# Patient Record
Sex: FEMALE | Race: WHITE | HISPANIC OR LATINO | Employment: FULL TIME | URBAN - METROPOLITAN AREA
[De-identification: names, ages, dates, MRNs, and addresses within clinical notes are randomized per-mention and may not be internally consistent; named-entity substitution may affect disease eponyms.]

---

## 2017-01-30 ENCOUNTER — OFFICE VISIT (OUTPATIENT)
Dept: OBSTETRICS AND GYNECOLOGY | Facility: CLINIC | Age: 27
End: 2017-01-30
Payer: COMMERCIAL

## 2017-01-30 VITALS
DIASTOLIC BLOOD PRESSURE: 70 MMHG | WEIGHT: 198.88 LBS | BODY MASS INDEX: 33.13 KG/M2 | SYSTOLIC BLOOD PRESSURE: 120 MMHG | HEIGHT: 65 IN

## 2017-01-30 DIAGNOSIS — Z12.4 SCREENING FOR CERVICAL CANCER: ICD-10-CM

## 2017-01-30 DIAGNOSIS — Z01.419 ROUTINE GYNECOLOGICAL EXAMINATION: Primary | ICD-10-CM

## 2017-01-30 PROCEDURE — 99999 PR PBB SHADOW E&M-EST. PATIENT-LVL II: CPT | Mod: PBBFAC,,, | Performed by: OBSTETRICS & GYNECOLOGY

## 2017-01-30 PROCEDURE — 99395 PREV VISIT EST AGE 18-39: CPT | Mod: S$GLB,,, | Performed by: OBSTETRICS & GYNECOLOGY

## 2017-01-30 PROCEDURE — 88175 CYTOPATH C/V AUTO FLUID REDO: CPT

## 2017-01-30 NOTE — PROGRESS NOTES
"OBSTETRIC HISTORY:   OB History      Para Term  AB TAB SAB Ectopic Multiple Living    0 0 0 0 0 0 0 0 0 0         COMPREHENSIVE GYN HISTORY:  PAP History: Denies abnormal Paps.  Infection History: Reports STDs: Chlamydia. Denies PID.  Benign History: Denies uterine fibroids. Denies ovarian cysts. Denies endometriosis.   Cancer History: Denies cervical cancer. Denies uterine cancer or hyperplasia. Denies ovarian cancer. Denies vulvar cancer or pre-cancer. Denies vaginal cancer or pre-cancer. Denies breast cancer. Denies colon cancer.  Sexual Activity History:   reports that she currently engages in sexual activity and has had male partners. She reports using the following methods of birth control/protection: OCP and Condom.   Menstrual History:  Every 30 days, flows for 4 days. Moderate flow.  Dysmenorrhea History: Reports occ. dysmenorrhea.  Contraception: OCP        HPI:   26 y.o.  Patient's last menstrual period was 2017.   Patient is  here for her annual gynecologic exam.  She has complaints of decreased sex drive and vaginal dryness. They have been trying to get pregnant since 2016 and she is getting positive ovulation kits. She denies bladder, breast and bowel complaints.    ROS:  GENERAL: Denies weight gain or weight loss. Feeling well overall.   SKIN: Denies rash or lesions.   HEAD: Denies headache.   NODES: Denies enlarged lymph nodes.   CHEST: Denies shortness of breath.   ABDOMEN: No abdominal pain, constipation, diarrhea, nausea, vomiting or rectal bleeding.   URINARY: No frequency, dysuria, hematuria, or burning on urination.  REPRODUCTIVE: See HPI.   BREASTS: The patient denies pain, lumps, or nipple discharge.   HEMATOLOGIC: No easy bruisability.   MUSCULOSKELETAL: Denies joint pain or back pain.   NEUROLOGIC: Denies weakness.   PSYCHIATRIC: Denies depression, anxiety or mood swings.    PE:   Visit Vitals    /70    Ht 5' 5" (1.651 m)    Wt 90.2 kg (198 lb 13.7 oz)    " LMP 01/18/2017    BMI 33.09 kg/m2     APPEARANCE: Well nourished, well developed, in no acute distress.  NECK: Neck symmetric without  thyromegaly.  NODES: No inguinal, cervical lymph node enlargement.  CHEST: Lungs clear to auscultation.  HEART: Regular rate and rhythm, no murmurs, rubs or gallops.  ABDOMEN: Soft. No tenderness or masses. No hernias.  BREASTS: Symmetrical, no skin changes or visible lesions. No palpable masses, nipple discharge or adenopathy bilaterally.  PELVIC:   VULVA: No lesions. Normal female genitalia.  URETHRAL MEATUS: Normal size and location, no lesions, no prolapse.  URETHRA: No masses, tenderness, prolapse or scarring.  VAGINA: Moist and well rugated, no discharge, no significant cystocele or rectocele.  CERVIX: No lesions and discharge.  UTERUS: Normal size, regular shape, mobile, non-tender, bladder base nontender.  ADNEXA: No masses or tenderness.    PROCEDURES:  Pap smear    Assessment:  Normal Gynecologic Exam  Decreased sex drive-- natural solutions such as increase almond intake.  Vaginal dryness -- using Proseed  If not pregnant by April 2017 we can send her for HSG and  for semen analysis at [facility]    Plan:  Mammogram and Colonoscopy if indicated by current recommendations.  Return to clinic in one year or for any problems or complaints.    Counseling:  Patient was counseled today on A.C.S. Pap guidelines and recommendations for yearly pelvic exams and monthly self breast exams; to see her PCP for other health maintenance. Regular exercise and healthy diet.

## 2017-01-31 ENCOUNTER — TELEPHONE (OUTPATIENT)
Dept: FAMILY MEDICINE | Facility: CLINIC | Age: 27
End: 2017-01-31

## 2017-01-31 NOTE — TELEPHONE ENCOUNTER
----- Message from Wilberto Rosas sent at 1/30/2017  4:08 PM CST -----  Contact: self/[phone]  The doctor cancelled her appt on 3/15; she needs something close to that because she is going to be out of her synthroid.

## 2017-02-01 DIAGNOSIS — E03.9 ACQUIRED HYPOTHYROIDISM: ICD-10-CM

## 2017-02-01 RX ORDER — LEVOTHYROXINE SODIUM 50 UG/1
50 TABLET ORAL DAILY
Qty: 90 TABLET | Refills: 3 | Status: SHIPPED | OUTPATIENT
Start: 2017-02-01 | End: 2018-03-23 | Stop reason: SDUPTHER

## 2017-02-01 NOTE — TELEPHONE ENCOUNTER
refill of the following medications:   levothyroxine (SYNTHROID) 50 MCG     Preferred pharmacy: [facility]Y  - [address] AT [facility]   Comment:   Will need refills to last until next appointment in April

## 2017-02-05 DIAGNOSIS — E03.9 ACQUIRED HYPOTHYROIDISM: ICD-10-CM

## 2017-02-05 RX ORDER — LEVOTHYROXINE SODIUM 50 UG/1
TABLET ORAL
Qty: 90 TABLET | Refills: 3 | Status: SHIPPED | OUTPATIENT
Start: 2017-02-05 | End: 2017-08-18 | Stop reason: SDUPTHER

## 2017-06-13 ENCOUNTER — PATIENT MESSAGE (OUTPATIENT)
Dept: ADMINISTRATIVE | Facility: HOSPITAL | Age: 27
End: 2017-06-13

## 2017-08-16 ENCOUNTER — PATIENT MESSAGE (OUTPATIENT)
Dept: OBSTETRICS AND GYNECOLOGY | Facility: CLINIC | Age: 27
End: 2017-08-16

## 2017-08-16 DIAGNOSIS — O20.0 THREATENED ABORTION: Primary | ICD-10-CM

## 2017-08-16 NOTE — TELEPHONE ENCOUNTER
Notify needs to check UPT and if negative not uncommon to have variability in cycle length. As long as in the window of 21-35 days it is considered normal. Unfortunately when you cycles are so varied it makes it a tiny bit harder to get pregnant.

## 2017-08-18 ENCOUNTER — OFFICE VISIT (OUTPATIENT)
Dept: INTERNAL MEDICINE | Facility: CLINIC | Age: 27
End: 2017-08-18
Payer: COMMERCIAL

## 2017-08-18 ENCOUNTER — LAB VISIT (OUTPATIENT)
Dept: LAB | Facility: HOSPITAL | Age: 27
End: 2017-08-18
Attending: INTERNAL MEDICINE
Payer: COMMERCIAL

## 2017-08-18 VITALS
WEIGHT: 205.94 LBS | HEIGHT: 65 IN | RESPIRATION RATE: 15 BRPM | SYSTOLIC BLOOD PRESSURE: 117 MMHG | HEART RATE: 80 BPM | BODY MASS INDEX: 34.31 KG/M2 | DIASTOLIC BLOOD PRESSURE: 79 MMHG | TEMPERATURE: 98 F

## 2017-08-18 DIAGNOSIS — Z00.00 ROUTINE MEDICAL EXAM: ICD-10-CM

## 2017-08-18 DIAGNOSIS — Z00.00 ROUTINE MEDICAL EXAM: Primary | ICD-10-CM

## 2017-08-18 LAB
25(OH)D3+25(OH)D2 SERPL-MCNC: 22 NG/ML
ALBUMIN SERPL BCP-MCNC: 4.1 G/DL
ALP SERPL-CCNC: 68 U/L
ALT SERPL W/O P-5'-P-CCNC: 12 U/L
ANION GAP SERPL CALC-SCNC: 10 MMOL/L
AST SERPL-CCNC: 22 U/L
BASOPHILS # BLD AUTO: 0.01 K/UL
BASOPHILS NFR BLD: 0.1 %
BILIRUB SERPL-MCNC: 0.5 MG/DL
BUN SERPL-MCNC: 11 MG/DL
CALCIUM SERPL-MCNC: 9.7 MG/DL
CHLORIDE SERPL-SCNC: 106 MMOL/L
CO2 SERPL-SCNC: 21 MMOL/L
CREAT SERPL-MCNC: 0.8 MG/DL
DIFFERENTIAL METHOD: ABNORMAL
EOSINOPHIL # BLD AUTO: 0 K/UL
EOSINOPHIL NFR BLD: 0.2 %
ERYTHROCYTE [DISTWIDTH] IN BLOOD BY AUTOMATED COUNT: 12.5 %
EST. GFR  (AFRICAN AMERICAN): >60 ML/MIN/1.73 M^2
EST. GFR  (NON AFRICAN AMERICAN): >60 ML/MIN/1.73 M^2
GLUCOSE SERPL-MCNC: 91 MG/DL
HCT VFR BLD AUTO: 42.3 %
HGB BLD-MCNC: 14.3 G/DL
LYMPHOCYTES # BLD AUTO: 3 K/UL
LYMPHOCYTES NFR BLD: 24.4 %
MCH RBC QN AUTO: 31.7 PG
MCHC RBC AUTO-ENTMCNC: 33.8 G/DL
MCV RBC AUTO: 94 FL
MONOCYTES # BLD AUTO: 1 K/UL
MONOCYTES NFR BLD: 7.8 %
NEUTROPHILS # BLD AUTO: 8.3 K/UL
NEUTROPHILS NFR BLD: 67.3 %
PLATELET # BLD AUTO: 239 K/UL
PMV BLD AUTO: 11.1 FL
POTASSIUM SERPL-SCNC: 3.9 MMOL/L
PROT SERPL-MCNC: 7.5 G/DL
RBC # BLD AUTO: 4.51 M/UL
SODIUM SERPL-SCNC: 137 MMOL/L
TSH SERPL DL<=0.005 MIU/L-ACNC: 1.43 UIU/ML
WBC # BLD AUTO: 12.29 K/UL

## 2017-08-18 PROCEDURE — 99999 PR PBB SHADOW E&M-EST. PATIENT-LVL III: CPT | Mod: PBBFAC,,, | Performed by: INTERNAL MEDICINE

## 2017-08-18 PROCEDURE — 82306 VITAMIN D 25 HYDROXY: CPT

## 2017-08-18 PROCEDURE — 99395 PREV VISIT EST AGE 18-39: CPT | Mod: S$GLB,,, | Performed by: INTERNAL MEDICINE

## 2017-08-18 PROCEDURE — 85025 COMPLETE CBC W/AUTO DIFF WBC: CPT

## 2017-08-18 PROCEDURE — 84443 ASSAY THYROID STIM HORMONE: CPT

## 2017-08-18 PROCEDURE — 80053 COMPREHEN METABOLIC PANEL: CPT

## 2017-08-18 PROCEDURE — 36415 COLL VENOUS BLD VENIPUNCTURE: CPT | Mod: PO

## 2017-08-18 RX ORDER — TOBRAMYCIN 3 MG/ML
2 SOLUTION/ DROPS OPHTHALMIC EVERY 4 HOURS
COMMUNITY
End: 2017-09-18 | Stop reason: ALTCHOICE

## 2017-08-18 NOTE — PROGRESS NOTES
The patient is a 27 y.o. old female who presents to the office for a physical.    PAST MEDICAL HISTORY  Past Medical History:   Diagnosis Date    Thyroid disease        SURGICAL HISTORY:  Past Surgical History:   Procedure Laterality Date    breast reduction  06/2012         MEDS:  Medcard reviewed and updated    ALLERGIES: Allergy Card reviewed and updated    SOCIAL HISTORY:   The patient is a nonsmoker, denies alcohol or illicit drug use.    ROS:  GENERAL: No fever, chills or weight loss.  Positive fatigue.  SKIN: No rashes.  HEAD: Occasional headaches.  Denies recent head trauma.  EYES: No photophobia, ocular pain or diplopia.  EARS: Denies ear pain, discharge or vertigo.  NOSE: No epistaxis or postnasal drip.  MOUTH & THROAT: No hoarseness or change in voice.   NODES: Denies swollen glands.  CHEST: Denies shortness of breath, wheezing, cough and sputum production.  CARDIOVASCULAR: Denies chest pain or palpitations.  ABDOMEN: Appetite fine. Denies diarrhea, constipation or blood in stool.  Positive abdominal cramping.  URINARY: No dysuria or hematuria.  MUSCULOSKELETAL: No joint stiffness or swelling. Positive back pain.  NEUROLOGIC: No history of seizures.  ENDOCRINE: Denies polyuria or polydipsia.  PSYCHIATRIC: Denies mood swings, homicidal or suicidal thoughts.  Remote history of depression and anxiety.    SCREENINGS:  Last cholesterol: 2016  Last colonoscopy: none  Last mammogram: none  Last Pap smear: 2017  Last tetanus: unknown  Last Pneumovax: none  Last eye exam: 2017  Last bone density: none  Last menstrual period: July 15    PE:   Vitals:  Vitals:    08/18/17 1549   BP: 117/79   Pulse: 80   Resp: 15   Temp: 98.2 °F (36.8 °C)       APPEARANCE: Well nourished, well developed, in no acute distress.    EYES: Sclerae anicteric. PERRL. EOMI.      EARS: TM's intact. No retraction or perforation.    NOSE: Mucosa pink. Airway clear.  MOUTH & THROAT: No tonsillar enlargement. No pharyngeal erythema or exudate.  No stridor.  NECK: Supple, no thyromegaly.  CHEST: Lungs clear to auscultation with unlabored respirations.  CARDIOVASCULAR: Normal S1, S2. No murmurs. No carotid bruits. No pedal edema.  ABDOMEN: Bowel sounds normal. Not distended. Soft. No tenderness or masses.   MUSCULOSKELETAL:  Normal gait, no cyanosis or clubbing.   SKIN: Normal skin turgor, warm and dry.  NEUROLOGIC: Cranial Nerves: Intact.  PSYCHIATRIC: The patient is oriented to person, place, and time and has a pleasant affect.        ASSESSMENT/PLAN:  Sherlyn was seen today for [facility] care and other.    Diagnoses and all orders for this visit:    Routine medical exam  -     CBC auto differential; Future  -     Comprehensive metabolic panel; Future  -     TSH; Future  -     Vitamin D; Future            Answers for HPI/ROS submitted by the patient on 8/16/2017   activity change: No  unexpected weight change: No  neck pain: No  hearing loss: No  rhinorrhea: No  trouble swallowing: No  eye discharge: No  visual disturbance: No  chest tightness: No  wheezing: No  chest pain: No  palpitations: No  blood in stool: No  constipation: No  vomiting: No  diarrhea: No  polydipsia: No  polyuria: No  difficulty urinating: No  hematuria: No  menstrual problem: No  dysuria: No  joint swelling: No  arthralgias: No  headaches: No  weakness: No  confusion: No  dysphoric mood: No

## 2017-08-21 ENCOUNTER — PATIENT MESSAGE (OUTPATIENT)
Dept: OBSTETRICS AND GYNECOLOGY | Facility: CLINIC | Age: 27
End: 2017-08-21

## 2017-08-22 ENCOUNTER — LAB VISIT (OUTPATIENT)
Dept: LAB | Facility: HOSPITAL | Age: 27
End: 2017-08-22
Attending: OBSTETRICS & GYNECOLOGY
Payer: COMMERCIAL

## 2017-08-22 DIAGNOSIS — O20.0 THREATENED ABORTION: ICD-10-CM

## 2017-08-22 LAB
ABO + RH BLD: NORMAL
BLD GP AB SCN CELLS X3 SERPL QL: NORMAL
HCG INTACT+B SERPL-ACNC: 3243 MIU/ML
PROGEST SERPL-MCNC: 8.8 NG/ML

## 2017-08-22 PROCEDURE — 86901 BLOOD TYPING SEROLOGIC RH(D): CPT

## 2017-08-22 PROCEDURE — 86900 BLOOD TYPING SEROLOGIC ABO: CPT

## 2017-08-22 PROCEDURE — 84702 CHORIONIC GONADOTROPIN TEST: CPT

## 2017-08-22 PROCEDURE — 84144 ASSAY OF PROGESTERONE: CPT

## 2017-08-22 NOTE — TELEPHONE ENCOUNTER
Patient never went for labs for bhcg, progesterone and type and screen with repeat bhcg in 48 hours.Did no one answer message or notify patient? Having cramping and has history of Chlamydia.  Can take 400 Iu of vitamin D in addition to what is in prenatal vitamin.

## 2017-08-22 NOTE — TELEPHONE ENCOUNTER
Spoke with patient notified on amount of Vitamin D and labs scheduled for today and repeat on Thursday.      Do you want to see patient in the office this week or keep appointment next week on 8/31?

## 2017-08-22 NOTE — TELEPHONE ENCOUNTER
Please advise on mychart encounter:    Sukh Smith,  I went in for my regular eye exam today and they initially didn't dilate my eyes because I'm pregnant and did some scans instead. However, one of my scans showed traction behind my right eye and they decided to use the most mild dilation to make sure there wasn't anything to worry about. They blocked my tear duct so it wouldn't get into my system, but I wanted to check with you to see if this was something to be worried about. I have to see the retinal specialist on September 25th, and they said they'll probably want to dilate again. Trying not to worry, but it's easier said than done.    Thanks,  Sherlyn

## 2017-08-22 NOTE — TELEPHONE ENCOUNTER
Please advise on patients melharfariba response:    Okay, thank you.    Last night I woke up with cramps that were more intense than usual, but I did not have any bleeding and they stopped after about 10-15 minutes. I just wanted to check if this is normal? Also, I saw that in my blood work from my PCP that I had insufficient Vitamin D levels. Do I need to take an extra supplement?

## 2017-08-23 ENCOUNTER — PATIENT MESSAGE (OUTPATIENT)
Dept: OBSTETRICS AND GYNECOLOGY | Facility: CLINIC | Age: 27
End: 2017-08-23

## 2017-08-23 ENCOUNTER — TELEPHONE (OUTPATIENT)
Dept: OBSTETRICS AND GYNECOLOGY | Facility: CLINIC | Age: 27
End: 2017-08-23

## 2017-08-23 DIAGNOSIS — O36.80X0 PREGNANCY, LOCATION UNKNOWN: Primary | ICD-10-CM

## 2017-08-23 NOTE — TELEPHONE ENCOUNTER
Patient notified and verbalized understanding.  Will come tomorrow for u/s then down to [facility] for HCG draw

## 2017-08-23 NOTE — TELEPHONE ENCOUNTER
----- Message from Wilberto Rosas sent at 8/23/2017  9:34 AM CDT -----  Contact: [phone]  She is returning the nurse's call.

## 2017-08-23 NOTE — TELEPHONE ENCOUNTER
Notify progesterone is low. Needs ultrasound on Thursday after BHCG done to rule out ectopic since having so much cramping. (put in US notes rule out ectopic)

## 2017-08-24 ENCOUNTER — OFFICE VISIT (OUTPATIENT)
Dept: OBSTETRICS AND GYNECOLOGY | Facility: CLINIC | Age: 27
End: 2017-08-24
Payer: COMMERCIAL

## 2017-08-24 ENCOUNTER — LAB VISIT (OUTPATIENT)
Dept: LAB | Facility: HOSPITAL | Age: 27
End: 2017-08-24
Attending: OBSTETRICS & GYNECOLOGY
Payer: COMMERCIAL

## 2017-08-24 ENCOUNTER — PATIENT MESSAGE (OUTPATIENT)
Dept: OBSTETRICS AND GYNECOLOGY | Facility: CLINIC | Age: 27
End: 2017-08-24

## 2017-08-24 DIAGNOSIS — Z34.91 CURRENTLY PREGNANT IN FIRST TRIMESTER WITH UNKNOWN GESTATIONAL AGE: Primary | ICD-10-CM

## 2017-08-24 DIAGNOSIS — O20.0 THREATENED ABORTION: ICD-10-CM

## 2017-08-24 DIAGNOSIS — O26.899 CRAMPING COMPLICATING PREGNANCY, ANTEPARTUM: Primary | ICD-10-CM

## 2017-08-24 DIAGNOSIS — O36.80X0 PREGNANCY, LOCATION UNKNOWN: ICD-10-CM

## 2017-08-24 DIAGNOSIS — R10.9 CRAMPING COMPLICATING PREGNANCY, ANTEPARTUM: Primary | ICD-10-CM

## 2017-08-24 LAB — HCG INTACT+B SERPL-ACNC: 6094 MIU/ML

## 2017-08-24 PROCEDURE — 76817 TRANSVAGINAL US OBSTETRIC: CPT | Mod: S$GLB,,, | Performed by: OBSTETRICS & GYNECOLOGY

## 2017-08-24 PROCEDURE — 84702 CHORIONIC GONADOTROPIN TEST: CPT

## 2017-08-24 PROCEDURE — 36415 COLL VENOUS BLD VENIPUNCTURE: CPT

## 2017-08-24 RX ORDER — PROGESTERONE 200 MG/1
400 CAPSULE ORAL NIGHTLY
Qty: 60 CAPSULE | Refills: 1 | Status: SHIPPED | OUTPATIENT
Start: 2017-08-24 | End: 2018-01-10 | Stop reason: ALTCHOICE

## 2017-08-24 NOTE — TELEPHONE ENCOUNTER
Notify numbers look good. Put on schedule with ultrasound first and see me after in 3-4 weeks. I also want her to start Prometrium since her level was a little low. Rx sent

## 2017-08-25 ENCOUNTER — PATIENT MESSAGE (OUTPATIENT)
Dept: OBSTETRICS AND GYNECOLOGY | Facility: CLINIC | Age: 27
End: 2017-08-25

## 2017-08-28 NOTE — TELEPHONE ENCOUNTER
Can get OTC lidocaine patches from either [facility] or [facility]. Can use heat or ice to back. Can take tylenol if needed

## 2017-08-28 NOTE — TELEPHONE ENCOUNTER
Patient notified via [employer].      New [employer] message:    Okay, thanks. It's been on and off the past few days, but it's stayed very light and brown.     One last question, I've been having a lot of lower back spasms (usually when bending) over the last few weeks. I know I can't take much medicine, but what can I do to relieve the discomfort?    Thanks for all of your help! Sorry I have so many questions.    Sherlyn       Please advise

## 2017-08-30 ENCOUNTER — PATIENT MESSAGE (OUTPATIENT)
Dept: OBSTETRICS AND GYNECOLOGY | Facility: CLINIC | Age: 27
End: 2017-08-30

## 2017-08-30 DIAGNOSIS — O20.0 THREATENED ABORTION: Primary | ICD-10-CM

## 2017-08-30 NOTE — TELEPHONE ENCOUNTER
Please advise to my chart encounter:        Good morning,     Today is the 3rd day I've had light spotting, but this morning it was pink instead of brown. I know spotting is normal, but I just wanted to follow up and also ask at what point should I be concerned? Thanks again for all of your help.     Sherlyn

## 2017-08-30 NOTE — TELEPHONE ENCOUNTER
We can send her for a a repeat of the [facility].  Orders signed  She needs to not worry about the color of the blood as that has no meaning

## 2017-08-31 ENCOUNTER — LAB VISIT (OUTPATIENT)
Dept: LAB | Facility: HOSPITAL | Age: 27
End: 2017-08-31
Attending: OBSTETRICS & GYNECOLOGY
Payer: COMMERCIAL

## 2017-08-31 DIAGNOSIS — O20.0 THREATENED ABORTION: ICD-10-CM

## 2017-08-31 LAB — HCG INTACT+B SERPL-ACNC: NORMAL MIU/ML

## 2017-08-31 PROCEDURE — 84702 CHORIONIC GONADOTROPIN TEST: CPT

## 2017-08-31 PROCEDURE — 36415 COLL VENOUS BLD VENIPUNCTURE: CPT | Mod: PO

## 2017-09-01 ENCOUNTER — PATIENT MESSAGE (OUTPATIENT)
Dept: OBSTETRICS AND GYNECOLOGY | Facility: HOSPITAL | Age: 27
End: 2017-09-01

## 2017-09-02 ENCOUNTER — LAB VISIT (OUTPATIENT)
Dept: LAB | Facility: HOSPITAL | Age: 27
End: 2017-09-02
Attending: OBSTETRICS & GYNECOLOGY
Payer: COMMERCIAL

## 2017-09-02 DIAGNOSIS — O20.0 THREATENED ABORTION: ICD-10-CM

## 2017-09-02 LAB — HCG INTACT+B SERPL-ACNC: NORMAL MIU/ML

## 2017-09-02 PROCEDURE — 84702 CHORIONIC GONADOTROPIN TEST: CPT

## 2017-09-02 PROCEDURE — 36415 COLL VENOUS BLD VENIPUNCTURE: CPT | Mod: PO

## 2017-09-03 ENCOUNTER — PATIENT MESSAGE (OUTPATIENT)
Dept: OBSTETRICS AND GYNECOLOGY | Facility: CLINIC | Age: 27
End: 2017-09-03

## 2017-09-15 ENCOUNTER — PATIENT MESSAGE (OUTPATIENT)
Dept: OBSTETRICS AND GYNECOLOGY | Facility: CLINIC | Age: 27
End: 2017-09-15

## 2017-09-18 ENCOUNTER — ROUTINE PRENATAL (OUTPATIENT)
Dept: OBSTETRICS AND GYNECOLOGY | Facility: CLINIC | Age: 27
End: 2017-09-18
Payer: COMMERCIAL

## 2017-09-18 ENCOUNTER — LAB VISIT (OUTPATIENT)
Dept: LAB | Facility: HOSPITAL | Age: 27
End: 2017-09-18
Attending: OBSTETRICS & GYNECOLOGY
Payer: COMMERCIAL

## 2017-09-18 ENCOUNTER — OFFICE VISIT (OUTPATIENT)
Dept: OBSTETRICS AND GYNECOLOGY | Facility: CLINIC | Age: 27
End: 2017-09-18
Payer: COMMERCIAL

## 2017-09-18 VITALS — DIASTOLIC BLOOD PRESSURE: 80 MMHG | SYSTOLIC BLOOD PRESSURE: 122 MMHG | BODY MASS INDEX: 33.86 KG/M2 | WEIGHT: 203.5 LBS

## 2017-09-18 DIAGNOSIS — Z34.91 CURRENTLY PREGNANT IN FIRST TRIMESTER WITH UNKNOWN GESTATIONAL AGE: ICD-10-CM

## 2017-09-18 DIAGNOSIS — Z34.01 ENCOUNTER FOR SUPERVISION OF NORMAL FIRST PREGNANCY IN FIRST TRIMESTER: ICD-10-CM

## 2017-09-18 DIAGNOSIS — Z34.01 ENCOUNTER FOR SUPERVISION OF NORMAL FIRST PREGNANCY IN FIRST TRIMESTER: Primary | ICD-10-CM

## 2017-09-18 LAB
ERYTHROCYTE [DISTWIDTH] IN BLOOD BY AUTOMATED COUNT: 13.1 %
HCT VFR BLD AUTO: 44.2 %
HGB BLD-MCNC: 14.7 G/DL
MCH RBC QN AUTO: 31.2 PG
MCHC RBC AUTO-ENTMCNC: 33.3 G/DL
MCV RBC AUTO: 94 FL
PLATELET # BLD AUTO: 231 K/UL
PMV BLD AUTO: 11.3 FL
RBC # BLD AUTO: 4.71 M/UL
TSH SERPL DL<=0.005 MIU/L-ACNC: 0.58 UIU/ML
WBC # BLD AUTO: 11.99 K/UL

## 2017-09-18 PROCEDURE — 76801 OB US < 14 WKS SINGLE FETUS: CPT | Mod: S$GLB,,, | Performed by: OBSTETRICS & GYNECOLOGY

## 2017-09-18 PROCEDURE — 85027 COMPLETE CBC AUTOMATED: CPT

## 2017-09-18 PROCEDURE — 99999 PR PBB SHADOW E&M-EST. PATIENT-LVL II: CPT | Mod: PBBFAC,,, | Performed by: OBSTETRICS & GYNECOLOGY

## 2017-09-18 PROCEDURE — 87591 N.GONORRHOEAE DNA AMP PROB: CPT

## 2017-09-18 PROCEDURE — 0500F INITIAL PRENATAL CARE VISIT: CPT | Mod: S$GLB,,, | Performed by: OBSTETRICS & GYNECOLOGY

## 2017-09-18 PROCEDURE — 84443 ASSAY THYROID STIM HORMONE: CPT

## 2017-09-18 PROCEDURE — 36415 COLL VENOUS BLD VENIPUNCTURE: CPT

## 2017-09-18 PROCEDURE — 86762 RUBELLA ANTIBODY: CPT

## 2017-09-18 PROCEDURE — 87340 HEPATITIS B SURFACE AG IA: CPT

## 2017-09-18 PROCEDURE — 86592 SYPHILIS TEST NON-TREP QUAL: CPT

## 2017-09-18 PROCEDURE — 86703 HIV-1/HIV-2 1 RESULT ANTBDY: CPT

## 2017-09-18 PROCEDURE — 87086 URINE CULTURE/COLONY COUNT: CPT

## 2017-09-18 RX ORDER — CYANOCOBALAMIN (VITAMIN B-12) 500 MCG
400 TABLET ORAL DAILY
COMMUNITY
End: 2018-12-13

## 2017-09-18 NOTE — PROGRESS NOTES
OBSTETRIC HISTORY:   OB History      Para Term  AB Living    1 0 0 0 0 0    SAB TAB Ectopic Multiple Live Births    0 0 0 0           HPI:   27 y.o.  Patient's last menstrual period was 07/15/2017.   Patient is  here for pregnancy. She denies bladder, breast and bowel complaints.    ROS:  GENERAL: Denies weight gain or weight loss. Feeling well overall.   SKIN: Denies rash or lesions.   HEAD: Denies headache.   NODES: Denies enlarged lymph nodes.   CHEST: Denies shortness of breath.   ABDOMEN: No abdominal pain, constipation, diarrhea, nausea, vomiting or rectal bleeding.   URINARY: No frequency, dysuria, hematuria, or burning on urination.  REPRODUCTIVE: See HPI.   BREASTS: The patient denies pain, lumps, or nipple discharge.   HEMATOLOGIC: No easy bruisability.   MUSCULOSKELETAL: Denies joint pain or back pain.   NEUROLOGIC: Denies weakness.   PSYCHIATRIC: Denies depression, anxiety or mood swings.    PE:   /80   Wt 92.3 kg (203 lb 7.8 oz)   LMP 07/15/2017   BMI 33.86 kg/m²   APPEARANCE: Well nourished, well developed, in no acute distress.  ABDOMEN: Soft. No tenderness or masses. No hernias.  PELVIC:   VULVA: No lesions. Normal female genitalia.  URETHRAL MEATUS: Normal size and location, no lesions, no prolapse.  URETHRA: No masses, tenderness, prolapse or scarring.  VAGINA: Moist and well rugated, no discharge, no significant cystocele or rectocele.  CERVIX: No lesions and discharge.  UTERUS: Normal size, regular shape, mobile, non-tender, bladder base nontender.  ADNEXA: No masses or tenderness.    ASSESSMENT:  Pregnancy    PLAN:  RTO 4 weeks  GC/CT done  Patient was counseled today on routine 1st trimester precautions, including vaginal bleeding and abdominal pain. Maternal Quad screen offered - patient does desire screening.  Weight: We discussed proper weight gain based on the [employer]'s recommendations based on her pre-pregnancy weight- see below. Diet: Avoid raw meat ie  sushi, unpasteurized cheese, and heat up deli meat. Eat fish that are high in mercury (zuly mackerel, swordfish, tuna) only 6-12 oz once a week. Environment: Patient also given environmental precautions such as avoiding cat litter, Zika Virus precautions and gardening without gloves. Discussed daily prenatal vitamin with folate/iron options (i.e. stool softener, DHA) and avoidance of smoking. Regular and moderate exercise for 30 min or more per day with the avoidance of activities with a high risk of falling, prolonged supine positions, or abdominal trauma.

## 2017-09-19 ENCOUNTER — PATIENT MESSAGE (OUTPATIENT)
Dept: OBSTETRICS AND GYNECOLOGY | Facility: CLINIC | Age: 27
End: 2017-09-19

## 2017-09-19 LAB
C TRACH DNA SPEC QL NAA+PROBE: NOT DETECTED
HBV SURFACE AG SERPL QL IA: NEGATIVE
HIV 1+2 AB+HIV1 P24 AG SERPL QL IA: NEGATIVE
N GONORRHOEA DNA SPEC QL NAA+PROBE: NOT DETECTED
RPR SER QL: NORMAL
RUBV IGG SER-ACNC: 14.7 IU/ML
RUBV IGG SER-IMP: REACTIVE

## 2017-09-20 ENCOUNTER — PATIENT MESSAGE (OUTPATIENT)
Dept: OBSTETRICS AND GYNECOLOGY | Facility: CLINIC | Age: 27
End: 2017-09-20

## 2017-09-20 LAB
BACTERIA UR CULT: NORMAL
BACTERIA UR CULT: NORMAL

## 2017-09-20 NOTE — TELEPHONE ENCOUNTER
Please advise on mychart encounter:    Dr. Smith,    I forgot to mention on Monday- I've been getting a sharp kind of pulling pain behind my bellybutton- it seems to be to the left of it.  It comes and goes on and off, but it has been more continuous the past day and a half.  It's not super painful, but it is pretty uncomfortable.  Are there any stretches or anything I can do without medication to relieve the discomfort?    Thanks,  Sherlyn

## 2017-09-20 NOTE — TELEPHONE ENCOUNTER
Try some gas x as it is safe and she may want to look up exercises/stretches on line/you tube. It is most likely gas or bowel related secondary to bowel changes with pregnancy.

## 2017-10-04 ENCOUNTER — PATIENT MESSAGE (OUTPATIENT)
Dept: OBSTETRICS AND GYNECOLOGY | Facility: CLINIC | Age: 27
End: 2017-10-04

## 2017-10-05 NOTE — TELEPHONE ENCOUNTER
Needs to check her pulse when the flutter happens. In pregnancy heart rate can be normal up to 120. If it is ever greater than 120 when she is having flutter may need to go to ED debi if close to 200. She maybe having PVC's if worsens needs to see PCP

## 2017-10-09 ENCOUNTER — PATIENT MESSAGE (OUTPATIENT)
Dept: OBSTETRICS AND GYNECOLOGY | Facility: CLINIC | Age: 27
End: 2017-10-09

## 2017-10-18 ENCOUNTER — CLINICAL SUPPORT (OUTPATIENT)
Dept: OBSTETRICS AND GYNECOLOGY | Facility: CLINIC | Age: 27
End: 2017-10-18
Payer: COMMERCIAL

## 2017-10-18 ENCOUNTER — ROUTINE PRENATAL (OUTPATIENT)
Dept: OBSTETRICS AND GYNECOLOGY | Facility: CLINIC | Age: 27
End: 2017-10-18
Payer: COMMERCIAL

## 2017-10-18 VITALS
DIASTOLIC BLOOD PRESSURE: 80 MMHG | SYSTOLIC BLOOD PRESSURE: 100 MMHG | WEIGHT: 201.75 LBS | BODY MASS INDEX: 33.57 KG/M2

## 2017-10-18 DIAGNOSIS — Z23 FLU VACCINE NEED: Primary | ICD-10-CM

## 2017-10-18 DIAGNOSIS — Z34.01 ENCOUNTER FOR SUPERVISION OF NORMAL FIRST PREGNANCY IN FIRST TRIMESTER: Primary | ICD-10-CM

## 2017-10-18 PROCEDURE — 90471 IMMUNIZATION ADMIN: CPT | Mod: S$GLB,,, | Performed by: OBSTETRICS & GYNECOLOGY

## 2017-10-18 PROCEDURE — 99999 PR PBB SHADOW E&M-EST. PATIENT-LVL II: CPT | Mod: PBBFAC,,, | Performed by: OBSTETRICS & GYNECOLOGY

## 2017-10-18 PROCEDURE — 90686 IIV4 VACC NO PRSV 0.5 ML IM: CPT | Mod: S$GLB,,, | Performed by: OBSTETRICS & GYNECOLOGY

## 2017-10-18 PROCEDURE — 0502F SUBSEQUENT PRENATAL CARE: CPT | Mod: S$GLB,,, | Performed by: OBSTETRICS & GYNECOLOGY

## 2017-10-18 NOTE — PROGRESS NOTES
Flu vaccine today  Given info to read MSAFP  Sounds like esophageal spasm  Max heart rate with exercise is 150

## 2017-10-18 NOTE — PROGRESS NOTES
10/18/17 at 1604 administered 0.5 ml Fluzone Quadrivalent to R deltoid.   Pt tolerated well.   Pt advised to wait 10 minutes before leaving the office.   Pt verbalized understanding.   Pt received VIS.  Lot: [id number]  Exp: 6/30/18  Man: [employer] Paste[employer]

## 2017-10-29 ENCOUNTER — PATIENT MESSAGE (OUTPATIENT)
Dept: OBSTETRICS AND GYNECOLOGY | Facility: CLINIC | Age: 27
End: 2017-10-29

## 2017-10-30 NOTE — TELEPHONE ENCOUNTER
Please advise on any further recommendations:    From: Sherlyn Monahan     Sent: 10/29/2017  5:18 PM CDT       To: Chiara Smith MD  Subject: Non-Urgent Medical    Hey Dr. Smith,   We took family pictures today and I sat in an ant pile. I used some baking soda on the bites and took a small dose of Benadryl. Is there anything else I should do? They're in some pretty awkward places.    Thanks,  Sherlyn    From: Med Assistant Osiris  Sent: 10/30/17, 11:17 AM  To: Sherlyn Monahan  Subject: RE: Non-Urgent Medical    Have you tried Hydrocortisone cream for the itching?    To: KADEEM KYLE STAFF      From: Sherlyn Monahan      Created: 10/30/2017 11:17 AM        No but I definitely will! Thanks!

## 2017-11-13 ENCOUNTER — ROUTINE PRENATAL (OUTPATIENT)
Dept: OBSTETRICS AND GYNECOLOGY | Facility: CLINIC | Age: 27
End: 2017-11-13
Payer: COMMERCIAL

## 2017-11-13 VITALS
DIASTOLIC BLOOD PRESSURE: 66 MMHG | SYSTOLIC BLOOD PRESSURE: 120 MMHG | BODY MASS INDEX: 33.68 KG/M2 | WEIGHT: 202.38 LBS

## 2017-11-13 DIAGNOSIS — Z36.3 SCREENING, ANTENATAL, FOR MALFORMATION BY ULTRASOUND: ICD-10-CM

## 2017-11-13 DIAGNOSIS — Z3A.17 17 WEEKS GESTATION OF PREGNANCY: ICD-10-CM

## 2017-11-13 DIAGNOSIS — Z34.02 ENCOUNTER FOR SUPERVISION OF NORMAL FIRST PREGNANCY IN SECOND TRIMESTER: Primary | ICD-10-CM

## 2017-11-13 PROCEDURE — 0502F SUBSEQUENT PRENATAL CARE: CPT | Mod: S$GLB,,, | Performed by: OBSTETRICS & GYNECOLOGY

## 2017-11-13 PROCEDURE — 99999 PR PBB SHADOW E&M-EST. PATIENT-LVL II: CPT | Mod: PBBFAC,,, | Performed by: OBSTETRICS & GYNECOLOGY

## 2017-11-13 NOTE — PROGRESS NOTES
Complaining of slight bleeding, only when wiped on Saturday.  Had intercourse about a week before.      Baby friendly education given for weeks 17-20

## 2017-11-16 ENCOUNTER — PATIENT MESSAGE (OUTPATIENT)
Dept: OBSTETRICS AND GYNECOLOGY | Facility: CLINIC | Age: 27
End: 2017-11-16

## 2017-11-16 NOTE — TELEPHONE ENCOUNTER
Please advise on mychart encounter:    Good morning,    I was just wondering what is the best way to treat allergies. They've been pretty bad the past few days, but I didn't want to start taking anything I wasn't supposed to. I know you said Zyrtec is safe. Is Flonase also okay to use?    Thanks,  Sherlyn

## 2017-12-04 ENCOUNTER — PROCEDURE VISIT (OUTPATIENT)
Dept: OBSTETRICS AND GYNECOLOGY | Facility: CLINIC | Age: 27
End: 2017-12-04
Payer: COMMERCIAL

## 2017-12-04 ENCOUNTER — ROUTINE PRENATAL (OUTPATIENT)
Dept: OBSTETRICS AND GYNECOLOGY | Facility: CLINIC | Age: 27
End: 2017-12-04
Payer: COMMERCIAL

## 2017-12-04 VITALS
WEIGHT: 204.38 LBS | DIASTOLIC BLOOD PRESSURE: 62 MMHG | SYSTOLIC BLOOD PRESSURE: 116 MMHG | BODY MASS INDEX: 34.01 KG/M2

## 2017-12-04 DIAGNOSIS — Z36.3 SCREENING, ANTENATAL, FOR MALFORMATION BY ULTRASOUND: ICD-10-CM

## 2017-12-04 DIAGNOSIS — Z34.02 ENCOUNTER FOR SUPERVISION OF NORMAL FIRST PREGNANCY IN SECOND TRIMESTER: Primary | ICD-10-CM

## 2017-12-04 DIAGNOSIS — Z3A.20 20 WEEKS GESTATION OF PREGNANCY: ICD-10-CM

## 2017-12-04 PROCEDURE — 99999 PR PBB SHADOW E&M-EST. PATIENT-LVL II: CPT | Mod: PBBFAC,,, | Performed by: OBSTETRICS & GYNECOLOGY

## 2017-12-04 PROCEDURE — 76805 OB US >/= 14 WKS SNGL FETUS: CPT | Mod: S$GLB,,, | Performed by: OBSTETRICS & GYNECOLOGY

## 2017-12-04 PROCEDURE — 0502F SUBSEQUENT PRENATAL CARE: CPT | Mod: S$GLB,,, | Performed by: OBSTETRICS & GYNECOLOGY

## 2017-12-06 ENCOUNTER — TELEPHONE (OUTPATIENT)
Dept: OBSTETRICS AND GYNECOLOGY | Facility: CLINIC | Age: 27
End: 2017-12-06

## 2017-12-07 ENCOUNTER — TELEPHONE (OUTPATIENT)
Dept: OBSTETRICS AND GYNECOLOGY | Facility: CLINIC | Age: 27
End: 2017-12-07

## 2017-12-07 NOTE — TELEPHONE ENCOUNTER
Spoke with patient, she fell on her back this morning, no cramping or bleeding, back pain, pain scale 5. She want to know should she be concerned.   She spoke with a triage nurse and was told to wait for your advice.      Please advice

## 2017-12-07 NOTE — TELEPHONE ENCOUNTER
----- Message from Melba Domínguez sent at 12/7/2017  8:08 AM CST -----  Contact: self/[phone]  Patient slipped on her steps and fell on her back this morning.  She needs to know if she should be concerned.  Please call and advise.

## 2017-12-07 NOTE — TELEPHONE ENCOUNTER
If she did not hit her abdomen she does not need to do anything. Can do ice or heat to back prn and take Tylenol

## 2017-12-11 ENCOUNTER — PATIENT MESSAGE (OUTPATIENT)
Dept: OBSTETRICS AND GYNECOLOGY | Facility: CLINIC | Age: 27
End: 2017-12-11

## 2017-12-11 ENCOUNTER — TELEPHONE (OUTPATIENT)
Dept: OBSTETRICS AND GYNECOLOGY | Facility: CLINIC | Age: 27
End: 2017-12-11

## 2017-12-11 NOTE — TELEPHONE ENCOUNTER
Notify most important thing is washing hands and having him cover lesions until they are crusted over so she doesn't have contact with lesions

## 2017-12-11 NOTE — TELEPHONE ENCOUNTER
Patient notified, wants to let you know that she did have contact with him before they even knew it was shingles.    Please advise

## 2017-12-11 NOTE — TELEPHONE ENCOUNTER
Spoke with patient she is a little concern,sent message via patient portal          Good morning,     I just wanted to let you know that Will found out over the weekend that he has shingles. I've been vaccinated for chicken pox, but I didn't know if there was anything else I needed to do, and I just wanted you to be aware.     Thanks,   Sherlyn       Please advise

## 2017-12-28 ENCOUNTER — NURSE TRIAGE (OUTPATIENT)
Dept: ADMINISTRATIVE | Facility: CLINIC | Age: 27
End: 2017-12-28

## 2017-12-29 NOTE — TELEPHONE ENCOUNTER
"  Reason for Disposition   [1] Pregnant 23 or more weeks AND [2] baby moving less today AND [3] willing to perform kick count  (all triage questions negative)    Answer Assessment - Initial Assessment Questions  1. FETAL MOVEMENT: "Has the baby's movement decreased or changed significantly from normal?" (e.g., yes, no; describe)      Mom thinks they have been less than normal today- normal in am - after nap today seemed less than normal but is sure in past hr has been at least 5 times  2. SY: "What date are you expecting to deliver?"         3. PREGNANCY: "How many weeks pregnant are you?"       23/24 wks  4. OTHER SYMPTOMS: "Do you have any other symptoms?" (e.g., abdominal pain, leaking fluid from vagina, vaginal bleeding, etc.)      None reported    Protocols used: ST PREGNANCY - DECREASED FETAL MOVEMENT-A-AH    "

## 2018-01-05 ENCOUNTER — PATIENT MESSAGE (OUTPATIENT)
Dept: OBSTETRICS AND GYNECOLOGY | Facility: CLINIC | Age: 28
End: 2018-01-05

## 2018-01-05 NOTE — TELEPHONE ENCOUNTER
Notify if over the weekend thinks she has the Flu needs to go to Urgent Care otherwise can use Chloraseptic spray and cough drops.

## 2018-01-06 ENCOUNTER — PATIENT MESSAGE (OUTPATIENT)
Dept: OBSTETRICS AND GYNECOLOGY | Facility: CLINIC | Age: 28
End: 2018-01-06

## 2018-01-08 ENCOUNTER — OFFICE VISIT (OUTPATIENT)
Dept: URGENT CARE | Facility: CLINIC | Age: 28
End: 2018-01-08
Payer: COMMERCIAL

## 2018-01-08 VITALS
BODY MASS INDEX: 33.49 KG/M2 | WEIGHT: 201 LBS | DIASTOLIC BLOOD PRESSURE: 80 MMHG | OXYGEN SATURATION: 98 % | RESPIRATION RATE: 16 BRPM | HEIGHT: 65 IN | SYSTOLIC BLOOD PRESSURE: 122 MMHG | HEART RATE: 78 BPM | TEMPERATURE: 98 F

## 2018-01-08 DIAGNOSIS — R05.9 COUGH: ICD-10-CM

## 2018-01-08 DIAGNOSIS — J06.9 UPPER RESPIRATORY TRACT INFECTION, UNSPECIFIED TYPE: Primary | ICD-10-CM

## 2018-01-08 LAB
CTP QC/QA: YES
FLUAV AG NPH QL: NEGATIVE
FLUBV AG NPH QL: NEGATIVE

## 2018-01-08 PROCEDURE — 99214 OFFICE O/P EST MOD 30 MIN: CPT | Mod: S$GLB,,, | Performed by: FAMILY MEDICINE

## 2018-01-08 PROCEDURE — 87804 INFLUENZA ASSAY W/OPTIC: CPT | Mod: QW,S$GLB,, | Performed by: FAMILY MEDICINE

## 2018-01-08 NOTE — PATIENT INSTRUCTIONS
Viral Upper Respiratory Illness (Adult)  You have a viral upper respiratory illness (URI), which is another term for the common cold. This illness is contagious during the first few days. It is spread through the air by coughing and sneezing. It may also be spread by direct contact (touching the sick person and then touching your own eyes, nose, or mouth). Frequent handwashing will decrease risk of spread. Most viral illnesses go away within 7 to 10 days with rest and simple home remedies. Sometimes the illness may last for several weeks. Antibiotics will not kill a virus, and they are generally not prescribed for this condition.    Home care  · If symptoms are severe, rest at home for the first 2 to 3 days. When you resume activity, don't let yourself get too tired.  · Avoid being exposed to cigarette smoke (yours or others).  · You may use acetaminophen or ibuprofen to control pain and fever, unless another medicine was prescribed. (Note: If you have chronic liver or kidney disease, have ever had a stomach ulcer or gastrointestinal bleeding, or are taking blood-thinning medicines, talk with your healthcare provider before using these medicines.) Aspirin should never be given to anyone under 18 years of age who is ill with a viral infection or fever. It may cause severe liver or brain damage.  · Your appetite may be poor, so a light diet is fine. Avoid dehydration by drinking 6 to 8 glasses of fluids per day (water, soft drinks, juices, tea, or soup). Extra fluids will help loosen secretions in the nose and lungs.  · Over-the-counter cold medicines will not shorten the length of time youre sick, but they may be helpful for the following symptoms: cough, sore throat, and nasal and sinus congestion. (Note: Do not use decongestants if you have high blood pressure.)  Follow-up care  Follow up with your healthcare provider, or as advised.  When to seek medical advice  Call your healthcare provider right away if any  of these occur:  · Cough with lots of colored sputum (mucus)  · Severe headache; face, neck, or ear pain  · Difficulty swallowing due to throat pain  · Fever of 100.4°F (38°C)  Call 911, or get immediate medical care  Call emergency services right away if any of these occur:  · Chest pain, shortness of breath, wheezing, or difficulty breathing  · Coughing up blood  · Inability to swallow due to throat pain  Date Last Reviewed: 9/13/2015  © 0499-8265 [employer]. [address] [place] [address]. All rights reserved. This information is not intended as a substitute for professional medical care. Always follow your healthcare professional's instructions.      PREGNANT PATIENTS ARE LIMITED IN USING OVER THE COUNTER MEDICATIONS TO SUDAFED, BENADRYL, ROBITUSSIN DM AND TYLENOL.  FLONASE NASAL SPRAY WOULD ALSO BE A GOOD CHOICE.     DISCUSS ANY OTHER MEDICATIONS WITH YOUR OB BEFORE USING THEM.

## 2018-01-08 NOTE — PROGRESS NOTES
"Subjective:       Patient ID: Sherlyn Monahan is a 27 y.o. female.    Vitals:  height is 5' 5" (1.651 m) and weight is 91.2 kg (201 lb). Her oral temperature is 98.1 °F (36.7 °C). Her blood pressure is 122/80 and her pulse is 78. Her respiration is 16 and oxygen saturation is 98%.     Chief Complaint: Cough    Cough   This is a new problem. Episode onset: 4d. The problem has been gradually worsening. The cough is productive of sputum. Associated symptoms include ear congestion, headaches, nasal congestion and postnasal drip. Pertinent negatives include no chest pain, chills, ear pain, eye redness, fever, myalgias, sore throat, shortness of breath or wheezing. Nothing aggravates the symptoms. She has tried nothing for the symptoms.     Review of Systems   Constitution: Positive for malaise/fatigue. Negative for chills and fever.   HENT: Positive for congestion and postnasal drip. Negative for ear pain, hoarse voice and sore throat.    Eyes: Negative for discharge and redness.   Cardiovascular: Negative for chest pain, dyspnea on exertion and leg swelling.   Respiratory: Positive for cough and sputum production. Negative for shortness of breath and wheezing.    Musculoskeletal: Negative for myalgias.   Gastrointestinal: Positive for diarrhea. Negative for abdominal pain and nausea.   Neurological: Positive for headaches.       Objective:      Physical Exam   Constitutional: She is oriented to person, place, and time. She appears well-developed and well-nourished. She is cooperative.  Non-toxic appearance. She does not appear ill. No distress.   HENT:   Head: Normocephalic and atraumatic.   Right Ear: Hearing, external ear and ear canal normal. A middle ear effusion is present.   Left Ear: Hearing, external ear and ear canal normal. A middle ear effusion is present.   Nose: Nose normal. No mucosal edema, rhinorrhea or nasal deformity. No epistaxis. Right sinus exhibits no maxillary sinus tenderness and no frontal " sinus tenderness. Left sinus exhibits no maxillary sinus tenderness and no frontal sinus tenderness.   Mouth/Throat: Uvula is midline and mucous membranes are normal. No trismus in the jaw. Normal dentition. No uvula swelling. Posterior oropharyngeal edema present. No posterior oropharyngeal erythema.   Eyes: Conjunctivae and lids are normal. No scleral icterus.   Sclera clear bilat   Neck: Trachea normal, full passive range of motion without pain and phonation normal. Neck supple.   Cardiovascular: Normal rate, regular rhythm, normal heart sounds, intact distal pulses and normal pulses.    Pulmonary/Chest: Effort normal and breath sounds normal. No respiratory distress. She has no decreased breath sounds. She has no wheezes. She has no rhonchi. She has no rales.   Abdominal: Soft. Normal appearance and bowel sounds are normal. She exhibits no distension. There is no tenderness.   Gravid abdomen   Musculoskeletal: Normal range of motion. She exhibits no edema or deformity.   Lymphadenopathy:     She has cervical adenopathy.        Right cervical: Superficial cervical adenopathy present.        Left cervical: Superficial cervical adenopathy present.   Neurological: She is alert and oriented to person, place, and time. She exhibits normal muscle tone. Coordination normal.   Skin: Skin is warm, dry and intact. She is not diaphoretic. No pallor.   Psychiatric: She has a normal mood and affect. Her speech is normal and behavior is normal. Judgment and thought content normal. Cognition and memory are normal.   Nursing note and vitals reviewed.      Assessment:       1. Upper respiratory tract infection, unspecified type    2. Cough        Plan:         Upper respiratory tract infection, unspecified type    Cough  -     POCT Influenza A/B      Follow Up Comments   Make sure that you follow up with your primary care doctor in the next 2-5 days if needed .  Return to the Urgent Care if signs or symptoms change and certainly  if you have worsening symptoms go to the nearest emergency department for further evaluation.

## 2018-01-10 ENCOUNTER — PROCEDURE VISIT (OUTPATIENT)
Dept: OBSTETRICS AND GYNECOLOGY | Facility: CLINIC | Age: 28
End: 2018-01-10
Payer: COMMERCIAL

## 2018-01-10 ENCOUNTER — ROUTINE PRENATAL (OUTPATIENT)
Dept: OBSTETRICS AND GYNECOLOGY | Facility: CLINIC | Age: 28
End: 2018-01-10
Payer: COMMERCIAL

## 2018-01-10 VITALS — SYSTOLIC BLOOD PRESSURE: 118 MMHG | WEIGHT: 205 LBS | DIASTOLIC BLOOD PRESSURE: 62 MMHG | BODY MASS INDEX: 34.12 KG/M2

## 2018-01-10 DIAGNOSIS — Z34.02 ENCOUNTER FOR SUPERVISION OF NORMAL FIRST PREGNANCY IN SECOND TRIMESTER: Primary | ICD-10-CM

## 2018-01-10 DIAGNOSIS — Z3A.25 25 WEEKS GESTATION OF PREGNANCY: ICD-10-CM

## 2018-01-10 PROCEDURE — 99999 PR PBB SHADOW E&M-EST. PATIENT-LVL II: CPT | Mod: PBBFAC,,, | Performed by: OBSTETRICS & GYNECOLOGY

## 2018-01-10 PROCEDURE — 76816 OB US FOLLOW-UP PER FETUS: CPT | Mod: S$GLB,,, | Performed by: OBSTETRICS & GYNECOLOGY

## 2018-01-10 PROCEDURE — 0502F SUBSEQUENT PRENATAL CARE: CPT | Mod: S$GLB,,, | Performed by: OBSTETRICS & GYNECOLOGY

## 2018-01-10 NOTE — PROGRESS NOTES
DM/CBC next visit as well as TSH  Round ligament pain can try maternity belt, stretches. Also, has asymmetric pelvic crests.

## 2018-01-13 ENCOUNTER — LAB VISIT (OUTPATIENT)
Dept: LAB | Facility: HOSPITAL | Age: 28
End: 2018-01-13
Attending: OBSTETRICS & GYNECOLOGY
Payer: COMMERCIAL

## 2018-01-13 DIAGNOSIS — Z34.02 ENCOUNTER FOR SUPERVISION OF NORMAL FIRST PREGNANCY IN SECOND TRIMESTER: ICD-10-CM

## 2018-01-13 LAB
BASOPHILS # BLD AUTO: 0 K/UL
BASOPHILS NFR BLD: 0 %
DIFFERENTIAL METHOD: ABNORMAL
EOSINOPHIL # BLD AUTO: 0 K/UL
EOSINOPHIL NFR BLD: 0.1 %
ERYTHROCYTE [DISTWIDTH] IN BLOOD BY AUTOMATED COUNT: 13.1 %
GLUCOSE SERPL-MCNC: 110 MG/DL
HCT VFR BLD AUTO: 37.8 %
HGB BLD-MCNC: 12.5 G/DL
LYMPHOCYTES # BLD AUTO: 2.1 K/UL
LYMPHOCYTES NFR BLD: 23.7 %
MCH RBC QN AUTO: 31.6 PG
MCHC RBC AUTO-ENTMCNC: 33.1 G/DL
MCV RBC AUTO: 96 FL
MONOCYTES # BLD AUTO: 0.5 K/UL
MONOCYTES NFR BLD: 5.7 %
NEUTROPHILS # BLD AUTO: 6.1 K/UL
NEUTROPHILS NFR BLD: 70.3 %
PLATELET # BLD AUTO: 176 K/UL
PMV BLD AUTO: 11 FL
RBC # BLD AUTO: 3.95 M/UL
TSH SERPL DL<=0.005 MIU/L-ACNC: 1.11 UIU/ML
WBC # BLD AUTO: 8.66 K/UL

## 2018-01-13 PROCEDURE — 85025 COMPLETE CBC W/AUTO DIFF WBC: CPT

## 2018-01-13 PROCEDURE — 36415 COLL VENOUS BLD VENIPUNCTURE: CPT

## 2018-01-13 PROCEDURE — 82950 GLUCOSE TEST: CPT

## 2018-01-13 PROCEDURE — 84443 ASSAY THYROID STIM HORMONE: CPT

## 2018-01-14 ENCOUNTER — PATIENT MESSAGE (OUTPATIENT)
Dept: OBSTETRICS AND GYNECOLOGY | Facility: CLINIC | Age: 28
End: 2018-01-14

## 2018-01-18 ENCOUNTER — PATIENT MESSAGE (OUTPATIENT)
Dept: OBSTETRICS AND GYNECOLOGY | Facility: CLINIC | Age: 28
End: 2018-01-18

## 2018-02-01 ENCOUNTER — ROUTINE PRENATAL (OUTPATIENT)
Dept: OBSTETRICS AND GYNECOLOGY | Facility: CLINIC | Age: 28
End: 2018-02-01
Payer: COMMERCIAL

## 2018-02-01 VITALS
SYSTOLIC BLOOD PRESSURE: 110 MMHG | BODY MASS INDEX: 34.49 KG/M2 | WEIGHT: 207.25 LBS | DIASTOLIC BLOOD PRESSURE: 72 MMHG

## 2018-02-01 DIAGNOSIS — Z3A.28 28 WEEKS GESTATION OF PREGNANCY: ICD-10-CM

## 2018-02-01 DIAGNOSIS — Z34.03 ENCOUNTER FOR SUPERVISION OF NORMAL FIRST PREGNANCY IN THIRD TRIMESTER: Primary | ICD-10-CM

## 2018-02-01 PROCEDURE — 0502F SUBSEQUENT PRENATAL CARE: CPT | Mod: S$GLB,,, | Performed by: OBSTETRICS & GYNECOLOGY

## 2018-02-01 PROCEDURE — 99999 PR PBB SHADOW E&M-EST. PATIENT-LVL II: CPT | Mod: PBBFAC,,, | Performed by: OBSTETRICS & GYNECOLOGY

## 2018-02-07 ENCOUNTER — PATIENT MESSAGE (OUTPATIENT)
Dept: OBSTETRICS AND GYNECOLOGY | Facility: CLINIC | Age: 28
End: 2018-02-07

## 2018-02-07 NOTE — TELEPHONE ENCOUNTER
Any color when she coughs up mucous? If not most likely allergies or from viral illness. Cough can be expected for 2-3 weeks after a virus or cold

## 2018-02-19 ENCOUNTER — PROCEDURE VISIT (OUTPATIENT)
Dept: OBSTETRICS AND GYNECOLOGY | Facility: CLINIC | Age: 28
End: 2018-02-19
Payer: COMMERCIAL

## 2018-02-19 ENCOUNTER — ROUTINE PRENATAL (OUTPATIENT)
Dept: OBSTETRICS AND GYNECOLOGY | Facility: CLINIC | Age: 28
End: 2018-02-19
Payer: COMMERCIAL

## 2018-02-19 VITALS
BODY MASS INDEX: 34.74 KG/M2 | DIASTOLIC BLOOD PRESSURE: 66 MMHG | WEIGHT: 208.75 LBS | SYSTOLIC BLOOD PRESSURE: 118 MMHG

## 2018-02-19 DIAGNOSIS — Z34.03 ENCOUNTER FOR SUPERVISION OF NORMAL FIRST PREGNANCY IN THIRD TRIMESTER: Primary | ICD-10-CM

## 2018-02-19 DIAGNOSIS — Z36.89 ENCOUNTER FOR ULTRASOUND TO ASSESS INTERVAL GROWTH OF FETUS: ICD-10-CM

## 2018-02-19 DIAGNOSIS — Z3A.31 31 WEEKS GESTATION OF PREGNANCY: ICD-10-CM

## 2018-02-19 PROCEDURE — 0502F SUBSEQUENT PRENATAL CARE: CPT | Mod: S$GLB,,, | Performed by: OBSTETRICS & GYNECOLOGY

## 2018-02-19 PROCEDURE — 99999 PR PBB SHADOW E&M-EST. PATIENT-LVL II: CPT | Mod: PBBFAC,,, | Performed by: OBSTETRICS & GYNECOLOGY

## 2018-02-19 PROCEDURE — 76816 OB US FOLLOW-UP PER FETUS: CPT | Mod: S$GLB,,, | Performed by: OBSTETRICS & GYNECOLOGY

## 2018-02-19 RX ORDER — CETIRIZINE HYDROCHLORIDE 10 MG/1
10 TABLET ORAL DAILY
Status: ON HOLD | COMMUNITY
End: 2018-04-13 | Stop reason: HOSPADM

## 2018-02-19 RX ORDER — FLUTICASONE PROPIONATE 50 MCG
1 SPRAY, SUSPENSION (ML) NASAL DAILY
COMMUNITY
End: 2018-05-16

## 2018-02-20 ENCOUNTER — PATIENT MESSAGE (OUTPATIENT)
Dept: OBSTETRICS AND GYNECOLOGY | Facility: CLINIC | Age: 28
End: 2018-02-20

## 2018-02-21 RX ORDER — AMOXICILLIN AND CLAVULANATE POTASSIUM 875; 125 MG/1; MG/1
1 TABLET, FILM COATED ORAL 2 TIMES DAILY
Qty: 14 TABLET | Refills: 0 | Status: SHIPPED | OUTPATIENT
Start: 2018-02-21 | End: 2018-02-28

## 2018-02-23 ENCOUNTER — PATIENT MESSAGE (OUTPATIENT)
Dept: INTERNAL MEDICINE | Facility: CLINIC | Age: 28
End: 2018-02-23

## 2018-02-24 ENCOUNTER — PATIENT MESSAGE (OUTPATIENT)
Dept: INTERNAL MEDICINE | Facility: CLINIC | Age: 28
End: 2018-02-24

## 2018-03-02 ENCOUNTER — PATIENT MESSAGE (OUTPATIENT)
Dept: INTERNAL MEDICINE | Facility: CLINIC | Age: 28
End: 2018-03-02

## 2018-03-02 ENCOUNTER — TELEPHONE (OUTPATIENT)
Dept: INTERNAL MEDICINE | Facility: CLINIC | Age: 28
End: 2018-03-02

## 2018-03-02 DIAGNOSIS — B35.1 ONYCHOMYCOSIS: ICD-10-CM

## 2018-03-02 DIAGNOSIS — M79.676 PAIN OF TOE, UNSPECIFIED LATERALITY: Primary | ICD-10-CM

## 2018-03-05 ENCOUNTER — OFFICE VISIT (OUTPATIENT)
Dept: PEDIATRICS | Facility: CLINIC | Age: 28
End: 2018-03-05
Payer: COMMERCIAL

## 2018-03-05 ENCOUNTER — ROUTINE PRENATAL (OUTPATIENT)
Dept: OBSTETRICS AND GYNECOLOGY | Facility: CLINIC | Age: 28
End: 2018-03-05
Payer: COMMERCIAL

## 2018-03-05 VITALS
DIASTOLIC BLOOD PRESSURE: 80 MMHG | SYSTOLIC BLOOD PRESSURE: 122 MMHG | WEIGHT: 212.31 LBS | BODY MASS INDEX: 35.33 KG/M2

## 2018-03-05 DIAGNOSIS — Z3A.33 33 WEEKS GESTATION OF PREGNANCY: ICD-10-CM

## 2018-03-05 DIAGNOSIS — Z34.03 ENCOUNTER FOR SUPERVISION OF NORMAL FIRST PREGNANCY IN THIRD TRIMESTER: Primary | ICD-10-CM

## 2018-03-05 DIAGNOSIS — Z76.81 PRE-BIRTH VISIT FOR EXPECTANT PARENTS: Primary | ICD-10-CM

## 2018-03-05 PROCEDURE — 99499 UNLISTED E&M SERVICE: CPT | Mod: S$GLB,,, | Performed by: PEDIATRICS

## 2018-03-05 PROCEDURE — 99999 PR PBB SHADOW E&M-EST. PATIENT-LVL I: CPT | Mod: PBBFAC,,, | Performed by: PEDIATRICS

## 2018-03-05 PROCEDURE — 99999 PR PBB SHADOW E&M-EST. PATIENT-LVL II: CPT | Mod: PBBFAC,,, | Performed by: OBSTETRICS & GYNECOLOGY

## 2018-03-05 PROCEDURE — 0502F SUBSEQUENT PRENATAL CARE: CPT | Mod: S$GLB,,, | Performed by: OBSTETRICS & GYNECOLOGY

## 2018-03-05 NOTE — PROGRESS NOTES
Baby friendly sheet and video played  
No complaints  
85 y/o M with PMH of Dementia, HLD, HTN, DM, BPH was brought in by daughter for episode of unresponsiveness and delirium , which resolved after glucose intake. Pt was monitored in the hospital. Fingersticks were within normal, with pt eating a regular diet. Delirium resolved. It was determined to be due to hypoglycemia Pt and family educated on importance of glucose testing and to discontinue home metformin, since HgAIC 5.9. Also found to be bradycardic so metoprolol and donepezil was stopped. Pt is now cleared for discharge home with physical therapy. Glucometer with supplies sent to pharmacy.

## 2018-03-13 ENCOUNTER — HOSPITAL ENCOUNTER (OUTPATIENT)
Facility: HOSPITAL | Age: 28
Discharge: HOME | End: 2018-03-13
Attending: OBSTETRICS & GYNECOLOGY | Admitting: OBSTETRICS & GYNECOLOGY
Payer: COMMERCIAL

## 2018-03-13 VITALS
DIASTOLIC BLOOD PRESSURE: 75 MMHG | HEART RATE: 103 BPM | WEIGHT: 212 LBS | SYSTOLIC BLOOD PRESSURE: 132 MMHG | BODY MASS INDEX: 35.32 KG/M2 | HEIGHT: 65 IN

## 2018-03-13 DIAGNOSIS — O26.899 ABDOMINAL PAIN AFFECTING PREGNANCY: ICD-10-CM

## 2018-03-13 DIAGNOSIS — R10.9 ABDOMINAL PAIN AFFECTING PREGNANCY: ICD-10-CM

## 2018-03-13 PROCEDURE — 63600175 PHARM REV CODE 636 W HCPCS: Performed by: OBSTETRICS & GYNECOLOGY

## 2018-03-13 PROCEDURE — 96372 THER/PROPH/DIAG INJ SC/IM: CPT

## 2018-03-13 PROCEDURE — 96360 HYDRATION IV INFUSION INIT: CPT

## 2018-03-13 PROCEDURE — 96361 HYDRATE IV INFUSION ADD-ON: CPT

## 2018-03-13 PROCEDURE — G0378 HOSPITAL OBSERVATION PER HR: HCPCS

## 2018-03-13 PROCEDURE — 25000003 PHARM REV CODE 250: Performed by: OBSTETRICS & GYNECOLOGY

## 2018-03-13 PROCEDURE — 99211 OFF/OP EST MAY X REQ PHY/QHP: CPT

## 2018-03-13 RX ORDER — ACETAMINOPHEN 500 MG
500 TABLET ORAL EVERY 6 HOURS PRN
Status: DISCONTINUED | OUTPATIENT
Start: 2018-03-13 | End: 2018-03-13 | Stop reason: HOSPADM

## 2018-03-13 RX ORDER — SODIUM CHLORIDE, SODIUM LACTATE, POTASSIUM CHLORIDE, CALCIUM CHLORIDE 600; 310; 30; 20 MG/100ML; MG/100ML; MG/100ML; MG/100ML
INJECTION, SOLUTION INTRAVENOUS CONTINUOUS
Status: DISCONTINUED | OUTPATIENT
Start: 2018-03-13 | End: 2018-03-13 | Stop reason: HOSPADM

## 2018-03-13 RX ORDER — SODIUM CHLORIDE, SODIUM LACTATE, POTASSIUM CHLORIDE, CALCIUM CHLORIDE 600; 310; 30; 20 MG/100ML; MG/100ML; MG/100ML; MG/100ML
INJECTION, SOLUTION INTRAVENOUS CONTINUOUS
Status: DISCONTINUED | OUTPATIENT
Start: 2018-03-13 | End: 2018-03-13 | Stop reason: SDUPTHER

## 2018-03-13 RX ORDER — TERBUTALINE SULFATE 1 MG/ML
0.25 INJECTION SUBCUTANEOUS ONCE
Status: COMPLETED | OUTPATIENT
Start: 2018-03-13 | End: 2018-03-13

## 2018-03-13 RX ORDER — ONDANSETRON 8 MG/1
8 TABLET, ORALLY DISINTEGRATING ORAL EVERY 8 HOURS PRN
Status: DISCONTINUED | OUTPATIENT
Start: 2018-03-13 | End: 2018-03-13 | Stop reason: HOSPADM

## 2018-03-13 RX ADMIN — TERBUTALINE SULFATE 0.25 MG: 1 INJECTION, SOLUTION SUBCUTANEOUS at 08:03

## 2018-03-13 RX ADMIN — TERBUTALINE SULFATE 0.25 MG: 1 INJECTION, SOLUTION SUBCUTANEOUS at 06:03

## 2018-03-13 RX ADMIN — ACETAMINOPHEN 500 MG: 500 TABLET ORAL at 08:03

## 2018-03-13 RX ADMIN — SODIUM CHLORIDE, POTASSIUM CHLORIDE, SODIUM LACTATE AND CALCIUM CHLORIDE: 600; 310; 30; 20 INJECTION, SOLUTION INTRAVENOUS at 06:03

## 2018-03-13 RX ADMIN — SODIUM CHLORIDE, POTASSIUM CHLORIDE, SODIUM LACTATE AND CALCIUM CHLORIDE: 600; 310; 30; 20 INJECTION, SOLUTION INTRAVENOUS at 08:03

## 2018-03-13 NOTE — PROGRESS NOTES
0515 Pt arrived to labor and delivery with c/o contractions, pt admitted to tr3, pt gowned and to bed, attached to external monitors, pt denies any vaginal bleeding or leakage of fluid, pt denies any complications with the pregnancy.     0600 DR. Miller called and notified of pts arrival with c/o contractions, pt is amparo 3-6minutes, pt is 1cm dilated, received order for brethine and IV fluids.

## 2018-03-13 NOTE — PLAN OF CARE
1000 spoke to Dr. Triplett; recheck pt and if still 1 cm ok to d/c home and f/u w/ Dr Smith as scheduled.    1020 SVE per RN, no change from prev exam. Pt. D/c home w. PTL precautions; d/c instructions given via teachback method. Pt & sig other demonstrated understanding.    1050 pt ambulatory off unit

## 2018-03-13 NOTE — PLAN OF CARE
0820 spoke to Dr. Triplett on call for Luis; ok to repeat terb dose and maint IVFs lR at 125ml/hr. Will continue to monitor.

## 2018-03-13 NOTE — DISCHARGE INSTRUCTIONS
Home Undelivered Discharge Instructions    After Discharge Orders:    Future Appointments  Date Time Provider Department Center   3/19/2018 3:00 PM ULTRASOUND, [facility]   3/19/2018 3:30 PM Chiara Smith MD [facility]   3/26/2018 3:15 PM Chiara Smith MD [facility]   4/2/2018 3:30 PM Chiara Smith MD [facility]   4/9/2018 9:30 AM Chiara Smith MD [facility]   4/16/2018 3:00 PM Chiara Smith MD [facility]           Current Discharge Medication List      CONTINUE these medications which have NOT CHANGED    Details   cetirizine (ZYRTEC) 10 MG tablet Take 10 mg by mouth once daily.      cholecalciferol, vitamin D3, (VITAMIN D3) 400 unit Tab Take 400 Units by mouth once daily.      fluticasone (FLONASE) 50 mcg/actuation nasal spray 1 spray by Each Nare route once daily.      levothyroxine (SYNTHROID) 50 MCG tablet Take 1 tablet (50 mcg total) by mouth once daily.  Qty: 90 tablet, Refills: 3    Associated Diagnoses: Acquired hypothyroidism      prenatal cmb#95-iron-FA-dha 28 mg iron-800 mcg-200 mg Cmpk Take by mouth.      ranitidine (ZANTAC) 75 MG tablet Take 75 mg by mouth once daily.                      · Diet:  normal diet as tolerated    · Rest: normal activity as tolerated    Other instructions: Do kick counts once a day on your baby. Choose the time of day your baby is most active. Get in a comfortable lying or sitting position and time how long it takes to feel 10 kicks, twists, turns, swishes, or rolls. Call your physician or midwife if there have not been 10 kicks in 1.5 hours    Call physician or midwife, return to Labor and Delivery, call 911, or go to the nearest Emergency Room if: increased leakage or fluid, contractions more than  5 per  30 minutes, decreased fetal movement, persistent low back pain or cramping, bleeding from vaginal area, difficulty urinating, pain with urination, difficulty breathing, new calf pain,  persistent headache or vision change

## 2018-03-14 ENCOUNTER — TELEPHONE (OUTPATIENT)
Dept: OBSTETRICS AND GYNECOLOGY | Facility: CLINIC | Age: 28
End: 2018-03-14

## 2018-03-14 ENCOUNTER — PATIENT MESSAGE (OUTPATIENT)
Dept: OBSTETRICS AND GYNECOLOGY | Facility: CLINIC | Age: 28
End: 2018-03-14

## 2018-03-14 NOTE — TELEPHONE ENCOUNTER
Notified to keep appointment on Monday with ultrasound first.  Continue to monitor contractions at home, if starts amparo again then she will need to return to L&D.  Advised per  to stay home from work until see comes in for her appointment on Monday.  Patient verbalized understanding

## 2018-03-14 NOTE — TELEPHONE ENCOUNTER
----- Message from Susan Winkler sent at 3/14/2018  9:31 AM CDT -----  No. [phone]   OB Patient is 34 weeks pregnant.   She went to L & D on 3/13/18 because of contractions.    She is calling to follow up.   Please call.

## 2018-03-15 ENCOUNTER — PATIENT MESSAGE (OUTPATIENT)
Dept: PEDIATRICS | Facility: CLINIC | Age: 28
End: 2018-03-15

## 2018-03-18 ENCOUNTER — NURSE TRIAGE (OUTPATIENT)
Dept: ADMINISTRATIVE | Facility: CLINIC | Age: 28
End: 2018-03-18

## 2018-03-18 NOTE — TELEPHONE ENCOUNTER
"  Reason for Disposition   MODERATE-SEVERE abdominal pain    Answer Assessment - Initial Assessment Questions  1. ONSET: "When did the symptoms begin?"         Today-one hour ago  2. CONTRACTIONS: "Describe the contractions that you are having." (e.g., duration, frequency, regularity, severity)     5-10 mins apart, moderate pain in back and front   SY: "What date are you expecting to deliver?"        4. PARITY: "Have you had a baby before?" If yes, "How long did the labor last?"     first  5. FETAL MOVEMENT: "Has the baby's movement decreased or changed significantly from normal?"   normal  6. OTHER SYMPTOMS: "Do you have any other symptoms?" (e.g., leaking fluid from vagina, fever, hand/facial swelling)   no    Protocols used: ST PREGNANCY - LABOR - [id number]    "

## 2018-03-19 ENCOUNTER — CLINICAL SUPPORT (OUTPATIENT)
Dept: OBSTETRICS AND GYNECOLOGY | Facility: CLINIC | Age: 28
End: 2018-03-19
Payer: COMMERCIAL

## 2018-03-19 ENCOUNTER — ROUTINE PRENATAL (OUTPATIENT)
Dept: OBSTETRICS AND GYNECOLOGY | Facility: CLINIC | Age: 28
End: 2018-03-19
Payer: COMMERCIAL

## 2018-03-19 ENCOUNTER — PROCEDURE VISIT (OUTPATIENT)
Dept: OBSTETRICS AND GYNECOLOGY | Facility: CLINIC | Age: 28
End: 2018-03-19
Payer: COMMERCIAL

## 2018-03-19 VITALS
WEIGHT: 208.56 LBS | SYSTOLIC BLOOD PRESSURE: 100 MMHG | BODY MASS INDEX: 34.71 KG/M2 | DIASTOLIC BLOOD PRESSURE: 64 MMHG

## 2018-03-19 DIAGNOSIS — Z3A.35 35 WEEKS GESTATION OF PREGNANCY: ICD-10-CM

## 2018-03-19 DIAGNOSIS — Z36.85 ANTENATAL SCREENING FOR STREPTOCOCCUS B: ICD-10-CM

## 2018-03-19 DIAGNOSIS — Z23 NEED FOR DIPHTHERIA-TETANUS-PERTUSSIS (TDAP) VACCINE, ADULT/ADOLESCENT: Primary | ICD-10-CM

## 2018-03-19 DIAGNOSIS — Z36.89 ENCOUNTER FOR ULTRASOUND TO ASSESS INTERVAL GROWTH OF FETUS: ICD-10-CM

## 2018-03-19 DIAGNOSIS — Z34.03 ENCOUNTER FOR SUPERVISION OF NORMAL FIRST PREGNANCY IN THIRD TRIMESTER: Primary | ICD-10-CM

## 2018-03-19 PROCEDURE — 99999 PR PBB SHADOW E&M-EST. PATIENT-LVL II: CPT | Mod: PBBFAC,,,

## 2018-03-19 PROCEDURE — 90715 TDAP VACCINE 7 YRS/> IM: CPT | Mod: S$GLB,,, | Performed by: OBSTETRICS & GYNECOLOGY

## 2018-03-19 PROCEDURE — 0502F SUBSEQUENT PRENATAL CARE: CPT | Mod: S$GLB,,, | Performed by: OBSTETRICS & GYNECOLOGY

## 2018-03-19 PROCEDURE — 99999 PR PBB SHADOW E&M-EST. PATIENT-LVL II: CPT | Mod: PBBFAC,,, | Performed by: OBSTETRICS & GYNECOLOGY

## 2018-03-19 PROCEDURE — 87081 CULTURE SCREEN ONLY: CPT

## 2018-03-19 PROCEDURE — 90471 IMMUNIZATION ADMIN: CPT | Mod: S$GLB,,, | Performed by: OBSTETRICS & GYNECOLOGY

## 2018-03-19 PROCEDURE — 76816 OB US FOLLOW-UP PER FETUS: CPT | Mod: S$GLB,,, | Performed by: OBSTETRICS & GYNECOLOGY

## 2018-03-19 NOTE — PROGRESS NOTES
Administered TDAP 0.5 ml   IM inj in R Deltoid  Patient Tolerated well.  Patient instructed to wait 15 minutes in the waiting room after injection and to monitor for reactions.  Patient verbalized understanding.   Lot: [id number]  Exp: [id number]

## 2018-03-19 NOTE — PROGRESS NOTES
Might even just be external os open. Can retrun to work 3/21/18.  GBBS done  Labor precautions given.  TDap Today  Can use KT tape for hips or lidocaine patches

## 2018-03-22 LAB — BACTERIA SPEC AEROBE CULT: NORMAL

## 2018-03-23 DIAGNOSIS — E03.9 ACQUIRED HYPOTHYROIDISM: ICD-10-CM

## 2018-03-23 RX ORDER — LEVOTHYROXINE SODIUM 50 UG/1
TABLET ORAL
Qty: 90 TABLET | Refills: 1 | Status: SHIPPED | OUTPATIENT
Start: 2018-03-23 | End: 2018-10-16 | Stop reason: SDUPTHER

## 2018-03-26 ENCOUNTER — ROUTINE PRENATAL (OUTPATIENT)
Dept: OBSTETRICS AND GYNECOLOGY | Facility: CLINIC | Age: 28
End: 2018-03-26
Payer: COMMERCIAL

## 2018-03-26 VITALS
SYSTOLIC BLOOD PRESSURE: 110 MMHG | DIASTOLIC BLOOD PRESSURE: 76 MMHG | BODY MASS INDEX: 35.29 KG/M2 | WEIGHT: 212.06 LBS

## 2018-03-26 DIAGNOSIS — Z34.03 ENCOUNTER FOR SUPERVISION OF NORMAL FIRST PREGNANCY IN THIRD TRIMESTER: Primary | ICD-10-CM

## 2018-03-26 DIAGNOSIS — Z3A.36 36 WEEKS GESTATION OF PREGNANCY: ICD-10-CM

## 2018-03-26 PROCEDURE — 99999 PR PBB SHADOW E&M-EST. PATIENT-LVL II: CPT | Mod: PBBFAC,,, | Performed by: OBSTETRICS & GYNECOLOGY

## 2018-03-26 PROCEDURE — 0502F SUBSEQUENT PRENATAL CARE: CPT | Mod: S$GLB,,, | Performed by: OBSTETRICS & GYNECOLOGY

## 2018-04-02 ENCOUNTER — ROUTINE PRENATAL (OUTPATIENT)
Dept: OBSTETRICS AND GYNECOLOGY | Facility: CLINIC | Age: 28
End: 2018-04-02
Payer: COMMERCIAL

## 2018-04-02 VITALS
SYSTOLIC BLOOD PRESSURE: 102 MMHG | DIASTOLIC BLOOD PRESSURE: 68 MMHG | BODY MASS INDEX: 35.66 KG/M2 | WEIGHT: 214.31 LBS

## 2018-04-02 DIAGNOSIS — Z34.03 ENCOUNTER FOR SUPERVISION OF NORMAL FIRST PREGNANCY IN THIRD TRIMESTER: Primary | ICD-10-CM

## 2018-04-02 DIAGNOSIS — Z3A.37 37 WEEKS GESTATION OF PREGNANCY: ICD-10-CM

## 2018-04-02 PROCEDURE — 99999 PR PBB SHADOW E&M-EST. PATIENT-LVL II: CPT | Mod: PBBFAC,,, | Performed by: OBSTETRICS & GYNECOLOGY

## 2018-04-02 PROCEDURE — 0502F SUBSEQUENT PRENATAL CARE: CPT | Mod: S$GLB,,, | Performed by: OBSTETRICS & GYNECOLOGY

## 2018-04-03 ENCOUNTER — OFFICE VISIT (OUTPATIENT)
Dept: PODIATRY | Facility: CLINIC | Age: 28
End: 2018-04-03
Payer: COMMERCIAL

## 2018-04-03 VITALS
DIASTOLIC BLOOD PRESSURE: 62 MMHG | SYSTOLIC BLOOD PRESSURE: 118 MMHG | WEIGHT: 214 LBS | HEART RATE: 75 BPM | HEIGHT: 65 IN | BODY MASS INDEX: 35.65 KG/M2

## 2018-04-03 DIAGNOSIS — S99.922S INJURY OF TOENAIL OF LEFT FOOT, SEQUELA: Primary | ICD-10-CM

## 2018-04-03 PROCEDURE — 99203 OFFICE O/P NEW LOW 30 MIN: CPT | Mod: S$GLB,,, | Performed by: PODIATRIST

## 2018-04-03 PROCEDURE — 99999 PR PBB SHADOW E&M-EST. PATIENT-LVL III: CPT | Mod: PBBFAC,,, | Performed by: PODIATRIST

## 2018-04-03 NOTE — LETTER
April 3, 2018      Samanta Jimenez MD  [address] [facility] [address]           [facility]  [address] [facility] [address]  Phone: [phone]          Patient: Sherlyn Monahan   MR Number: [id number]   YOB: 1990   Date of Visit: 4/3/2018       Dear Dr. Samanta Jimenez:    Thank you for referring Sherlyn Monahan to me for evaluation. Attached you will find relevant portions of my assessment and plan of care.    If you have questions, please do not hesitate to call me. I look forward to following Sherlyn Monahan along with you.    Sincerely,    Amparo Wolfe DPM    Enclosure  CC:  No Recipients    If you would like to receive this communication electronically, please contact [email or web address] or [phone] to request more information on [facility] access.    For providers and/or their staff who would like to refer a patient to [facility], please contact us through our one-stop-shop provider referral line, Tiburcio Carpenter, at [phone].    If you feel you have received this communication in error or would no longer like to receive these types of communications, please e-mail [email or web address]

## 2018-04-03 NOTE — PROGRESS NOTES
Chief Complaint   Patient presents with    Toe Pain     left great toe           HPI:   Sherlyn Monahan is a 27 y.o. female with complaints of  left great toe discoloration.  Fell and hurt her toe in December.  Nail has been dark and thickened since then.  No self treatment at home yet.   No drainage reported.         Past Medical History:   Diagnosis Date    Hypothyroidism     Pregnant and not yet delivered in second trimester     25 weeks    Thyroid disease              Current Outpatient Prescriptions on File Prior to Visit   Medication Sig Dispense Refill    cetirizine (ZYRTEC) 10 MG tablet Take 10 mg by mouth once daily.      cholecalciferol, vitamin D3, (VITAMIN D3) 400 unit Tab Take 400 Units by mouth once daily.      fluticasone (FLONASE) 50 mcg/actuation nasal spray 1 spray by Each Nare route once daily.      prenatal cmb#95-iron-FA-dha 28 mg iron-800 mcg-200 mg Cmpk Take by mouth.      ranitidine (ZANTAC) 75 MG tablet Take 75 mg by mouth once daily.       SYNTHROID 50 mcg tablet TAKE 1 TABLET BY MOUTH DAILY 90 tablet 1     No current facility-administered medications on file prior to visit.            Review of patient's allergies indicates:   Allergen Reactions    Minocycline            Social History     Social History    Marital status:      Spouse name: N/A    Number of children: N/A    Years of education: N/A     Occupational History    Not on file.     Social History Main Topics    Smoking status: Former Smoker    Smokeless tobacco: Never Used    Alcohol use No    Drug use: No    Sexual activity: Yes     Partners: Male     Birth control/ protection: None     Other Topics Concern    Not on file     Social History Narrative    No narrative on file             ROS:   General ROS: negative for - chills, fever or night sweats  Respiratory ROS: no cough, shortness of breath, or wheezing  Cardiovascular ROS: no chest pain or dyspnea on exertion  Musculoskeletal ROS:  "negative  Neurological ROS: no TIA or stroke symptoms  Dermatological ROS: negative      EXAM:     Vitals:    04/03/18 1506   BP: 118/62   Pulse: 75   Weight: 97.1 kg (214 lb)   Height: 5' 5" (1.651 m)        General:  Alert, oriented, no acute distress      Left  Lower extremity exam:    Vascular:   Dorsalis Pedis:  present   Posterior Tibial:  present  capillary refill time:  3 seconds  Temperature of toes warm to touch  Hair on toes:  present    none Edema to affected area.        Neurological:     sharp dull - L normal and light touch - L normal        Dermatological:   There is intact skin tone, turgor, and temperature.    Wounds: none  Erythema:  none  left hallux toenail with new nail growth at the proximal border and appears normal.  Old nail on top with dried dark blood in between. No paronychia.  No open wounds.  No drainage.  No cellulitis.             Musculoskeletal:   Metatarsophalangeal, subtalar, and ankle range of motion are 5/5, adequate ROM, adequate strength  Right foot: no gross deformity  Left foot: no gross deformity            ASSESSMENT/PLAN:        I counseled the patient on the patient's conditions, their implications and medical management.        Injury of toenail of left foot, sequela      - reassurance.  No acute infection.  I trimmed and filed the old toenail off and cleaned the area.  Patient reported relief.  She is to monitor the toe daily.  A new nail may take 6-8 months to fully grow out and no guarantees as the final appearance of the toenail    May consider applying kerasal nail over the area as the nail grows out to reduce change of fungal infection.  Patient is amenable to plan.  Call or return to clinic prn if these symptoms worsen or fail to improve as anticipated.       "

## 2018-04-05 ENCOUNTER — PATIENT MESSAGE (OUTPATIENT)
Dept: OBSTETRICS AND GYNECOLOGY | Facility: CLINIC | Age: 28
End: 2018-04-05

## 2018-04-05 NOTE — TELEPHONE ENCOUNTER
"As long as not associated with other symptoms may just be vertigo (does she feel like she is walking crooked or the room is spinning) or dizziness more like a "fainting type of dizziness"??? Maybe 2/2 allergies as well and sinuses  "

## 2018-04-09 ENCOUNTER — ROUTINE PRENATAL (OUTPATIENT)
Dept: OBSTETRICS AND GYNECOLOGY | Facility: CLINIC | Age: 28
End: 2018-04-09
Payer: COMMERCIAL

## 2018-04-09 VITALS
WEIGHT: 212.06 LBS | DIASTOLIC BLOOD PRESSURE: 72 MMHG | SYSTOLIC BLOOD PRESSURE: 122 MMHG | BODY MASS INDEX: 35.29 KG/M2

## 2018-04-09 DIAGNOSIS — Z3A.38 38 WEEKS GESTATION OF PREGNANCY: ICD-10-CM

## 2018-04-09 DIAGNOSIS — Z34.03 ENCOUNTER FOR SUPERVISION OF NORMAL FIRST PREGNANCY IN THIRD TRIMESTER: Primary | ICD-10-CM

## 2018-04-09 PROCEDURE — 0502F SUBSEQUENT PRENATAL CARE: CPT | Mod: S$GLB,,, | Performed by: OBSTETRICS & GYNECOLOGY

## 2018-04-09 PROCEDURE — 99999 PR PBB SHADOW E&M-EST. PATIENT-LVL II: CPT | Mod: PBBFAC,,, | Performed by: OBSTETRICS & GYNECOLOGY

## 2018-04-11 ENCOUNTER — HOSPITAL ENCOUNTER (INPATIENT)
Facility: HOSPITAL | Age: 28
LOS: 3 days | Discharge: HOME | End: 2018-04-14
Attending: OBSTETRICS & GYNECOLOGY | Admitting: OBSTETRICS & GYNECOLOGY
Payer: COMMERCIAL

## 2018-04-11 ENCOUNTER — ANESTHESIA EVENT (OUTPATIENT)
Dept: OBSTETRICS AND GYNECOLOGY | Facility: HOSPITAL | Age: 28
End: 2018-04-11
Payer: COMMERCIAL

## 2018-04-11 ENCOUNTER — ANESTHESIA (OUTPATIENT)
Dept: OBSTETRICS AND GYNECOLOGY | Facility: HOSPITAL | Age: 28
End: 2018-04-11
Payer: COMMERCIAL

## 2018-04-11 VITALS — OXYGEN SATURATION: 98 % | SYSTOLIC BLOOD PRESSURE: 124 MMHG | DIASTOLIC BLOOD PRESSURE: 35 MMHG | HEART RATE: 109 BPM

## 2018-04-11 DIAGNOSIS — O47.9 UTERINE CONTRACTIONS DURING PREGNANCY: ICD-10-CM

## 2018-04-11 DIAGNOSIS — Z98.891 S/P C-SECTION: ICD-10-CM

## 2018-04-11 PROBLEM — O33.2 CEPHALOPELVIC DISPROPORTION DUE TO INLET CONTRACTION OF PELVIS: Status: ACTIVE | Noted: 2018-04-11

## 2018-04-11 LAB
ABO + RH BLD: NORMAL
BASOPHILS # BLD AUTO: 0.02 K/UL
BASOPHILS NFR BLD: 0.2 %
BLD GP AB SCN CELLS X3 SERPL QL: NORMAL
DIFFERENTIAL METHOD: ABNORMAL
EOSINOPHIL # BLD AUTO: 0 K/UL
EOSINOPHIL NFR BLD: 0.1 %
ERYTHROCYTE [DISTWIDTH] IN BLOOD BY AUTOMATED COUNT: 13.3 %
HCT VFR BLD AUTO: 45 %
HGB BLD-MCNC: 14.8 G/DL
LYMPHOCYTES # BLD AUTO: 2.4 K/UL
LYMPHOCYTES NFR BLD: 26.6 %
MCH RBC QN AUTO: 31.4 PG
MCHC RBC AUTO-ENTMCNC: 32.9 G/DL
MCV RBC AUTO: 96 FL
MONOCYTES # BLD AUTO: 0.7 K/UL
MONOCYTES NFR BLD: 7.7 %
NEUTROPHILS # BLD AUTO: 5.8 K/UL
NEUTROPHILS NFR BLD: 65.3 %
PLATELET # BLD AUTO: 163 K/UL
PMV BLD AUTO: 12 FL
RBC # BLD AUTO: 4.71 M/UL
WBC # BLD AUTO: 8.87 K/UL

## 2018-04-11 PROCEDURE — 63600175 PHARM REV CODE 636 W HCPCS: Performed by: ANESTHESIOLOGY

## 2018-04-11 PROCEDURE — 11000001 HC ACUTE MED/SURG PRIVATE ROOM

## 2018-04-11 PROCEDURE — 63600175 PHARM REV CODE 636 W HCPCS: Performed by: OBSTETRICS & GYNECOLOGY

## 2018-04-11 PROCEDURE — 25000003 PHARM REV CODE 250: Performed by: STUDENT IN AN ORGANIZED HEALTH CARE EDUCATION/TRAINING PROGRAM

## 2018-04-11 PROCEDURE — 72100002 HC LABOR CARE, 1ST 8 HOURS

## 2018-04-11 PROCEDURE — 59025 FETAL NON-STRESS TEST: CPT

## 2018-04-11 PROCEDURE — 59510 CESAREAN DELIVERY: CPT | Mod: AT,,, | Performed by: OBSTETRICS & GYNECOLOGY

## 2018-04-11 PROCEDURE — 27200710 HC EPIDURAL INFUSION PUMP SET: Performed by: STUDENT IN AN ORGANIZED HEALTH CARE EDUCATION/TRAINING PROGRAM

## 2018-04-11 PROCEDURE — 27800516 HC TRAY, EPIDURAL COMBO: Performed by: STUDENT IN AN ORGANIZED HEALTH CARE EDUCATION/TRAINING PROGRAM

## 2018-04-11 PROCEDURE — 85025 COMPLETE CBC W/AUTO DIFF WBC: CPT

## 2018-04-11 PROCEDURE — 25000003 PHARM REV CODE 250: Performed by: ANESTHESIOLOGY

## 2018-04-11 PROCEDURE — 62326 NJX INTERLAMINAR LMBR/SAC: CPT | Performed by: ANESTHESIOLOGY

## 2018-04-11 PROCEDURE — 86850 RBC ANTIBODY SCREEN: CPT

## 2018-04-11 PROCEDURE — 10907ZC DRAINAGE OF AMNIOTIC FLUID, THERAPEUTIC FROM PRODUCTS OF CONCEPTION, VIA NATURAL OR ARTIFICIAL OPENING: ICD-10-PCS | Performed by: OBSTETRICS & GYNECOLOGY

## 2018-04-11 PROCEDURE — 86592 SYPHILIS TEST NON-TREP QUAL: CPT

## 2018-04-11 PROCEDURE — S0028 INJECTION, FAMOTIDINE, 20 MG: HCPCS

## 2018-04-11 PROCEDURE — 27800516 HC TRAY, EPIDURAL COMBO: Performed by: ANESTHESIOLOGY

## 2018-04-11 PROCEDURE — 99211 OFF/OP EST MAY X REQ PHY/QHP: CPT | Mod: 25

## 2018-04-11 PROCEDURE — 25000003 PHARM REV CODE 250

## 2018-04-11 PROCEDURE — 25000003 PHARM REV CODE 250: Performed by: OBSTETRICS & GYNECOLOGY

## 2018-04-11 PROCEDURE — 51702 INSERT TEMP BLADDER CATH: CPT

## 2018-04-11 RX ORDER — FENTANYL CITRATE 50 UG/ML
INJECTION, SOLUTION INTRAMUSCULAR; INTRAVENOUS
Status: DISCONTINUED | OUTPATIENT
Start: 2018-04-11 | End: 2018-04-11

## 2018-04-11 RX ORDER — CEFAZOLIN SODIUM 1 G/3ML
INJECTION, POWDER, FOR SOLUTION INTRAMUSCULAR; INTRAVENOUS
Status: DISCONTINUED | OUTPATIENT
Start: 2018-04-11 | End: 2018-04-11

## 2018-04-11 RX ORDER — FENTANYL CITRATE 50 UG/ML
INJECTION, SOLUTION INTRAMUSCULAR; INTRAVENOUS
Status: DISPENSED
Start: 2018-04-11 | End: 2018-04-11

## 2018-04-11 RX ORDER — NALOXONE HCL 0.4 MG/ML
0.02 VIAL (ML) INJECTION
Status: DISCONTINUED | OUTPATIENT
Start: 2018-04-11 | End: 2018-04-11

## 2018-04-11 RX ORDER — NAPROXEN 500 MG/1
500 TABLET ORAL EVERY 8 HOURS
Status: DISCONTINUED | OUTPATIENT
Start: 2018-04-12 | End: 2018-04-14 | Stop reason: HOSPADM

## 2018-04-11 RX ORDER — FAMOTIDINE 10 MG/ML
INJECTION INTRAVENOUS
Status: COMPLETED
Start: 2018-04-11 | End: 2018-04-11

## 2018-04-11 RX ORDER — KETOROLAC TROMETHAMINE 30 MG/ML
INJECTION, SOLUTION INTRAMUSCULAR; INTRAVENOUS
Status: DISCONTINUED | OUTPATIENT
Start: 2018-04-11 | End: 2018-04-11

## 2018-04-11 RX ORDER — OXYTOCIN/RINGER'S LACTATE 20/1000 ML
2 PLASTIC BAG, INJECTION (ML) INTRAVENOUS CONTINUOUS
Status: DISCONTINUED | OUTPATIENT
Start: 2018-04-11 | End: 2018-04-11

## 2018-04-11 RX ORDER — BUPIVACAINE HYDROCHLORIDE 2.5 MG/ML
30 INJECTION, SOLUTION EPIDURAL; INFILTRATION; INTRACAUDAL ONCE
Status: COMPLETED | OUTPATIENT
Start: 2018-04-11 | End: 2018-04-11

## 2018-04-11 RX ORDER — OXYTOCIN 10 [USP'U]/ML
INJECTION, SOLUTION INTRAMUSCULAR; INTRAVENOUS
Status: DISCONTINUED | OUTPATIENT
Start: 2018-04-11 | End: 2018-04-11

## 2018-04-11 RX ORDER — LEVOTHYROXINE SODIUM 25 UG/1
50 TABLET ORAL DAILY
Status: DISCONTINUED | OUTPATIENT
Start: 2018-04-12 | End: 2018-04-14 | Stop reason: HOSPADM

## 2018-04-11 RX ORDER — MISOPROSTOL 200 UG/1
600 TABLET ORAL
Status: DISCONTINUED | OUTPATIENT
Start: 2018-04-11 | End: 2018-04-14 | Stop reason: HOSPADM

## 2018-04-11 RX ORDER — MISOPROSTOL 200 UG/1
200 TABLET ORAL EVERY 6 HOURS PRN
Status: DISCONTINUED | OUTPATIENT
Start: 2018-04-11 | End: 2018-04-14 | Stop reason: HOSPADM

## 2018-04-11 RX ORDER — ONDANSETRON 2 MG/ML
4 INJECTION INTRAMUSCULAR; INTRAVENOUS EVERY 6 HOURS PRN
Status: DISCONTINUED | OUTPATIENT
Start: 2018-04-11 | End: 2018-04-14 | Stop reason: HOSPADM

## 2018-04-11 RX ORDER — FAMOTIDINE 10 MG/ML
20 INJECTION INTRAVENOUS ONCE
Status: COMPLETED | OUTPATIENT
Start: 2018-04-11 | End: 2018-04-11

## 2018-04-11 RX ORDER — ADHESIVE BANDAGE
30 BANDAGE TOPICAL 2 TIMES DAILY PRN
Status: DISCONTINUED | OUTPATIENT
Start: 2018-04-12 | End: 2018-04-14 | Stop reason: HOSPADM

## 2018-04-11 RX ORDER — OXYCODONE AND ACETAMINOPHEN 10; 325 MG/1; MG/1
1 TABLET ORAL EVERY 4 HOURS PRN
Status: DISCONTINUED | OUTPATIENT
Start: 2018-04-11 | End: 2018-04-14 | Stop reason: HOSPADM

## 2018-04-11 RX ORDER — OXYTOCIN/RINGER'S LACTATE 20/1000 ML
41.65 PLASTIC BAG, INJECTION (ML) INTRAVENOUS CONTINUOUS
Status: DISCONTINUED | OUTPATIENT
Start: 2018-04-11 | End: 2018-04-11

## 2018-04-11 RX ORDER — OXYCODONE HYDROCHLORIDE 5 MG/1
10 TABLET ORAL EVERY 4 HOURS PRN
Status: DISCONTINUED | OUTPATIENT
Start: 2018-04-11 | End: 2018-04-14 | Stop reason: HOSPADM

## 2018-04-11 RX ORDER — ONDANSETRON 8 MG/1
8 TABLET, ORALLY DISINTEGRATING ORAL EVERY 8 HOURS PRN
Status: DISCONTINUED | OUTPATIENT
Start: 2018-04-11 | End: 2018-04-14 | Stop reason: HOSPADM

## 2018-04-11 RX ORDER — MORPHINE SULFATE 1 MG/ML
INJECTION, SOLUTION EPIDURAL; INTRATHECAL; INTRAVENOUS
Status: DISCONTINUED | OUTPATIENT
Start: 2018-04-11 | End: 2018-04-11

## 2018-04-11 RX ORDER — SODIUM CHLORIDE 9 MG/ML
INJECTION, SOLUTION INTRAVENOUS
Status: DISCONTINUED | OUTPATIENT
Start: 2018-04-11 | End: 2018-04-11

## 2018-04-11 RX ORDER — SODIUM CHLORIDE, SODIUM LACTATE, POTASSIUM CHLORIDE, CALCIUM CHLORIDE 600; 310; 30; 20 MG/100ML; MG/100ML; MG/100ML; MG/100ML
INJECTION, SOLUTION INTRAVENOUS CONTINUOUS
Status: DISCONTINUED | OUTPATIENT
Start: 2018-04-11 | End: 2018-04-14 | Stop reason: HOSPADM

## 2018-04-11 RX ORDER — SODIUM CITRATE AND CITRIC ACID MONOHYDRATE 334; 500 MG/5ML; MG/5ML
SOLUTION ORAL
Status: COMPLETED
Start: 2018-04-11 | End: 2018-04-11

## 2018-04-11 RX ORDER — METHYLERGONOVINE MALEATE 0.2 MG/ML
200 INJECTION INTRAVENOUS
Status: DISCONTINUED | OUTPATIENT
Start: 2018-04-11 | End: 2018-04-14 | Stop reason: HOSPADM

## 2018-04-11 RX ORDER — ACETAMINOPHEN 10 MG/ML
1000 INJECTION, SOLUTION INTRAVENOUS ONCE
Status: COMPLETED | OUTPATIENT
Start: 2018-04-11 | End: 2018-04-11

## 2018-04-11 RX ORDER — ONDANSETRON 8 MG/1
8 TABLET, ORALLY DISINTEGRATING ORAL EVERY 8 HOURS PRN
Status: DISCONTINUED | OUTPATIENT
Start: 2018-04-11 | End: 2018-04-11

## 2018-04-11 RX ORDER — CARBOPROST TROMETHAMINE 250 UG/ML
250 INJECTION, SOLUTION INTRAMUSCULAR
Status: DISCONTINUED | OUTPATIENT
Start: 2018-04-11 | End: 2018-04-14 | Stop reason: HOSPADM

## 2018-04-11 RX ORDER — OXYCODONE HYDROCHLORIDE 5 MG/1
5 TABLET ORAL EVERY 4 HOURS PRN
Status: DISCONTINUED | OUTPATIENT
Start: 2018-04-11 | End: 2018-04-14 | Stop reason: HOSPADM

## 2018-04-11 RX ORDER — OXYCODONE AND ACETAMINOPHEN 5; 325 MG/1; MG/1
1 TABLET ORAL EVERY 4 HOURS PRN
Status: DISCONTINUED | OUTPATIENT
Start: 2018-04-11 | End: 2018-04-14 | Stop reason: HOSPADM

## 2018-04-11 RX ORDER — ROPIVACAINE HYDROCHLORIDE 2 MG/ML
INJECTION, SOLUTION EPIDURAL; INFILTRATION; PERINEURAL
Status: DISPENSED
Start: 2018-04-11 | End: 2018-04-12

## 2018-04-11 RX ORDER — DIPHENHYDRAMINE HCL 25 MG
25 CAPSULE ORAL EVERY 4 HOURS PRN
Status: DISCONTINUED | OUTPATIENT
Start: 2018-04-11 | End: 2018-04-14 | Stop reason: HOSPADM

## 2018-04-11 RX ORDER — SODIUM CHLORIDE, SODIUM LACTATE, POTASSIUM CHLORIDE, CALCIUM CHLORIDE 600; 310; 30; 20 MG/100ML; MG/100ML; MG/100ML; MG/100ML
INJECTION, SOLUTION INTRAVENOUS CONTINUOUS PRN
Status: DISCONTINUED | OUTPATIENT
Start: 2018-04-11 | End: 2018-04-11

## 2018-04-11 RX ORDER — OXYCODONE AND ACETAMINOPHEN 5; 325 MG/1; MG/1
1 TABLET ORAL ONCE AS NEEDED
Status: COMPLETED | OUTPATIENT
Start: 2018-04-11 | End: 2018-04-11

## 2018-04-11 RX ORDER — FENTANYL CITRATE 50 UG/ML
INJECTION, SOLUTION INTRAMUSCULAR; INTRAVENOUS
Status: DISPENSED
Start: 2018-04-11 | End: 2018-04-12

## 2018-04-11 RX ORDER — MORPHINE SULFATE 4 MG/ML
3 INJECTION, SOLUTION INTRAMUSCULAR; INTRAVENOUS EVERY 5 MIN PRN
Status: DISCONTINUED | OUTPATIENT
Start: 2018-04-11 | End: 2018-04-11

## 2018-04-11 RX ORDER — SIMETHICONE 80 MG
1 TABLET,CHEWABLE ORAL EVERY 6 HOURS PRN
Status: DISCONTINUED | OUTPATIENT
Start: 2018-04-11 | End: 2018-04-14 | Stop reason: HOSPADM

## 2018-04-11 RX ORDER — ACETAMINOPHEN 325 MG/1
650 TABLET ORAL EVERY 6 HOURS
Status: DISPENSED | OUTPATIENT
Start: 2018-04-12 | End: 2018-04-13

## 2018-04-11 RX ORDER — BISACODYL 10 MG
10 SUPPOSITORY, RECTAL RECTAL ONCE AS NEEDED
Status: ACTIVE | OUTPATIENT
Start: 2018-04-11 | End: 2018-04-11

## 2018-04-11 RX ORDER — KETOROLAC TROMETHAMINE 30 MG/ML
30 INJECTION, SOLUTION INTRAMUSCULAR; INTRAVENOUS EVERY 6 HOURS
Status: COMPLETED | OUTPATIENT
Start: 2018-04-12 | End: 2018-04-12

## 2018-04-11 RX ORDER — SODIUM CHLORIDE, SODIUM LACTATE, POTASSIUM CHLORIDE, CALCIUM CHLORIDE 600; 310; 30; 20 MG/100ML; MG/100ML; MG/100ML; MG/100ML
INJECTION, SOLUTION INTRAVENOUS CONTINUOUS
Status: DISCONTINUED | OUTPATIENT
Start: 2018-04-11 | End: 2018-04-11

## 2018-04-11 RX ORDER — CEFAZOLIN SODIUM 2 G/50ML
2 SOLUTION INTRAVENOUS ONCE
Status: DISCONTINUED | OUTPATIENT
Start: 2018-04-11 | End: 2018-04-11

## 2018-04-11 RX ORDER — OXYTOCIN/RINGER'S LACTATE 20/1000 ML
41.65 PLASTIC BAG, INJECTION (ML) INTRAVENOUS CONTINUOUS
Status: ACTIVE | OUTPATIENT
Start: 2018-04-12 | End: 2018-04-12

## 2018-04-11 RX ORDER — LIDOCAINE HCL/EPINEPHRINE/PF 2%-1:200K
VIAL (ML) INJECTION
Status: DISCONTINUED | OUTPATIENT
Start: 2018-04-11 | End: 2018-04-11

## 2018-04-11 RX ORDER — DOCUSATE SODIUM 100 MG/1
200 CAPSULE, LIQUID FILLED ORAL 2 TIMES DAILY
Status: DISCONTINUED | OUTPATIENT
Start: 2018-04-11 | End: 2018-04-14 | Stop reason: HOSPADM

## 2018-04-11 RX ORDER — ONDANSETRON 2 MG/ML
INJECTION INTRAMUSCULAR; INTRAVENOUS
Status: DISCONTINUED | OUTPATIENT
Start: 2018-04-11 | End: 2018-04-11

## 2018-04-11 RX ORDER — ROPIVACAINE HYDROCHLORIDE 2 MG/ML
INJECTION, SOLUTION EPIDURAL; INFILTRATION; PERINEURAL
Status: DISPENSED
Start: 2018-04-11 | End: 2018-04-11

## 2018-04-11 RX ORDER — SODIUM CITRATE AND CITRIC ACID MONOHYDRATE 334; 500 MG/5ML; MG/5ML
30 SOLUTION ORAL ONCE
Status: COMPLETED | OUTPATIENT
Start: 2018-04-11 | End: 2018-04-11

## 2018-04-11 RX ADMIN — AZITHROMYCIN MONOHYDRATE 500 MG: 500 INJECTION, POWDER, LYOPHILIZED, FOR SOLUTION INTRAVENOUS at 10:04

## 2018-04-11 RX ADMIN — MORPHINE SULFATE 3 MG: 1 INJECTION, SOLUTION EPIDURAL; INTRATHECAL; INTRAVENOUS at 10:04

## 2018-04-11 RX ADMIN — LIDOCAINE HYDROCHLORIDE,EPINEPHRINE BITARTRATE 1 ML: 20; .005 INJECTION, SOLUTION EPIDURAL; INFILTRATION; INTRACAUDAL; PERINEURAL at 10:04

## 2018-04-11 RX ADMIN — KETOROLAC TROMETHAMINE 30 MG: 30 INJECTION, SOLUTION INTRAMUSCULAR; INTRAVENOUS at 10:04

## 2018-04-11 RX ADMIN — SODIUM CHLORIDE, POTASSIUM CHLORIDE, SODIUM LACTATE AND CALCIUM CHLORIDE 1000 ML: 600; 310; 30; 20 INJECTION, SOLUTION INTRAVENOUS at 08:04

## 2018-04-11 RX ADMIN — LIDOCAINE HYDROCHLORIDE,EPINEPHRINE BITARTRATE 2 ML: 20; .005 INJECTION, SOLUTION EPIDURAL; INFILTRATION; INTRACAUDAL; PERINEURAL at 10:04

## 2018-04-11 RX ADMIN — SODIUM CHLORIDE, POTASSIUM CHLORIDE, SODIUM LACTATE AND CALCIUM CHLORIDE 1000 ML: 600; 310; 30; 20 INJECTION, SOLUTION INTRAVENOUS at 09:04

## 2018-04-11 RX ADMIN — Medication 2 MILLI-UNITS/MIN: at 12:04

## 2018-04-11 RX ADMIN — MORPHINE SULFATE 2 MG: 1 INJECTION, SOLUTION EPIDURAL; INTRATHECAL; INTRAVENOUS at 10:04

## 2018-04-11 RX ADMIN — LIDOCAINE HYDROCHLORIDE,EPINEPHRINE BITARTRATE 3 ML: 20; .005 INJECTION, SOLUTION EPIDURAL; INFILTRATION; INTRACAUDAL; PERINEURAL at 10:04

## 2018-04-11 RX ADMIN — ACETAMINOPHEN 1000 MG: 10 INJECTION, SOLUTION INTRAVENOUS at 07:04

## 2018-04-11 RX ADMIN — OXYCODONE HYDROCHLORIDE AND ACETAMINOPHEN 1 TABLET: 5; 325 TABLET ORAL at 06:04

## 2018-04-11 RX ADMIN — ONDANSETRON 8 MG: 8 TABLET, ORALLY DISINTEGRATING ORAL at 06:04

## 2018-04-11 RX ADMIN — Medication 12 ML/HR: at 10:04

## 2018-04-11 RX ADMIN — FENTANYL CITRATE 100 MCG: 50 INJECTION, SOLUTION INTRAMUSCULAR; INTRAVENOUS at 10:04

## 2018-04-11 RX ADMIN — BUPIVACAINE HYDROCHLORIDE 75 MG: 2.5 INJECTION, SOLUTION EPIDURAL; INFILTRATION; INTRACAUDAL; PERINEURAL at 10:04

## 2018-04-11 RX ADMIN — SODIUM CHLORIDE, SODIUM LACTATE, POTASSIUM CHLORIDE, AND CALCIUM CHLORIDE: .6; .31; .03; .02 INJECTION, SOLUTION INTRAVENOUS at 09:04

## 2018-04-11 RX ADMIN — OXYTOCIN 30 UNITS: 10 INJECTION, SOLUTION INTRAMUSCULAR; INTRAVENOUS at 10:04

## 2018-04-11 RX ADMIN — FAMOTIDINE 20 MG: 10 INJECTION INTRAVENOUS at 09:04

## 2018-04-11 RX ADMIN — SODIUM CHLORIDE, POTASSIUM CHLORIDE, SODIUM LACTATE AND CALCIUM CHLORIDE: 600; 310; 30; 20 INJECTION, SOLUTION INTRAVENOUS at 09:04

## 2018-04-11 RX ADMIN — ONDANSETRON 4 MG: 2 INJECTION, SOLUTION INTRAMUSCULAR; INTRAVENOUS at 10:04

## 2018-04-11 RX ADMIN — CEFAZOLIN 2 G: 330 INJECTION, POWDER, FOR SOLUTION INTRAMUSCULAR; INTRAVENOUS at 10:04

## 2018-04-11 RX ADMIN — SODIUM CITRATE AND CITRIC ACID MONOHYDRATE 30 ML: 334; 500 SOLUTION ORAL at 09:04

## 2018-04-11 RX ADMIN — SODIUM CITRATE AND CITRIC ACID MONOHYDRATE 30 ML: 500; 334 SOLUTION ORAL at 09:04

## 2018-04-11 RX ADMIN — Medication 41.65 MILLI-UNITS/MIN: at 11:04

## 2018-04-11 NOTE — PLAN OF CARE
0450- Pt of Dr. Smith arrived. 38.4 wks  c/o ctx q 5 min for last hr. Pt rates pain 5/10 but tolerable. Pt gowned and in bed. EFM and toco applied to soft/ nontender abd. Sve done. Cervix 3/70/-1 intact. No bs or lof noted. Pt has appt w/ OB on Monday. PMH reviewed and poc discussed. Pt reports +Fm, denies lof or vb. States she drank few sips of water before arriving. Denies need for pain meds at this time. Pts SO @ bs. Call light in reach. Will monitor and notify on call OB.   0610- Dr. Butt notified of pts arrival.  @ 38.4 wks c/o irregular ctx x 1 hr. Pt arrived on unit @ 0440. Cervix 370/-1 intact on arrival. Ctx mild-moderate q 3-5 min. Reactive NST. Cervix checked again @ 0600 and unchanged. Orders noted to offer pt option to stay for monitoring for another hr and then recheck cervix or go home and return when ctx intensify.   0628- Dr. Smith phoned unit for update on pt. Notified that pt arrived around 0445. Cervix 3 cm, unchanged from office visit on 18. Ctx q 3-5 min. Pt requesting pain meds. Dr. Butt gave pt option to stay for observation for 2 more hrs or go home. Pt would like to stay. Luis states she will check pts cervix on rounds this am.   0700- Report given to HARIS Sy RN.

## 2018-04-11 NOTE — PLAN OF CARE
Problem: Patient Care Overview  Goal: Plan of Care Review  Outcome: Ongoing (interventions implemented as appropriate)  , positive cervical change, labor augmentation with Pitocin, epidural for pain control.

## 2018-04-11 NOTE — ANESTHESIA PROCEDURE NOTES
CSE    Patient location during procedure: OB  Start time: 4/11/2018 8:49 AM  Timeout: 4/11/2018 8:48 AM  End time: 4/11/2018 10:00 AM  Staffing  Anesthesiologist: JOHNNY GAXIOLA  Resident/CRNA: DEL ZURITA  Performed: resident/CRNA and anesthesiologist   Preanesthetic Checklist  Completed: patient identified, site marked, surgical consent, pre-op evaluation, timeout performed, IV checked, risks and benefits discussed and monitors and equipment checked  CSE  Patient position: sitting  Prep: ChloraPrep  Patient monitoring: heart rate, cardiac monitor, continuous pulse ox and frequent blood pressure checks  Approach: midline  Spinal Needle  Needle type: pencil-tip   Needle gauge: 25 G  Needle length: 5 in  Epidural Needle  Injection technique: CHAIM air  Needle type: Tuohy   Needle gauge: 17 G  Needle length: 3.5 in  Needle insertion depth: 7.5 cm  Location: L4-5  Needle localization: anatomical landmarks  Catheter  Catheter type: [employer]  Catheter size: 20 G  Catheter at skin depth: 12.5 cm  Test dose: lidocaine 1.5% with Epi 1-to-200,000 and positive (Spinal dose given, when went to thread catheter got heme back. Flushed and pulled back catheter, test dose positive. Went to another level, placed epidural and test dose negative. Catheter secured.)  Additional Documentation: incremental injection, increased heart rate with TD and no paresthesia on injection  Assessment  Sensory level: T7   Dermatomal levels determined by alcohol swab  Medications:  Bolus administered: 3 mL of 1.5 lidocaine  Epinephrine added: 3.75 mcg/mL (1/300,000)  Intrathecal Medications:  Bolus administered: 1.5 mL of 0.2 ropivacaine  administered: 3 mcg of  fentanyl  Epinephrine added: none

## 2018-04-11 NOTE — H&P
"HPI:   Sherlyn Monahan is a 27 y.o. female  at 38 weeks 4 daysEGA who presents here for contractions and has cervical change. She rates her pain 7/10 without relief from pain meds.    ROS:  GENERAL: Denies weight gain or weight loss. Feeling well overall.   SKIN: Denies rash or lesions.   HEAD: Denies head injury or headache.   CHEST: Denies chest pain or shortness of breath.   CARDIOVASCULAR: Denies palpitations or left sided chest pain.   ABDOMEN: No abdominal pain, constipation, diarrhea, nausea, vomiting or rectal bleeding.   URINARY: No frequency, dysuria, hematuria, or burning on urination.  REPRODUCTIVE: See HPI.     Past Medical History:   Diagnosis Date    Hypothyroidism     Pregnant and not yet delivered in second trimester     25 weeks    Thyroid disease      Past Surgical History:   Procedure Laterality Date    breast reduction  2012    BREAST SURGERY      breast reduction     Family History   Problem Relation Age of Onset    Breast cancer Mother 45     remission for 10 years    Cancer Mother 45     breast     Hypertension Mother     Diabetes Father     Diabetes Maternal Grandfather     Heart disease Maternal Grandfather     Diabetes Paternal Grandfather     Colon cancer Neg Hx     Ovarian cancer Neg Hx      Minocycline       PE:   /69   Pulse 71   Temp 98.8 °F (37.1 °C) (Oral)   Resp 16   Ht 5' 5" (1.651 m)   Wt 96.2 kg (212 lb)   LMP 07/15/2017   SpO2 98%   Breastfeeding? Yes   BMI 35.28 kg/m²   APPEARANCE: Well nourished, well developed, in no acute distress.  CHEST: Lungs clear to auscultation.  HEART: Regular rate and rhythm, no murmurs, rubs or gallops.  ABDOMEN:  Gravid.   SVE: 3-4/70% -1    Assessment:  IUP 38 weeks 4 days  Active labor  Category 1 Fetal Heart Tracing    Plan:   AROM after epidural     "

## 2018-04-11 NOTE — ANESTHESIA PREPROCEDURE EVALUATION
04/11/2018  Sherlyn Monahan is a 27 y.o., female. Pregnant with first child, would like epidural.    Anesthesia Evaluation    I have reviewed the Patient Summary Reports.    I have reviewed the Nursing Notes.      Review of Systems  Anesthesia Hx:  No problems with previous Anesthesia   Denies Personal Hx of Anesthesia complications.   Social:  Non-Smoker, No Alcohol Use    Hematology/Oncology:     Oncology Normal     EENT/Dental:EENT/Dental Normal   Cardiovascular:  Cardiovascular Normal Exercise tolerance: good     Pulmonary:  Pulmonary Normal    Renal/:  Renal/ Normal     Hepatic/GI:  Hepatic/GI Normal    Musculoskeletal:  Musculoskeletal Normal    Neurological:  Neurology Normal    Endocrine:   Hypothyroidism        Physical Exam  General:  Well nourished    Airway/Jaw/Neck:  Airway Findings: Mouth Opening: Normal Mallampati: I  Jaw/Neck Findings:  Neck ROM: Normal ROM     Eyes/Ears/Nose:  EYES/EARS/NOSE FINDINGS: Normal   Dental:  Dental Findings: In tact   Chest/Lungs:  Chest/Lungs Findings: Clear to auscultation, Normal Respiratory Rate     Heart/Vascular:  Heart Findings: Rate: Normal  Rhythm: Regular Rhythm      Musculoskeletal:  Musculoskeletal Findings: Normal   Skin:  Skin Findings: Normal    Mental Status:  Mental Status Findings:  Cooperative, Alert and Oriented         Anesthesia Plan  Type of Anesthesia, risks & benefits discussed:  Anesthesia Type:  CSE  Patient's Preference:   Intra-op Monitoring Plan: standard ASA monitors  Intra-op Monitoring Plan Comments:   Post Op Pain Control Plan:   Post Op Pain Control Plan Comments:   Induction:   IV  Beta Blocker:  Patient is not currently on a Beta-Blocker (No further documentation required).       Informed Consent: Patient understands risks and agrees with Anesthesia plan.  Questions answered. Anesthesia consent signed with patient.  ASA  Score: 2     Day of Surgery Review of History & Physical:            Ready For Surgery From Anesthesia Perspective.     Vitals:    04/11/18 0945   BP:    Pulse: 84   Resp:    Temp:        Recent Labs  Lab 04/11/18  0812   WBC 8.87   RBC 4.71   HGB 14.8   HCT 45.0      MCV 96   MCH 31.4*   MCHC 32.9

## 2018-04-11 NOTE — PLAN OF CARE
0700 Report received from SHAHANA Javed Rn, assumed care of  triage pt arrived at 0445 at 38.4 weeks with complaint of ctx. Pt received percocet at 0647 Pt still reports pain 7/10 with ctx and 4/10 in between ctx in her back with pressure in her perineum, Plan of care reviewed to continue to monitor and will recheck cervix this AM when Dr Smith comes for rounds. Pt can recall all information. Full assessment done.     0745 Dr Smith at bedside SVE 3-, positive cervical change, admit orders received. Pt moved to 361, Iv started in left hand, labs done, plan of care reviewed. Anesthesia Dr Leyva  Notified, IV bolus in process. Consents done, will receive epidural shortly once IV bolus is finished. Pt and spouse can recall all information.     0845 anesthesia at bedside see flow sheet for epidural information and time out.     0925 positive test dose, HR from 70's to 115... Cath removed will place new epidural. Dr Calderon and Gordon at bedside.    0947 test dose negative    0957 Dr Smith at bedside, EFm and toco readjusted, AROM clear , mcnulty inserted Pt educated on signs to report to Rn, strict bedrest, call light in reach.     1215 Dr Smith at bedside SVE , orders to augment with Pitocin.    See pericalm notes for labor interventions and position changes.     1530 Dr Smith updated on variable decels and position changes, will decrease Pitocin to 8 milliunits and continue to monitor.     1545 rep variable decels to 60 bpm, FSE applied and pitocin stopped.     1630 Dr Smith at bedside SVE , bedside ultrasound confirms OP position, will continue with labor and restart Pitocin at 1700 if tracing reactive. Pt and spouse can recall all information.     183 SVE , Dr Smith updated, will increase Pitocin to 4 milliunits, have night RN recheck at 2030, pt also having a h/a worse when sitting up, Dr Restrepo notifed of headache orders placed for IV tylenol.

## 2018-04-12 ENCOUNTER — SURGERY (OUTPATIENT)
Age: 28
End: 2018-04-12

## 2018-04-12 LAB
BASOPHILS # BLD AUTO: 0.01 K/UL
BASOPHILS NFR BLD: 0.1 %
DIFFERENTIAL METHOD: ABNORMAL
EOSINOPHIL # BLD AUTO: 0 K/UL
EOSINOPHIL NFR BLD: 0.1 %
ERYTHROCYTE [DISTWIDTH] IN BLOOD BY AUTOMATED COUNT: 13.2 %
HCT VFR BLD AUTO: 37.1 %
HGB BLD-MCNC: 12.1 G/DL
LYMPHOCYTES # BLD AUTO: 2.1 K/UL
LYMPHOCYTES NFR BLD: 16.5 %
MCH RBC QN AUTO: 30.9 PG
MCHC RBC AUTO-ENTMCNC: 32.6 G/DL
MCV RBC AUTO: 95 FL
MONOCYTES # BLD AUTO: 1 K/UL
MONOCYTES NFR BLD: 7.7 %
NEUTROPHILS # BLD AUTO: 9.7 K/UL
NEUTROPHILS NFR BLD: 75.4 %
PLATELET # BLD AUTO: 130 K/UL
PMV BLD AUTO: 11.9 FL
RBC # BLD AUTO: 3.91 M/UL
RPR SER QL: NORMAL
WBC # BLD AUTO: 12.82 K/UL

## 2018-04-12 PROCEDURE — 25000003 PHARM REV CODE 250: Performed by: OBSTETRICS & GYNECOLOGY

## 2018-04-12 PROCEDURE — 27200033

## 2018-04-12 PROCEDURE — 85025 COMPLETE CBC W/AUTO DIFF WBC: CPT

## 2018-04-12 PROCEDURE — 25000003 PHARM REV CODE 250: Performed by: ANESTHESIOLOGY

## 2018-04-12 PROCEDURE — 37000009 HC ANESTHESIA EA ADD 15 MINS: Performed by: OBSTETRICS & GYNECOLOGY

## 2018-04-12 PROCEDURE — 11000001 HC ACUTE MED/SURG PRIVATE ROOM

## 2018-04-12 PROCEDURE — 37000008 HC ANESTHESIA 1ST 15 MINUTES: Performed by: OBSTETRICS & GYNECOLOGY

## 2018-04-12 PROCEDURE — 63600175 PHARM REV CODE 636 W HCPCS: Performed by: OBSTETRICS & GYNECOLOGY

## 2018-04-12 PROCEDURE — 63600175 PHARM REV CODE 636 W HCPCS: Performed by: ANESTHESIOLOGY

## 2018-04-12 PROCEDURE — 36415 COLL VENOUS BLD VENIPUNCTURE: CPT

## 2018-04-12 PROCEDURE — 36000684 HC CESAREAN SECTION, UNSCHEDULED

## 2018-04-12 RX ADMIN — DIPHENHYDRAMINE HYDROCHLORIDE 25 MG: 25 CAPSULE ORAL at 04:04

## 2018-04-12 RX ADMIN — KETOROLAC TROMETHAMINE 30 MG: 30 INJECTION, SOLUTION INTRAMUSCULAR at 04:04

## 2018-04-12 RX ADMIN — LEVOTHYROXINE SODIUM 50 MCG: 25 TABLET ORAL at 08:04

## 2018-04-12 RX ADMIN — SODIUM CHLORIDE, POTASSIUM CHLORIDE, SODIUM LACTATE AND CALCIUM CHLORIDE: 600; 310; 30; 20 INJECTION, SOLUTION INTRAVENOUS at 03:04

## 2018-04-12 RX ADMIN — ACETAMINOPHEN 650 MG: 325 TABLET ORAL at 04:04

## 2018-04-12 RX ADMIN — NAPROXEN 500 MG: 500 TABLET ORAL at 10:04

## 2018-04-12 RX ADMIN — KETOROLAC TROMETHAMINE 30 MG: 30 INJECTION, SOLUTION INTRAMUSCULAR at 10:04

## 2018-04-12 RX ADMIN — DIPHENHYDRAMINE HYDROCHLORIDE 25 MG: 25 CAPSULE ORAL at 03:04

## 2018-04-12 RX ADMIN — OXYCODONE HYDROCHLORIDE AND ACETAMINOPHEN 1 TABLET: 10; 325 TABLET ORAL at 10:04

## 2018-04-12 RX ADMIN — DOCUSATE SODIUM 200 MG: 100 CAPSULE, LIQUID FILLED ORAL at 10:04

## 2018-04-12 RX ADMIN — DOCUSATE SODIUM 200 MG: 100 CAPSULE, LIQUID FILLED ORAL at 08:04

## 2018-04-12 NOTE — LACTATION NOTE
This note was copied from a baby's chart.   Infant rooting when stimulated to wakeful state but not maintaining latch.  Tongue thrusting noted.  Mom with flat nipples, give soft shells as well as hydrocolloid dressing for bilateral cracked/blistered nipples.  Mother assisted with manual milk expression. Able to express 1mL and given to baby in medicine cup; audible swallow noted.  Lactation consult order in.  Will continue to monitor.

## 2018-04-12 NOTE — PLAN OF CARE
0730am=  Awake, in bed, resp even and unlabored.  Voices no complaints at present.  IV Rt FA infusing LR at 125 ml/hr, via pump, without difficulty.  Li patent and draining via gravity.  Significant other at bedside.  Resting quietly with no distress.  Safety maintained with call light in reach.  1030am=  Li cath discontinued and pt tolerated well.  Informed pt to notify staff with urge to urinate,for staff to assist pt OOB and into Bathroom.  Pt verbalized understanding.     1530pm=  Assisted to BR and pt voided without difficulty.  1545pm=  Discontinued IVF's and SL to Rt FA remains intact.

## 2018-04-12 NOTE — TRANSFER OF CARE
"Anesthesia Transfer of Care Note    Patient: Sherlyn Monahan    Procedure(s) Performed: * No procedures listed *    Patient location: Labor and Delivery    Anesthesia Type: CSE    Transport from OR: Transported from OR on room air with adequate spontaneous ventilation    Post pain: adequate analgesia    Post assessment: no apparent anesthetic complications    Post vital signs: stable    Level of consciousness: awake, alert and oriented    Nausea/Vomiting: nausea    Complications: none    Transfer of care protocol was followed      Last vitals:   Visit Vitals  /69   Pulse 80   Temp 37.1 °C (98.8 °F) (Temporal)   Resp 16   Ht 5' 5" (1.651 m)   Wt 96.2 kg (212 lb)   LMP 07/15/2017   SpO2 96%   Breastfeeding? Yes   BMI 35.28 kg/m²     "

## 2018-04-12 NOTE — PLAN OF CARE
Pt requesting Benadryl for itching, even after shower taken.  Instructed pt of medication may deplete breastmilk production.  Pt verbalized understanding, but still requests med.

## 2018-04-12 NOTE — PROGRESS NOTES
"POD #0 s/p     Subjective: No complaints. No flatus    Objective: /72 (BP Location: Left arm, Patient Position: Lying)   Pulse 67   Temp 98.6 °F (37 °C) (Oral)   Resp 17   Ht 5' 5" (1.651 m)   Wt 96.2 kg (212 lb)   LMP 07/15/2017   SpO2 96%   Breastfeeding? Yes   BMI 35.28 kg/m²     H/H:   Lab Results   Component Value Date    WBC 12.82 (H) 2018    HGB 12.1 2018    HCT 37.1 2018    MCV 95 2018     (L) 2018       Chest: Clear to auscultation  CV: Regular rate and rhythm  Abdomen: Non-tender, non-distended, soft, positive bowel sounds  Incision: Bandaged  Extremities: Non-tender, no edema    Assessment: S/P     Plan: Routine progressive care      "

## 2018-04-12 NOTE — L&D DELIVERY NOTE
[facility]   Section   Operative Note    SUMMARY     Date of Surgery: 18    Pre-Operative Diagnosis:   IUP 38 weeks 4 days  CPD  Active labor    Post-Operative Diagnosis:   Same  Viable male infant  Nuchal cord x 1    Surgery:   Primary LTCS    Surgeon: MD Constance  Assistant: LUKAS Kaye CST    Anesthesia: Epidural and local    EBL: 400 cc    Findings:   Normal bilateral tubes and ovaries.   Normal uterus  Viable male infant with 9/9 Apgars with caput and nuchal cord x 1  LOP      Specimens: None    Complications: None          Condition: Good    Disposition: PACU - hemodynamically stable.    Attestation: Good         Delivery Information for  Kamlesh Monahan    Birth information:  YOB: 2018   Time of birth: 10:19 PM   Sex: male   Head Delivery Date/Time:     Delivery type:    Gestational Age: 38w4d               Assessment    No data filed                          Interventions/Resuscitation         Cord    No data filed              Labor Events:       labor: No     Labor Onset Date/Time:         Dilation Complete Date/Time:         Start Pushing Date/Time:       Rupture Date/Time:              Rupture type:           Fluid Amount:        Fluid Color:        Fluid Odor:        Membrane Status (PeriCalm): ARM (Artificial Rupture)      Rupture Date/Time (PeriCalm): 2018 09:57:00      Fluid Amount (PeriCalm): Moderate      Fluid Color (PeriCalm): Clear       steroids: None     Antibiotics given for GBS: No     Induction: none     Indications for induction:        Augmentation: oxytocin;amniotomy     Indications for augmentation: Ineffective Contraction Pattern     Labor complications:       Additional complications:          Cervical ripening:                     Delivery:      Episiotomy:       Indication for Episiotomy:       Perineal Lacerations:   Repaired:      Periurethral Laceration:   Repaired:     Labial Laceration:   Repaired:      Sulcus Laceration:   Repaired:     Vaginal Laceration:   Repaired:     Cervical Laceration:   Repaired:     Repair suture:       Repair # of packets:       Vaginal delivery QBL (mL):        QBL (mL): 0     Combined Blood Loss (mL): 0     Vaginal Sweep Performed:       Surgicount Correct:         Other providers:            Details (if applicable):  Trial of Labor      Categorization:      Priority:     Indications for :     Incision Type:       Additional  information:  Forceps:    Vacuum:    Breech:    Observed anomalies    Other (Comments):

## 2018-04-12 NOTE — NURSING
- Report received of  at 38 weeks 4 days admitted for labor augmentation from ELISABETH Santana. Care assumed. Full assessment done, history and medications reviewed with pt. Pt and family updated on plan of care with questions answered. Bed in low locked position, call bell in reach.     - Anesthesia notified for redose.     - Anesthesia at bedside for assessment, pulled back epidural catheter, and redosed.     - Dr. Smith at bedside for SVE, pt complete/0 with molding, plan of care to redo epidural, then recheck.     - Call placed to Dr. Smith, SVE remains same.     - Trial pushed, head unable to move past spines, C/s called for CPD. Anesthesia, nursery, and first assist notified. Pt prepped for c/s, updated on plan of care.     - Pt transferred to OR 2 for c/s.     - Delivery of viable male infant, APGARS 9/9.     5 - Recovery started.    224 - Pt transferred back to room 361.    120 - Pt transferred to room 306 via bed, infant in crib at side. Pt and family oriented to new room, bed in low locked position, call bell in reach. Report given to ELISABETH Tran.

## 2018-04-12 NOTE — ANESTHESIA POSTPROCEDURE EVALUATION
"Anesthesia Post Evaluation    Patient: Sherlyn Monahan    Procedure(s) Performed: Procedure(s) (LRB):  DELIVERY- SECTION (N/A)    Final Anesthesia Type: CSE  Patient location during evaluation: labor & delivery  Patient participation: Yes- Able to Participate  Level of consciousness: awake and alert and oriented  Post-procedure vital signs: reviewed and stable  Pain management: adequate  Airway patency: patent  PONV status at discharge: No PONV  Anesthetic complications: no      Cardiovascular status: hemodynamically stable  Respiratory status: unassisted, spontaneous ventilation and room air  Hydration status: euvolemic  Follow-up not needed.        Visit Vitals  /75 (BP Location: Left arm, Patient Position: Lying)   Pulse 77   Temp 36.6 °C (97.9 °F) (Oral)   Resp 18   Ht 5' 5" (1.651 m)   Wt 96.2 kg (212 lb)   LMP 07/15/2017   SpO2 95%   Breastfeeding? Yes   BMI 35.28 kg/m²       Pain/Nahun Score: Pain Rating Prior to Med Admin: 5 (2018  4:40 AM)  Pain Rating Post Med Admin: 0 (2018 10:00 AM)    No catheter in back.  No headache/neckache/backache.  Full return of neurological function.  Has not yet ambulated, but leg sensation and strength normal. Has not yet urinated, advised to notify team if has any difficulty.  Advised patient to report any new problems of back pain, especially with fever or decreasing bladder function occurring during coming days to weeks.    "

## 2018-04-12 NOTE — PROGRESS NOTES
Patient had a rim about 8:30pm which was reducible but still high at -1 station with caput. Patient was requesting epidural to be done again so she was sat up and epidural done again. Patient was more comfortable but even with sitting up the head did not come down. Nurse attempted pushing with patient but then a bradycardic deceleration occurred. Will proceed with primary  for CPD. The arches were narrow.

## 2018-04-12 NOTE — OP NOTE
Date of Surgery: 18     Pre-Operative Diagnosis:   IUP 38 weeks 4 days  CPD  Active labor     Post-Operative Diagnosis:   Same  Viable male infant  Nuchal cord x 1     Surgery:   Primary LTCS     Surgeon: MD Constance  Assistant: LUKAS Kaye CST     Anesthesia: Epidural and local     EBL: 400 cc     Findings:   Normal bilateral tubes and ovaries.   Normal uterus  Viable male infant with 9/9 Apgars with caput and nuchal cord x 1  LOP        Specimens: None     Complications: None           Condition: Good     Disposition: PACU - hemodynamically stable.     Attestation: Good     With patient in supine position, the legs are  and FSE is removed. The Li Catheter was placed in the LDR.   Abdomen prepped with Chloroprep and 3 minute drying time allowed prior to draping of the abdomen.   Time out taken with OR team members.  Pfannenstiel Incision made through the skin, transverse fascial incision developed, rectus muscles  in the midline and the peritoneum entered.   no adhesions noted.  The lower uterine segment and position of the fetus identified.   Bladder flap taken down through transverse peritoneal incision.    Low Transverse Incision made through well developed lower uterine segment and extended laterally with blunt dissection.   Clear fluid noted.  Infant delivered from vertex presentation.  Cord clamped after one minute and  handed to attending nurse.  Cord blood taken, placenta delivered.  The uterus was exteriorized.  Closure with running lock 0 Chromic, starting at right angle and tied at the left angle. Observation for bleeding along the hysterotomy line. Excellent hemostasis was noted and Gricel hemostatic agent was placed over hysterotomy site.    Uterus, right and left adnexa with normal anatomy.Closure of the rectus muscles with 0 Chromic suture in an interupted fashion. Fascial closure with 0 Vicryl starting at the right angle and tying the knot at the left angle.  Bupivacaine was injected under the fascial layer.  Skin closure with 4 0 Monocryl subcuticular.  Wound dressed with Dermaflex surgical glue.   The patient tolerated the procedure well and all counts were correct x 2.  The patient was taken to recovery room in stable condition.

## 2018-04-12 NOTE — LACTATION NOTE
This note was copied from a baby's chart.  0450-attempted to wake infant for breastfeeding. Infant rooting; infant latched             on but quickly releases breast; falling asleep.  Colostrum hand                            expressed with no response from infant.    0500-Infant placed skin to skin.  Will continue to monitor.    0600-Mom re-attempted breast feeding; infant did not sustained latch.    0615-Dr. Joiner at bedside performing first exam.  Notified that baby has been sleepy, has not breast fed since 2315 (75minutes on left side).  Also notified that baby's heart rate has been 100.  MD verbalized understanding.  Per MD, no concerns or new orders at this time.  Will continue to monitor.

## 2018-04-12 NOTE — ANESTHESIA PROCEDURE NOTES
CSE    Patient location during procedure: OB  Start time: 4/11/2018 8:30 AM  Timeout: 4/11/2018 8:25 AM  End time: 4/11/2018 8:40 AM  Staffing  Anesthesiologist: LEROY PULIDO  Performed: anesthesiologist   Preanesthetic Checklist  Completed: patient identified, site marked, pre-op evaluation, timeout performed, IV checked, risks and benefits discussed and monitors and equipment checked  CSE  Patient position: sitting  Prep: ChloraPrep  Patient monitoring: heart rate, cardiac monitor and frequent blood pressure checks  Approach: midline  Spinal Needle  Needle type: pencil-tip   Needle gauge: 25 G  Needle length: 3.5 in  Epidural Needle  Injection technique: CHAIM saline  Needle type: Tuohy   Needle gauge: 17 G  Needle length: 3.5 in  Needle insertion depth: 7.5 cm  Location: L3-4  Needle localization: anatomical landmarks  Catheter  Catheter type: Reclamador  Catheter size: 19 G  Catheter at skin depth: 13 cm  Test dose: lidocaine 1.5% with Epi 1-to-200,000  Additional Documentation: incremental injection, negative aspiration for CSF, negative aspiration for heme, no paresthesia on injection and negative test dose  Assessment  Sensory level: T7   Dermatomal levels determined by alcohol swab  Medications:  Bolus administered: 200 mL of 0.2 ropivacaine  Opioid administered: 400 mcg of   fentanyl  Intrathecal Medications:  Bolus administered: 1.5 mL of 0.2 ropivacaine  administered: primary anesthetic and 3 mcg of  fentanyl

## 2018-04-12 NOTE — PLAN OF CARE
Problem: Patient Care Overview  Goal: Plan of Care Review  Outcome: Ongoing (interventions implemented as appropriate)  Mom will continue to breastfeed frequently & on cue at least 8+ times/24 hrs.  Will monitor for signs of adequate fdg. Will stimulate nipple & shape breast to facilitate deep latch. Encouraged to have baby's weight checked soon after d/c & frequently within the first couple of weeks to make sure baby is transferring milk & gaining weight appropriately. Will use breast shells as instructed to help leonel nipples. Discussed hx of breast reduction surgery. Mom unsure if nipple/areola removed or if left intact for procedure. Has periareolar incisions. Can hand express drops of colostrum easily. Lots of swallows can be heard & seen. Praise & reassurance provided. Discussed possibility of having to supplement with formula. Will call for any needs.

## 2018-04-12 NOTE — LACTATION NOTE
"   04/12/18 1000   Maternal Infant Assessment   Breast Density Bilateral:;soft   Areola Bilateral:;elastic   Nipple(s) Bilateral:;graspable;everted  (w/ stimulation/reverse pressure softening)   Nipple Symptoms bilateral:;painful;redness   Infant Assessment   Mouth Size average   Tongue/Frenulum Symptoms appearance normal;frenulum normal   Sucking Reflex present   Rooting Reflex present   Swallow Reflex present   Breasts WDL   Breasts WDL WDL   Pain/Comfort Assessments   Pain Assessment Performed Yes       Number Scale   Presence of Pain complains of pain/discomfort   Location - Side Bilateral   Location nipple(s)   Pain Rating: Activity 8  (decreases to 4-5 after few mins of sucking)   Factors that Aggravate Pain other (see comments)  (BR)   Factors that Relieve Pain other (see comments)  (colostrum; gel pads)   Maternal Infant Feeding   Maternal Preparation breast care   Maternal Emotional State tense;assist needed   Infant Positioning clutch/"football"   Signs of Milk Transfer audible swallow;infant jaw motion present   Presence of Pain yes   Pain Location nipples, bilateral   Pain Description soreness   Comfort Measures Before/During Feeding infant position adjusted;latch adjusted;suction broken using finger   Breast Milk Supply Volume (ml) (expresses drops of colostrum easily)   Time Spent (min) 30-60 min   Comfort Measures Following Feeding expressed milk applied   Nipple Shape After Feeding, Right elongated   Latch Assistance yes   Breastfeeding Education adequate infant intake;adequate milk volume;importance of skin-to-skin contact;increasing milk supply;milk expression, hand   Breastfeeding History   Currently Breastfeeding no   Breastfeeding History no   Feeding Infant   Feeding Tolerance/Success adequate pause for breath;alert for feeding;arousal required;coordinated suck;coordinated swallow;strong suck   Effective Latch During Feeding yes   Audible Swallow yes   Suck/Swallow Coordination present "   Skin-to-Skin Contact During Feeding yes   Lactation Referrals   Lactation Consult Breast/nipple pain;Breastfeeding assessment;Initial assessment;Knowledge deficit   Lactation Referrals pediatric care provider   Lactation Follow-up Date/Time (Pediatric Care Provider) within 2-3 days of d/c;enc frequent weight checks at ped's  office due to hx breast reduction surgery   Lactation Interventions   Attachment Promotion breastfeeding assistance provided;counseling provided;face-to-face positioning promoted;family involvement promoted;privacy provided;skin-to-skin contact encouraged   Breast Care: Breastfeeding milk massaged towards nipple   Breastfeeding Assistance assisted with positioning;assisted with techniques for flat/inverted nipples;feeding cue recognition promoted;feeding on demand promoted;feeding session observed;infant latch-on verified;infant stimulated to wakeful state;infant suck/swallow verified;milk expression/pumping;nipple shell utilized;support offered   Maternal Breastfeeding Support encouragement offered;lactation counseling provided;maternal rest encouraged   Latch Promotion positioning assisted;infant moved to breast;suck stimulated with colostrum drop

## 2018-04-13 ENCOUNTER — PATIENT MESSAGE (OUTPATIENT)
Dept: OBSTETRICS AND GYNECOLOGY | Facility: CLINIC | Age: 28
End: 2018-04-13

## 2018-04-13 ENCOUNTER — TELEPHONE (OUTPATIENT)
Dept: OBSTETRICS AND GYNECOLOGY | Facility: HOSPITAL | Age: 28
End: 2018-04-13

## 2018-04-13 PROCEDURE — 11000001 HC ACUTE MED/SURG PRIVATE ROOM

## 2018-04-13 PROCEDURE — 25000003 PHARM REV CODE 250: Performed by: OBSTETRICS & GYNECOLOGY

## 2018-04-13 RX ORDER — NAPROXEN 500 MG/1
500 TABLET ORAL EVERY 8 HOURS
Qty: 40 TABLET | Refills: 0 | Status: SHIPPED | OUTPATIENT
Start: 2018-04-13 | End: 2018-06-05

## 2018-04-13 RX ORDER — OXYCODONE HYDROCHLORIDE 10 MG/1
10 TABLET ORAL EVERY 4 HOURS PRN
Qty: 40 TABLET | Refills: 0 | Status: SHIPPED | OUTPATIENT
Start: 2018-04-13 | End: 2018-05-16

## 2018-04-13 RX ADMIN — DOCUSATE SODIUM 200 MG: 100 CAPSULE, LIQUID FILLED ORAL at 08:04

## 2018-04-13 RX ADMIN — LEVOTHYROXINE SODIUM 50 MCG: 25 TABLET ORAL at 08:04

## 2018-04-13 RX ADMIN — DOCUSATE SODIUM 200 MG: 100 CAPSULE, LIQUID FILLED ORAL at 09:04

## 2018-04-13 RX ADMIN — OXYCODONE HYDROCHLORIDE AND ACETAMINOPHEN 1 TABLET: 5; 325 TABLET ORAL at 03:04

## 2018-04-13 RX ADMIN — NAPROXEN 500 MG: 500 TABLET ORAL at 09:04

## 2018-04-13 RX ADMIN — NAPROXEN 500 MG: 500 TABLET ORAL at 01:04

## 2018-04-13 RX ADMIN — NAPROXEN 500 MG: 500 TABLET ORAL at 05:04

## 2018-04-13 RX ADMIN — OXYCODONE HYDROCHLORIDE AND ACETAMINOPHEN 1 TABLET: 5; 325 TABLET ORAL at 10:04

## 2018-04-13 RX ADMIN — OXYCODONE HYDROCHLORIDE AND ACETAMINOPHEN 1 TABLET: 10; 325 TABLET ORAL at 09:04

## 2018-04-13 RX ADMIN — OXYCODONE HYDROCHLORIDE AND ACETAMINOPHEN 1 TABLET: 10; 325 TABLET ORAL at 05:04

## 2018-04-13 NOTE — PROGRESS NOTES
"POD #1 s/p     Subjective: No complaints. Positive flatus. Unsure about discharge tonight or tomorrow. She is having some problems with breast feeding. Discussed discharge medications and she wants to go with Percocet 10mg so she has option of 1/2 or whole tablet.    Objective: /66 (BP Location: Right arm, Patient Position: Sitting)   Pulse 71   Temp 97.6 °F (36.4 °C) (Oral)   Resp 18   Ht 5' 5" (1.651 m)   Wt 96.2 kg (212 lb)   LMP 07/15/2017   SpO2 99%   Breastfeeding? Yes   BMI 35.28 kg/m²     H/H:   Lab Results   Component Value Date    WBC 12.82 (H) 2018    HGB 12.1 2018    HCT 37.1 2018    MCV 95 2018     (L) 2018       Chest: Clear to auscultation  CV: Regular rate and rhythm  Abdomen: Non-tender, non-distended, soft, positive bowel sounds  Incision: Bandaged  Extremities: Non-tender, no edema    Assessment: S/P     Plan:   Routine progressive care  Circ done if patient stays until Saturday  Discharge Friday or Saturday with baby depending on  Breast feeding challenges today.     "

## 2018-04-13 NOTE — LACTATION NOTE
04/13/18 0930   Maternal Infant Assessment   Breast Density Bilateral:;soft   Areola Bilateral:;elastic   Nipple(s) Bilateral:;graspable;everted  (with stimulation)   Nipple Symptoms left:;bruised;bilateral:;redness;painful   Breasts WDL   Breasts WDL WDL   Pain/Comfort Assessments   Pain Assessment Performed Yes       Number Scale   Presence of Pain complains of pain/discomfort   Location - Side Bilateral   Location nipple(s)   Factors that Aggravate Pain other (see comments)  (BR/pumping)   Factors that Relieve Pain other (see comments)  (colostrum; gel pads)   Maternal Infant Feeding   Maternal Preparation breast care;hand hygiene   Maternal Emotional State relaxed;independent   Presence of Pain yes   Pain Location nipples, bilateral   Pain Description soreness   Breast Milk Supply Volume (ml) (obtains drops with pumping)   Time Spent (min) 15-30 min   Breastfeeding Education adequate infant intake;adequate milk volume;diet;importance of skin-to-skin contact;increasing milk supply;medication effects;milk expression, electric pump;milk expression, hand;prenatal vitamins continued   Feeding Infant   Satiety Cues sleeping after feeding   Equipment Type/Education   Pump Type Symphony   Breast Pump Type double electric, hospital grade   Breast Pump Flange Type hard   Pumping Frequency (times) (enc to BR/pump/hand express 8+ times/24hrs)   Lactation Referrals   Lactation Consult Breast/nipple pain;Follow up;Knowledge deficit;Pump teaching   Lactation Referrals pediatric care provider   Lactation Follow-up Date/Time (Pediatric Care Provider) within 2-3 days & frequently for weight checks until certain BR effectively/transferring adequate milk   Lactation Interventions   Breast Care: Breastfeeding other (see comments)  (gel pads applied)   Breastfeeding Assistance feeding cue recognition promoted;feeding on demand promoted;milk expression/pumping;nipple shell utilized   Maternal Breastfeeding Support diary/feeding log  utilized;encouragement offered;lactation counseling provided;maternal hydration promoted;maternal nutrition promoted;maternal rest encouraged

## 2018-04-13 NOTE — LACTATION NOTE
"Patient called RN crying in pain. Patient c/o nipple pain. Nipples bilaterally noted to be red, blistered, and scabbed. Patient is exclusively breastfeeding. Patient states "can't latch baby on it hurts too much." Hydrogels at bedside.     Medela Symphony double breast pump given. Instructions and demonstration given. All questions answered. Mother is to pump 8 or more in 24 hours. Infant will be given colostrum via syringe. Will continue to monitor.    "

## 2018-04-13 NOTE — DISCHARGE SUMMARY
Admit Date: 18  Discharge Date: 18    Attending physician: MD Luis    Diagnosis:  Term Pregnancy  Viable male  Infant  CPD    Procedure:   Primary     Hospital Course: Afebrile and vital signs stable throughout hospital course. Routine progressive care. Vaginal bleeding stable. Tolerating oral intake. Positive flatus.    Discharged Condition: Improving    Disposition: Discharged to home or self care    Activity:  As tolerated  Pelvic rest x 6 weeks  Diet: Regular  Medications: Given to patient or sent electronically   Follow up:  1  Week for   4-6 weeks for vaginal delivery

## 2018-04-13 NOTE — LACTATION NOTE
This note was copied from a baby's chart.  Mother called RN to room. Infant crying expressing feeding cues. Mother states she pumped ay 0200 and got 2 drops of colostrum which were given to baby. Mother expresses concern and requests to supplement with formula.    Mother educated syringe feed baby.Information provided on benefits of breastfeeding, supply and demand, adequacy of colostrum, feeding frequency and normal  feeding patterns for first days of life. Informed about risks of formula feeding. After education, mother still chooses to supplement with formula.    Mother will continue to pump 8 or more in 24 hours and offer EBM first.

## 2018-04-14 VITALS
SYSTOLIC BLOOD PRESSURE: 122 MMHG | BODY MASS INDEX: 35.32 KG/M2 | DIASTOLIC BLOOD PRESSURE: 79 MMHG | HEART RATE: 94 BPM | HEIGHT: 65 IN | WEIGHT: 212 LBS | TEMPERATURE: 98 F | OXYGEN SATURATION: 99 % | RESPIRATION RATE: 18 BRPM

## 2018-04-14 PROCEDURE — 25000003 PHARM REV CODE 250: Performed by: OBSTETRICS & GYNECOLOGY

## 2018-04-14 RX ADMIN — NAPROXEN 500 MG: 500 TABLET ORAL at 05:04

## 2018-04-14 RX ADMIN — OXYCODONE HYDROCHLORIDE AND ACETAMINOPHEN 1 TABLET: 5; 325 TABLET ORAL at 04:04

## 2018-04-14 RX ADMIN — DOCUSATE SODIUM 200 MG: 100 CAPSULE, LIQUID FILLED ORAL at 10:04

## 2018-04-14 RX ADMIN — LEVOTHYROXINE SODIUM 50 MCG: 25 TABLET ORAL at 10:04

## 2018-04-14 NOTE — PLAN OF CARE
Problem: Patient Care Overview  Goal: Plan of Care Review  Outcome: Outcome(s) achieved Date Met: 04/14/18  Mom will do skin to skin & attempt to breastfeed frequently & on cue at least 8+ times/24 hrs.  Will monitor for signs of adequate fdg. Will pump/hand express 8+ times/24hrs for increased stimulation/supplementation. Has hx of breast reduction surgery. Will use breast shells as instructed until nipples more everted. Will continue to supplement with formula (by syringe or bottle feeding) until certain baby able to transfer adequate amount of breastmilk. Formula Feeding Guide provided with verbal instructions on formula fdg. Will have baby's weight checked at ped's office by Mon 4/16/18. Will call for any needs.

## 2018-04-14 NOTE — DISCHARGE INSTRUCTIONS
Breastfeeding Discharge Instructions       Feed the baby at the earliest sign of hunger or comfort  o Hands to mouth, sucking motions  o Rooting or searching for something to suck on  o Dont wait for crying - it is a sign of distress     The feedings may be 8-12 times per 24hrs and will not follow a schedule   Avoid pacifiers and bottles for the first 4 weeks   Alternate the breast you start the feeding with, or start with the breast that feels the fullest   Switch breasts when the baby takes himself off the breast or falls asleep   Keep offering breasts until the baby looks full, no longer gives hunger signs, and stays asleep when placed on his back in the crib   If the baby is sleepy and wont wake for a feeding, put the baby skin-to-skin dressed in a diaper against the mothers bare chest   Sleep near your baby   The baby should be positioned and latched on to the breast correctly  o Chest-to-chest, chin in the breast  o Babys lips are flipped outward  o Babys mouth is stretched open wide like a shout  o Babys sucking should feel like tugging to the mother  - The baby should be drinking at the breast:  o You should hear swallowing or gulping throughout the feeding  o You should see milk on the babys lips when he comes off the breast  o Your breasts should be softer when the baby is finished feeding  o The baby should look relaxed at the end of feedings  o After the 4th day and your milk is in:  o The babys poop should turn bright yellow and be loose, watery, and seedy  o The baby should have at least 3-4 poops the size of the palm of your hand per day  o The baby should have at least 5-6 wet diapers per day  o The urine should be light yellow in color  You should drink when you are thirsty and eat a healthy diet when you are    hungry.     Take naps to get the rest you need.   Take medications and/or drink alcohol only with permission of your obstetrician    or the babys pediatrician.  You can  also call the [facility],   ([phone]), Monday-Friday, 8am-5pm Central time, to get the most   up-to-date evidence-based information on the use of medications during   pregnancy and breastfeeding.      The baby should be examined by a pediatrician at 3-5 days of age.  Once your   milk comes in, the baby should be gaining at least ½ - 1oz each day and should be back to birthweight no later than 10-14 days of age.          Community Resources    [facility] Breastfeeding Warmline: [phone]  Local [facility] clinics: provide incentives and breastpumps to eligible mothers  [employer] ([employer]LI):  mother-to-mother support group website        www.[employer]  Local La Leche League mother-to-mother support groups:        www.[employer]        La Leche League [facility]   Dr. Richardson Donahue website for latch videos and general information:        www.breastfeedinginc.ca  [facility] is a call center that provides information about the safety of taking medications while breastfeeding.  Call [phone], M-F, 8am-5pm, [place].  International Lactation Consultant Association provides resources for assistance:        www.ilca.org  [facility] Breastfeeding Coalition provides informationand resources for parents  and the community    http://[facility], [place].org     Dianne Meyer is a mom-to-mom support group:                             www.nolanesting.[place]//breastfeedng-support/  Partners for Healthy Babies:  5-692-687-BABY(6407)  [employer]: a breastfeeding support group for women of color, 333.545.6958      Patient Discharge Instructions for Postpartum Women    Resume Regular Diet  Increase activity gradually, no heavy lifting  Shower  No tampons, douching or sexual intercourse.  Discuss birth control options with your physician.  Wear a support bra  Return to work/school when you've been cleared by a physician    Call your physician if     *Fever of 100.4 or  "higher  *Persistent nausea/ vomiting  *Incisional drainage  *Heavy vaginal bleeding or large clots (Heavy bleeding is soaking 1 pad in an hour)  *Swelling and pain in arms or legs  *Severe headaches, blurred vision or fainting  *Shortness of breath  *Frequency and burning with urination  *Signs of postpartum depression, discuss these signs with your physician    Call lactation services for questions regarding feeding, nipple and breast care, and general questions about lactation.  They can be reached at [phone]         Understanding Postpartum Depression    You've just had a baby.  You know you should be excited and happy.  But instead you find yourself crying for no reason.  You may have trouble coping with your daily tasks.  You feel sad, tired, and hopeless most of the time.  You may even feel ashamed or guilty.  But what you're going through is not your fault and you can feel better.  Talk to your doctor.  He or she can help.    Depression After Childbirth    You may be weepy and tired right after giving birth.  These feelings are normal.  They're sometimes called the "baby blues."  These blues go away 2-3 weeks.  However, postpartum (meaning "after birth") depression lasts much longer and is more sever than the "baby blues."  It can make you feel sad and hopeless.  You may also fear that your baby will be harmed and worry about being a bad mother.      What is Depression?    Depression is a mood disorder that affects the way you think and feel.  The most common symptom is a feeling of deep sadness.  You may also feel as if you just can't cope with life.    Other symptoms include:      * Gaining or loosing weight  * Sleeping too much or too little  * Feeling tired all the time  * Feeling restless  * Fears of harming your baby   * Lack of interest in your baby  * Feeling worthless or guilty  * No longer finding pleasure in things you used to  * Having trouble thinking clearly or making decisions  * Thoughts " of hurting yourself or your baby    What Causes Postpartum Depression    The exact causes of postpartum depression isn't known.  It may be due to changes in your hormones during and after childbirth.  You may also be tired from caring for your baby and adjusting to being a mother.  All these factors may make you feel depressed.  In some cases, your genes may also play a role.    Depression Can Be Treated    The good news is that there are many ways to treat postpartum depression.  Talking to your doctor is the first step toward feeling better.    Resources:    * National [place] of Mental Health  -- 444-468-5271    www.nimh.nih.gov    * National [employer] on Mental Illness --199.819.2862    Www.césar.org    * Mental Health [place] -- 558.426.3999     Www.nmha.org    * National Suicide Hotline --368.800.1701 (800-SUICIDE)    5874-0851 [employer]  All rights reserved.  This information is not intended as a substitute for professional medical care.  Always follow up with your healthcare professional's instructions.

## 2018-04-14 NOTE — LACTATION NOTE
04/14/18 0830   Maternal Medical Surgical History   Surgical History yes   Surgical Procedure other (see comments)  (breast reduction w/ periareolar incisions)   Maternal Infant Assessment   Breast Density Bilateral:;soft;filling  (slightly per mom)   Areola Bilateral:;elastic;surgical scarring   Nipple(s) Bilateral:;graspable;everted  (w/ stimulation)   Nipple Symptoms bilateral:;tender;redness;left:;bruised   Infant Assessment   Mouth Size average   Sucking Reflex present   Rooting Reflex present   Swallow Reflex present   Breasts WDL   Breasts WDL ex  (slightly filling; hx breast reduction)   Pain/Comfort Assessments   Pain Assessment Performed Yes       Number Scale   Presence of Pain complains of pain/discomfort   Location - Side Bilateral   Location nipple(s)   Pain Rating: Activity 4   Factors that Aggravate Pain other (see comments)  (BR/pumping)   Factors that Relieve Pain other (see comments)  (colostrum; gel pads)   Maternal Infant Feeding   Maternal Preparation breast care   Maternal Emotional State assist needed;relaxed   Infant Positioning cross-cradle   Signs of Milk Transfer infant jaw motion present   Presence of Pain yes   Pain Location nipples, bilateral   Pain Description soreness   Comfort Measures Before/During Feeding infant position adjusted;latch adjusted;maternal position adjusted;suction broken using finger   Breast Milk Supply Volume (ml) 0 ml  (no colostrum visible with hand expression)   Time Spent (min) 30-60 min   Nipple Shape After Feeding, Right elongated;round   Latch Assistance yes   Breastfeeding Education adequate infant intake;adequate milk volume;importance of skin-to-skin contact;increasing milk supply;label/storage of breast milk;milk expression, hand   Feeding Infant   Satiety Cues cessation of sucking;calm after feeding   Feeding Tolerance/Success adequate pause for breath;alert for feeding;arousal required;coordinated suck;coordinated swallow;strong suck   Effective  Latch During Feeding yes   Suck/Swallow Coordination present   Skin-to-Skin Contact During Feeding yes   Equipment Type/Education   Pump Type Symphony  (has Spectra pump for home use)   Breast Pump Type double electric, hospital grade   Breast Pump Flange Type hard   Breast Pump Flange Size 24 mm;27 mm   Pumping Frequency (times) (enc to BR/pump/supplement with EBM & formula as needed)   Lactation Referrals   Lactation Consult Breast/nipple pain;Breastfeeding assessment;Follow up;Knowledge deficit   Lactation Referrals pediatric care provider   Lactation Follow-up Date/Time (Pediatric Care Provider) within 2-3 days   Lactation Interventions   Attachment Promotion breastfeeding assistance provided;counseling provided;face-to-face positioning promoted;family involvement promoted;privacy provided;skin-to-skin contact encouraged   Breast Care: Breastfeeding milk massaged towards nipple   Breastfeeding Assistance assisted with positioning;assisted with techniques for flat/inverted nipples;feeding cue recognition promoted;feeding on demand promoted;feeding session observed;infant latch-on verified;infant stimulated to wakeful state;infant suck/swallow verified;milk expression/pumping;support offered;nipple shell utilized   Maternal Breastfeeding Support diary/feeding log utilized;encouragement offered;lactation counseling provided;maternal rest encouraged   Latch Promotion positioning assisted;infant moved to breast;suck stimulated with colostrum drop

## 2018-04-14 NOTE — PLAN OF CARE
0800  VSS. NAD noted. Plan of care reviewed with pt. Pt verbalized understanding.  Pt reports pain goal met with prescribed medications per MD.  Ambulating freely in room.  Tolerating regular diet. Bonding with baby. AEB holding, feeding, dressing, and changing baby's diaper. Smiles appropriately at baby. Family support noted at bedside.  Remains free from falls and injury. Will continue to monitor.    1000 Reviewed discharge documentation and medications with patient. Pt verbalized f/u appt is scheduled w/ OB. Pt is bonding with baby. Smiles appropriately at baby. Family support noted at bedside.  Mother shows she is able to care for herself and baby. Patient reports having help at home. Pt transported via wheelchair with baby in arms for discharge.

## 2018-04-17 ENCOUNTER — TELEPHONE (OUTPATIENT)
Dept: OBSTETRICS AND GYNECOLOGY | Facility: CLINIC | Age: 28
End: 2018-04-17

## 2018-04-17 NOTE — TELEPHONE ENCOUNTER
F/u call done. Mom stated that BR/pumping is going well now. Stated that milk is in. Has been putting baby to breast with nipple shield due to nipple soreness. Encouraged to only use shield temporarily until nipple soreness decreased due to possibility of decreased milk production. Stated that baby is actively sucking/swallowing at breast. Milk is visible in mouth & shield during fdg. Baby has had no formula today. Praise & reassurance provided. Has been pumping few times per day for increased stimulation/supplementation. Pumped 44 ml yesterday at one session with Fluidnet pump. Has also been using pump in style that friend gave her. Discussed supply/demand. Encouraged frequent BR/pumping. Discussed signs of adequate fdg; I&O. Baby has been having lots of voids/stools per mom. Offered OP consult for pre/post weight checks to make sure baby is getting enough with BR as decrease supplements in bottles. Will call back if feels the need. Questions answered. Instructed to call for any further needs. Verbalized understanding.

## 2018-04-18 ENCOUNTER — OFFICE VISIT (OUTPATIENT)
Dept: OBSTETRICS AND GYNECOLOGY | Facility: CLINIC | Age: 28
End: 2018-04-18
Payer: COMMERCIAL

## 2018-04-18 VITALS
BODY MASS INDEX: 33.38 KG/M2 | WEIGHT: 200.63 LBS | SYSTOLIC BLOOD PRESSURE: 124 MMHG | DIASTOLIC BLOOD PRESSURE: 68 MMHG

## 2018-04-18 DIAGNOSIS — Z98.890 POST-OPERATIVE STATE: Primary | ICD-10-CM

## 2018-04-18 PROCEDURE — 99999 PR PBB SHADOW E&M-EST. PATIENT-LVL II: CPT | Mod: PBBFAC,,, | Performed by: OBSTETRICS & GYNECOLOGY

## 2018-04-18 PROCEDURE — 99024 POSTOP FOLLOW-UP VISIT: CPT | Mod: S$GLB,,, | Performed by: OBSTETRICS & GYNECOLOGY

## 2018-04-18 NOTE — PROGRESS NOTES
CC: Post-op     HPI: 27 y.o.  female patient presents today following  for incision check.  There are no complaints and the procedure and hospitalization occured without complications.     MEDICATIONS: See Med Card    ALLERGIES: See Allergy Card    MEDICAL HISTORY:   PAST MEDICAL HISTORY:   Past Medical History:   Diagnosis Date    Hypothyroidism     Pregnant and not yet delivered in second trimester     25 weeks    Thyroid disease         PAST SURGICAL HISTORY:   Past Surgical History:   Procedure Laterality Date    breast reduction  2012    BREAST SURGERY      breast reduction        FAMILY HISTORY:   Family History   Problem Relation Age of Onset    Breast cancer Mother 45     remission for 10 years    Cancer Mother 45     breast     Hypertension Mother     Diabetes Father     Diabetes Maternal Grandfather     Heart disease Maternal Grandfather     Diabetes Paternal Grandfather     Colon cancer Neg Hx     Ovarian cancer Neg Hx         SOCIAL HISTORY:   Social History   Substance Use Topics    Smoking status: Former Smoker    Smokeless tobacco: Never Used    Alcohol use No          ROS: Negative other than HPI.    PE:   General: Appears well  Abdomen: Soft, no tenderness, no distention, no hepatosplenomegaly. Incisions are well healed  Extremities: No cords or edema      ASSESSMENT: Routine postoperative follow-up exam    PLAN:   Follow-up with me in 4 weeks.  Will probably use condoms for birth control

## 2018-04-21 ENCOUNTER — PATIENT MESSAGE (OUTPATIENT)
Dept: PEDIATRICS | Facility: CLINIC | Age: 28
End: 2018-04-21

## 2018-04-24 ENCOUNTER — PATIENT MESSAGE (OUTPATIENT)
Dept: OBSTETRICS AND GYNECOLOGY | Facility: CLINIC | Age: 28
End: 2018-04-24

## 2018-04-24 NOTE — TELEPHONE ENCOUNTER
Sounds pretty normal she just maybe overdoing it (even doing a lot of laundry can make it hurt). Is she still taking anti-inflammatories every 8 hours?

## 2018-04-24 NOTE — TELEPHONE ENCOUNTER
Patient delivered 4/12/18.  Complaining of still having pain to the point of still needing pain medication.     Please advise

## 2018-04-27 ENCOUNTER — PATIENT MESSAGE (OUTPATIENT)
Dept: PEDIATRICS | Facility: CLINIC | Age: 28
End: 2018-04-27

## 2018-04-30 ENCOUNTER — PATIENT MESSAGE (OUTPATIENT)
Dept: PEDIATRICS | Facility: CLINIC | Age: 28
End: 2018-04-30

## 2018-05-01 NOTE — TELEPHONE ENCOUNTER
Please advise Milton's mother that it looks like he has a small umbilical granuloma, which is common after the umbilical stump falls off.  I can cauterize it easily in clinic.  I have an 8 am appointment tomorrow morning that I put on hold for him if mom would like to come then.

## 2018-05-16 ENCOUNTER — PATIENT MESSAGE (OUTPATIENT)
Dept: OBSTETRICS AND GYNECOLOGY | Facility: CLINIC | Age: 28
End: 2018-05-16

## 2018-05-16 ENCOUNTER — OFFICE VISIT (OUTPATIENT)
Dept: OBSTETRICS AND GYNECOLOGY | Facility: CLINIC | Age: 28
End: 2018-05-16
Payer: COMMERCIAL

## 2018-05-16 VITALS
HEIGHT: 65 IN | BODY MASS INDEX: 34.27 KG/M2 | DIASTOLIC BLOOD PRESSURE: 72 MMHG | WEIGHT: 205.69 LBS | SYSTOLIC BLOOD PRESSURE: 110 MMHG

## 2018-05-16 DIAGNOSIS — Z12.4 ROUTINE CERVICAL SMEAR: ICD-10-CM

## 2018-05-16 PROCEDURE — 88175 CYTOPATH C/V AUTO FLUID REDO: CPT

## 2018-05-16 PROCEDURE — 0503F POSTPARTUM CARE VISIT: CPT | Mod: S$GLB,,, | Performed by: OBSTETRICS & GYNECOLOGY

## 2018-05-16 PROCEDURE — 99999 PR PBB SHADOW E&M-EST. PATIENT-LVL III: CPT | Mod: PBBFAC,,, | Performed by: OBSTETRICS & GYNECOLOGY

## 2018-05-16 NOTE — PROGRESS NOTES
"OBSTETRIC HISTORY:   OB History      Para Term  AB Living    1 1 1 0 0 1    SAB TAB Ectopic Multiple Live Births    0 0 0 0 1         COMPREHENSIVE GYN HISTORY:  PAP History: Denies abnormal Paps.  Infection History: Reports STDs: Chlamydia. Denies PID.  Benign History: Denies uterine fibroids. Denies ovarian cysts. Denies endometriosis.   Cancer History: Denies cervical cancer. Denies uterine cancer or hyperplasia. Denies ovarian cancer. Denies vulvar cancer or pre-cancer. Denies vaginal cancer or pre-cancer. Denies breast cancer. Denies colon cancer.  Sexual Activity History:   reports that she currently engages in sexual activity and has had male partners. She reports using the following methods of birth control/protection: Condom.   Menstrual History:  Every 30 days, flows for 4 days. Moderate flow.  Dysmenorrhea History: Reports occ. dysmenorrhea.  Contraception: Condom         HPI:   28 y.o.  Patient's last menstrual period was 07/15/2017.   Patient is  here for postpartum exam. She denies depression and plans to use condoms. She is breast feeding. She denies bladder, breast and bowel complaints.    ROS:  GENERAL: Denies weight gain or weight loss. Feeling well overall.   SKIN: Denies rash or lesions.   HEAD: Denies headache.   NODES: Denies enlarged lymph nodes.   CHEST: Denies shortness of breath.   ABDOMEN: No abdominal pain, constipation, diarrhea, nausea, vomiting or rectal bleeding.   URINARY: No frequency, dysuria, hematuria, or burning on urination.  REPRODUCTIVE: See HPI.   BREASTS: The patient denies pain, lumps, or nipple discharge.   HEMATOLOGIC: No easy bruisability.   MUSCULOSKELETAL: Denies joint pain or back pain.   NEUROLOGIC: Denies weakness.   PSYCHIATRIC: Denies depression, anxiety or mood swings.    PE:   /72 (BP Location: Right arm)   Ht 5' 5" (1.651 m)   Wt 93.3 kg (205 lb 11 oz)   LMP 07/15/2017   BMI 34.23 kg/m²   APPEARANCE: Well nourished, well developed, in " no acute distress.  ABDOMEN: Soft. No tenderness or masses. No hernias. Incision well healed  BREASTS: Symmetrical, no skin changes or visible lesions. No palpable masses, nipple discharge or adenopathy bilaterally.  PELVIC:   VULVA: No lesions. Normal female genitalia.  URETHRAL MEATUS: Normal size and location, no lesions, no prolapse.  URETHRA: No masses, tenderness, prolapse or scarring.  VAGINA: Moist and well rugated, no discharge, no significant cystocele or rectocele.  CERVIX: No lesions and discharge.  UTERUS: Normal size, regular shape, mobile, non-tender, bladder base nontender.  ADNEXA: No masses or tenderness.    PROCEDURES:  Pap smear    Assessment/Plan:  PP Exam

## 2018-05-28 ENCOUNTER — PATIENT MESSAGE (OUTPATIENT)
Dept: OBSTETRICS AND GYNECOLOGY | Facility: CLINIC | Age: 28
End: 2018-05-28

## 2018-05-29 ENCOUNTER — PATIENT MESSAGE (OUTPATIENT)
Dept: FAMILY MEDICINE | Facility: CLINIC | Age: 28
End: 2018-05-29

## 2018-05-29 ENCOUNTER — TELEPHONE (OUTPATIENT)
Dept: FAMILY MEDICINE | Facility: CLINIC | Age: 28
End: 2018-05-29

## 2018-06-05 ENCOUNTER — LAB VISIT (OUTPATIENT)
Dept: LAB | Facility: HOSPITAL | Age: 28
End: 2018-06-05
Attending: FAMILY MEDICINE
Payer: COMMERCIAL

## 2018-06-05 ENCOUNTER — OFFICE VISIT (OUTPATIENT)
Dept: FAMILY MEDICINE | Facility: CLINIC | Age: 28
End: 2018-06-05
Payer: COMMERCIAL

## 2018-06-05 VITALS
BODY MASS INDEX: 34.46 KG/M2 | OXYGEN SATURATION: 97 % | WEIGHT: 206.81 LBS | HEART RATE: 75 BPM | SYSTOLIC BLOOD PRESSURE: 108 MMHG | TEMPERATURE: 99 F | HEIGHT: 65 IN | DIASTOLIC BLOOD PRESSURE: 75 MMHG

## 2018-06-05 DIAGNOSIS — R00.2 HEART PALPITATIONS: ICD-10-CM

## 2018-06-05 DIAGNOSIS — E66.9 OBESITY (BMI 30.0-34.9): ICD-10-CM

## 2018-06-05 DIAGNOSIS — Z98.891 S/P C-SECTION: ICD-10-CM

## 2018-06-05 DIAGNOSIS — E03.9 ACQUIRED HYPOTHYROIDISM: ICD-10-CM

## 2018-06-05 DIAGNOSIS — R00.2 HEART PALPITATIONS: Primary | ICD-10-CM

## 2018-06-05 LAB
ALBUMIN SERPL BCP-MCNC: 4.1 G/DL
ALP SERPL-CCNC: 98 U/L
ALT SERPL W/O P-5'-P-CCNC: 8 U/L
ANION GAP SERPL CALC-SCNC: 10 MMOL/L
AST SERPL-CCNC: 15 U/L
BASOPHILS # BLD AUTO: 0.01 K/UL
BASOPHILS NFR BLD: 0.1 %
BILIRUB SERPL-MCNC: 0.4 MG/DL
BUN SERPL-MCNC: 17 MG/DL
CALCIUM SERPL-MCNC: 10.5 MG/DL
CHLORIDE SERPL-SCNC: 103 MMOL/L
CO2 SERPL-SCNC: 27 MMOL/L
CREAT SERPL-MCNC: 0.8 MG/DL
DIFFERENTIAL METHOD: NORMAL
EOSINOPHIL # BLD AUTO: 0.1 K/UL
EOSINOPHIL NFR BLD: 0.6 %
ERYTHROCYTE [DISTWIDTH] IN BLOOD BY AUTOMATED COUNT: 12.2 %
EST. GFR  (AFRICAN AMERICAN): >60 ML/MIN/1.73 M^2
EST. GFR  (NON AFRICAN AMERICAN): >60 ML/MIN/1.73 M^2
GLUCOSE SERPL-MCNC: 82 MG/DL
HCT VFR BLD AUTO: 44.2 %
HGB BLD-MCNC: 14.3 G/DL
LYMPHOCYTES # BLD AUTO: 3.2 K/UL
LYMPHOCYTES NFR BLD: 37.1 %
MCH RBC QN AUTO: 30.4 PG
MCHC RBC AUTO-ENTMCNC: 32.4 G/DL
MCV RBC AUTO: 94 FL
MONOCYTES # BLD AUTO: 0.5 K/UL
MONOCYTES NFR BLD: 6.2 %
NEUTROPHILS # BLD AUTO: 4.9 K/UL
NEUTROPHILS NFR BLD: 55.8 %
PLATELET # BLD AUTO: 205 K/UL
PMV BLD AUTO: 10.9 FL
POTASSIUM SERPL-SCNC: 4.6 MMOL/L
PROT SERPL-MCNC: 7.8 G/DL
RBC # BLD AUTO: 4.71 M/UL
SODIUM SERPL-SCNC: 140 MMOL/L
TSH SERPL DL<=0.005 MIU/L-ACNC: 0.94 UIU/ML
WBC # BLD AUTO: 8.7 K/UL

## 2018-06-05 PROCEDURE — 36415 COLL VENOUS BLD VENIPUNCTURE: CPT

## 2018-06-05 PROCEDURE — 80053 COMPREHEN METABOLIC PANEL: CPT

## 2018-06-05 PROCEDURE — 99214 OFFICE O/P EST MOD 30 MIN: CPT | Mod: S$GLB,,, | Performed by: FAMILY MEDICINE

## 2018-06-05 PROCEDURE — 85025 COMPLETE CBC W/AUTO DIFF WBC: CPT

## 2018-06-05 PROCEDURE — 3008F BODY MASS INDEX DOCD: CPT | Mod: CPTII,S$GLB,, | Performed by: FAMILY MEDICINE

## 2018-06-05 PROCEDURE — 99999 PR PBB SHADOW E&M-EST. PATIENT-LVL IV: CPT | Mod: PBBFAC,,, | Performed by: FAMILY MEDICINE

## 2018-06-05 PROCEDURE — 84443 ASSAY THYROID STIM HORMONE: CPT

## 2018-06-05 NOTE — PATIENT INSTRUCTIONS
Basic labs today.  Increase hydration. Increase physical activity and notify if symptoms persist or worsen-- MY CHART MESSAGE.

## 2018-06-05 NOTE — PROGRESS NOTES
Office Visit    Patient Name: Sherlyn Monahan    : 1990  MRN: [id number]    Subjective:  Sherlyn is a 28 y.o. female who presents today for:    Palpitations (had palpitations in the past but since having son; only when laying down at night)    28-year-old patient of mine with hypothyroidism and recent  section delivery on 2018 per Dr. Smith here today to discuss heart palpitations. Labs performed in the hospital prior to her discharge (2018) showed no postoperative anemia (H/H ) and TSH 1.1 2018.    She reports feeling a strong, forceful heart beat at night when laying down over the last month.  No heart racing or skipped beat appreciated.  No chest pain or shortness of breath. Occasional lightheadedness. She is breastfeeding and may not always be drinking enough water. She is not really exercising much, though she has been cleared by OBGYN to resume activity as tolerated.  Taking PN multivitamin and knows to continue through breast feeding.           Past Medical History  Past Medical History:   Diagnosis Date    Acquired hypothyroidism 2016    Obesity (BMI 30.0-34.9) 2016    loosing weight consistently on 21 Day Fix program        Past Surgical History  Past Surgical History:   Procedure Laterality Date    breast reduction  2012    BREAST SURGERY      breast reduction     SECTION         Family History  Family History   Problem Relation Age of Onset    Breast cancer Mother 45        remission for 10 years    Cancer Mother 45        breast     Hypertension Mother     Diabetes Father     Diabetes Maternal Grandfather     Heart disease Maternal Grandfather     Diabetes Paternal Grandfather     Colon cancer Neg Hx     Ovarian cancer Neg Hx        Social History  Social History     Social History    Marital status:      Spouse name: N/A    Number of children: N/A    Years of education: N/A     Occupational History    Not on  "file.     Social History Main Topics    Smoking status: Former Smoker    Smokeless tobacco: Never Used    Alcohol use No    Drug use: No    Sexual activity: Yes     Partners: Male     Birth control/ protection: None     Other Topics Concern    Not on file     Social History Narrative    No narrative on file       Current Medications  Medications reviewed and updated.     Allergies   Review of patient's allergies indicates:   Allergen Reactions    Minocycline        Review of Systems (Pertinent positives)  Review of Systems   Constitutional: Negative for activity change and unexpected weight change.   HENT: Negative for hearing loss, rhinorrhea and trouble swallowing.    Eyes: Negative for discharge and visual disturbance.   Respiratory: Negative for chest tightness and wheezing.    Cardiovascular: Positive for palpitations. Negative for chest pain.   Gastrointestinal: Negative for blood in stool, constipation, diarrhea and vomiting.   Endocrine: Negative for polydipsia and polyuria.   Genitourinary: Negative for difficulty urinating, dysuria, hematuria and menstrual problem.   Musculoskeletal: Negative for arthralgias, joint swelling and neck pain.   Neurological: Negative for weakness and headaches.   Psychiatric/Behavioral: Negative for confusion and dysphoric mood.       /75 (BP Location: Right arm, Patient Position: Sitting)   Pulse 75   Temp 98.5 °F (36.9 °C) (Oral)   Ht 5' 5" (1.651 m)   Wt 93.8 kg (206 lb 12.7 oz)   LMP 07/15/2017   SpO2 97%   Breastfeeding? Yes   BMI 34.41 kg/m²     Physical Exam   Constitutional: She is oriented to person, place, and time. She appears well-developed and well-nourished. No distress.   HENT:   Head: Normocephalic and atraumatic.   Eyes: Conjunctivae are normal.   Cardiovascular: Normal rate and regular rhythm.    Pulmonary/Chest: Effort normal and breath sounds normal.   Musculoskeletal: She exhibits no edema.   Neurological: She is alert and oriented to " person, place, and time.   Skin: Skin is warm and dry.   Psychiatric: She has a normal mood and affect.   Vitals reviewed.        Assessment/Plan:  Sherlyn Monahan is a 28 y.o. female who presents today for :    Sherlyn was seen today for palpitations.    Diagnoses and all orders for this visit:    Heart palpitations  Comments:  consider holter monitor if symptoms worsen, but overall benign sounding forceful heart beat. labs today and increse physical activity and hydration.   Orders:  -     Comprehensive metabolic panel; Future  -     CBC auto differential; Future  -     TSH; Future    Acquired hypothyroidism  -     Comprehensive metabolic panel; Future  -     CBC auto differential; Future  -     TSH; Future    Obesity (BMI 30.0-34.9)  -     Comprehensive metabolic panel; Future  -     CBC auto differential; Future  -     TSH; Future    S/P   -     CBC auto differential; Future            ICD-10-CM ICD-9-CM    1. Heart palpitations R00.2 785.1 Comprehensive metabolic panel      CBC auto differential      TSH    consider holter monitor if symptoms worsen, but overall benign sounding forceful heart beat. labs today and increse physical activity and hydration.    2. Acquired hypothyroidism E03.9 244.9 Comprehensive metabolic panel      CBC auto differential      TSH   3. Obesity (BMI 30.0-34.9) E66.9 278.00 Comprehensive metabolic panel      CBC auto differential      TSH   4. S/P  Z98.891 V45.89 CBC auto differential       Patient Instructions   Basic labs today.  Increase hydration. Increase physical activity and notify if symptoms persist or worsen-- MY CHART MESSAGE.         Follow-up for return as needed for new concerns.

## 2018-07-10 ENCOUNTER — PATIENT MESSAGE (OUTPATIENT)
Dept: FAMILY MEDICINE | Facility: CLINIC | Age: 28
End: 2018-07-10

## 2018-09-24 ENCOUNTER — PATIENT MESSAGE (OUTPATIENT)
Dept: FAMILY MEDICINE | Facility: CLINIC | Age: 28
End: 2018-09-24

## 2018-10-16 DIAGNOSIS — E03.9 ACQUIRED HYPOTHYROIDISM: ICD-10-CM

## 2018-10-16 RX ORDER — LEVOTHYROXINE SODIUM 50 UG/1
TABLET ORAL
Qty: 90 TABLET | Refills: 4 | Status: SHIPPED | OUTPATIENT
Start: 2018-10-16 | End: 2019-08-30 | Stop reason: SDUPTHER

## 2018-12-03 ENCOUNTER — PATIENT MESSAGE (OUTPATIENT)
Dept: FAMILY MEDICINE | Facility: CLINIC | Age: 28
End: 2018-12-03

## 2018-12-03 DIAGNOSIS — R21 RASH AND NONSPECIFIC SKIN ERUPTION: Primary | ICD-10-CM

## 2018-12-04 ENCOUNTER — TELEPHONE (OUTPATIENT)
Dept: FAMILY MEDICINE | Facility: CLINIC | Age: 28
End: 2018-12-04

## 2018-12-04 DIAGNOSIS — R21 RASH AND NONSPECIFIC SKIN ERUPTION: Primary | ICD-10-CM

## 2018-12-04 RX ORDER — PREDNISONE 20 MG/1
20 TABLET ORAL DAILY
Qty: 5 TABLET | Refills: 0 | Status: SHIPPED | OUTPATIENT
Start: 2018-12-04 | End: 2018-12-09

## 2018-12-09 NOTE — TELEPHONE ENCOUNTER
Please have patient schedule with Dr Bai or other dermatologist for further evaluation of her rash. I have attached the referral thanks

## 2018-12-11 ENCOUNTER — TELEPHONE (OUTPATIENT)
Dept: DERMATOLOGY | Facility: CLINIC | Age: 28
End: 2018-12-11

## 2018-12-11 NOTE — TELEPHONE ENCOUNTER
----- Message from Willow Ramires LPN sent at 12/10/2018  3:25 PM CST -----      ----- Message -----  From: Yin Odonnell  Sent: 12/10/2018   2:30 PM  To: [facility] Staff    Can you please help me schedule patient with Dr. Bai, thank you

## 2018-12-11 NOTE — TELEPHONE ENCOUNTER
----- Message from Lolis Domínguez sent at 12/11/2018  9:53 AM CST -----  Contact: patient  Please call above patient at [phone] missed a call from the nurse

## 2018-12-13 ENCOUNTER — OFFICE VISIT (OUTPATIENT)
Dept: ALLERGY | Facility: CLINIC | Age: 28
End: 2018-12-13
Payer: COMMERCIAL

## 2018-12-13 VITALS
WEIGHT: 203.94 LBS | SYSTOLIC BLOOD PRESSURE: 118 MMHG | DIASTOLIC BLOOD PRESSURE: 84 MMHG | HEIGHT: 65 IN | BODY MASS INDEX: 33.98 KG/M2

## 2018-12-13 DIAGNOSIS — L50.8 ACUTE URTICARIA: Primary | ICD-10-CM

## 2018-12-13 PROCEDURE — 99204 OFFICE O/P NEW MOD 45 MIN: CPT | Mod: S$GLB,,, | Performed by: ALLERGY & IMMUNOLOGY

## 2018-12-13 PROCEDURE — 3008F BODY MASS INDEX DOCD: CPT | Mod: CPTII,S$GLB,, | Performed by: ALLERGY & IMMUNOLOGY

## 2018-12-13 PROCEDURE — 99999 PR PBB SHADOW E&M-EST. PATIENT-LVL III: CPT | Mod: PBBFAC,,, | Performed by: ALLERGY & IMMUNOLOGY

## 2018-12-13 RX ORDER — FLUTICASONE PROPIONATE 50 MCG
1 SPRAY, SUSPENSION (ML) NASAL
COMMUNITY
End: 2022-01-28

## 2018-12-13 RX ORDER — CYANOCOBALAMIN (VITAMIN B-12) 500 MCG
400 TABLET ORAL
COMMUNITY
End: 2019-09-18 | Stop reason: ALTCHOICE

## 2018-12-13 NOTE — PROGRESS NOTES
Subjective:       Patient ID: Sherlyn Monahan is a 28 y.o. female.    Chief Complaint:  Urticaria (possible drug reaction ) and Itching      27 yo woman presents for new patient evaluation of hives. She states about November 21 she had strep throat so was on amoxil. She took full 10 days and on day 8 or 9 she started with hives. She would itch all over and get red raised bumps. Would last minutes to hour and move all around. She started benadryl with minimal help. Then PCP gave steroid fr 5 days and not much help. she is still itching and some hives but better now. Breaks out most days but not as many. No swelling of lips, eyes, tongue, etc. No SOB. She has stuffy, runny nose in spring and fall but none now. She has no asthma or eczema. No known food, insect or latex allergy. No sinus surgery. she has hypothyroid but no other medical issues.         Environmental History: see history section for home environment  Review of Systems   Constitutional: Negative for appetite change, chills, fatigue and fever.   HENT: Negative for congestion, ear discharge, ear pain, facial swelling, nosebleeds, postnasal drip, rhinorrhea, sinus pressure, sneezing, sore throat, trouble swallowing and voice change.    Eyes: Negative for discharge, redness, itching and visual disturbance.   Respiratory: Negative for cough, choking, chest tightness, shortness of breath and wheezing.    Cardiovascular: Negative for chest pain, palpitations and leg swelling.   Gastrointestinal: Negative for abdominal distention, abdominal pain, constipation, diarrhea, nausea and vomiting.   Genitourinary: Negative for difficulty urinating.   Musculoskeletal: Negative for arthralgias, gait problem, joint swelling and myalgias.   Skin: Positive for color change and rash.   Neurological: Negative for dizziness, syncope, weakness, light-headedness and headaches.   Hematological: Negative for adenopathy. Does not bruise/bleed easily.   Psychiatric/Behavioral:  Negative for agitation, behavioral problems, confusion and sleep disturbance. The patient is not nervous/anxious.         Objective:      Physical Exam   Constitutional: She is oriented to person, place, and time. She appears well-developed and well-nourished. No distress.   HENT:   Head: Normocephalic and atraumatic.   Right Ear: Hearing, tympanic membrane, external ear and ear canal normal.   Left Ear: Hearing, tympanic membrane, external ear and ear canal normal.   Nose: No mucosal edema, rhinorrhea, sinus tenderness or septal deviation. No epistaxis. Right sinus exhibits no maxillary sinus tenderness and no frontal sinus tenderness. Left sinus exhibits no maxillary sinus tenderness and no frontal sinus tenderness.   Mouth/Throat: Uvula is midline, oropharynx is clear and moist and mucous membranes are normal. No uvula swelling.   Eyes: Conjunctivae are normal. Right eye exhibits no discharge. Left eye exhibits no discharge.   Neck: Normal range of motion. No thyromegaly present.   Cardiovascular: Normal rate, regular rhythm and normal heart sounds.   No murmur heard.  Pulmonary/Chest: Effort normal and breath sounds normal. No respiratory distress. She has no wheezes.   Abdominal: Soft. She exhibits no distension. There is no tenderness.   Musculoskeletal: Normal range of motion. She exhibits no edema or tenderness.   Lymphadenopathy:     She has no cervical adenopathy.   Neurological: She is alert and oriented to person, place, and time.   Skin: Skin is warm and dry. No rash noted. No erythema.   Psychiatric: She has a normal mood and affect. Her behavior is normal. Judgment and thought content normal.   Nursing note and vitals reviewed.      Laboratory:   none performed   Assessment:       1. Acute urticaria         Plan:       1. Discussed with pt that with hives >3 weeks after amoxil it is not related to amoxicillin. Likely related to infection. Start Cetirizine 10 mg BID. If not resolved in 3-4 weeks then  call clinic

## 2018-12-24 ENCOUNTER — PATIENT MESSAGE (OUTPATIENT)
Dept: ALLERGY | Facility: CLINIC | Age: 28
End: 2018-12-24

## 2018-12-28 ENCOUNTER — PATIENT MESSAGE (OUTPATIENT)
Dept: FAMILY MEDICINE | Facility: CLINIC | Age: 28
End: 2018-12-28

## 2018-12-28 DIAGNOSIS — K21.9 GASTROESOPHAGEAL REFLUX DISEASE, ESOPHAGITIS PRESENCE NOT SPECIFIED: Primary | ICD-10-CM

## 2018-12-28 PROCEDURE — 99284 EMERGENCY DEPT VISIT MOD MDM: CPT

## 2018-12-29 ENCOUNTER — HOSPITAL ENCOUNTER (EMERGENCY)
Facility: HOSPITAL | Age: 28
Discharge: HOME | End: 2018-12-29
Attending: EMERGENCY MEDICINE
Payer: COMMERCIAL

## 2018-12-29 VITALS
WEIGHT: 203 LBS | HEART RATE: 65 BPM | RESPIRATION RATE: 20 BRPM | DIASTOLIC BLOOD PRESSURE: 61 MMHG | HEIGHT: 65 IN | TEMPERATURE: 98 F | OXYGEN SATURATION: 97 % | BODY MASS INDEX: 33.82 KG/M2 | SYSTOLIC BLOOD PRESSURE: 119 MMHG

## 2018-12-29 DIAGNOSIS — K52.9 GASTROENTERITIS: Primary | ICD-10-CM

## 2018-12-29 LAB
ALBUMIN SERPL BCP-MCNC: 4.1 G/DL
ALP SERPL-CCNC: 96 U/L
ALT SERPL W/O P-5'-P-CCNC: 13 U/L
ANION GAP SERPL CALC-SCNC: 11 MMOL/L
AST SERPL-CCNC: 17 U/L
B-HCG UR QL: NEGATIVE
BASOPHILS # BLD AUTO: 0.01 K/UL
BASOPHILS NFR BLD: 0.1 %
BILIRUB SERPL-MCNC: 0.3 MG/DL
BILIRUB UR QL STRIP: NEGATIVE
BILIRUB UR QL STRIP: NEGATIVE
BUN SERPL-MCNC: 11 MG/DL
CALCIUM SERPL-MCNC: 10.1 MG/DL
CHLORIDE SERPL-SCNC: 103 MMOL/L
CLARITY UR: CLEAR
CLARITY UR: CLEAR
CO2 SERPL-SCNC: 25 MMOL/L
COLOR UR: YELLOW
COLOR UR: YELLOW
CREAT SERPL-MCNC: 0.8 MG/DL
CTP QC/QA: YES
DIFFERENTIAL METHOD: ABNORMAL
EOSINOPHIL # BLD AUTO: 0 K/UL
EOSINOPHIL NFR BLD: 0.3 %
ERYTHROCYTE [DISTWIDTH] IN BLOOD BY AUTOMATED COUNT: 12.3 %
EST. GFR  (AFRICAN AMERICAN): >60 ML/MIN/1.73 M^2
EST. GFR  (NON AFRICAN AMERICAN): >60 ML/MIN/1.73 M^2
GLUCOSE SERPL-MCNC: 111 MG/DL
GLUCOSE UR QL STRIP: NEGATIVE
GLUCOSE UR QL STRIP: NEGATIVE
HCT VFR BLD AUTO: 42.1 %
HGB BLD-MCNC: 14.3 G/DL
HGB UR QL STRIP: NEGATIVE
HGB UR QL STRIP: NEGATIVE
KETONES UR QL STRIP: NEGATIVE
KETONES UR QL STRIP: NEGATIVE
LEUKOCYTE ESTERASE UR QL STRIP: NEGATIVE
LEUKOCYTE ESTERASE UR QL STRIP: NEGATIVE
LIPASE SERPL-CCNC: 27 U/L
LYMPHOCYTES # BLD AUTO: 3.2 K/UL
LYMPHOCYTES NFR BLD: 27.2 %
MCH RBC QN AUTO: 31.8 PG
MCHC RBC AUTO-ENTMCNC: 34 G/DL
MCV RBC AUTO: 94 FL
MONOCYTES # BLD AUTO: 0.8 K/UL
MONOCYTES NFR BLD: 6.7 %
NEUTROPHILS # BLD AUTO: 7.6 K/UL
NEUTROPHILS NFR BLD: 65.4 %
NITRITE UR QL STRIP: NEGATIVE
NITRITE UR QL STRIP: NEGATIVE
PH UR STRIP: 6 [PH] (ref 5–8)
PH UR STRIP: 6 [PH] (ref 5–8)
PLATELET # BLD AUTO: 196 K/UL
PMV BLD AUTO: 10.9 FL
POTASSIUM SERPL-SCNC: 4.3 MMOL/L
PROT SERPL-MCNC: 7.8 G/DL
PROT UR QL STRIP: NEGATIVE
PROT UR QL STRIP: NEGATIVE
RBC # BLD AUTO: 4.49 M/UL
SODIUM SERPL-SCNC: 139 MMOL/L
SP GR UR STRIP: 1.02 (ref 1–1.03)
SP GR UR STRIP: 1.02 (ref 1–1.03)
URN SPEC COLLECT METH UR: NORMAL
URN SPEC COLLECT METH UR: NORMAL
UROBILINOGEN UR STRIP-ACNC: NEGATIVE EU/DL
UROBILINOGEN UR STRIP-ACNC: NEGATIVE EU/DL
WBC # BLD AUTO: 11.6 K/UL

## 2018-12-29 PROCEDURE — 81025 URINE PREGNANCY TEST: CPT | Performed by: EMERGENCY MEDICINE

## 2018-12-29 PROCEDURE — 83690 ASSAY OF LIPASE: CPT

## 2018-12-29 PROCEDURE — 81003 URINALYSIS AUTO W/O SCOPE: CPT

## 2018-12-29 PROCEDURE — 80053 COMPREHEN METABOLIC PANEL: CPT

## 2018-12-29 PROCEDURE — 85025 COMPLETE CBC W/AUTO DIFF WBC: CPT

## 2018-12-29 RX ORDER — PANTOPRAZOLE SODIUM 20 MG/1
20 TABLET, DELAYED RELEASE ORAL DAILY
Qty: 30 TABLET | Refills: 0 | Status: SHIPPED | OUTPATIENT
Start: 2018-12-29 | End: 2018-12-30 | Stop reason: SDUPTHER

## 2018-12-29 NOTE — ED NOTES
Abdominal pain started at 7 tonight; patient took ibuprofen and gax-x prior to arrival. Patient complains of midepigastric and mid back pain; patient states she does have IBS but normally when she has a flare her lower back and lower abdominal pain. Patient is experiencing mucousy stools and intermittent nausea.

## 2018-12-29 NOTE — ED PROVIDER NOTES
Encounter Date: 2018    SCRIBE #1 NOTE: I, Madeleine Valenzuela, am scribing for, and in the presence of,  Dr. Schrader. I have scribed the entire note.       History     Chief Complaint   Patient presents with    Abdominal Pain     epigastric pain that radiates to upper back and nausea x5 hrs. also reports mucoid stools. pt.has had similar episodes of pain intermittently for 6 months but not as severe     Sherlyn Monahan is a 28 y.o. female who  has a past medical history of Acquired hypothyroidism (2016), Allergy, Irritable bowel syndrome (IBS), Obesity (BMI 30.0-34.9) (2016), and Urticaria.    The patient presents to the ED due to abdominal pain. She reports onset of symptoms was about 6 hrs ago. The patient notest he pain radiates to her back. She describes the pain as aching. The patient has associated diarrhea but denies any vomiting, urinary symptoms, fever or chills. She denies use of any medications for the symptoms. She has experienced similar symptoms about 1-2 times a month over the the last 6 months.       The history is provided by the patient.     Review of patient's allergies indicates:   Allergen Reactions    Minocycline Swelling     Past Medical History:   Diagnosis Date    Acquired hypothyroidism 2016    Allergy     Irritable bowel syndrome (IBS)     Obesity (BMI 30.0-34.9) 2016    loosing weight consistently on 21 Day Fix program     Urticaria      Past Surgical History:   Procedure Laterality Date    breast reduction  2012    BREAST SURGERY      breast reduction     SECTION      DELIVERY- SECTION N/A 2018    Performed by [facility] Smith [facility] at [facility]     Family History   Problem Relation Age of Onset    Breast cancer Mother 45        remission for 10 years    Cancer Mother 45        breast     Hypertension Mother     Allergic rhinitis Mother     Diabetes Father     Diabetes Maternal Grandfather     Heart disease Maternal Grandfather      Diabetes Paternal Grandfather     Colon cancer Neg Hx     Ovarian cancer Neg Hx     Allergies Neg Hx     Angioedema Neg Hx     Asthma Neg Hx     Atopy Neg Hx     Eczema Neg Hx     Immunodeficiency Neg Hx     Rhinitis Neg Hx     Urticaria Neg Hx      Social History     Tobacco Use    Smoking status: Former Smoker    Smokeless tobacco: Never Used   Substance Use Topics    Alcohol use: No    Drug use: No     Review of Systems   Constitutional: Negative for chills and fever.   HENT: Negative for congestion, rhinorrhea and sore throat.    Eyes: Negative for redness and visual disturbance.   Respiratory: Negative for cough, shortness of breath and wheezing.    Cardiovascular: Negative for chest pain and palpitations.   Gastrointestinal: Positive for abdominal pain, diarrhea and nausea. Negative for vomiting.   Genitourinary: Negative for dysuria and hematuria.   Musculoskeletal: Negative for back pain, myalgias and neck pain.   Skin: Negative for rash.   Neurological: Negative for dizziness, weakness and light-headedness.   Psychiatric/Behavioral: Negative for confusion.       Physical Exam     Initial Vitals [12/29/18 0005]   BP Pulse Resp Temp SpO2   119/69 66 20 98.5 °F (36.9 °C) 97 %      MAP       --         Physical Exam    Nursing note and vitals reviewed.  Constitutional: She appears well-developed and well-nourished. She is not diaphoretic. No distress.   HENT:   Head: Normocephalic and atraumatic.   Mouth/Throat: Oropharynx is clear and moist.   Eyes: Conjunctivae and EOM are normal. Pupils are equal, round, and reactive to light.   Neck: Normal range of motion. Neck supple.   Cardiovascular: Normal rate, regular rhythm and normal heart sounds. Exam reveals no gallop and no friction rub.    No murmur heard.  Pulmonary/Chest: Breath sounds normal. She has no wheezes. She has no rhonchi. She has no rales.   Abdominal: Soft. Bowel sounds are normal. There is no tenderness. There is no rebound and  no guarding.   Musculoskeletal: Normal range of motion. She exhibits no edema or tenderness.   Lymphadenopathy:     She has no cervical adenopathy.   Neurological: She is alert and oriented to person, place, and time. She has normal strength.   Skin: Skin is warm and dry. Capillary refill takes less than 2 seconds. No rash noted.         ED Course   Procedures  Labs Reviewed   CBC W/ AUTO DIFFERENTIAL - Abnormal; Notable for the following components:       Result Value    MCH 31.8 (*)     All other components within normal limits   COMPREHENSIVE METABOLIC PANEL - Abnormal; Notable for the following components:    Glucose 111 (*)     All other components within normal limits   URINALYSIS   LIPASE   URINALYSIS, REFLEX TO URINE CULTURE   URINALYSIS, REFLEX TO URINE CULTURE   POCT URINE PREGNANCY          Imaging Results    None          Medical Decision Making:   Clinical Tests:   Lab Tests: Ordered and Reviewed              Attending Attestation:           Physician Attestation for Scribe:  Physician Attestation Statement for Scribe #1: I, vandana mondragon, reviewed documentation, as scribed by azeb cruz in my presence, and it is both accurate and complete.                    Clinical Impression:     1. Gastroenteritis        Disposition:   Disposition: Discharged  Condition: Stable                        Vandana Mondragon MD  12/29/18 0247

## 2018-12-30 RX ORDER — PANTOPRAZOLE SODIUM 20 MG/1
20 TABLET, DELAYED RELEASE ORAL DAILY
Qty: 90 TABLET | Refills: 3 | OUTPATIENT
Start: 2018-12-30 | End: 2019-02-18 | Stop reason: SDUPTHER

## 2019-01-13 ENCOUNTER — OFFICE VISIT (OUTPATIENT)
Dept: URGENT CARE | Facility: CLINIC | Age: 29
End: 2019-01-13
Payer: COMMERCIAL

## 2019-01-13 ENCOUNTER — PATIENT MESSAGE (OUTPATIENT)
Dept: OBSTETRICS AND GYNECOLOGY | Facility: CLINIC | Age: 29
End: 2019-01-13

## 2019-01-13 VITALS
WEIGHT: 205 LBS | HEART RATE: 86 BPM | SYSTOLIC BLOOD PRESSURE: 101 MMHG | TEMPERATURE: 99 F | OXYGEN SATURATION: 100 % | DIASTOLIC BLOOD PRESSURE: 72 MMHG | BODY MASS INDEX: 34.16 KG/M2 | HEIGHT: 65 IN | RESPIRATION RATE: 18 BRPM

## 2019-01-13 DIAGNOSIS — S39.012A STRAIN OF LUMBAR REGION, INITIAL ENCOUNTER: Primary | ICD-10-CM

## 2019-01-13 PROCEDURE — 99213 PR OFFICE/OUTPT VISIT, EST, LEVL III, 20-29 MIN: ICD-10-PCS | Mod: 25,S$GLB,, | Performed by: FAMILY MEDICINE

## 2019-01-13 PROCEDURE — 96372 THER/PROPH/DIAG INJ SC/IM: CPT | Mod: S$GLB,,, | Performed by: FAMILY MEDICINE

## 2019-01-13 PROCEDURE — 3008F PR BODY MASS INDEX (BMI) DOCUMENTED: ICD-10-PCS | Mod: CPTII,S$GLB,, | Performed by: FAMILY MEDICINE

## 2019-01-13 PROCEDURE — 99213 OFFICE O/P EST LOW 20 MIN: CPT | Mod: 25,S$GLB,, | Performed by: FAMILY MEDICINE

## 2019-01-13 PROCEDURE — 96372 PR INJECTION,THERAP/PROPH/DIAG2ST, IM OR SUBCUT: ICD-10-PCS | Mod: S$GLB,,, | Performed by: FAMILY MEDICINE

## 2019-01-13 PROCEDURE — 3008F BODY MASS INDEX DOCD: CPT | Mod: CPTII,S$GLB,, | Performed by: FAMILY MEDICINE

## 2019-01-13 RX ORDER — KETOROLAC TROMETHAMINE 30 MG/ML
60 INJECTION, SOLUTION INTRAMUSCULAR; INTRAVENOUS
Status: COMPLETED | OUTPATIENT
Start: 2019-01-13 | End: 2019-01-13

## 2019-01-13 RX ADMIN — KETOROLAC TROMETHAMINE 60 MG: 30 INJECTION, SOLUTION INTRAMUSCULAR; INTRAVENOUS at 12:01

## 2019-01-13 NOTE — PATIENT INSTRUCTIONS
Back Pain (Acute or Chronic)    Back pain is one of the most common problems. The good news is that most people feel better in 1 to 2 weeks, and most of the rest in 1 to 2 months. Most people can remain active.  People experience and describe pain differently; not everyone is the same.  · The pain can be sharp, stabbing, shooting, aching, cramping or burning.  · Movement, standing, bending, lifting, sitting, or walking may worsen pain.  · It can be localized to one spot or area, or it can be more generalized.  · It can spread or radiate upwards, to the front, or go down your arms or legs (sciatica).  · It can cause muscle spasm.  Most of the time, mechanical problems with the muscles or spine cause the pain. Mechanical problems are usually caused by an injury to the muscles or ligaments. While illness can cause back pain, it is usually not caused by a serious illness. Mechanical problems include:   · Physical activity such as sports, exercise, work, or normal activity  · Overexertion, lifting, pushing, pulling incorrectly or too aggressively  · Sudden twisting, bending, or stretching from an accident, or accidental movement  · Poor posture  · Stretching or moving wrong, without noticing pain at the time  · Poor coordination, lack of regular exercise (check with your doctor about this)  · Spinal disc disease or arthritis  · Stress  Pain can also be related to pregnancy, or illness like appendicitis, bladder or kidney infections, pelvic infections, and many other things.  Acute back pain usually gets better in 1 to 2 weeks. Back pain related to disk disease, arthritis in the spinal joints or spinal stenosis (narrowing of the spinal canal) can become chronic and last for months or years.  Unless you had a physical injury (for example, a car accident or fall) X-rays are usually not needed for the initial evaluation of back pain. If pain continues and does not respond to medical treatment, X-rays and other tests may be  needed.  Home care  Try these home care recommendations:  · When in bed, try to find a position of comfort. A firm mattress is best. Try lying flat on your back with pillows under your knees. You can also try lying on your side with your knees bent up towards your chest and a pillow between your knees.  · At first, do not try to stretch out the sore spots. If there is a strain, it is not like the good soreness you get after exercising without an injury. In this case, stretching may make it worse.  · Avoid prolong sitting, long car rides, or travel. This puts more stress on the lower back than standing or walking.  · During the first 24 to 72 hours after an acute injury or flare up of chronic back pain, apply an ice pack to the painful area for 20 minutes and then remove it for 20 minutes. Do this over a period of 60 to 90 minutes or several times a day. This will reduce swelling and pain. Wrap the ice pack in a thin towel or plastic to protect your skin.  · You can start with ice, then switch to heat. Heat (hot shower, hot bath, or heating pad) reduces pain and works well for muscle spasms. Heat can be applied to the painful area for 20 minutes then remove it for 20 minutes. Do this over a period of 60 to 90 minutes or several times a day. Do not sleep on a heating pad. It can lead to skin burns or tissue damage.  · You can alternate ice and heat therapy. Talk with your doctor about the best treatment for your back pain.  · Therapeutic massage can help relax the back muscles without stretching them.  · Be aware of safe lifting methods and do not lift anything without stretching first.  Medicines  Talk to your doctor before using medicine, especially if you have other medical problems or are taking other medicines.  · You may use over-the-counter medicine as directed on the bottle to control pain, unless another pain medicine was prescribed. If you have chronic conditions like diabetes, liver or kidney disease,  stomach ulcers, or gastrointestinal bleeding, or are taking blood thinners, talk to your doctor before taking any medicine.  · Be careful if you are given a prescription medicines, narcotics, or medicine for muscle spasms. They can cause drowsiness, affect your coordination, reflexes, and judgement. Do not drive or operate heavy machinery.  Follow-up care  Follow up with your healthcare provider, or as advised.   A radiologist will review any X-rays that were taken. Your provide will notify you of any new findings that may affect your care.  Call 911  Call emergency services if any of the following occur:  · Trouble breathing  · Confusion  · Very drowsy or trouble awakening  · Fainting or loss of consciousness  · Rapid or very slow heart rate  · Loss of bowel or bladder control  When to seek medical advice  Call your healthcare provider right away if any of these occur:   · Pain becomes worse or spreads to your legs  · Weakness or numbness in one or both legs  · Numbness in the groin or genital area  Date Last Reviewed: 7/1/2016  © 5994-1405 [employer]. [address] [place] [address], [place] [address]. All rights reserved. This information is not intended as a substitute for professional medical care. Always follow your healthcare professional's instructions.

## 2019-02-18 DIAGNOSIS — K21.9 GASTROESOPHAGEAL REFLUX DISEASE, ESOPHAGITIS PRESENCE NOT SPECIFIED: ICD-10-CM

## 2019-02-18 RX ORDER — PANTOPRAZOLE SODIUM 20 MG/1
20 TABLET, DELAYED RELEASE ORAL DAILY
Qty: 90 TABLET | Refills: 3 | Status: SHIPPED | OUTPATIENT
Start: 2019-02-18 | End: 2019-04-01 | Stop reason: SDUPTHER

## 2019-02-18 NOTE — TELEPHONE ENCOUNTER
Sherlyn Monahan would like a refill of the following medications:         ranitidine (ZANTAC) 300 MG tablet [Alea Moses MD]         pantoprazole (PROTONIX) 20 MG tablet [Alea Moses MD]     Preferred pharmacy: [facility] [id number] [place] [address] [address] [facility]   Delivery method: Pickup

## 2019-03-01 ENCOUNTER — PATIENT MESSAGE (OUTPATIENT)
Dept: PEDIATRICS | Facility: CLINIC | Age: 29
End: 2019-03-01

## 2019-04-01 ENCOUNTER — PATIENT MESSAGE (OUTPATIENT)
Dept: FAMILY MEDICINE | Facility: CLINIC | Age: 29
End: 2019-04-01

## 2019-04-01 DIAGNOSIS — K21.9 GASTROESOPHAGEAL REFLUX DISEASE, ESOPHAGITIS PRESENCE NOT SPECIFIED: ICD-10-CM

## 2019-04-01 DIAGNOSIS — R10.9 ABDOMINAL PAIN WITH RADIATION TO BACK: Primary | ICD-10-CM

## 2019-04-01 DIAGNOSIS — K58.9 IRRITABLE BOWEL SYNDROME, UNSPECIFIED TYPE: ICD-10-CM

## 2019-04-01 RX ORDER — PANTOPRAZOLE SODIUM 20 MG/1
20 TABLET, DELAYED RELEASE ORAL
Qty: 180 TABLET | Refills: 3 | Status: SHIPPED | OUTPATIENT
Start: 2019-04-01 | End: 2019-08-28

## 2019-04-02 NOTE — TELEPHONE ENCOUNTER
Please assist with scheduling abdominal ultrasound and GI appointment per referral to follow, thanks

## 2019-04-04 ENCOUNTER — PATIENT MESSAGE (OUTPATIENT)
Dept: FAMILY MEDICINE | Facility: CLINIC | Age: 29
End: 2019-04-04

## 2019-04-04 DIAGNOSIS — J06.9 URI WITH COUGH AND CONGESTION: Primary | ICD-10-CM

## 2019-04-04 RX ORDER — METHYLPREDNISOLONE 4 MG/1
TABLET ORAL
Qty: 1 PACKAGE | Refills: 0 | Status: SHIPPED | OUTPATIENT
Start: 2019-04-04 | End: 2019-05-10

## 2019-04-06 ENCOUNTER — HOSPITAL ENCOUNTER (OUTPATIENT)
Dept: RADIOLOGY | Facility: HOSPITAL | Age: 29
Discharge: HOME | End: 2019-04-06
Attending: FAMILY MEDICINE
Payer: COMMERCIAL

## 2019-04-06 ENCOUNTER — PATIENT MESSAGE (OUTPATIENT)
Dept: FAMILY MEDICINE | Facility: CLINIC | Age: 29
End: 2019-04-06

## 2019-04-06 DIAGNOSIS — R10.9 ABDOMINAL PAIN WITH RADIATION TO BACK: ICD-10-CM

## 2019-04-06 PROCEDURE — 76700 US ABDOMEN COMPLETE: ICD-10-PCS | Mod: 26,,, | Performed by: RADIOLOGY

## 2019-04-06 PROCEDURE — 76700 US EXAM ABDOM COMPLETE: CPT | Mod: TC

## 2019-04-06 PROCEDURE — 76700 US EXAM ABDOM COMPLETE: CPT | Mod: 26,,, | Performed by: RADIOLOGY

## 2019-04-07 ENCOUNTER — PATIENT MESSAGE (OUTPATIENT)
Dept: FAMILY MEDICINE | Facility: CLINIC | Age: 29
End: 2019-04-07

## 2019-04-07 PROBLEM — K80.20 GALL BLADDER STONES: Status: ACTIVE | Noted: 2019-04-07

## 2019-04-07 PROBLEM — K76.0 FATTY LIVER: Status: ACTIVE | Noted: 2019-04-07

## 2019-04-10 ENCOUNTER — PATIENT MESSAGE (OUTPATIENT)
Dept: FAMILY MEDICINE | Facility: CLINIC | Age: 29
End: 2019-04-10

## 2019-05-10 ENCOUNTER — OFFICE VISIT (OUTPATIENT)
Dept: GASTROENTEROLOGY | Facility: CLINIC | Age: 29
End: 2019-05-10
Payer: COMMERCIAL

## 2019-05-10 VITALS
WEIGHT: 195.56 LBS | SYSTOLIC BLOOD PRESSURE: 122 MMHG | DIASTOLIC BLOOD PRESSURE: 70 MMHG | HEIGHT: 65 IN | BODY MASS INDEX: 32.58 KG/M2

## 2019-05-10 DIAGNOSIS — R11.0 NAUSEA: ICD-10-CM

## 2019-05-10 DIAGNOSIS — K80.20 GALLSTONES: Primary | ICD-10-CM

## 2019-05-10 DIAGNOSIS — R10.13 ABDOMINAL PAIN, EPIGASTRIC: ICD-10-CM

## 2019-05-10 PROCEDURE — 3008F BODY MASS INDEX DOCD: CPT | Mod: CPTII,S$GLB,, | Performed by: NURSE PRACTITIONER

## 2019-05-10 PROCEDURE — 99999 PR PBB SHADOW E&M-EST. PATIENT-LVL III: ICD-10-PCS | Mod: PBBFAC,,, | Performed by: NURSE PRACTITIONER

## 2019-05-10 PROCEDURE — 3008F PR BODY MASS INDEX (BMI) DOCUMENTED: ICD-10-PCS | Mod: CPTII,S$GLB,, | Performed by: NURSE PRACTITIONER

## 2019-05-10 PROCEDURE — 99203 PR OFFICE/OUTPT VISIT, NEW, LEVL III, 30-44 MIN: ICD-10-PCS | Mod: S$GLB,,, | Performed by: NURSE PRACTITIONER

## 2019-05-10 PROCEDURE — 99203 OFFICE O/P NEW LOW 30 MIN: CPT | Mod: S$GLB,,, | Performed by: NURSE PRACTITIONER

## 2019-05-10 PROCEDURE — 99999 PR PBB SHADOW E&M-EST. PATIENT-LVL III: CPT | Mod: PBBFAC,,, | Performed by: NURSE PRACTITIONER

## 2019-05-10 RX ORDER — CETIRIZINE HYDROCHLORIDE 5 MG/1
10 TABLET ORAL 2 TIMES DAILY
COMMUNITY
End: 2023-01-12

## 2019-05-10 NOTE — PROGRESS NOTES
Subjective:       Patient ID: Sherlyn Monahan is a 28 y.o. female.    Chief Complaint: Abdominal Pain    HPI  Reports episode of severe epigastric pain with radiation to the back in July 2018.  Another similar episode in December 2018, March 2019 and April 2019.  Will have pain and nausea.  May have loose stools, though associates this with IBS flare.  No blood with bowel movement or black stools.   She denies heartburn or acid reflux currently.  Has had episodic heartburn in the past associated typically with poor diet.    Has used protonix with her recent episodes.    Apart from these episodes, she generally feels well with no abdominal pain, nausea, or other symptoms.  She can not determine trigger for her complaints.    Recent abdominal US:  FINDINGS:  Liver: Mildly enlarged, measuring 17.0 cm. There is diffusely increased echogenicity in keeping with sequela of fatty infiltration.  No focal hepatic lesions.    Gallbladder: There are 8-10 gallstones seen, largest measuring 2.0 cm.  Gallbladder wall thickness measures 0.8 cm..  No pericholecystic fluid.  No sonographic Resendez sign.    Biliary system: The common duct is not dilated, measuring 2 mm.  No intrahepatic ductal dilatation.    Spleen: Normal in size with a homogeneous echotexture, measuring 11.3 x 4.1 cm.  Accessory splenule noted measuring 2.0 x 2.1 x 1.7 cm.    Pancreas: The visualized portions of pancreas appear normal.    Last labs in December 2018 with normal lipase and cmp.      Review of Systems   Constitutional: Negative.  Negative for activity change, appetite change, fatigue, fever and unexpected weight change.   Respiratory: Negative for shortness of breath.    Cardiovascular: Negative for chest pain.   Gastrointestinal: Positive for abdominal pain, diarrhea and nausea. Negative for blood in stool and constipation.   Genitourinary: Negative.    Skin: Negative.    Neurological: Negative.    Psychiatric/Behavioral: Negative.        Objective:       Physical Exam   Constitutional: She is oriented to person, place, and time. She appears well-developed and well-nourished. No distress.   Eyes: No scleral icterus.   Cardiovascular: Normal rate.   Pulmonary/Chest: Effort normal.   Abdominal: Soft. Bowel sounds are normal. She exhibits no distension and no mass. There is no tenderness. There is no guarding.   Neurological: She is alert and oriented to person, place, and time.   Skin: Skin is warm and dry. She is not diaphoretic.   Psychiatric: She has a normal mood and affect. Her behavior is normal. Judgment and thought content normal.       Assessment:       1. Gallstones    2. Abdominal pain, epigastric    3. Nausea        Plan:         Sherlyn was seen today for abdominal pain.    Diagnoses and all orders for this visit:    Gallstones  -     Ambulatory referral to General Surgery    Abdominal pain, epigastric  -     Ambulatory referral to General Surgery    Nausea    Will refer her to surgery for further discussion.      Can return to discuss EGD if symptoms persist.

## 2019-05-10 NOTE — LETTER
May 10, 2019      Alea Moses MD  [address]  [address]  [place] [address]           [facility]  [address]  [place] [address]  Phone: [phone]          Patient: Sherlyn Monahan   MR Number: [id number]   YOB: 1990   Date of Visit: 5/10/2019       Dear Dr. Alea Moses:    Thank you for referring Sherlyn Monahan to me for evaluation. Attached you will find relevant portions of my assessment and plan of care.    If you have questions, please do not hesitate to call me. I look forward to following Sherlyn Monahan along with you.    Sincerely,    Esther Meza, [facility]    Enclosure  CC:  No Recipients    If you would like to receive this communication electronically, please contact [email or web address] or [phone] to request more information on [employer] access.    For providers and/or their staff who would like to refer a patient to [facility], please contact us through our one-stop-shop provider referral line, [facility], at [phone].    If you feel you have received this communication in error or would no longer like to receive these types of communications, please e-mail [email or web address]

## 2019-05-21 ENCOUNTER — OFFICE VISIT (OUTPATIENT)
Dept: SURGERY | Facility: CLINIC | Age: 29
End: 2019-05-21
Payer: COMMERCIAL

## 2019-05-21 VITALS
BODY MASS INDEX: 32.44 KG/M2 | HEIGHT: 65 IN | WEIGHT: 194.69 LBS | HEART RATE: 56 BPM | DIASTOLIC BLOOD PRESSURE: 69 MMHG | TEMPERATURE: 99 F | SYSTOLIC BLOOD PRESSURE: 106 MMHG

## 2019-05-21 DIAGNOSIS — K80.50 BILIARY COLIC SYMPTOM: Primary | ICD-10-CM

## 2019-05-21 PROCEDURE — 3008F BODY MASS INDEX DOCD: CPT | Mod: CPTII,S$GLB,, | Performed by: STUDENT IN AN ORGANIZED HEALTH CARE EDUCATION/TRAINING PROGRAM

## 2019-05-21 PROCEDURE — 3008F PR BODY MASS INDEX (BMI) DOCUMENTED: ICD-10-PCS | Mod: CPTII,S$GLB,, | Performed by: STUDENT IN AN ORGANIZED HEALTH CARE EDUCATION/TRAINING PROGRAM

## 2019-05-21 PROCEDURE — 99204 OFFICE O/P NEW MOD 45 MIN: CPT | Mod: S$GLB,,, | Performed by: STUDENT IN AN ORGANIZED HEALTH CARE EDUCATION/TRAINING PROGRAM

## 2019-05-21 PROCEDURE — 99999 PR PBB SHADOW E&M-EST. PATIENT-LVL III: CPT | Mod: PBBFAC,,, | Performed by: STUDENT IN AN ORGANIZED HEALTH CARE EDUCATION/TRAINING PROGRAM

## 2019-05-21 PROCEDURE — 99204 PR OFFICE/OUTPT VISIT, NEW, LEVL IV, 45-59 MIN: ICD-10-PCS | Mod: S$GLB,,, | Performed by: STUDENT IN AN ORGANIZED HEALTH CARE EDUCATION/TRAINING PROGRAM

## 2019-05-21 PROCEDURE — 99999 PR PBB SHADOW E&M-EST. PATIENT-LVL III: ICD-10-PCS | Mod: PBBFAC,,, | Performed by: STUDENT IN AN ORGANIZED HEALTH CARE EDUCATION/TRAINING PROGRAM

## 2019-05-21 NOTE — LETTER
May 22, 2019      Esther Meza, [facility]  [address]  [place] [address]           [facility]  [address]  [address]  [place] [address]  Phone: [phone]          Patient: Sherlyn Monahan   MR Number: [id number]   YOB: 1990   Date of Visit: 5/21/2019       Dear Esther Meza:    Thank you for referring Sherlyn Monahan to me for evaluation. Attached you will find relevant portions of my assessment and plan of care.    If you have questions, please do not hesitate to call me. I look forward to following Sherlyn Monahan along with you.    Sincerely,    Missael Tolentino MD    Enclosure  CC:  No Recipients    If you would like to receive this communication electronically, please contact [email or web address] or [phone] to request more information on [email or web address] Link access.    For providers and/or their staff who would like to refer a patient to [facility], please contact us through our one-stop-shop provider referral line, [facility] , at [phone].    If you feel you have received this communication in error or would no longer like to receive these types of communications, please e-mail [email or web address]

## 2019-05-22 PROBLEM — K80.50 BILIARY COLIC SYMPTOM: Status: ACTIVE | Noted: 2019-05-22

## 2019-05-22 NOTE — PROGRESS NOTES
Patient ID: Sherlyn Monahan is a 29 y.o. female.    Chief Complaint: No chief complaint on file.      HPI:  29F with intermittent epigastric pain that radiates to her back for about the past 8-9 months. Overall worsening, episodes becoming more severe. Has had several attacks in the past few weeks with pain lasting 3 hours. Pain then resolves completely and has no pain in between. She thinks it usually happens about an hours after eating. Some nausea but no vomiting. Pain is stabbing and severe when it comes. Went to ER with one severe attack.       Review of Systems   Constitutional: Negative for fever.   HENT: Negative for trouble swallowing.    Respiratory: Negative for shortness of breath.    Cardiovascular: Negative for chest pain.   Gastrointestinal: Positive for abdominal pain and nausea. Negative for blood in stool and vomiting.   Genitourinary: Negative for dysuria.   Musculoskeletal: Negative for gait problem.   Skin: Negative for rash and wound.   Allergic/Immunologic: Negative for immunocompromised state.   Neurological: Negative for weakness.   Hematological: Does not bruise/bleed easily.   Psychiatric/Behavioral: Negative for agitation.       Current Outpatient Medications   Medication Sig Dispense Refill    cetirizine (ZYRTEC) 5 MG tablet Take 5 mg by mouth once daily.      cholecalciferol, vitamin D3, (VITAMIN D3) 400 unit Tab Take 400 Units by mouth.      fluticasone (FLONASE) 50 mcg/actuation nasal spray 1 spray by Nasal route.      pantoprazole (PROTONIX) 20 MG tablet Take 1 tablet (20 mg total) by mouth 2 (two) times daily before meals. 180 tablet 3    prenatal vit37-iron-folic acid 29 mg iron- 1 mg Chew Take by mouth.      SYNTHROID 50 mcg tablet TAKE 1 TABLET BY MOUTH DAILY 90 tablet 4     No current facility-administered medications for this visit.        Review of patient's allergies indicates:   Allergen Reactions    Minocycline Swelling       Past Medical History:   Diagnosis Date     Acquired hypothyroidism 2016    Allergy     Irritable bowel syndrome (IBS)     Obesity (BMI 30.0-34.9) 2016    loosing weight consistently on 21 Day Fix program     Urticaria        Past Surgical History:   Procedure Laterality Date    breast reduction  2012    BREAST SURGERY      breast reduction     SECTION      DELIVERY- SECTION N/A 2018    Performed by [facility]ara Smith [facility] at [facility]       Family History   Problem Relation Age of Onset    Breast cancer Mother 45        remission for 10 years    Cancer Mother 45        breast     Hypertension Mother     Allergic rhinitis Mother     Diabetes Father     Diabetes Maternal Grandfather     Heart disease Maternal Grandfather     Diabetes Paternal Grandfather     Colon cancer Neg Hx     Ovarian cancer Neg Hx     Allergies Neg Hx     Angioedema Neg Hx     Asthma Neg Hx     Atopy Neg Hx     Eczema Neg Hx     Immunodeficiency Neg Hx     Rhinitis Neg Hx     Urticaria Neg Hx        Social History     Socioeconomic History    Marital status:      Spouse name: Not on file    Number of children: Not on file    Years of education: Not on file    Highest education level: Not on file   Occupational History    Not on file   Social Needs    Financial resource strain: Not on file    Food insecurity:     Worry: Not on file     Inability: Not on file    Transportation needs:     Medical: Not on file     Non-medical: Not on file   Tobacco Use    Smoking status: Former Smoker    Smokeless tobacco: Never Used   Substance and Sexual Activity    Alcohol use: No    Drug use: No    Sexual activity: Yes     Partners: Male     Birth control/protection: None   Lifestyle    Physical activity:     Days per week: Not on file     Minutes per session: Not on file    Stress: Not on file   Relationships    Social connections:     Talks on phone: Not on file     Gets together: Not on file     Attends Zoroastrianism service:  Not on file     Active member of club or organization: Not on file     Attends meetings of clubs or organizations: Not on file     Relationship status: Not on file   Other Topics Concern    Not on file   Social History Narrative    Not on file       Vitals:    05/21/19 1601   BP: 106/69   Pulse: (!) 56   Temp: 98.6 °F (37 °C)       Physical Exam   Constitutional: She is oriented to person, place, and time. She appears well-developed and well-nourished. No distress.   HENT:   Head: Normocephalic and atraumatic.   Eyes: No scleral icterus.   Cardiovascular: Normal rate.   Pulmonary/Chest: Effort normal. No stridor.   Abdominal: Soft. She exhibits no distension. There is no tenderness.   Lymphadenopathy:     She has no cervical adenopathy.   Neurological: She is alert and oriented to person, place, and time.   Skin: Skin is warm. No erythema.   Psychiatric: She has a normal mood and affect. Her behavior is normal.     LFts normal  Lipase normal  US shows several large gallstones, up to 2cm. CBD 2mm    Assessment & Plan:   29F with biliary colic  Plan for OR lap mikal June 11

## 2019-05-22 NOTE — H&P (VIEW-ONLY)
Patient ID: Sherlyn Monahan is a 29 y.o. female.    Chief Complaint: No chief complaint on file.      HPI:  29F with intermittent epigastric pain that radiates to her back for about the past 8-9 months. Overall worsening, episodes becoming more severe. Has had several attacks in the past few weeks with pain lasting 3 hours. Pain then resolves completely and has no pain in between. She thinks it usually happens about an hours after eating. Some nausea but no vomiting. Pain is stabbing and severe when it comes. Went to ER with one severe attack.       Review of Systems   Constitutional: Negative for fever.   HENT: Negative for trouble swallowing.    Respiratory: Negative for shortness of breath.    Cardiovascular: Negative for chest pain.   Gastrointestinal: Positive for abdominal pain and nausea. Negative for blood in stool and vomiting.   Genitourinary: Negative for dysuria.   Musculoskeletal: Negative for gait problem.   Skin: Negative for rash and wound.   Allergic/Immunologic: Negative for immunocompromised state.   Neurological: Negative for weakness.   Hematological: Does not bruise/bleed easily.   Psychiatric/Behavioral: Negative for agitation.       Current Outpatient Medications   Medication Sig Dispense Refill    cetirizine (ZYRTEC) 5 MG tablet Take 5 mg by mouth once daily.      cholecalciferol, vitamin D3, (VITAMIN D3) 400 unit Tab Take 400 Units by mouth.      fluticasone (FLONASE) 50 mcg/actuation nasal spray 1 spray by Nasal route.      pantoprazole (PROTONIX) 20 MG tablet Take 1 tablet (20 mg total) by mouth 2 (two) times daily before meals. 180 tablet 3    prenatal vit37-iron-folic acid 29 mg iron- 1 mg Chew Take by mouth.      SYNTHROID 50 mcg tablet TAKE 1 TABLET BY MOUTH DAILY 90 tablet 4     No current facility-administered medications for this visit.        Review of patient's allergies indicates:   Allergen Reactions    Minocycline Swelling       Past Medical History:   Diagnosis Date     Acquired hypothyroidism 2016    Allergy     Irritable bowel syndrome (IBS)     Obesity (BMI 30.0-34.9) 2016    loosing weight consistently on 21 Day Fix program     Urticaria        Past Surgical History:   Procedure Laterality Date    breast reduction  2012    BREAST SURGERY      breast reduction     SECTION      DELIVERY- SECTION N/A 2018    Performed by [facility] at [facility]       Family History   Problem Relation Age of Onset    Breast cancer Mother 45        remission for 10 years    Cancer Mother 45        breast     Hypertension Mother     Allergic rhinitis Mother     Diabetes Father     Diabetes Maternal Grandfather     Heart disease Maternal Grandfather     Diabetes Paternal Grandfather     Colon cancer Neg Hx     Ovarian cancer Neg Hx     Allergies Neg Hx     Angioedema Neg Hx     Asthma Neg Hx     Atopy Neg Hx     Eczema Neg Hx     Immunodeficiency Neg Hx     Rhinitis Neg Hx     Urticaria Neg Hx        Social History     Socioeconomic History    Marital status:      Spouse name: Not on file    Number of children: Not on file    Years of education: Not on file    Highest education level: Not on file   Occupational History    Not on file   Social Needs    Financial resource strain: Not on file    Food insecurity:     Worry: Not on file     Inability: Not on file    Transportation needs:     Medical: Not on file     Non-medical: Not on file   Tobacco Use    Smoking status: Former Smoker    Smokeless tobacco: Never Used   Substance and Sexual Activity    Alcohol use: No    Drug use: No    Sexual activity: Yes     Partners: Male     Birth control/protection: None   Lifestyle    Physical activity:     Days per week: Not on file     Minutes per session: Not on file    Stress: Not on file   Relationships    Social connections:     Talks on phone: Not on file     Gets together: Not on file     Attends Taoist service:  Not on file     Active member of club or organization: Not on file     Attends meetings of clubs or organizations: Not on file     Relationship status: Not on file   Other Topics Concern    Not on file   Social History Narrative    Not on file       Vitals:    05/21/19 1601   BP: 106/69   Pulse: (!) 56   Temp: 98.6 °F (37 °C)       Physical Exam   Constitutional: She is oriented to person, place, and time. She appears well-developed and well-nourished. No distress.   HENT:   Head: Normocephalic and atraumatic.   Eyes: No scleral icterus.   Cardiovascular: Normal rate.   Pulmonary/Chest: Effort normal. No stridor.   Abdominal: Soft. She exhibits no distension. There is no tenderness.   Lymphadenopathy:     She has no cervical adenopathy.   Neurological: She is alert and oriented to person, place, and time.   Skin: Skin is warm. No erythema.   Psychiatric: She has a normal mood and affect. Her behavior is normal.     LFts normal  Lipase normal  US shows several large gallstones, up to 2cm. CBD 2mm    Assessment & Plan:   29F with biliary colic  Plan for OR lap mikal June 11

## 2019-05-30 ENCOUNTER — PATIENT MESSAGE (OUTPATIENT)
Dept: FAMILY MEDICINE | Facility: CLINIC | Age: 29
End: 2019-05-30

## 2019-06-06 ENCOUNTER — HOSPITAL ENCOUNTER (OUTPATIENT)
Dept: PREADMISSION TESTING | Facility: HOSPITAL | Age: 29
Discharge: HOME | End: 2019-06-06
Attending: STUDENT IN AN ORGANIZED HEALTH CARE EDUCATION/TRAINING PROGRAM
Payer: COMMERCIAL

## 2019-06-06 ENCOUNTER — CLINICAL SUPPORT (OUTPATIENT)
Dept: LAB | Facility: HOSPITAL | Age: 29
End: 2019-06-06
Attending: STUDENT IN AN ORGANIZED HEALTH CARE EDUCATION/TRAINING PROGRAM
Payer: COMMERCIAL

## 2019-06-06 ENCOUNTER — ANESTHESIA EVENT (OUTPATIENT)
Dept: SURGERY | Facility: HOSPITAL | Age: 29
End: 2019-06-06
Payer: COMMERCIAL

## 2019-06-06 DIAGNOSIS — K80.50 BILIARY COLIC SYMPTOM: ICD-10-CM

## 2019-06-06 DIAGNOSIS — K80.50 BILIARY COLIC SYMPTOM: Primary | ICD-10-CM

## 2019-06-06 PROCEDURE — 93005 ELECTROCARDIOGRAM TRACING: CPT

## 2019-06-06 PROCEDURE — 93010 ELECTROCARDIOGRAM REPORT: CPT | Mod: ,,, | Performed by: INTERNAL MEDICINE

## 2019-06-06 PROCEDURE — 93010 EKG 12-LEAD: ICD-10-PCS | Mod: ,,, | Performed by: INTERNAL MEDICINE

## 2019-06-06 RX ORDER — LIDOCAINE HYDROCHLORIDE 10 MG/ML
1 INJECTION, SOLUTION EPIDURAL; INFILTRATION; INTRACAUDAL; PERINEURAL ONCE
Status: CANCELLED | OUTPATIENT
Start: 2019-06-06 | End: 2019-06-06

## 2019-06-06 RX ORDER — SODIUM CHLORIDE, SODIUM LACTATE, POTASSIUM CHLORIDE, CALCIUM CHLORIDE 600; 310; 30; 20 MG/100ML; MG/100ML; MG/100ML; MG/100ML
INJECTION, SOLUTION INTRAVENOUS CONTINUOUS
Status: CANCELLED | OUTPATIENT
Start: 2019-06-06

## 2019-06-06 NOTE — ANESTHESIA PREPROCEDURE EVALUATION
2019  Sherlyn Monahan is a 29 y.o., female scheduled for laparoscopic cholecystectomy on 19.    Past Medical History:   Diagnosis Date    Acquired hypothyroidism 2016    Allergy     Irritable bowel syndrome (IBS)     Obesity (BMI 30.0-34.9) 2016    loosing weight consistently on 21 Day Fix program     Urticaria      Past Surgical History:   Procedure Laterality Date    breast reduction  2012    BREAST SURGERY      breast reduction     SECTION      DELIVERY- SECTION N/A 2018    Performed by [facility] MD at [facility] L[facility]       Anesthesia Evaluation    I have reviewed the Patient Summary Reports.     I have reviewed the Medications.     Review of Systems  Anesthesia Hx:  No problems with previous Anesthesia  Denies Family Hx of Anesthesia complications.    Social:  Former Smoker, Social Alcohol Use    Hematology/Oncology:  Hematology Normal        Cardiovascular:  Cardiovascular Normal Exercise tolerance: good   Denies Angina.        Pulmonary:  Pulmonary Normal  Denies Shortness of breath.    Renal/:  Renal/ Normal     Hepatic/GI:   GERD, well controlled  Liver Disease, Fatty Liver    Neurological:  Neurology Normal    Endocrine:   Hypothyroidism        Physical Exam  General:  Well nourished    Airway/Jaw/Neck:  Airway Findings: Mouth Opening: Normal Tongue: Normal  General Airway Assessment: Adult  Mallampati: I  TM Distance: Normal, at least 6 cm  Jaw/Neck Findings:  Neck ROM: Normal ROM      Dental:  Dental Findings: In tact   Chest/Lungs:  Chest/Lungs Findings: Clear to auscultation, Normal Respiratory Rate     Heart/Vascular:  Heart Findings: Rate: Normal  Rhythm: Regular Rhythm  Sounds: Normal  Heart murmur: negative       Mental Status:  Mental Status Findings:  Cooperative, Alert and Oriented         Anesthesia Plan  Type of Anesthesia, risks  & benefits discussed:  Anesthesia Type:  general  Patient's Preference:   Intra-op Monitoring Plan:   Intra-op Monitoring Plan Comments:   Post Op Pain Control Plan:   Post Op Pain Control Plan Comments:   Induction:   IV  Beta Blocker:  Patient is not currently on a Beta-Blocker (No further documentation required).       Informed Consent: Patient understands risks and agrees with Anesthesia plan.  Questions answered.   ASA Score: 2     Day of Surgery Review of History & Physical:        Anesthesia Plan Notes: Anesthesia consent to be signed prior to procedure on 6/11/19          Ready For Surgery From Anesthesia Perspective.

## 2019-06-06 NOTE — PRE-PROCEDURE INSTRUCTIONS
Ernie Monique  [phone]    Allergies, medical, surgical, family and psychosocial histories reviewed with patient. Periop plan of care reviewed. Preop instructions given, including medications to take and to hold. Hibiclens soap and instructions on use given. Time allotted for questions to be addressed.  Patient verbalized understanding.

## 2019-06-06 NOTE — DISCHARGE INSTRUCTIONS
Your surgery is scheduled for 6/11/19.    Please report to Outpatient Surgery Intake Office on the 2nd FLOOR at 5:30 a.m.          INSTRUCTIONS IMPORTANT!!!  ¨ Do not eat or drink after 12 midnight-including water. OK to brush teeth, no   gum, candy or mints!    ¨ Take only these medicines with a small swallow of water-morning of surgery: Levothyroxine and pantoprazole        ____  Proceed to [facility] on 6/6/19 for additional blood test.        ____  Do not wear makeup, including mascara.  ____  No powder, lotions or creams to surgical area.  ____  Please remove all jewelry, including piercings and leave at home.  ____  No money or valuables needed. Please leave at home.  ____  Please bring any documents given by your doctor.  ____  If going home the same day, arrange for a ride home. You will not be able to             drive if Anesthesia was used.  ____  Wear loose fitting clothing. Allow for dressings, bandages.  ____  Stop Aspirin, Ibuprofen, Motrin and Aleve at least 3-5 days before surgery, unless otherwise instructed by your doctor, or the nurse.   You MAY use Tylenol/acetaminophen until day of surgery.  ____  Wash the surgical area with Hibiclens the night before surgery, and again the             morning of surgery.  Be sure to rinse hibiclens off completely (if instructed by   nurse).  ____  If you take diabetic medication, do not take am of surgery unless instructed by Doctor.  ____  Call MD for temperature above 101 degrees or any other signs of infection such as Urinary (bladder) infection, Upper respiratory infection, skin boils, etc.  ____ Stop taking any Fish Oil supplement or any Vitamins that contain Vitamin E at least 5 days prior to surgery.  ____ Do Not wear your contact lenses the day of your procedure.  You may wear your glasses.      ____Do not shave surgical site for 3 days prior to surgery.  ____ Practice Good hand washing before, during, and after procedure.      I have  read or had read and explained to me, and understand the above information.  Additional comments or instructions:  For additional questions call [phone]      ANESTHESIA SIDE EFFECTS  -For the first 24 hours after surgery:  Do not drive, use heavy equipment, make important decisions, or drink alcohol  -It is normal to feel sleepy for several hours.  Rest until you are more awake.  -Have someone stay with you, if needed.  They can watch for problems and help keep you safe.  -Some possible post anesthesia side effects include: nausea and vomiting, sore throat and hoarseness, sleepiness, and dizziness.        Pre-Op Bathing Instructions    Before surgery, you can play an important role in your own health.    Because skin is not sterile, we need to be sure that your skin is as free of germs as possible. By following the instructions below, you can reduce the number of germs on your skin before surgery.    IMPORTANT: You will need to shower with a special soap called Hibiclens*, available at any pharmacy.  If you are allergic to Chlorhexidine (the antiseptic in Hibiclens), use an antibacterial soap such as Dial Soap for your preoperative shower.  You will shower with Hibiclens both the night before your surgery and the morning of your surgery.  Do not use Hibiclens on the head, face or genitals to avoid injury to those areas.    STEP #1: THE NIGHT BEFORE YOUR SURGERY     1. Do not shave the area of your body where your surgery will be performed.  2. Shower and wash your hair and body as usual with your normal soap and shampoo.  3. Rinse your hair and body thoroughly after you shower to remove all soap residue.  4. With your hand, apply one packet of Hibiclens soap to the surgical site.   5. Wash the site gently for five (5) minutes. Do not scrub your skin too hard.   6. Do not wash with your regular soap after Hibiclens is used.  7. Rinse your body thoroughly.  8. Pat yourself dry with a clean, soft towel.  9. Do not use  lotion, cream, or powder.  10. Wear clean clothes.    STEP #2: THE MORNING OF YOUR SURGERY     1. Repeat Step #1.    * Not to be used by people allergic to Chlorhexidine.          Cholecystectomy     Clips close off the duct connecting the gallbladder to the bile duct. The gallbladder is then removed.     Youve had painful attacks caused by gallstones. To treat the problem, your healthcare provider wants to remove your gallbladder. This surgery is called cholecystectomy. Removing the gallbladder can relieve pain. It will also prevent future attacks. You can live a healthy life without your gallbladder. You may also be able to go back to eating foods you enjoyed before your gallbladder problems started.  Before your surgery  Be prepared:  · Tell your provider what medicines you take. Include those bought over the counter. Also include herbs or supplements. Be sure to mention if you take prescription blood thinners. This includes warfarin, clopidogrel, and aspirin.  · Have any tests your provider asks for, such as blood tests.  · Dont eat or drink after midnight, the night before your surgery. This includes water, coffee, and mints. However, you may need to take some medicine with sips of water--talk with your healthcare provider.  The day of surgery  When you arrive, you will prepare for surgery:  · An IV line will be put into a vein in your arm or hand. This gives you fluids and medicine.  · An anesthesiologist will talk with you about anesthesia. This is medicine used to prevent pain. You will receive general anesthesia. This puts you into a state like deep sleep through the procedure.  During surgery  There are 2 methods for removing the gallbladder. Your healthcare provider will choose which method is best for you:  · Laparoscopic cholecystectomy. This is most common. During surgery, 2 to 4 small incisions are made. A thin tube with a camera is used. This is called a laparoscope. The scope is put through one of  the incisions. It sends images to a video screen. Surgical tools are put through other incisions. The gallbladder is removed using the scope and these tools.  · Open cholecystectomy. One larger incision is made. The surgeon sees and works through this incision. Open surgery is most often used when scarring or other factors make it a better choice for you.  In some cases, safety requires a change from laparoscopic to open surgery during the procedure.  After surgery  You will be sent to a room to wake up from the anesthesia. You will likely go home the same day. In some cases, an overnight stay is needed. If you had open cholecystectomy, you may need to stay in the hospital for a few days. When you are released to go home, have a family member or friend ready to drive you.  Risks and possible complications of gallbladder surgery  All surgeries have risks. The risks of gallbladder surgery include:  · Bleeding  · Infection  · Injury to the common bile duct or nearby organs  · Blood clots in the legs  · Bile leaks  · Hernia at incision site  · Pnemonia   Date Last Reviewed: 7/1/2016  © 5650-6617 [employer]. [address] [place] [address], [place] [address]. All rights reserved. This information is not intended as a substitute for professional medical care. Always follow your healthcare professional's instructions.

## 2019-06-10 ENCOUNTER — NURSE TRIAGE (OUTPATIENT)
Dept: ADMINISTRATIVE | Facility: CLINIC | Age: 29
End: 2019-06-10

## 2019-06-11 ENCOUNTER — ANESTHESIA (OUTPATIENT)
Dept: SURGERY | Facility: HOSPITAL | Age: 29
End: 2019-06-11
Payer: COMMERCIAL

## 2019-06-11 ENCOUNTER — HOSPITAL ENCOUNTER (OUTPATIENT)
Facility: HOSPITAL | Age: 29
Discharge: HOME | End: 2019-06-11
Attending: STUDENT IN AN ORGANIZED HEALTH CARE EDUCATION/TRAINING PROGRAM | Admitting: STUDENT IN AN ORGANIZED HEALTH CARE EDUCATION/TRAINING PROGRAM
Payer: COMMERCIAL

## 2019-06-11 VITALS
RESPIRATION RATE: 14 BRPM | TEMPERATURE: 98 F | HEIGHT: 65 IN | DIASTOLIC BLOOD PRESSURE: 76 MMHG | WEIGHT: 190 LBS | SYSTOLIC BLOOD PRESSURE: 122 MMHG | HEART RATE: 68 BPM | OXYGEN SATURATION: 100 % | BODY MASS INDEX: 31.65 KG/M2

## 2019-06-11 DIAGNOSIS — K80.50 BILIARY COLIC SYMPTOM: Primary | ICD-10-CM

## 2019-06-11 PROCEDURE — 71000033 HC RECOVERY, INTIAL HOUR: Performed by: STUDENT IN AN ORGANIZED HEALTH CARE EDUCATION/TRAINING PROGRAM

## 2019-06-11 PROCEDURE — 25000003 PHARM REV CODE 250: Performed by: NURSE PRACTITIONER

## 2019-06-11 PROCEDURE — 88304 TISSUE SPECIMEN TO PATHOLOGY - SURGERY: ICD-10-PCS | Mod: 26,,, | Performed by: PATHOLOGY

## 2019-06-11 PROCEDURE — S0020 INJECTION, BUPIVICAINE HYDRO: HCPCS | Performed by: STUDENT IN AN ORGANIZED HEALTH CARE EDUCATION/TRAINING PROGRAM

## 2019-06-11 PROCEDURE — 37000008 HC ANESTHESIA 1ST 15 MINUTES: Performed by: STUDENT IN AN ORGANIZED HEALTH CARE EDUCATION/TRAINING PROGRAM

## 2019-06-11 PROCEDURE — 25000003 PHARM REV CODE 250: Performed by: NURSE ANESTHETIST, CERTIFIED REGISTERED

## 2019-06-11 PROCEDURE — 36000709 HC OR TIME LEV III EA ADD 15 MIN: Performed by: STUDENT IN AN ORGANIZED HEALTH CARE EDUCATION/TRAINING PROGRAM

## 2019-06-11 PROCEDURE — 47562 LAPAROSCOPIC CHOLECYSTECTOMY: CPT | Mod: ,,, | Performed by: STUDENT IN AN ORGANIZED HEALTH CARE EDUCATION/TRAINING PROGRAM

## 2019-06-11 PROCEDURE — 27201423 OPTIME MED/SURG SUP & DEVICES STERILE SUPPLY: Performed by: STUDENT IN AN ORGANIZED HEALTH CARE EDUCATION/TRAINING PROGRAM

## 2019-06-11 PROCEDURE — 71000016 HC POSTOP RECOV ADDL HR: Performed by: STUDENT IN AN ORGANIZED HEALTH CARE EDUCATION/TRAINING PROGRAM

## 2019-06-11 PROCEDURE — 71000015 HC POSTOP RECOV 1ST HR: Performed by: STUDENT IN AN ORGANIZED HEALTH CARE EDUCATION/TRAINING PROGRAM

## 2019-06-11 PROCEDURE — 47562 PR LAP,CHOLECYSTECTOMY: ICD-10-PCS | Mod: ,,, | Performed by: STUDENT IN AN ORGANIZED HEALTH CARE EDUCATION/TRAINING PROGRAM

## 2019-06-11 PROCEDURE — 88304 TISSUE EXAM BY PATHOLOGIST: CPT | Mod: 26,,, | Performed by: PATHOLOGY

## 2019-06-11 PROCEDURE — 25000003 PHARM REV CODE 250: Performed by: STUDENT IN AN ORGANIZED HEALTH CARE EDUCATION/TRAINING PROGRAM

## 2019-06-11 PROCEDURE — 63600175 PHARM REV CODE 636 W HCPCS: Performed by: NURSE ANESTHETIST, CERTIFIED REGISTERED

## 2019-06-11 PROCEDURE — 63600175 PHARM REV CODE 636 W HCPCS: Performed by: ANESTHESIOLOGY

## 2019-06-11 PROCEDURE — 36000708 HC OR TIME LEV III 1ST 15 MIN: Performed by: STUDENT IN AN ORGANIZED HEALTH CARE EDUCATION/TRAINING PROGRAM

## 2019-06-11 PROCEDURE — 88304 TISSUE EXAM BY PATHOLOGIST: CPT | Performed by: PATHOLOGY

## 2019-06-11 PROCEDURE — 37000009 HC ANESTHESIA EA ADD 15 MINS: Performed by: STUDENT IN AN ORGANIZED HEALTH CARE EDUCATION/TRAINING PROGRAM

## 2019-06-11 PROCEDURE — 71000039 HC RECOVERY, EACH ADD'L HOUR: Performed by: STUDENT IN AN ORGANIZED HEALTH CARE EDUCATION/TRAINING PROGRAM

## 2019-06-11 PROCEDURE — 63600175 PHARM REV CODE 636 W HCPCS: Performed by: STUDENT IN AN ORGANIZED HEALTH CARE EDUCATION/TRAINING PROGRAM

## 2019-06-11 RX ORDER — HYDROCODONE BITARTRATE AND ACETAMINOPHEN 5; 325 MG/1; MG/1
1 TABLET ORAL EVERY 4 HOURS PRN
Qty: 10 TABLET | Refills: 0 | Status: SHIPPED | OUTPATIENT
Start: 2019-06-11 | End: 2019-08-28

## 2019-06-11 RX ORDER — ONDANSETRON HYDROCHLORIDE 2 MG/ML
INJECTION, SOLUTION INTRAMUSCULAR; INTRAVENOUS
Status: DISCONTINUED | OUTPATIENT
Start: 2019-06-11 | End: 2019-06-11

## 2019-06-11 RX ORDER — HYDROCODONE BITARTRATE AND ACETAMINOPHEN 5; 325 MG/1; MG/1
1 TABLET ORAL EVERY 4 HOURS PRN
Status: DISCONTINUED | OUTPATIENT
Start: 2019-06-11 | End: 2019-06-11 | Stop reason: HOSPADM

## 2019-06-11 RX ORDER — HYDROMORPHONE HYDROCHLORIDE 2 MG/ML
0.5 INJECTION, SOLUTION INTRAMUSCULAR; INTRAVENOUS; SUBCUTANEOUS EVERY 5 MIN PRN
Status: DISCONTINUED | OUTPATIENT
Start: 2019-06-11 | End: 2019-06-11 | Stop reason: HOSPADM

## 2019-06-11 RX ORDER — METHYLPREDNISOLONE ACETATE 40 MG/ML
INJECTION, SUSPENSION INTRA-ARTICULAR; INTRALESIONAL; INTRAMUSCULAR; SOFT TISSUE
Status: DISCONTINUED | OUTPATIENT
Start: 2019-06-11 | End: 2019-06-11

## 2019-06-11 RX ORDER — SODIUM CHLORIDE 0.9 % (FLUSH) 0.9 %
10 SYRINGE (ML) INJECTION
Status: DISCONTINUED | OUTPATIENT
Start: 2019-06-11 | End: 2019-06-11 | Stop reason: HOSPADM

## 2019-06-11 RX ORDER — NEOSTIGMINE METHYLSULFATE 1 MG/ML
INJECTION, SOLUTION INTRAVENOUS
Status: DISCONTINUED | OUTPATIENT
Start: 2019-06-11 | End: 2019-06-11

## 2019-06-11 RX ORDER — PROPOFOL 10 MG/ML
VIAL (ML) INTRAVENOUS
Status: DISCONTINUED | OUTPATIENT
Start: 2019-06-11 | End: 2019-06-11

## 2019-06-11 RX ORDER — MIDAZOLAM HYDROCHLORIDE 1 MG/ML
INJECTION INTRAMUSCULAR; INTRAVENOUS
Status: DISCONTINUED | OUTPATIENT
Start: 2019-06-11 | End: 2019-06-11

## 2019-06-11 RX ORDER — CEFAZOLIN SODIUM 2 G/50ML
2 SOLUTION INTRAVENOUS
Status: COMPLETED | OUTPATIENT
Start: 2019-06-11 | End: 2019-06-11

## 2019-06-11 RX ORDER — ROCURONIUM BROMIDE 10 MG/ML
INJECTION, SOLUTION INTRAVENOUS
Status: DISCONTINUED | OUTPATIENT
Start: 2019-06-11 | End: 2019-06-11

## 2019-06-11 RX ORDER — BUPIVACAINE HYDROCHLORIDE 5 MG/ML
INJECTION, SOLUTION EPIDURAL; INTRACAUDAL
Status: DISCONTINUED | OUTPATIENT
Start: 2019-06-11 | End: 2019-06-11 | Stop reason: HOSPADM

## 2019-06-11 RX ORDER — LIDOCAINE HCL/PF 100 MG/5ML
SYRINGE (ML) INTRAVENOUS
Status: DISCONTINUED | OUTPATIENT
Start: 2019-06-11 | End: 2019-06-11

## 2019-06-11 RX ORDER — SODIUM CHLORIDE, SODIUM LACTATE, POTASSIUM CHLORIDE, CALCIUM CHLORIDE 600; 310; 30; 20 MG/100ML; MG/100ML; MG/100ML; MG/100ML
INJECTION, SOLUTION INTRAVENOUS CONTINUOUS
Status: DISCONTINUED | OUTPATIENT
Start: 2019-06-11 | End: 2019-06-11 | Stop reason: HOSPADM

## 2019-06-11 RX ORDER — ACETAMINOPHEN 10 MG/ML
INJECTION, SOLUTION INTRAVENOUS
Status: DISCONTINUED | OUTPATIENT
Start: 2019-06-11 | End: 2019-06-11

## 2019-06-11 RX ORDER — ONDANSETRON 2 MG/ML
4 INJECTION INTRAMUSCULAR; INTRAVENOUS ONCE AS NEEDED
Status: COMPLETED | OUTPATIENT
Start: 2019-06-11 | End: 2019-06-11

## 2019-06-11 RX ORDER — GLYCOPYRROLATE 0.2 MG/ML
INJECTION INTRAMUSCULAR; INTRAVENOUS
Status: DISCONTINUED | OUTPATIENT
Start: 2019-06-11 | End: 2019-06-11

## 2019-06-11 RX ORDER — FENTANYL CITRATE 50 UG/ML
INJECTION, SOLUTION INTRAMUSCULAR; INTRAVENOUS
Status: DISCONTINUED | OUTPATIENT
Start: 2019-06-11 | End: 2019-06-11

## 2019-06-11 RX ORDER — AMOXICILLIN 250 MG
1 CAPSULE ORAL 2 TIMES DAILY
COMMUNITY
Start: 2019-06-11 | End: 2019-08-28

## 2019-06-11 RX ORDER — LIDOCAINE HYDROCHLORIDE 10 MG/ML
1 INJECTION, SOLUTION EPIDURAL; INFILTRATION; INTRACAUDAL; PERINEURAL ONCE
Status: DISCONTINUED | OUTPATIENT
Start: 2019-06-11 | End: 2019-06-11 | Stop reason: HOSPADM

## 2019-06-11 RX ADMIN — GLYCOPYRROLATE 0.6 MG: 0.2 INJECTION, SOLUTION INTRAMUSCULAR; INTRAVENOUS at 07:06

## 2019-06-11 RX ADMIN — NEOSTIGMINE METHYLSULFATE 4 MG: 1 INJECTION INTRAVENOUS at 07:06

## 2019-06-11 RX ADMIN — MIDAZOLAM HYDROCHLORIDE 2 MG: 1 INJECTION, SOLUTION INTRAMUSCULAR; INTRAVENOUS at 06:06

## 2019-06-11 RX ADMIN — FENTANYL CITRATE 50 MCG: 50 INJECTION, SOLUTION INTRAMUSCULAR; INTRAVENOUS at 08:06

## 2019-06-11 RX ADMIN — ONDANSETRON 8 MG: 2 INJECTION, SOLUTION INTRAMUSCULAR; INTRAVENOUS at 07:06

## 2019-06-11 RX ADMIN — SODIUM CHLORIDE, SODIUM LACTATE, POTASSIUM CHLORIDE, AND CALCIUM CHLORIDE: .6; .31; .03; .02 INJECTION, SOLUTION INTRAVENOUS at 06:06

## 2019-06-11 RX ADMIN — CEFAZOLIN SODIUM 2 G: 2 SOLUTION INTRAVENOUS at 06:06

## 2019-06-11 RX ADMIN — ACETAMINOPHEN 1000 MG: 10 INJECTION, SOLUTION INTRAVENOUS at 07:06

## 2019-06-11 RX ADMIN — ROCURONIUM BROMIDE 40 MG: 10 INJECTION, SOLUTION INTRAVENOUS at 07:06

## 2019-06-11 RX ADMIN — HYDROMORPHONE HYDROCHLORIDE 0.5 MG: 2 INJECTION, SOLUTION INTRAMUSCULAR; INTRAVENOUS; SUBCUTANEOUS at 08:06

## 2019-06-11 RX ADMIN — METHYLPREDNISOLONE ACETATE 40 MG: 40 INJECTION, SUSPENSION INTRA-ARTICULAR; INTRALESIONAL; INTRAMUSCULAR; SOFT TISSUE at 07:06

## 2019-06-11 RX ADMIN — FENTANYL CITRATE 50 MCG: 50 INJECTION, SOLUTION INTRAMUSCULAR; INTRAVENOUS at 07:06

## 2019-06-11 RX ADMIN — LIDOCAINE HYDROCHLORIDE 75 MG: 20 INJECTION, SOLUTION INTRAVENOUS at 07:06

## 2019-06-11 RX ADMIN — PROPOFOL 160 MG: 10 INJECTION, EMULSION INTRAVENOUS at 07:06

## 2019-06-11 RX ADMIN — ONDANSETRON 4 MG: 2 INJECTION INTRAMUSCULAR; INTRAVENOUS at 09:06

## 2019-06-11 RX ADMIN — FENTANYL CITRATE 100 MCG: 50 INJECTION, SOLUTION INTRAMUSCULAR; INTRAVENOUS at 07:06

## 2019-06-11 NOTE — INTERVAL H&P NOTE
The patient has been examined and the H&P has been reviewed:    I concur with the findings and no changes have occurred since H&P was written.    Anesthesia/Surgery risks, benefits and alternative options discussed and understood by patient/family.          Active Hospital Problems    Diagnosis  POA    Biliary colic symptom [K80.50]  Yes      Resolved Hospital Problems   No resolved problems to display.

## 2019-06-11 NOTE — PLAN OF CARE
Patient received from PACU.  Patient is easily arousable.  Has complaint of pain 3/10.  Fluids offered.  Puncture sites X4 intact with gauze and Tegaderm.  Instructed on calling for help when using bathroom before discharging patient.  Patient verbalized understanding.  Safety maintained.

## 2019-06-11 NOTE — TRANSFER OF CARE
"Anesthesia Transfer of Care Note    Patient: Sherlyn Monahan    Procedure(s) Performed: Procedure(s) (LRB):  CHOLECYSTECTOMY, LAPAROSCOPIC (N/A)    Patient location: PACU    Anesthesia Type: general    Transport from OR: Transported from OR on 6-10 L/min O2 by face mask with adequate spontaneous ventilation    Post pain: adequate analgesia    Post assessment: no apparent anesthetic complications    Post vital signs: stable    Level of consciousness: sedated    Nausea/Vomiting: no nausea/vomiting    Complications: none    Transfer of care protocol was followed      Last vitals:   Visit Vitals  /72 (BP Location: Right arm)   Pulse 90   Temp 36.7 °C (98.1 °F) (Skin)   Resp 18   Ht 5' 5" (1.651 m)   Wt 86.2 kg (190 lb)   LMP 05/27/2019   SpO2 100%   Breastfeeding? No   BMI 31.62 kg/m²     "

## 2019-06-11 NOTE — OP NOTE
DATE OF PROCEDURE:  06/11/2019    PREOPERATIVE DIAGNOSIS:  Recurrent biliary colic.    POSTOPERATIVE DIAGNOSIS:  Recurrent biliary colic.    PROCEDURE:  Laparoscopic cholecystectomy.    SURGEON:  Missael Tolentino M.D.    ASSISTANT:  Ankush Gee PGY3    ANESTHESIA:  General endotracheal.    PREP:  Chlorhexidine.    SPECIMEN:  Gallbladder.    ESTIMATED BLOOD LOSS:  23mL.    INDICATIONS:  The patient is a 29 y.o. female who presents to clinic with   symptomatic cholelithiasis.  The patient was counseled on his options for   treatment and desired to proceed with surgical intervention.  The risks of the   procedure were described to the patient including bleeding, infection, pain,   scarring, wound complications, injury to local structures including the liver,   intestine or bile duct retained common duct stone and potential need for further   surgery.  The patient demonstrated understanding of these risks and a consent   form was obtained.    PROCEDURE:  The patient was identified in the Preoperative Unit and taken back   to the Operating Room and laid supine on the operating room table.  IV   antibiotics were administered prior to the induction of general anesthesia.    General anesthesia was induced without complication.  The patient was then   prepped and draped in standard sterile fashion manner.  A timeout procedure was   performed in accordance of hospital protocol.      A 3 mm incision was made in the   supraumbilical location using a #15 blade scalpel.  A 5mm optical trocar was used to enter the abdomen.  Once we confirmed an   intra-abdominal presence, CO2 insufflation was initiated.  A camera was inserted and the abdomen   was inspected.  There did not appear to be any abnormalities within the   abdomen.  At this point, an 11 mm subxiphoid trocar and two 5-mm right subcostal   trocars were then placed under direct visualization.  The gallbladder was   grasped and elevated into the right upper quadrant  over the liver.  The   peritoneum was gently stripped inferiorly until the infundibulum and cystic   structures were visualized.  Gentle dissection of the cystic duct and artery   were skeletonized and the critical view was obtained.  At this point, the cystic   duct and artery were then clipped twice proximally, once distally and then   divided.  The gallbladder was then removed from the liver bed using Bovie   cautery.  Once this was completed, it was put into an EndoCatch bag and then   removed through the umbilical port.      The clips were visualized and appeared   intact.  The liver bed was hemostatic.  The right upper quadrant was gently   irrigated and fluid was evacuated.  At this point, the ports were removed under   direct visualization and CO2 gas was evacuated.  The subxiphoid fascia was closed   using 0 Vicryl suture in a figure-of-eight fashion.  The skin incisions were   closed using 5-0 Monocryl suture in an interrupted fashion.  Dry sterile   dressings were applied.  The patient was awakened from anesthesia without   complication and returned to the Postop Recovery in stable condition.  At the   end of the case, sponge and needle counts were correct on 2 occasions.  I was   present and scrubbed throughout the entirety of the case.    COMPLICATIONS:  None.    CONDITION:  Stable.

## 2019-06-11 NOTE — ANESTHESIA POSTPROCEDURE EVALUATION
Anesthesia Post Evaluation    Patient: Sherlyn Monahan    Procedure(s) Performed: Procedure(s) (LRB):  CHOLECYSTECTOMY, LAPAROSCOPIC (N/A)    Final Anesthesia Type: general  Patient location during evaluation: PACU  Patient participation: Yes- Able to Participate  Level of consciousness: awake and alert  Post-procedure vital signs: reviewed and stable  Pain management: adequate  Airway patency: patent  PONV status at discharge: No PONV  Anesthetic complications: no      Cardiovascular status: blood pressure returned to baseline  Respiratory status: unassisted  Hydration status: euvolemic  Follow-up not needed.          Vitals Value Taken Time   /70 6/11/2019  9:42 AM   Temp 36.5 °C (97.7 °F) 6/11/2019  9:42 AM   Pulse 73 6/11/2019  9:42 AM   Resp 14 6/11/2019  9:42 AM   SpO2 100 % 6/11/2019  9:42 AM         Event Time     Out of Recovery 09:37:05          Pain/Nahun Score: Pain Rating Prior to Med Admin: 6 (6/11/2019  8:39 AM)  Pain Rating Post Med Admin: 2 (6/11/2019  9:35 AM)  Nahun Score: 9 (6/11/2019  9:41 AM)

## 2019-06-11 NOTE — TELEPHONE ENCOUNTER
Reason for Disposition   General information question, no triage required and triager able to answer question    Protocols used: INFORMATION ONLY CALL-A-AH    Pt states that she started with a scratchy throat this morning and is scheduled for surgery first thing in the morning. Denies fever or productive cough. Advised patient to notify MD in the morning when she arrives

## 2019-06-11 NOTE — PLAN OF CARE
Patient is being discharged home.  Patient has voided.  VSS. Discharge instructions on medications, diet, signs and symptoms when to call the MD, and follow-up visit are given to the patient.  Patient verbalized complete understanding on the above discharge instructions.  IV access removed.   at bedside and will accompany patient home.  To the Exit per Nursing staff.  Voiced no complaints.

## 2019-06-11 NOTE — DISCHARGE INSTRUCTIONS
ANESTHESIA  -For the first 24 hours after surgery:  Do not drive, use heavy equipment, make important decisions, or drink alcohol  -It is normal to feel sleepy for several hours.  Rest until you are more awake.  -Have someone stay with you, if needed.  They can watch for problems and help keep you safe.  -Some possible post anesthesia side effects include: nausea and vomiting, sore throat and hoarseness, sleepiness, and dizziness.    PAIN  -If you have pain after surgery, pain medicine will help you feel better.  Take it as directed, before pain becomes severe.  Most pain relievers taken by mouth need at least 20-30 minutes to start working.  -Do not drive or drink alcohol while taking pain medicine.  -Pain medication can upset your stomach.  Taking them with a little food may help.  -Other ways to help control pain: elevation, ice, and relaxation  -Call your surgeon if still having unmanageable pain an hour after taking pain medicine.  -Pain medicine can cause constipation.  Taking an over-the counter stool softener while on prescription pain medicine and drinking plenty of fluids can prevent this side effect.  -Call your surgeon if you have severe side effects like: breathing problems, trouble waking up, dizziness, confusion, or severe constipation.    NAUSEA  -Some people have nausea after surgery.  This is often because of anesthesia, pain, pain medicine, or the stress of surgery.  -Do not push yourself to eat.  Start off with clear liquids and soup.  Slowly move to solid foods.  Don't eat fatty, rich, spicy foods at first.  Eat smaller amounts.  -If you develop persistent nausea and vomiting please notify your surgeon immediately.    BLEEDING  -Different types of surgery require different types of care and dressing changes.  It is important to follow all instructions and advice from your surgeon.  Change dressing as directed.  Call your surgeon for any concerns regarding postop bleeding.    SIGNS OF  INFECTION  -Signs of infection include: fever, swelling, drainage, and redness  -Notify your surgeon if you have a fever of 100.4 F (38.0 C) or higher.  -Notify your surgeon if you notice redness, swelling, increased pain, pus, or a foul smell at the incision site.            Docusate capsules  What is this medicine?  DOCUSATE (doc CUE sayt) is stool softener. It helps prevent constipation and straining or discomfort associated with hard or dry stools.  How should I use this medicine?  Take this medicine by mouth with a glass of water. Follow the directions on the label. Take your doses at regular intervals. Do not take your medicine more often than directed.  Talk to your pediatrician regarding the use of this medicine in children. While this medicine may be prescribed for children as young as 2 years for selected conditions, precautions do apply.  What side effects may I notice from receiving this medicine?  Side effects that you should report to your doctor or health care professional as soon as possible:  · allergic reactions like skin rash, itching or hives, swelling of the face, lips, or tongue  Side effects that usually do not require medical attention (report to your doctor or health care professional if they continue or are bothersome):  · diarrhea  · stomach cramps  · throat irritation  What may interact with this medicine?  · mineral oil  What if I miss a dose?  If you miss a dose, take it as soon as you can. If it is almost time for your next dose, take only that dose. Do not take double or extra doses.  Where should I keep my medicine?  Keep out of the reach of children.  Store at room temperature between 15 and 30 degrees C (59 and 86 degrees F). Throw away any unused medicine after the expiration date.  What should I tell my health care provider before I take this medicine?  They need to know if you have any of these conditions:  · nausea or vomiting  · severe constipation  · stomach pain  · sudden  change in bowel habit lasting more than 2 weeks  · an unusual or allergic reaction to docusate, other medicines, foods, dyes, or preservatives  · pregnant or trying to get pregnant  · breast-feeding  What should I watch for while using this medicine?  Do not use for more than one week without advice from your doctor or health care professional. If your constipation returns, check with your doctor or health care professional.  Drink plenty of water while taking this medicine. Drinking water helps decrease constipation.  Stop using this medicine and contact your doctor or health care professional if you experience any rectal bleeding or do not have a bowel movement after use. These could be signs of a more serious condition.  NOTE:This sheet is a summary. It may not cover all possible information. If you have questions about this medicine, talk to your doctor, pharmacist, or health care provider. Copyright© 2017 [employer]

## 2019-06-12 NOTE — DISCHARGE SUMMARY
[facility]  Short Stay  Discharge Summary    Admit Date: 6/11/2019    Discharge Date and Time: 6/11/2019 11:06 AM      Discharge Attending Physician: No att. providers found     Hospital Course (synopsis of major diagnoses, care, treatment, and services provided during the course of the hospital stay): Patient brought in for elective surgery. Underwent procedure without complication and was discharged.       Final Diagnoses:    Principal Problem: Biliary colic symptom   Secondary Diagnoses:   Active Hospital Problems    Diagnosis  POA    *Biliary colic symptom [K80.50]  Yes      Resolved Hospital Problems   No resolved problems to display.       Discharged Condition: stable    Disposition: Home or Self Care    Follow up/Patient Instructions:    Medications:  Reconciled Home Medications:      Medication List      START taking these medications    HYDROcodone-acetaminophen 5-325 mg per tablet  Commonly known as:  NORCO  Take 1 tablet by mouth every 4 (four) hours as needed for Pain.     senna-docusate 8.6-50 mg 8.6-50 mg per tablet  Commonly known as:  PERICOLACE  Take 1 tablet by mouth 2 (two) times daily.        CONTINUE taking these medications    cetirizine 5 MG tablet  Commonly known as:  ZYRTEC  Take 5 mg by mouth once daily.     cholecalciferol (vitamin D3) 400 unit Tab  Commonly known as:  VITAMIN D3  Take 400 Units by mouth.     fluticasone propionate 50 mcg/actuation nasal spray  Commonly known as:  FLONASE  1 spray by Nasal route.     pantoprazole 20 MG tablet  Commonly known as:  PROTONIX  Take 1 tablet (20 mg total) by mouth 2 (two) times daily before meals.     prenatal vit37-iron-folic acid 29 mg iron- 1 mg Chew  Take by mouth.     SYNTHROID 50 MCG tablet  Generic drug:  levothyroxine  TAKE 1 TABLET BY MOUTH DAILY          Discharge Procedure Orders   Diet general     Call MD for:  temperature >100.4     Call MD for:  persistent nausea and vomiting     Call MD for:  severe  uncontrolled pain     Remove dressing in 48 hours     Shower on day dressing removed (No bath)     Follow-up Information     Missael Tolentino MD.    Specialties:  Surgery, General Surgery  Contact information:  [address]  [address]  [facility] [address] [phone]

## 2019-06-17 ENCOUNTER — PATIENT MESSAGE (OUTPATIENT)
Dept: SURGERY | Facility: CLINIC | Age: 29
End: 2019-06-17

## 2019-06-25 ENCOUNTER — OFFICE VISIT (OUTPATIENT)
Dept: SURGERY | Facility: CLINIC | Age: 29
End: 2019-06-25
Payer: COMMERCIAL

## 2019-06-25 VITALS
HEIGHT: 65 IN | HEART RATE: 65 BPM | SYSTOLIC BLOOD PRESSURE: 117 MMHG | TEMPERATURE: 97 F | BODY MASS INDEX: 31.51 KG/M2 | WEIGHT: 189.13 LBS | DIASTOLIC BLOOD PRESSURE: 70 MMHG

## 2019-06-25 DIAGNOSIS — Z90.49 S/P LAPAROSCOPIC CHOLECYSTECTOMY: Primary | ICD-10-CM

## 2019-06-25 PROCEDURE — 99999 PR PBB SHADOW E&M-EST. PATIENT-LVL III: CPT | Mod: PBBFAC,,, | Performed by: STUDENT IN AN ORGANIZED HEALTH CARE EDUCATION/TRAINING PROGRAM

## 2019-06-25 PROCEDURE — 99024 PR POST-OP FOLLOW-UP VISIT: ICD-10-PCS | Mod: S$GLB,,, | Performed by: STUDENT IN AN ORGANIZED HEALTH CARE EDUCATION/TRAINING PROGRAM

## 2019-06-25 PROCEDURE — 99024 POSTOP FOLLOW-UP VISIT: CPT | Mod: S$GLB,,, | Performed by: STUDENT IN AN ORGANIZED HEALTH CARE EDUCATION/TRAINING PROGRAM

## 2019-06-25 PROCEDURE — 99999 PR PBB SHADOW E&M-EST. PATIENT-LVL III: ICD-10-PCS | Mod: PBBFAC,,, | Performed by: STUDENT IN AN ORGANIZED HEALTH CARE EDUCATION/TRAINING PROGRAM

## 2019-06-25 NOTE — PROGRESS NOTES
Feeling well  No more postprandial pain  Some incisional pain, soreness  No fevers  No drainage  No NV  Some loose stool    Ab soft NT ND  Incisions good    Path stones    RTC prn

## 2019-08-28 ENCOUNTER — PATIENT MESSAGE (OUTPATIENT)
Dept: FAMILY MEDICINE | Facility: CLINIC | Age: 29
End: 2019-08-28

## 2019-08-28 RX ORDER — FERROUS SULFATE, DRIED 160(50) MG
1 TABLET, EXTENDED RELEASE ORAL
COMMUNITY

## 2019-08-29 PROBLEM — Z90.49 STATUS POST CHOLECYSTECTOMY: Status: ACTIVE | Noted: 2019-08-29

## 2019-08-29 PROBLEM — K80.20 GALL BLADDER STONES: Status: RESOLVED | Noted: 2019-04-07 | Resolved: 2019-08-29

## 2019-08-29 NOTE — PROGRESS NOTES
Office Visit    Patient Name: Sherlyn Monahan    : 1990  MRN: [id number]    Subjective:  Sherlyn is a 29 y.o. female who presents today for:    Annual Exam    Sherlyn presents today for annual wellness exam and monitoring of chronic conditions that include hypothyroidism,  section delivery on 2018 per Dr. Smith, lap mikal 19 per Dr Tolentino, h/o heart palpitations s/p normal EKG 19, h/o urticaria s/p allergy eval and treatment with extended course of antihistamine (hives not though secondary to amoxicillin).    She is of childbearing age.  She has a gynecologist-- Dr Smith--  and is up to date with pap.  Periods are regular but back to being heavy and painful.      She has been feeling overall well. Bowel movements are normalizing post gallbladder surgery. Having headaches fairly frequently but not really many migraines.      General lifestyle habits are as follows:  Diet is described as balanced-- has cut out fatty foods post cholecystectomy and is on weight watchers, exercise is described as fair-- building up her walking routine, sleep is overall good-- has a 16 month old so this is variable. Weight down 20_+ labs in the last 18 months since having her baby 2018     Immunizations: TDap 3/19/18, yearly flu shot UTD     Screening Tests: PAP 18 WNL and repeat 3 years, STD screening **     Eye/Dental: eye DUE and she will schedule,  Dental UTD          Past Medical History  Past Medical History:   Diagnosis Date    Acquired hypothyroidism 2016    Irritable bowel syndrome (IBS)     Obesity (BMI 30.0-34.9) 2016    loosing weight consistently on 21 Day Fix program     Urticaria        Past Surgical History  Past Surgical History:   Procedure Laterality Date    breast reduction  2012    BREAST SURGERY      breast reduction     SECTION      CHOLECYSTECTOMY, LAPAROSCOPIC N/A 2019    [facility]     DELIVERY- SECTION N/A 2018    [facility] L&D       Family History  Family History   Problem Relation Age of Onset    Breast cancer Mother 45        remission for 10 years    Cancer Mother 45        breast     Hypertension Mother     Allergic rhinitis Mother     Diabetes Father     Diabetes Maternal Grandfather     Heart disease Maternal Grandfather     Diabetes Paternal Grandfather     Colon cancer Neg Hx     Ovarian cancer Neg Hx     Allergies Neg Hx     Angioedema Neg Hx     Asthma Neg Hx     Atopy Neg Hx     Eczema Neg Hx     Immunodeficiency Neg Hx     Rhinitis Neg Hx     Urticaria Neg Hx        Social History  Social History     Socioeconomic History    Marital status:      Spouse name: Not on file    Number of children: Not on file    Years of education: Not on file    Highest education level: Not on file   Occupational History    Not on file   Social Needs    Financial resource strain: Not hard at all    Food insecurity:     Worry: Never true     Inability: Never true    Transportation needs:     Medical: No     Non-medical: No   Tobacco Use    Smoking status: Former Smoker    Smokeless tobacco: Never Used   Substance and Sexual Activity    Alcohol use: No     Frequency: Monthly or less     Drinks per session: 1 or 2     Binge frequency: Never     Comment: very rare    Drug use: No    Sexual activity: Yes     Partners: Male     Birth control/protection: None   Lifestyle    Physical activity:     Days per week: 1 day     Minutes per session: 50 min    Stress: Only a little   Relationships    Social connections:     Talks on phone: More than three times a week     Gets together: Twice a week     Attends Gnosticist service: Not on file     Active member of club or organization: Yes     Attends meetings of clubs or organizations: More than 4 times per year     Relationship status:    Other Topics Concern    Not on file  "  Social History Narrative    Not on file       Current Medications  Medications reviewed and updated.     Allergies   Review of patient's allergies indicates:   Allergen Reactions    Minocycline Swelling       Review of Systems (Pertinent positives)  Review of Systems   Constitutional: Negative for activity change and unexpected weight change.   HENT: Negative for hearing loss, rhinorrhea and trouble swallowing.    Eyes: Negative for discharge and visual disturbance.   Respiratory: Negative for chest tightness and wheezing.    Cardiovascular: Negative for chest pain and palpitations.   Gastrointestinal: Negative for blood in stool, constipation, diarrhea and vomiting.   Endocrine: Negative for polydipsia and polyuria.   Genitourinary: Negative for difficulty urinating, dysuria, hematuria and menstrual problem.   Musculoskeletal: Negative for arthralgias, joint swelling and neck pain.   Neurological: Positive for headaches. Negative for weakness.   Psychiatric/Behavioral: Negative for confusion and dysphoric mood.       /72   Pulse 84   Temp 98.4 °F (36.9 °C)   Ht 5' 5" (1.651 m)   Wt 84.5 kg (186 lb 4.6 oz)   LMP 08/23/2019   SpO2 96%   BMI 31.00 kg/m²     Physical Exam   Constitutional: She is oriented to person, place, and time. She appears well-developed and well-nourished. No distress.   HENT:   Head: Normocephalic and atraumatic.   Right Ear: Ear canal normal. Tympanic membrane is not erythematous and not bulging.   Left Ear: Ear canal normal. Tympanic membrane is not erythematous and not bulging.   Mouth/Throat: No oropharyngeal exudate.   Eyes: Conjunctivae are normal.   Neck: Carotid bruit is not present. No thyroid mass and no thyromegaly present.   Cardiovascular: Normal rate, regular rhythm and normal heart sounds.   No murmur heard.  Pulses:       Dorsalis pedis pulses are 2+ on the right side, and 2+ on the left side.   Pulmonary/Chest: Effort normal and breath sounds normal. No " "respiratory distress.   Abdominal: Soft. Bowel sounds are normal. She exhibits no distension and no mass. There is no hepatosplenomegaly. There is no tenderness.   Musculoskeletal: Normal range of motion.   Lymphadenopathy:     She has no cervical adenopathy.   Neurological: She is alert and oriented to person, place, and time.   Skin: Skin is warm and dry. No rash noted.   Psychiatric: She has a normal mood and affect.   Vitals reviewed.        Assessment/Plan:  Sherlyn Monahan is a 29 y.o. female who presents today for :    Sherlyn was seen today for annual exam.    Diagnoses and all orders for this visit:    Routine general medical examination at a health care facility  Comments:  HEALTH MAINTENANCE REVIEWED: TDAP/FLU SHOT & PAP UP TO DATE-- HAS UPCOMING GYN APT. ADVISED ON DIET/EXERCISE/SLEEP, EYE/DENTAL EXAMS  Orders:  -     Hemoglobin A1c; Future  -     Comprehensive metabolic panel; Future  -     Lipid panel; Future  -     CBC auto differential; Future  -     TSH; Future  -     Vitamin D; Future    Obesity (BMI 30.0-34.9)  Comments:  Has been losing weight, advised to continue weight watchers and also advise on a "postpartum core workout" program.    S/P     Chronic migraine without aura without status migrainosus, not intractable  Comments:  Has not had that many migraines recently, headaches her overall tolerable with use of p.r.n. Aleve, trying to do better self-care. EYE EXAM UTD    Acquired hypothyroidism  Comments:  has been stable on synthroid 50 mcg daily, check TSH  Orders:  -     TSH; Future    Fatty liver  Comments:  counselled on management of risk factors for fatty liver, check liver enzymes with labs  Orders:  -     Hemoglobin A1c; Future  -     Comprehensive metabolic panel; Future  -     Lipid panel; Future  -     CBC auto differential; Future  -     TSH; Future    Status post cholecystectomy  Comments:  Bowel movements have now returned to normal post cholecystectomy, paying " "attention to her diet.    Family history of breast cancer in mother    Medication management  -     Hemoglobin A1c; Future  -     Comprehensive metabolic panel; Future  -     Lipid panel; Future  -     CBC auto differential; Future  -     TSH; Future  -     Vitamin D; Future            ICD-10-CM ICD-9-CM    1. Routine general medical examination at a health care facility Z00.00 V70.0 Hemoglobin A1c      Comprehensive metabolic panel      Lipid panel      CBC auto differential      TSH      Vitamin D    HEALTH MAINTENANCE REVIEWED: TDAP/FLU SHOT & PAP UP TO DATE-- HAS UPCOMING GYN APT. ADVISED ON DIET/EXERCISE/SLEEP, EYE/DENTAL EXAMS   2. Obesity (BMI 30.0-34.9) E66.9 278.00     Has been losing weight, advised to continue weight watchers and also advise on a "postpartum core workout" program.   3. S/P  Z98.891 V45.89    4. Chronic migraine without aura without status migrainosus, not intractable G43.709 346.70     Has not had that many migraines recently, headaches her overall tolerable with use of p.r.n. Aleve, trying to do better self-care. EYE EXAM UTD   5. Acquired hypothyroidism E03.9 244.9 TSH    has been stable on synthroid 50 mcg daily, check TSH   6. Fatty liver K76.0 571.8 Hemoglobin A1c      Comprehensive metabolic panel      Lipid panel      CBC auto differential      TSH    counselled on management of risk factors for fatty liver, check liver enzymes with labs   7. Status post cholecystectomy Z90.49 V45.79     Bowel movements have now returned to normal post cholecystectomy, paying attention to her diet.   8. Family history of breast cancer in mother Z80.3 V16.3    9. Medication management Z79.899 V58.69 Hemoglobin A1c      Comprehensive metabolic panel      Lipid panel      CBC auto differential      TSH      Vitamin D       There are no Patient Instructions on file for this visit.      Follow up in about 1 year (around 2020) for return as needed for new concerns.  "

## 2019-08-30 ENCOUNTER — OFFICE VISIT (OUTPATIENT)
Dept: FAMILY MEDICINE | Facility: CLINIC | Age: 29
End: 2019-08-30
Payer: COMMERCIAL

## 2019-08-30 ENCOUNTER — TELEPHONE (OUTPATIENT)
Dept: FAMILY MEDICINE | Facility: CLINIC | Age: 29
End: 2019-08-30

## 2019-08-30 ENCOUNTER — LAB VISIT (OUTPATIENT)
Dept: LAB | Facility: HOSPITAL | Age: 29
End: 2019-08-30
Attending: FAMILY MEDICINE
Payer: COMMERCIAL

## 2019-08-30 VITALS
OXYGEN SATURATION: 96 % | WEIGHT: 186.31 LBS | HEART RATE: 84 BPM | SYSTOLIC BLOOD PRESSURE: 104 MMHG | TEMPERATURE: 98 F | HEIGHT: 65 IN | DIASTOLIC BLOOD PRESSURE: 72 MMHG | BODY MASS INDEX: 31.04 KG/M2

## 2019-08-30 DIAGNOSIS — E03.9 ACQUIRED HYPOTHYROIDISM: ICD-10-CM

## 2019-08-30 DIAGNOSIS — Z90.49 STATUS POST CHOLECYSTECTOMY: ICD-10-CM

## 2019-08-30 DIAGNOSIS — K76.0 FATTY LIVER: ICD-10-CM

## 2019-08-30 DIAGNOSIS — Z00.00 ROUTINE GENERAL MEDICAL EXAMINATION AT A HEALTH CARE FACILITY: Primary | ICD-10-CM

## 2019-08-30 DIAGNOSIS — E66.9 OBESITY (BMI 30.0-34.9): ICD-10-CM

## 2019-08-30 DIAGNOSIS — Z98.891 S/P C-SECTION: ICD-10-CM

## 2019-08-30 DIAGNOSIS — Z79.899 MEDICATION MANAGEMENT: ICD-10-CM

## 2019-08-30 DIAGNOSIS — Z00.00 ROUTINE GENERAL MEDICAL EXAMINATION AT A HEALTH CARE FACILITY: ICD-10-CM

## 2019-08-30 DIAGNOSIS — G43.709 CHRONIC MIGRAINE WITHOUT AURA WITHOUT STATUS MIGRAINOSUS, NOT INTRACTABLE: ICD-10-CM

## 2019-08-30 DIAGNOSIS — Z80.3 FAMILY HISTORY OF BREAST CANCER IN MOTHER: ICD-10-CM

## 2019-08-30 LAB
25(OH)D3+25(OH)D2 SERPL-MCNC: 32 NG/ML (ref 30–96)
ALBUMIN SERPL BCP-MCNC: 4.5 G/DL (ref 3.5–5.2)
ALP SERPL-CCNC: 83 U/L (ref 55–135)
ALT SERPL W/O P-5'-P-CCNC: 8 U/L (ref 10–44)
ANION GAP SERPL CALC-SCNC: 11 MMOL/L (ref 8–16)
AST SERPL-CCNC: 22 U/L (ref 10–40)
BASOPHILS # BLD AUTO: 0.01 K/UL (ref 0–0.2)
BASOPHILS NFR BLD: 0.1 % (ref 0–1.9)
BILIRUB SERPL-MCNC: 0.6 MG/DL (ref 0.1–1)
BUN SERPL-MCNC: 13 MG/DL (ref 6–20)
CALCIUM SERPL-MCNC: 10.2 MG/DL (ref 8.7–10.5)
CHLORIDE SERPL-SCNC: 105 MMOL/L (ref 95–110)
CHOLEST SERPL-MCNC: 187 MG/DL (ref 120–199)
CHOLEST/HDLC SERPL: 4.6 {RATIO} (ref 2–5)
CO2 SERPL-SCNC: 24 MMOL/L (ref 23–29)
CREAT SERPL-MCNC: 0.8 MG/DL (ref 0.5–1.4)
DIFFERENTIAL METHOD: NORMAL
EOSINOPHIL # BLD AUTO: 0 K/UL (ref 0–0.5)
EOSINOPHIL NFR BLD: 0.3 % (ref 0–8)
ERYTHROCYTE [DISTWIDTH] IN BLOOD BY AUTOMATED COUNT: 12.6 % (ref 11.5–14.5)
EST. GFR  (AFRICAN AMERICAN): >60 ML/MIN/1.73 M^2
EST. GFR  (NON AFRICAN AMERICAN): >60 ML/MIN/1.73 M^2
ESTIMATED AVG GLUCOSE: 97 MG/DL (ref 68–131)
GLUCOSE SERPL-MCNC: 93 MG/DL (ref 70–110)
HBA1C MFR BLD HPLC: 5 % (ref 4–5.6)
HCT VFR BLD AUTO: 43.7 % (ref 37–48.5)
HDLC SERPL-MCNC: 41 MG/DL (ref 40–75)
HDLC SERPL: 21.9 % (ref 20–50)
HGB BLD-MCNC: 14.2 G/DL (ref 12–16)
LDLC SERPL CALC-MCNC: 122.2 MG/DL (ref 63–159)
LYMPHOCYTES # BLD AUTO: 2.6 K/UL (ref 1–4.8)
LYMPHOCYTES NFR BLD: 38.6 % (ref 18–48)
MCH RBC QN AUTO: 30.5 PG (ref 27–31)
MCHC RBC AUTO-ENTMCNC: 32.5 G/DL (ref 32–36)
MCV RBC AUTO: 94 FL (ref 82–98)
MONOCYTES # BLD AUTO: 0.5 K/UL (ref 0.3–1)
MONOCYTES NFR BLD: 6.6 % (ref 4–15)
NEUTROPHILS # BLD AUTO: 3.7 K/UL (ref 1.8–7.7)
NEUTROPHILS NFR BLD: 54.4 % (ref 38–73)
NONHDLC SERPL-MCNC: 146 MG/DL
PLATELET # BLD AUTO: 212 K/UL (ref 150–350)
PMV BLD AUTO: 10.8 FL (ref 9.2–12.9)
POTASSIUM SERPL-SCNC: 4.2 MMOL/L (ref 3.5–5.1)
PROT SERPL-MCNC: 7.8 G/DL (ref 6–8.4)
RBC # BLD AUTO: 4.66 M/UL (ref 4–5.4)
SODIUM SERPL-SCNC: 140 MMOL/L (ref 136–145)
TRIGL SERPL-MCNC: 119 MG/DL (ref 30–150)
TSH SERPL DL<=0.005 MIU/L-ACNC: 1.05 UIU/ML (ref 0.4–4)
WBC # BLD AUTO: 6.84 K/UL (ref 3.9–12.7)

## 2019-08-30 PROCEDURE — 84443 ASSAY THYROID STIM HORMONE: CPT

## 2019-08-30 PROCEDURE — 36415 COLL VENOUS BLD VENIPUNCTURE: CPT

## 2019-08-30 PROCEDURE — 83036 HEMOGLOBIN GLYCOSYLATED A1C: CPT

## 2019-08-30 PROCEDURE — 80053 COMPREHEN METABOLIC PANEL: CPT

## 2019-08-30 PROCEDURE — 85025 COMPLETE CBC W/AUTO DIFF WBC: CPT

## 2019-08-30 PROCEDURE — 99999 PR PBB SHADOW E&M-EST. PATIENT-LVL III: CPT | Mod: PBBFAC,,, | Performed by: FAMILY MEDICINE

## 2019-08-30 PROCEDURE — 82306 VITAMIN D 25 HYDROXY: CPT

## 2019-08-30 PROCEDURE — 99395 PR PREVENTIVE VISIT,EST,18-39: ICD-10-PCS | Mod: S$GLB,,, | Performed by: FAMILY MEDICINE

## 2019-08-30 PROCEDURE — 99999 PR PBB SHADOW E&M-EST. PATIENT-LVL III: ICD-10-PCS | Mod: PBBFAC,,, | Performed by: FAMILY MEDICINE

## 2019-08-30 PROCEDURE — 99395 PREV VISIT EST AGE 18-39: CPT | Mod: S$GLB,,, | Performed by: FAMILY MEDICINE

## 2019-08-30 PROCEDURE — 80061 LIPID PANEL: CPT

## 2019-08-30 RX ORDER — LEVOTHYROXINE SODIUM 50 UG/1
50 TABLET ORAL DAILY
Qty: 90 TABLET | Refills: 4 | Status: SHIPPED | OUTPATIENT
Start: 2019-08-30 | End: 2020-09-12

## 2019-09-16 ENCOUNTER — PATIENT OUTREACH (OUTPATIENT)
Dept: ADMINISTRATIVE | Facility: OTHER | Age: 29
End: 2019-09-16

## 2019-09-18 ENCOUNTER — OFFICE VISIT (OUTPATIENT)
Dept: OBSTETRICS AND GYNECOLOGY | Facility: CLINIC | Age: 29
End: 2019-09-18
Payer: COMMERCIAL

## 2019-09-18 ENCOUNTER — IMMUNIZATION (OUTPATIENT)
Dept: PHARMACY | Facility: CLINIC | Age: 29
End: 2019-09-18
Payer: COMMERCIAL

## 2019-09-18 VITALS
SYSTOLIC BLOOD PRESSURE: 112 MMHG | DIASTOLIC BLOOD PRESSURE: 64 MMHG | WEIGHT: 188.69 LBS | HEIGHT: 65 IN | BODY MASS INDEX: 31.44 KG/M2

## 2019-09-18 DIAGNOSIS — Z01.419 WELL WOMAN EXAM WITH ROUTINE GYNECOLOGICAL EXAM: Primary | ICD-10-CM

## 2019-09-18 DIAGNOSIS — Z12.4 CERVICAL CANCER SCREENING: ICD-10-CM

## 2019-09-18 PROCEDURE — 99395 PREV VISIT EST AGE 18-39: CPT | Mod: S$GLB,,, | Performed by: OBSTETRICS & GYNECOLOGY

## 2019-09-18 PROCEDURE — 99395 PR PREVENTIVE VISIT,EST,18-39: ICD-10-PCS | Mod: S$GLB,,, | Performed by: OBSTETRICS & GYNECOLOGY

## 2019-09-18 PROCEDURE — 99999 PR PBB SHADOW E&M-EST. PATIENT-LVL III: ICD-10-PCS | Mod: PBBFAC,,, | Performed by: OBSTETRICS & GYNECOLOGY

## 2019-09-18 PROCEDURE — 99999 PR PBB SHADOW E&M-EST. PATIENT-LVL III: CPT | Mod: PBBFAC,,, | Performed by: OBSTETRICS & GYNECOLOGY

## 2019-09-18 PROCEDURE — 88175 CYTOPATH C/V AUTO FLUID REDO: CPT

## 2019-09-18 NOTE — PROGRESS NOTES
"OBSTETRIC HISTORY:   OB History        1    Para   1    Term   1       0    AB   0    Living   1       SAB   0    TAB   0    Ectopic   0    Multiple   0    Live Births   1                COMPREHENSIVE GYN HISTORY:  PAP History: Denies abnormal Paps.  Infection History: Reports STDs: Chlamydia. Denies PID.  Benign History: Denies uterine fibroids. Denies ovarian cysts. Denies endometriosis.   Cancer History: Denies cervical cancer. Denies uterine cancer or hyperplasia. Denies ovarian cancer. Denies vulvar cancer or pre-cancer. Denies vaginal cancer or pre-cancer. Denies breast cancer. Denies colon cancer.  Sexual Activity History:   reports that she currently engages in sexual activity and has had male partners. She reports using the following methods of birth control/protection: Condom.   Menstrual History:  Every 30 days, flows for 4 days. Moderate flow.  Dysmenorrhea History: Reports occ. dysmenorrhea.  Contraception: Condom         HPI:   29 y.o.  Patient's last menstrual period was 2019.   Patient is  here for her annual gynecologic exam.  She has no complaints. She denies bladder, breast and bowel complaints.    ROS:  GENERAL: Denies weight gain or weight loss. Feeling well overall.   SKIN: Denies rash or lesions.   HEAD: Denies headache.   NODES: Denies enlarged lymph nodes.   CHEST: Denies shortness of breath.   ABDOMEN: No abdominal pain, constipation, diarrhea, nausea, vomiting or rectal bleeding.   URINARY: No frequency, dysuria, hematuria, or burning on urination.  REPRODUCTIVE: See HPI.   BREASTS: The patient denies pain, lumps, or nipple discharge.   HEMATOLOGIC: No easy bruisability.   MUSCULOSKELETAL: Denies joint pain or back pain.   NEUROLOGIC: Denies weakness.   PSYCHIATRIC: Denies depression, anxiety or mood swings.    PE:   /64   Ht 5' 5" (1.651 m)   Wt 85.6 kg (188 lb 11.4 oz)   LMP 2019   BMI 31.40 kg/m²   APPEARANCE: Well nourished, well developed, in no " acute distress.  NECK: Neck symmetric without  thyromegaly.  NODES: No inguinal, cervical lymph node enlargement.  CHEST: Lungs clear to auscultation.  HEART: Regular rate and rhythm, no murmurs, rubs or gallops.  ABDOMEN: Soft. No tenderness or masses. No hernias.  BREASTS: Symmetrical, no skin changes or visible lesions. No palpable masses, nipple discharge or adenopathy bilaterally.  PELVIC:   VULVA: No lesions. Normal female genitalia.  URETHRAL MEATUS: Normal size and location, no lesions, no prolapse.  URETHRA: No masses, tenderness, prolapse or scarring.  VAGINA: Moist and well rugated, no discharge, no significant cystocele or rectocele.  CERVIX: No lesions and discharge.  UTERUS: Normal size, regular shape, mobile, non-tender, bladder base nontender.  ADNEXA: No masses or tenderness.    PROCEDURES:  Pap smear    Assessment:  Normal Gynecologic Exam    Plan:  Mammogram and Colonoscopy if indicated by current recommendations.  Return to clinic in one year or for any problems or complaints.    Counseling:  Patient was counseled today on A.C.S. Pap guidelines and recommendations for yearly pelvic exams and monthly self breast exams; to see her PCP for other health maintenance. Regular exercise and healthy diet.

## 2019-09-21 ENCOUNTER — PATIENT MESSAGE (OUTPATIENT)
Dept: OBSTETRICS AND GYNECOLOGY | Facility: CLINIC | Age: 29
End: 2019-09-21

## 2019-09-21 DIAGNOSIS — O20.0 THREATENED ABORTION: Primary | ICD-10-CM

## 2019-09-21 DIAGNOSIS — E03.9 HYPOTHYROIDISM, UNSPECIFIED TYPE: ICD-10-CM

## 2019-09-23 ENCOUNTER — LAB VISIT (OUTPATIENT)
Dept: LAB | Facility: HOSPITAL | Age: 29
End: 2019-09-23
Attending: OBSTETRICS & GYNECOLOGY
Payer: COMMERCIAL

## 2019-09-23 ENCOUNTER — PATIENT MESSAGE (OUTPATIENT)
Dept: OBSTETRICS AND GYNECOLOGY | Facility: CLINIC | Age: 29
End: 2019-09-23

## 2019-09-23 DIAGNOSIS — E03.9 HYPOTHYROIDISM, UNSPECIFIED TYPE: ICD-10-CM

## 2019-09-23 DIAGNOSIS — O20.0 THREATENED ABORTION: ICD-10-CM

## 2019-09-23 PROCEDURE — 84702 CHORIONIC GONADOTROPIN TEST: CPT

## 2019-09-23 PROCEDURE — 84144 ASSAY OF PROGESTERONE: CPT

## 2019-09-23 PROCEDURE — 84443 ASSAY THYROID STIM HORMONE: CPT

## 2019-09-23 PROCEDURE — 84439 ASSAY OF FREE THYROXINE: CPT

## 2019-09-23 PROCEDURE — 36415 COLL VENOUS BLD VENIPUNCTURE: CPT | Mod: PO

## 2019-09-23 NOTE — TELEPHONE ENCOUNTER
Pain on right side may just be where she ovulated from but will start with labs first. Will add thyroid since takes thyroid meds.

## 2019-09-24 ENCOUNTER — PATIENT MESSAGE (OUTPATIENT)
Dept: OBSTETRICS AND GYNECOLOGY | Facility: CLINIC | Age: 29
End: 2019-09-24

## 2019-09-24 LAB
HCG INTACT+B SERPL-ACNC: 559 MIU/ML
PROGEST SERPL-MCNC: 22.9 NG/ML
T4 FREE SERPL-MCNC: 1.11 NG/DL (ref 0.71–1.51)
TSH SERPL DL<=0.005 MIU/L-ACNC: 1.31 UIU/ML (ref 0.4–4)

## 2019-09-24 NOTE — TELEPHONE ENCOUNTER
Notified need to wait and see what the repeat HCG levels show first.  Patient verbalized understanding

## 2019-09-24 NOTE — TELEPHONE ENCOUNTER
----- Message from Lubna Pizarro sent at 9/24/2019  1:57 PM CDT -----  Contact: Pt  Patient called requesting to schedule appointment due to prenatal visit    Patient can be reached at [phone]

## 2019-09-25 ENCOUNTER — LAB VISIT (OUTPATIENT)
Dept: LAB | Facility: HOSPITAL | Age: 29
End: 2019-09-25
Attending: OBSTETRICS & GYNECOLOGY
Payer: COMMERCIAL

## 2019-09-25 DIAGNOSIS — O20.0 THREATENED ABORTION: ICD-10-CM

## 2019-09-25 PROCEDURE — 36415 COLL VENOUS BLD VENIPUNCTURE: CPT | Mod: PO

## 2019-09-25 PROCEDURE — 84702 CHORIONIC GONADOTROPIN TEST: CPT

## 2019-09-26 ENCOUNTER — TELEPHONE (OUTPATIENT)
Dept: OBSTETRICS AND GYNECOLOGY | Facility: HOSPITAL | Age: 29
End: 2019-09-26

## 2019-09-26 ENCOUNTER — PATIENT MESSAGE (OUTPATIENT)
Dept: FAMILY MEDICINE | Facility: CLINIC | Age: 29
End: 2019-09-26

## 2019-09-26 ENCOUNTER — OFFICE VISIT (OUTPATIENT)
Dept: URGENT CARE | Facility: CLINIC | Age: 29
End: 2019-09-26
Payer: COMMERCIAL

## 2019-09-26 VITALS
WEIGHT: 187 LBS | DIASTOLIC BLOOD PRESSURE: 78 MMHG | TEMPERATURE: 98 F | HEART RATE: 70 BPM | BODY MASS INDEX: 31.16 KG/M2 | RESPIRATION RATE: 20 BRPM | OXYGEN SATURATION: 100 % | HEIGHT: 65 IN | SYSTOLIC BLOOD PRESSURE: 129 MMHG

## 2019-09-26 DIAGNOSIS — H10.31 ACUTE BACTERIAL CONJUNCTIVITIS OF RIGHT EYE: Primary | ICD-10-CM

## 2019-09-26 DIAGNOSIS — R10.31 RIGHT LOWER QUADRANT PAIN: ICD-10-CM

## 2019-09-26 DIAGNOSIS — O20.0 THREATENED ABORTION: Primary | ICD-10-CM

## 2019-09-26 LAB — HCG INTACT+B SERPL-ACNC: 1161 MIU/ML

## 2019-09-26 PROCEDURE — 99214 OFFICE O/P EST MOD 30 MIN: CPT | Mod: S$GLB,,, | Performed by: NURSE PRACTITIONER

## 2019-09-26 PROCEDURE — 3008F PR BODY MASS INDEX (BMI) DOCUMENTED: ICD-10-PCS | Mod: CPTII,S$GLB,, | Performed by: NURSE PRACTITIONER

## 2019-09-26 PROCEDURE — 99214 PR OFFICE/OUTPT VISIT, EST, LEVL IV, 30-39 MIN: ICD-10-PCS | Mod: S$GLB,,, | Performed by: NURSE PRACTITIONER

## 2019-09-26 PROCEDURE — 3008F BODY MASS INDEX DOCD: CPT | Mod: CPTII,S$GLB,, | Performed by: NURSE PRACTITIONER

## 2019-09-26 RX ORDER — POLYMYXIN B SULFATE AND TRIMETHOPRIM 1; 10000 MG/ML; [USP'U]/ML
1 SOLUTION OPHTHALMIC EVERY 6 HOURS
Qty: 10 ML | Refills: 0 | Status: SHIPPED | OUTPATIENT
Start: 2019-09-26 | End: 2019-10-03

## 2019-09-26 NOTE — TELEPHONE ENCOUNTER
Notify [facility] went up appropriately patient did labs for RLQ pain in pregnancy and threatened  (use these codes) please schedule for an ultrasound around 10/11 unless when you call patient if any worsening RLQ pain then needs sooner. Order not placed yet

## 2019-09-26 NOTE — PROGRESS NOTES
"Subjective:       Patient ID: Sherlyn Monahan is a 29 y.o. female.    Vitals:  height is 5' 5" (1.651 m) and weight is 84.8 kg (187 lb). Her oral temperature is 98.2 °F (36.8 °C). Her blood pressure is 129/78 and her pulse is 70. Her respiration is 20 and oxygen saturation is 100%.     Chief Complaint: Eye Problem (Right Eye)    This is a 29 y.o. female who presents today with a chief complaint of right eye problem which started yesterday.  Patient's right eye is red, itchy, and patient feels like something is in it.  She has not taken/tried anything for this. Patient reported exposure to pink eye.     Eye Problem    The right eye is affected. This is a new problem. The current episode started yesterday. The problem occurs constantly. The problem has been gradually worsening. There was no injury mechanism. The pain is at a severity of 2/10. The pain is mild. There is no known exposure to pink eye. She wears contacts. Associated symptoms include eye redness. Pertinent negatives include no blurred vision, eye discharge, double vision, fever, itching, nausea, photophobia or vomiting. She has tried nothing for the symptoms.       Constitution: Negative for chills and fever.   HENT: Negative for congestion and sinus pain.    Eyes: Positive for eye pain and eye redness. Negative for eye trauma, foreign body in eye, eye discharge, eye itching, photophobia, vision loss, double vision, blurred vision and eyelid swelling.   Gastrointestinal: Negative for nausea and vomiting.   Genitourinary: Negative for history of kidney stones.   Skin: Negative for rash.   Allergic/Immunologic: Negative for seasonal allergies and itching.   Neurological: Negative for headaches.       Objective:      Physical Exam   Constitutional: She is oriented to person, place, and time. Vital signs are normal. She appears well-developed and well-nourished. She is active and cooperative.  Non-toxic appearance. She does not have a sickly appearance. She " does not appear ill. No distress.   HENT:   Head: Normocephalic and atraumatic.   Right Ear: Hearing, tympanic membrane, external ear and ear canal normal.   Left Ear: Hearing, tympanic membrane, external ear and ear canal normal.   Nose: No mucosal edema, rhinorrhea or nasal deformity. No epistaxis. Right sinus exhibits no maxillary sinus tenderness and no frontal sinus tenderness. Left sinus exhibits no maxillary sinus tenderness and no frontal sinus tenderness.   Mouth/Throat: Uvula is midline, oropharynx is clear and moist and mucous membranes are normal. No trismus in the jaw. Normal dentition. No uvula swelling. No posterior oropharyngeal edema or posterior oropharyngeal erythema. No tonsillar exudate.   Eyes: Pupils are equal, round, and reactive to light. EOM and lids are normal. Right conjunctiva is injected. No scleral icterus.   Right sclera noted with mild redness.    Neck: Trachea normal, full passive range of motion without pain and phonation normal. Neck supple.   Cardiovascular: Normal rate, regular rhythm, normal heart sounds and intact distal pulses.   Pulses:       Radial pulses are 2+ on the right side, and 2+ on the left side.   Pulmonary/Chest: Effort normal and breath sounds normal. No respiratory distress.   Musculoskeletal: Normal range of motion. She exhibits no edema or deformity.   Neurological: She is alert and oriented to person, place, and time. She has normal strength. Gait normal.   Skin: Skin is warm, dry and intact. Capillary refill takes less than 2 seconds. No rash noted. She is not diaphoretic. No pallor.   Psychiatric: She has a normal mood and affect. Her speech is normal and behavior is normal. Judgment and thought content normal. Cognition and memory are normal.   Nursing note and vitals reviewed.      Assessment:       1. Acute bacterial conjunctivitis of right eye        Plan:         Acute bacterial conjunctivitis of right eye  -     polymyxin B sulf-trimethoprim  (POLYTRIM) 10,000 unit- 1 mg/mL Drop; Place 1 drop into the right eye every 6 (six) hours. for 7 days  Dispense: 10 mL; Refill: 0      Patient Instructions   Refrain from wearing contact lenses until symptoms have completely resolved.    If you were prescribed a narcotic or controlled medication, do not drive or operate heavy equipment or machinery while taking these medications.  You must understand that you've received an Urgent Care treatment only and that you may be released before all your medical problems are known or treated. You, the patient, will arrange for follow up care as instructed.  Follow up with your PCP or specialty clinic as directed within 2-5 days if not improved or as needed.  You can call [phone] to schedule an appointment with the appropriate provider.  If your condition worsens we recommend that you receive another evaluation at the emergency room immediately or contact your primary medical clinics after hours call service to discuss your concerns.  Please return here or go to the Emergency Department for any concerns or worsening of condition.      What Is Conjunctivitis?    Conjunctivitis is an irritation or infection. It affects the membrane that covers the white of your eye and the inside of your eyelid (conjunctiva). It can happen to one or both eyes. The membrane swells and the blood vessels enlarge (dilate). This makes your eye red. That's why conjunctivitis is sometimes called red eye or pink eye.  What are the symptoms?  If you have one or more of these symptoms, see an eye doctor:  · Redness in and around your eye  · Eyes that are puffy and sore  · Itching, burning, or stinging eyes  · Watery eyes or discharge from your eye  · Eyelids that are crusty or stuck together when you wake up in the morning  · Pink color in the whites of one or both eyes  Getting treatment quickly can help prevent damage to your eyes.  How is it diagnosed?  Conjunctivitis is usually a minor eye  infection. But it can sometimes become a more serious problem. Some more serious eye diseases have symptoms that look like conjunctivitis. So it's important for an eye doctor to diagnose you. Your eye doctor will ask about your symptoms and any medicines you take. He or she will ask about any illnesses or medical conditions you may have. The doctor will also check your eyes with a hand-held light and a special microscope called a slit lamp.  Date Last Reviewed: 6/11/2015 © 2000-2017 [employer]. [address] [place] [address], [place] [address]. All rights reserved. This information is not intended as a substitute for professional medical care. Always follow your healthcare professional's instructions.

## 2019-09-26 NOTE — PATIENT INSTRUCTIONS
Refrain from wearing contact lenses until symptoms have completely resolved.    If you were prescribed a narcotic or controlled medication, do not drive or operate heavy equipment or machinery while taking these medications.  You must understand that you've received an Urgent Care treatment only and that you may be released before all your medical problems are known or treated. You, the patient, will arrange for follow up care as instructed.  Follow up with your PCP or specialty clinic as directed within 2-5 days if not improved or as needed.  You can call [phone] to schedule an appointment with the appropriate provider.  If your condition worsens we recommend that you receive another evaluation at the emergency room immediately or contact your primary medical clinics after hours call service to discuss your concerns.  Please return here or go to the Emergency Department for any concerns or worsening of condition.      What Is Conjunctivitis?    Conjunctivitis is an irritation or infection. It affects the membrane that covers the white of your eye and the inside of your eyelid (conjunctiva). It can happen to one or both eyes. The membrane swells and the blood vessels enlarge (dilate). This makes your eye red. That's why conjunctivitis is sometimes called red eye or pink eye.  What are the symptoms?  If you have one or more of these symptoms, see an eye doctor:  · Redness in and around your eye  · Eyes that are puffy and sore  · Itching, burning, or stinging eyes  · Watery eyes or discharge from your eye  · Eyelids that are crusty or stuck together when you wake up in the morning  · Pink color in the whites of one or both eyes  Getting treatment quickly can help prevent damage to your eyes.  How is it diagnosed?  Conjunctivitis is usually a minor eye infection. But it can sometimes become a more serious problem. Some more serious eye diseases have symptoms that look like conjunctivitis. So it's important for  an eye doctor to diagnose you. Your eye doctor will ask about your symptoms and any medicines you take. He or she will ask about any illnesses or medical conditions you may have. The doctor will also check your eyes with a hand-held light and a special microscope called a slit lamp.  Date Last Reviewed: 6/11/2015  © 7515-0517 [employer]. [address] [place] [address], [place] [address]. All rights reserved. This information is not intended as a substitute for professional medical care. Always follow your healthcare professional's instructions.

## 2019-09-26 NOTE — TELEPHONE ENCOUNTER
----- Message from Jelena Moses sent at 9/26/2019  8:25 AM CDT -----  Contact: [phone]/ Self   Patient called in returning your call. Please advise.

## 2019-09-26 NOTE — LETTER
September 26, 2019      [facility]  [address]  [facility] [address]  Phone: [phone]  Fax: [phone]       Patient: Sherlyn Monahan   YOB: 1990  Date of Visit: 09/26/2019    To Whom It May Concern:    Kalyani Monahan  was at [facility] on 09/26/2019. She may return to work/school on 9/26/2019 with no restrictions. If you have any questions or concerns, or if I can be of further assistance, please do not hesitate to contact me.    Sincerely,      Maki Johnson, RT

## 2019-09-26 NOTE — TELEPHONE ENCOUNTER
Patient notified and verbalized understanding.  Patient is scheduled here on the office on 10/11/2019 ultrasound first @ 7:30(only time available that morning).  Patient verbalized understanding

## 2019-09-27 ENCOUNTER — PATIENT MESSAGE (OUTPATIENT)
Dept: OBSTETRICS AND GYNECOLOGY | Facility: CLINIC | Age: 29
End: 2019-09-27

## 2019-09-30 ENCOUNTER — PATIENT MESSAGE (OUTPATIENT)
Dept: FAMILY MEDICINE | Facility: CLINIC | Age: 29
End: 2019-09-30

## 2019-10-01 ENCOUNTER — PATIENT MESSAGE (OUTPATIENT)
Dept: FAMILY MEDICINE | Facility: CLINIC | Age: 29
End: 2019-10-01

## 2019-10-03 ENCOUNTER — TELEPHONE (OUTPATIENT)
Dept: OBSTETRICS AND GYNECOLOGY | Facility: CLINIC | Age: 29
End: 2019-10-03

## 2019-10-03 NOTE — TELEPHONE ENCOUNTER
----- Message from Rena Faye sent at 10/2/2019  3:58 PM CDT -----  Contact: silvia from Dr espino's office  Called to speak with Dr Smith about eye drops being prescribed for the patient.     She can be reached at [phone]      Thanks  KB

## 2019-10-03 NOTE — TELEPHONE ENCOUNTER
Patient was prescribed Polytrim (antibiotic eye drop).  [facility] at the [facility] was calling to verify it is ok to use in pregnancy?  Notified per Dr. Smith, it is ok for patient to use.

## 2019-10-10 ENCOUNTER — PATIENT MESSAGE (OUTPATIENT)
Dept: OBSTETRICS AND GYNECOLOGY | Facility: CLINIC | Age: 29
End: 2019-10-10

## 2019-10-10 ENCOUNTER — PATIENT OUTREACH (OUTPATIENT)
Dept: ADMINISTRATIVE | Facility: OTHER | Age: 29
End: 2019-10-10

## 2019-10-11 ENCOUNTER — ROUTINE PRENATAL (OUTPATIENT)
Dept: OBSTETRICS AND GYNECOLOGY | Facility: CLINIC | Age: 29
End: 2019-10-11
Payer: COMMERCIAL

## 2019-10-11 ENCOUNTER — LAB VISIT (OUTPATIENT)
Dept: LAB | Facility: HOSPITAL | Age: 29
End: 2019-10-11
Attending: OBSTETRICS & GYNECOLOGY
Payer: COMMERCIAL

## 2019-10-11 ENCOUNTER — PROCEDURE VISIT (OUTPATIENT)
Dept: OBSTETRICS AND GYNECOLOGY | Facility: CLINIC | Age: 29
End: 2019-10-11
Payer: COMMERCIAL

## 2019-10-11 VITALS — SYSTOLIC BLOOD PRESSURE: 120 MMHG | WEIGHT: 185 LBS | DIASTOLIC BLOOD PRESSURE: 60 MMHG | BODY MASS INDEX: 30.79 KG/M2

## 2019-10-11 DIAGNOSIS — R10.31 RIGHT LOWER QUADRANT PAIN: ICD-10-CM

## 2019-10-11 DIAGNOSIS — O20.0 THREATENED ABORTION: ICD-10-CM

## 2019-10-11 DIAGNOSIS — Z34.81 ENCOUNTER FOR SUPERVISION OF OTHER NORMAL PREGNANCY IN FIRST TRIMESTER: ICD-10-CM

## 2019-10-11 DIAGNOSIS — Z34.81 ENCOUNTER FOR SUPERVISION OF OTHER NORMAL PREGNANCY IN FIRST TRIMESTER: Primary | ICD-10-CM

## 2019-10-11 LAB
ABO + RH BLD: NORMAL
BLD GP AB SCN CELLS X3 SERPL QL: NORMAL
ERYTHROCYTE [DISTWIDTH] IN BLOOD BY AUTOMATED COUNT: 12.5 % (ref 11.5–14.5)
HBV SURFACE AG SERPL QL IA: NEGATIVE
HCT VFR BLD AUTO: 40.5 % (ref 37–48.5)
HGB BLD-MCNC: 13.4 G/DL (ref 12–16)
HIV 1+2 AB+HIV1 P24 AG SERPL QL IA: NEGATIVE
MCH RBC QN AUTO: 30.8 PG (ref 27–31)
MCHC RBC AUTO-ENTMCNC: 33.1 G/DL (ref 32–36)
MCV RBC AUTO: 93 FL (ref 82–98)
PLATELET # BLD AUTO: 194 K/UL (ref 150–350)
PMV BLD AUTO: 11.1 FL (ref 9.2–12.9)
RBC # BLD AUTO: 4.35 M/UL (ref 4–5.4)
TSH SERPL DL<=0.005 MIU/L-ACNC: 0.9 UIU/ML (ref 0.4–4)
WBC # BLD AUTO: 8.32 K/UL (ref 3.9–12.7)

## 2019-10-11 PROCEDURE — 99999 PR PBB SHADOW E&M-EST. PATIENT-LVL II: ICD-10-PCS | Mod: PBBFAC,,, | Performed by: OBSTETRICS & GYNECOLOGY

## 2019-10-11 PROCEDURE — 87086 URINE CULTURE/COLONY COUNT: CPT

## 2019-10-11 PROCEDURE — 76817 TRANSVAGINAL US OBSTETRIC: CPT | Mod: S$GLB,,, | Performed by: OBSTETRICS & GYNECOLOGY

## 2019-10-11 PROCEDURE — 85027 COMPLETE CBC AUTOMATED: CPT

## 2019-10-11 PROCEDURE — 99999 PR PBB SHADOW E&M-EST. PATIENT-LVL II: CPT | Mod: PBBFAC,,, | Performed by: OBSTETRICS & GYNECOLOGY

## 2019-10-11 PROCEDURE — 86703 HIV-1/HIV-2 1 RESULT ANTBDY: CPT

## 2019-10-11 PROCEDURE — 87340 HEPATITIS B SURFACE AG IA: CPT

## 2019-10-11 PROCEDURE — 86850 RBC ANTIBODY SCREEN: CPT

## 2019-10-11 PROCEDURE — 0500F PR INITIAL PRENATAL CARE VISIT: ICD-10-PCS | Mod: S$GLB,,, | Performed by: OBSTETRICS & GYNECOLOGY

## 2019-10-11 PROCEDURE — 86592 SYPHILIS TEST NON-TREP QUAL: CPT

## 2019-10-11 PROCEDURE — 0500F INITIAL PRENATAL CARE VISIT: CPT | Mod: S$GLB,,, | Performed by: OBSTETRICS & GYNECOLOGY

## 2019-10-11 PROCEDURE — 84443 ASSAY THYROID STIM HORMONE: CPT

## 2019-10-11 PROCEDURE — 87491 CHLMYD TRACH DNA AMP PROBE: CPT

## 2019-10-11 PROCEDURE — 86762 RUBELLA ANTIBODY: CPT

## 2019-10-11 PROCEDURE — 76817 PR US, OB, TRANSVAG APPROACH: ICD-10-PCS | Mod: S$GLB,,, | Performed by: OBSTETRICS & GYNECOLOGY

## 2019-10-11 NOTE — PROGRESS NOTES
OBSTETRIC HISTORY:   OB History        2    Para   1    Term   1       0    AB   0    Living   1       SAB   0    TAB   0    Ectopic   0    Multiple   0    Live Births   1                    HPI:   29 y.o.  Patient's last menstrual period was 2019 (exact date).   Patient is  here for pregnancy. Some diarrhea (stop Omega fatty acids). She denies bladder or breast complaints.    ROS:  GENERAL: Denies weight gain or weight loss. Feeling well overall.   SKIN: Denies rash or lesions.   HEAD: Denies headache.   NODES: Denies enlarged lymph nodes.   CHEST: Denies shortness of breath.   ABDOMEN: No abdominal pain, constipation, nausea, vomiting or rectal bleeding. +diarrhea  URINARY: No frequency, dysuria, hematuria, or burning on urination.  REPRODUCTIVE: See HPI.   BREASTS: The patient denies pain, lumps, or nipple discharge.   HEMATOLOGIC: No easy bruisability.   MUSCULOSKELETAL: Denies joint pain or back pain.   NEUROLOGIC: Denies weakness.   PSYCHIATRIC: Denies depression, anxiety or mood swings.    PE:   /60   Wt 83.9 kg (185 lb)   LMP 2019 (Exact Date)   BMI 30.79 kg/m²   APPEARANCE: Well nourished, well developed, in no acute distress.  ABDOMEN: Soft. No tenderness or masses. No hernias.  PELVIC:   VULVA: No lesions. Normal female genitalia.  URETHRAL MEATUS: Normal size and location, no lesions, no prolapse.  URETHRA: No masses, tenderness, prolapse or scarring.  VAGINA: Moist and well rugated, no discharge, no significant cystocele or rectocele.  CERVIX: No lesions and discharge.  UTERUS: Normal size, regular shape, mobile, non-tender, bladder base nontender.  ADNEXA: No masses or tenderness.    ASSESSMENT:  Pregnancy  Diarrhea-- Imodium AD but stop Omega fatty acids    PLAN:  RTO 4 weeks  OB labs

## 2019-10-12 LAB
C TRACH DNA SPEC QL NAA+PROBE: NOT DETECTED
N GONORRHOEA DNA SPEC QL NAA+PROBE: NOT DETECTED
RPR SER QL: NORMAL

## 2019-10-13 LAB — BACTERIA UR CULT: NORMAL

## 2019-10-14 ENCOUNTER — HOSPITAL ENCOUNTER (EMERGENCY)
Facility: HOSPITAL | Age: 29
Discharge: HOME | End: 2019-10-14
Attending: EMERGENCY MEDICINE
Payer: COMMERCIAL

## 2019-10-14 ENCOUNTER — PATIENT MESSAGE (OUTPATIENT)
Dept: OBSTETRICS AND GYNECOLOGY | Facility: CLINIC | Age: 29
End: 2019-10-14

## 2019-10-14 VITALS
HEIGHT: 65 IN | OXYGEN SATURATION: 99 % | DIASTOLIC BLOOD PRESSURE: 64 MMHG | SYSTOLIC BLOOD PRESSURE: 112 MMHG | RESPIRATION RATE: 18 BRPM | TEMPERATURE: 98 F | HEART RATE: 64 BPM | BODY MASS INDEX: 30.82 KG/M2 | WEIGHT: 185 LBS

## 2019-10-14 DIAGNOSIS — M79.662 PAIN OF LEFT CALF: Primary | ICD-10-CM

## 2019-10-14 DIAGNOSIS — Z3A.01 LESS THAN 8 WEEKS GESTATION OF PREGNANCY: ICD-10-CM

## 2019-10-14 LAB
RUBV IGG SER-ACNC: 15 IU/ML
RUBV IGG SER-IMP: REACTIVE

## 2019-10-14 PROCEDURE — 99284 EMERGENCY DEPT VISIT MOD MDM: CPT | Mod: 25

## 2019-10-14 NOTE — TELEPHONE ENCOUNTER
Complaining of sharp stabbing pain in her left calf.  She first noticed it yesterday when she was lying down watching a movie.  It is more noticeable when standing but doesn't hurt all the time when weight it placed on that leg but notice more times then not.  Patient denies SOB and has not noticed any swelling in the leg.  Notified may need to go to ED to rule out DVT but will discuss with Dr. Smith and get back with her.  Patient verbalized understanding.  Please advise

## 2019-10-14 NOTE — ED NOTES
Patient identifiers verified and correct for Sherlyn Monahan.    LOC: The patient is awake, alert and aware of environment with an appropriate affect, the patient is oriented x 3 and speaking appropriately.  APPEARANCE: Patient resting comfortably and in no acute distress, patient is clean and well groomed, patient's clothing is properly fastened.  SKIN: The skin is warm and dry, color consistent with ethnicity, patient has normal skin turgor and moist mucus membranes, skin intact, no breakdown or bruising noted.  MUSCULOSKELETAL: Patient moving all extremities spontaneously, no obvious swelling or deformities noted. Pain to left posterior calf since yesterday. No evidence trauma. No swelling or redness noted. Distal pulses strong, gait WNL. Legs warm to touch bilaterally.   RESPIRATORY: Airway is open and patent, respirations are spontaneous, patient has a normal effort and rate, no accessory muscle use noted.

## 2019-10-14 NOTE — ED TRIAGE NOTES
States that she began with left posterior calf pain since yesterday with no h/o injury. States that PCP sent her here to r/o DVT. Presents in no distress.

## 2019-10-15 NOTE — ED PROVIDER NOTES
Encounter Date: 10/14/2019       History     Chief Complaint   Patient presents with    Leg Pain     Pt is 7 weeks pregnant and reports that she started having left calf pain yesterday afternoon. She spoke with Dr. Smith and was told to come to ER to rule out DVT.      Sherlyn Monahan, a 29 y.o. female  has a past medical history of Acquired hypothyroidism (2016), Irritable bowel syndrome (IBS), Obesity (BMI 30.0-34.9) (2016), and Urticaria.     She presents to the ED evaluation of left leg pain, sent by OB to r/t DVT.  Patient states that she has had intermittent aching pain to her left calf for the last week.  She has been unable to identify any alleviating or exacerbating factors.  Denies any recent travel, or surgery.  No previous history of DVTs.  No treatments tried for pain.  Patient is currently about 7 weeks pregnant.  Denies any abdominal pain, vaginal bleeding or vaginal discharge.      The history is provided by the patient.     Review of patient's allergies indicates:   Allergen Reactions    Minocycline Swelling     Past Medical History:   Diagnosis Date    Acquired hypothyroidism 2016    Irritable bowel syndrome (IBS)     Obesity (BMI 30.0-34.9) 2016    loosing weight consistently on 21 Day Fix program     Urticaria      Past Surgical History:   Procedure Laterality Date    breast reduction  2012    BREAST SURGERY      breast reduction     SECTION      CHOLECYSTECTOMY  2019    LAPAROSCOPIC CHOLECYSTECTOMY N/A 2019    Procedure: CHOLECYSTECTOMY, LAPAROSCOPIC;  Surgeon: Missael Tolentino MD;  Location: [facility];  Service: General;  Laterality: N/A;  video     Family History   Problem Relation Age of Onset    Breast cancer Mother 45        remission for 10 years    Cancer Mother 45        breast     Hypertension Mother     Allergic rhinitis Mother     Diabetes Father     Diabetes Maternal Grandfather     Heart disease Maternal Grandfather      Diabetes Paternal Grandfather     Colon cancer Neg Hx     Ovarian cancer Neg Hx     Allergies Neg Hx     Angioedema Neg Hx     Asthma Neg Hx     Atopy Neg Hx     Eczema Neg Hx     Immunodeficiency Neg Hx     Rhinitis Neg Hx     Urticaria Neg Hx      Social History     Tobacco Use    Smoking status: Former Smoker    Smokeless tobacco: Never Used   Substance Use Topics    Alcohol use: No     Frequency: Monthly or less     Drinks per session: 1 or 2     Binge frequency: Never     Comment: very rare    Drug use: No     Review of Systems   Constitutional: Negative for fever.   Musculoskeletal: Positive for myalgias ( left calf). Arthralgias: Left calf.   Skin: Negative for color change.   Neurological: Negative for weakness and numbness.   Psychiatric/Behavioral: Negative for agitation.   All other systems reviewed and are negative.      Physical Exam     Initial Vitals [10/14/19 1337]   BP Pulse Resp Temp SpO2   (!) 141/76 78 20 98.1 °F (36.7 °C) 98 %      MAP       --         Physical Exam    Nursing note and vitals reviewed.  Constitutional: She appears well-developed and well-nourished. She is not diaphoretic.   HENT:   Head: Normocephalic and atraumatic.   Right Ear: External ear normal.   Left Ear: External ear normal.   Nose: Nose normal.   Eyes: Conjunctivae and EOM are normal.   Neck: Normal range of motion. No tracheal deviation present.   Cardiovascular: Normal rate and regular rhythm.   Pulmonary/Chest: Breath sounds normal. No respiratory distress.   Musculoskeletal: Normal range of motion.        Left lower leg: She exhibits no tenderness, no bony tenderness, no swelling, no edema, no deformity and no laceration.   Negative Homans sign   Neurological: She is alert and oriented to person, place, and time.   Skin: Skin is warm. No rash noted. No erythema.   Psychiatric: She has a normal mood and affect. Thought content normal.         ED Course   Procedures  Labs Reviewed - No data to  display       Imaging Results          US Lower Extremity Veins Left (Final result)  Result time 10/14/19 14:40:56    Final result by Mehdi Reese MD (10/14/19 14:40:56)                 Impression:      No evidence of deep venous thrombosis in the left lower extremity.      Electronically signed by: Mehdi Reese MD  Date:    10/14/2019  Time:    14:40             Narrative:    EXAMINATION:  US LOWER EXTREMITY VEINS LEFT    CLINICAL HISTORY:  Pain in left leg    TECHNIQUE:  Duplex and color flow Doppler evaluation and graded compression of the left lower extremity veins was performed.    COMPARISON:  None    FINDINGS:  Left thigh veins: The common femoral, femoral, popliteal, upper greater saphenous, and deep femoral veins are patent and free of thrombus. The veins are normally compressible and have normal phasic flow and augmentation response.    Left calf veins: The visualized calf veins are patent.    Contralateral CFV: The contralateral (right) common femoral vein is patent and free of thrombus.    Miscellaneous: None                                 Medical Decision Making:   Initial Assessment:   Intermittent left leg pain and pregnancy  Differential Diagnosis:   DVT, muscular strain, dehydration  Clinical Tests:   Radiological Study: Ordered and Reviewed  ED Management:  Patient presents to the ER for evaluation of left leg pain sent by OB for ultrasound to rule out DVT.  Ultrasound today is negative. Patient was instructed to take Tylenol and increased hydration as well as at stretching.  Instructed to follow up with her OB for further evaluation and return with any new or worsening symptoms.                   ED Course as of Oct 14 1930   Mon Oct 14, 2019   1428 BP(!): 141/76 [LD]   1428 Temp: 98.1 °F (36.7 °C) [LD]   1428 Pulse: 78 [LD]   1428 Resp: 20 [LD]   1428 SpO2: 98 % [LD]      ED Course User Index  [LD] Airam Hussein MD     Clinical Impression:       ICD-10-CM ICD-9-CM   1. Pain of left calf  M79.662 729.5   2. Less than 8 weeks gestation of pregnancy Z3A.01 V22.2                                Yin Summers PA-C  10/14/19 1934

## 2019-10-21 ENCOUNTER — PATIENT MESSAGE (OUTPATIENT)
Dept: OBSTETRICS AND GYNECOLOGY | Facility: CLINIC | Age: 29
End: 2019-10-21

## 2019-10-23 ENCOUNTER — PATIENT MESSAGE (OUTPATIENT)
Dept: OBSTETRICS AND GYNECOLOGY | Facility: CLINIC | Age: 29
End: 2019-10-23

## 2019-11-06 ENCOUNTER — PATIENT OUTREACH (OUTPATIENT)
Dept: ADMINISTRATIVE | Facility: OTHER | Age: 29
End: 2019-11-06

## 2019-11-07 ENCOUNTER — ROUTINE PRENATAL (OUTPATIENT)
Dept: OBSTETRICS AND GYNECOLOGY | Facility: CLINIC | Age: 29
End: 2019-11-07
Payer: COMMERCIAL

## 2019-11-07 VITALS — SYSTOLIC BLOOD PRESSURE: 110 MMHG | WEIGHT: 182.13 LBS | DIASTOLIC BLOOD PRESSURE: 68 MMHG | BODY MASS INDEX: 30.3 KG/M2

## 2019-11-07 DIAGNOSIS — Z34.81 ENCOUNTER FOR SUPERVISION OF OTHER NORMAL PREGNANCY IN FIRST TRIMESTER: Primary | ICD-10-CM

## 2019-11-07 PROCEDURE — 0502F PR SUBSEQUENT PRENATAL CARE: ICD-10-PCS | Mod: CPTII,S$GLB,, | Performed by: OBSTETRICS & GYNECOLOGY

## 2019-11-07 PROCEDURE — 99999 PR PBB SHADOW E&M-EST. PATIENT-LVL II: CPT | Mod: PBBFAC,,, | Performed by: OBSTETRICS & GYNECOLOGY

## 2019-11-07 PROCEDURE — 99999 PR PBB SHADOW E&M-EST. PATIENT-LVL II: ICD-10-PCS | Mod: PBBFAC,,, | Performed by: OBSTETRICS & GYNECOLOGY

## 2019-11-07 PROCEDURE — 0502F SUBSEQUENT PRENATAL CARE: CPT | Mod: CPTII,S$GLB,, | Performed by: OBSTETRICS & GYNECOLOGY

## 2019-11-07 NOTE — PROGRESS NOTES
Complaining of increased anxiety that started at the beginning of October.  It lasted for about 2 weeks then subsided, then about 2 weeks ago it returned and has started to subside again as of yesterday.    Baby friendly video played for weeks 9-12

## 2019-11-07 NOTE — PROGRESS NOTES
Discussed options for anxiety. Had anxiety as a teen. Options: start with B complex and vitamin D (first let me know how much she is already taking), , Buspar, possible Zoloft, etc

## 2019-11-08 ENCOUNTER — PATIENT MESSAGE (OUTPATIENT)
Dept: OBSTETRICS AND GYNECOLOGY | Facility: CLINIC | Age: 29
End: 2019-11-08

## 2019-12-04 ENCOUNTER — PATIENT OUTREACH (OUTPATIENT)
Dept: ADMINISTRATIVE | Facility: OTHER | Age: 29
End: 2019-12-04

## 2019-12-05 ENCOUNTER — ROUTINE PRENATAL (OUTPATIENT)
Dept: OBSTETRICS AND GYNECOLOGY | Facility: CLINIC | Age: 29
End: 2019-12-05
Payer: COMMERCIAL

## 2019-12-05 VITALS — BODY MASS INDEX: 30.6 KG/M2 | WEIGHT: 183.88 LBS | SYSTOLIC BLOOD PRESSURE: 112 MMHG | DIASTOLIC BLOOD PRESSURE: 62 MMHG

## 2019-12-05 DIAGNOSIS — Z34.82 ENCOUNTER FOR SUPERVISION OF OTHER NORMAL PREGNANCY IN SECOND TRIMESTER: Primary | ICD-10-CM

## 2019-12-05 DIAGNOSIS — Z36.3 ANTENATAL SCREENING FOR MALFORMATION USING ULTRASONICS: ICD-10-CM

## 2019-12-05 PROCEDURE — 99999 PR PBB SHADOW E&M-EST. PATIENT-LVL II: CPT | Mod: PBBFAC,,, | Performed by: OBSTETRICS & GYNECOLOGY

## 2019-12-05 PROCEDURE — 0502F PR SUBSEQUENT PRENATAL CARE: ICD-10-PCS | Mod: CPTII,S$GLB,, | Performed by: OBSTETRICS & GYNECOLOGY

## 2019-12-05 PROCEDURE — 0502F SUBSEQUENT PRENATAL CARE: CPT | Mod: CPTII,S$GLB,, | Performed by: OBSTETRICS & GYNECOLOGY

## 2019-12-05 PROCEDURE — 99999 PR PBB SHADOW E&M-EST. PATIENT-LVL II: ICD-10-PCS | Mod: PBBFAC,,, | Performed by: OBSTETRICS & GYNECOLOGY

## 2019-12-05 NOTE — PROGRESS NOTES
Complaining of bilateral hip pain.  Also complaining of daily afternoon headaches, but has only been drinking approx 50 oz of water daily

## 2019-12-30 ENCOUNTER — PATIENT MESSAGE (OUTPATIENT)
Dept: OBSTETRICS AND GYNECOLOGY | Facility: CLINIC | Age: 29
End: 2019-12-30

## 2019-12-31 ENCOUNTER — PATIENT MESSAGE (OUTPATIENT)
Dept: OBSTETRICS AND GYNECOLOGY | Facility: CLINIC | Age: 29
End: 2019-12-31

## 2020-01-06 ENCOUNTER — PATIENT OUTREACH (OUTPATIENT)
Dept: ADMINISTRATIVE | Facility: OTHER | Age: 30
End: 2020-01-06

## 2020-01-08 ENCOUNTER — ROUTINE PRENATAL (OUTPATIENT)
Dept: OBSTETRICS AND GYNECOLOGY | Facility: CLINIC | Age: 30
End: 2020-01-08
Payer: COMMERCIAL

## 2020-01-08 ENCOUNTER — PROCEDURE VISIT (OUTPATIENT)
Dept: OBSTETRICS AND GYNECOLOGY | Facility: CLINIC | Age: 30
End: 2020-01-08
Payer: COMMERCIAL

## 2020-01-08 VITALS
SYSTOLIC BLOOD PRESSURE: 100 MMHG | DIASTOLIC BLOOD PRESSURE: 64 MMHG | BODY MASS INDEX: 31.05 KG/M2 | WEIGHT: 186.63 LBS

## 2020-01-08 DIAGNOSIS — Z34.82 ENCOUNTER FOR SUPERVISION OF OTHER NORMAL PREGNANCY IN SECOND TRIMESTER: Primary | ICD-10-CM

## 2020-01-08 DIAGNOSIS — Z36.3 ANTENATAL SCREENING FOR MALFORMATION USING ULTRASONICS: ICD-10-CM

## 2020-01-08 PROCEDURE — 76805 OB US >/= 14 WKS SNGL FETUS: CPT | Mod: S$GLB,,, | Performed by: OBSTETRICS & GYNECOLOGY

## 2020-01-08 PROCEDURE — 99999 PR PBB SHADOW E&M-EST. PATIENT-LVL II: CPT | Mod: PBBFAC,,, | Performed by: OBSTETRICS & GYNECOLOGY

## 2020-01-08 PROCEDURE — 76805 PR US, OB 14+WKS, TRANSABD, SINGLE GESTATION: ICD-10-PCS | Mod: S$GLB,,, | Performed by: OBSTETRICS & GYNECOLOGY

## 2020-01-08 PROCEDURE — 0502F PR SUBSEQUENT PRENATAL CARE: ICD-10-PCS | Mod: CPTII,S$GLB,, | Performed by: OBSTETRICS & GYNECOLOGY

## 2020-01-08 PROCEDURE — 0502F SUBSEQUENT PRENATAL CARE: CPT | Mod: CPTII,S$GLB,, | Performed by: OBSTETRICS & GYNECOLOGY

## 2020-01-08 PROCEDURE — 99999 PR PBB SHADOW E&M-EST. PATIENT-LVL II: ICD-10-PCS | Mod: PBBFAC,,, | Performed by: OBSTETRICS & GYNECOLOGY

## 2020-01-08 RX ORDER — FAMOTIDINE 10 MG/1
10 TABLET ORAL 2 TIMES DAILY
COMMUNITY
End: 2020-07-10

## 2020-01-08 RX ORDER — VITAMIN B COMPLEX
1 CAPSULE ORAL DAILY
COMMUNITY

## 2020-01-08 NOTE — PROGRESS NOTES
Patient has asymmetric pelvic crests and having spasm at time of exam. See chiropractor and do Kegel exercises

## 2020-02-04 ENCOUNTER — PATIENT OUTREACH (OUTPATIENT)
Dept: ADMINISTRATIVE | Facility: OTHER | Age: 30
End: 2020-02-04

## 2020-02-05 ENCOUNTER — PROCEDURE VISIT (OUTPATIENT)
Dept: OBSTETRICS AND GYNECOLOGY | Facility: CLINIC | Age: 30
End: 2020-02-05
Payer: COMMERCIAL

## 2020-02-05 ENCOUNTER — ROUTINE PRENATAL (OUTPATIENT)
Dept: OBSTETRICS AND GYNECOLOGY | Facility: CLINIC | Age: 30
End: 2020-02-05
Payer: COMMERCIAL

## 2020-02-05 VITALS
WEIGHT: 190.69 LBS | DIASTOLIC BLOOD PRESSURE: 60 MMHG | BODY MASS INDEX: 31.73 KG/M2 | SYSTOLIC BLOOD PRESSURE: 110 MMHG

## 2020-02-05 DIAGNOSIS — Z34.82 ENCOUNTER FOR SUPERVISION OF OTHER NORMAL PREGNANCY IN SECOND TRIMESTER: Primary | ICD-10-CM

## 2020-02-05 PROCEDURE — 99499 UNLISTED E&M SERVICE: CPT | Mod: S$GLB,,, | Performed by: PEDIATRICS

## 2020-02-05 PROCEDURE — 76816 PR  US,PREGNANT UTERUS,F/U,TRANSABD APP: ICD-10-PCS | Mod: S$GLB,,, | Performed by: PEDIATRICS

## 2020-02-05 PROCEDURE — 99499 NO LOS: ICD-10-PCS | Mod: S$GLB,,, | Performed by: PEDIATRICS

## 2020-02-05 PROCEDURE — 0502F PR SUBSEQUENT PRENATAL CARE: ICD-10-PCS | Mod: CPTII,S$GLB,, | Performed by: OBSTETRICS & GYNECOLOGY

## 2020-02-05 PROCEDURE — 99999 PR PBB SHADOW E&M-EST. PATIENT-LVL II: ICD-10-PCS | Mod: PBBFAC,,, | Performed by: OBSTETRICS & GYNECOLOGY

## 2020-02-05 PROCEDURE — 76816 OB US FOLLOW-UP PER FETUS: CPT | Mod: S$GLB,,, | Performed by: PEDIATRICS

## 2020-02-05 PROCEDURE — 99999 PR PBB SHADOW E&M-EST. PATIENT-LVL II: CPT | Mod: PBBFAC,,, | Performed by: OBSTETRICS & GYNECOLOGY

## 2020-02-05 PROCEDURE — 0502F SUBSEQUENT PRENATAL CARE: CPT | Mod: CPTII,S$GLB,, | Performed by: OBSTETRICS & GYNECOLOGY

## 2020-02-05 NOTE — PROGRESS NOTES
Has ketones so maybe having some cramping from that so increase water intake. States Chiropractor helping. Can skip DM

## 2020-03-03 ENCOUNTER — PATIENT MESSAGE (OUTPATIENT)
Dept: OBSTETRICS AND GYNECOLOGY | Facility: CLINIC | Age: 30
End: 2020-03-03

## 2020-03-04 ENCOUNTER — PATIENT OUTREACH (OUTPATIENT)
Dept: ADMINISTRATIVE | Facility: OTHER | Age: 30
End: 2020-03-04

## 2020-03-05 ENCOUNTER — APPOINTMENT (OUTPATIENT)
Dept: LAB | Facility: HOSPITAL | Age: 30
End: 2020-03-05
Attending: OBSTETRICS & GYNECOLOGY
Payer: COMMERCIAL

## 2020-03-05 ENCOUNTER — ROUTINE PRENATAL (OUTPATIENT)
Dept: OBSTETRICS AND GYNECOLOGY | Facility: CLINIC | Age: 30
End: 2020-03-05
Payer: COMMERCIAL

## 2020-03-05 VITALS — SYSTOLIC BLOOD PRESSURE: 110 MMHG | BODY MASS INDEX: 32.28 KG/M2 | DIASTOLIC BLOOD PRESSURE: 54 MMHG | WEIGHT: 194 LBS

## 2020-03-05 DIAGNOSIS — Z34.82 ENCOUNTER FOR SUPERVISION OF OTHER NORMAL PREGNANCY IN SECOND TRIMESTER: Primary | ICD-10-CM

## 2020-03-05 LAB — FIBRONECTIN FETAL SPEC QL: NEGATIVE

## 2020-03-05 PROCEDURE — 99999 PR PBB SHADOW E&M-EST. PATIENT-LVL II: CPT | Mod: PBBFAC,,, | Performed by: OBSTETRICS & GYNECOLOGY

## 2020-03-05 PROCEDURE — 0502F PR SUBSEQUENT PRENATAL CARE: ICD-10-PCS | Mod: CPTII,S$GLB,, | Performed by: OBSTETRICS & GYNECOLOGY

## 2020-03-05 PROCEDURE — 0502F SUBSEQUENT PRENATAL CARE: CPT | Mod: CPTII,S$GLB,, | Performed by: OBSTETRICS & GYNECOLOGY

## 2020-03-05 PROCEDURE — 82731 ASSAY OF FETAL FIBRONECTIN: CPT

## 2020-03-05 PROCEDURE — 99999 PR PBB SHADOW E&M-EST. PATIENT-LVL II: ICD-10-PCS | Mod: PBBFAC,,, | Performed by: OBSTETRICS & GYNECOLOGY

## 2020-03-05 NOTE — PROGRESS NOTES
Having Jon Diaz. FFN done. Sees retinal specialist Monday so ask about steroids and if OK to deliver vaginally with valsalva with pushing.

## 2020-03-09 ENCOUNTER — PATIENT MESSAGE (OUTPATIENT)
Dept: OBSTETRICS AND GYNECOLOGY | Facility: CLINIC | Age: 30
End: 2020-03-09

## 2020-03-10 ENCOUNTER — PATIENT MESSAGE (OUTPATIENT)
Dept: OBSTETRICS AND GYNECOLOGY | Facility: CLINIC | Age: 30
End: 2020-03-10

## 2020-03-12 ENCOUNTER — PATIENT MESSAGE (OUTPATIENT)
Dept: OBSTETRICS AND GYNECOLOGY | Facility: CLINIC | Age: 30
End: 2020-03-12

## 2020-03-12 ENCOUNTER — PATIENT OUTREACH (OUTPATIENT)
Dept: ADMINISTRATIVE | Facility: OTHER | Age: 30
End: 2020-03-12

## 2020-03-17 ENCOUNTER — TELEPHONE (OUTPATIENT)
Dept: OBSTETRICS AND GYNECOLOGY | Facility: CLINIC | Age: 30
End: 2020-03-17

## 2020-03-17 NOTE — TELEPHONE ENCOUNTER
Patient is ok with putting appointment off until next Thursday due to COVID 19 concerns.  Also can send info about Connected Mom.  Patient states the only thing she was going to speak with you about this week is about her increase in anxiety, having episodes daily.  Patient states she feels better about taking medication now then she did earlier in her pregnancy.  Preferred pharmacy up to date.  Please advise

## 2020-03-18 ENCOUNTER — PATIENT MESSAGE (OUTPATIENT)
Dept: OBSTETRICS AND GYNECOLOGY | Facility: CLINIC | Age: 30
End: 2020-03-18

## 2020-03-19 RX ORDER — SERTRALINE HYDROCHLORIDE 25 MG/1
25 TABLET, FILM COATED ORAL DAILY
Qty: 30 TABLET | Refills: 2 | Status: SHIPPED | OUTPATIENT
Start: 2020-03-19 | End: 2020-05-04 | Stop reason: DRUGHIGH

## 2020-03-20 ENCOUNTER — PATIENT OUTREACH (OUTPATIENT)
Dept: ADMINISTRATIVE | Facility: OTHER | Age: 30
End: 2020-03-20

## 2020-03-24 ENCOUNTER — PATIENT MESSAGE (OUTPATIENT)
Dept: ADMINISTRATIVE | Facility: OTHER | Age: 30
End: 2020-03-24

## 2020-03-24 ENCOUNTER — TELEPHONE (OUTPATIENT)
Dept: OBSTETRICS AND GYNECOLOGY | Facility: CLINIC | Age: 30
End: 2020-03-24

## 2020-03-24 ENCOUNTER — PATIENT MESSAGE (OUTPATIENT)
Dept: OBSTETRICS AND GYNECOLOGY | Facility: CLINIC | Age: 30
End: 2020-03-24

## 2020-03-24 NOTE — TELEPHONE ENCOUNTER
Patient is ok with appointment this week being a virtual visit and receiving information on Connected Moms.

## 2020-03-24 NOTE — TELEPHONE ENCOUNTER
----- Message from Melody Curry sent at 3/24/2020 10:07 AM CDT -----  Contact: [phone]-self  Patient is returning your call.

## 2020-03-26 ENCOUNTER — PATIENT MESSAGE (OUTPATIENT)
Dept: OBSTETRICS AND GYNECOLOGY | Facility: CLINIC | Age: 30
End: 2020-03-26

## 2020-03-26 ENCOUNTER — OFFICE VISIT (OUTPATIENT)
Dept: OBSTETRICS AND GYNECOLOGY | Facility: CLINIC | Age: 30
End: 2020-03-26
Payer: COMMERCIAL

## 2020-03-26 ENCOUNTER — PATIENT OUTREACH (OUTPATIENT)
Dept: ADMINISTRATIVE | Facility: OTHER | Age: 30
End: 2020-03-26

## 2020-03-26 DIAGNOSIS — Z34.83 ENCOUNTER FOR SUPERVISION OF OTHER NORMAL PREGNANCY IN THIRD TRIMESTER: Primary | ICD-10-CM

## 2020-03-26 PROCEDURE — 0502F PR SUBSEQUENT PRENATAL CARE: ICD-10-PCS | Mod: CPTII,95,S$GLB, | Performed by: OBSTETRICS & GYNECOLOGY

## 2020-03-26 PROCEDURE — 0502F SUBSEQUENT PRENATAL CARE: CPT | Mod: CPTII,95,S$GLB, | Performed by: OBSTETRICS & GYNECOLOGY

## 2020-03-26 NOTE — PROGRESS NOTES
The patient location is: home  The chief complaint leading to consultation is: Routine prenatal visit conducted through video secondary to COVID 19  Visit type: Virtual visit with synchronous audio and video  Total time spent with patient: 7 minutes  Each patient to whom he or she provides medical services by telemedicine is:  (1) informed of the relationship between the physician and patient and the respective role of any other health care provider with respect to management of the patient; and (2) notified that he or she may decline to receive medical services by telemedicine and may withdraw from such care at any time.    Notes: Blood pressure and weight could not be obtained today as patient has not received Connected Mom scale and BP cuff. Patient states that she has positive fetal movement and maybe up to 10 [place]-Diaz contractions per day but then we reviewed contractions (contraction is hard like forehead and should last for a minute from beginning to end). Patient states some of what she may be feeling is the baby moving. She feels the Zoloft has helped slightly but it has not been a full week yet. She was given the phone number for Labor and Delivery if she has any questions.  labor precautions. COVID-19 precautions: patient states they are staying quarantined.

## 2020-03-27 ENCOUNTER — PATIENT MESSAGE (OUTPATIENT)
Dept: OBSTETRICS AND GYNECOLOGY | Facility: CLINIC | Age: 30
End: 2020-03-27

## 2020-04-01 ENCOUNTER — PATIENT MESSAGE (OUTPATIENT)
Dept: OBSTETRICS AND GYNECOLOGY | Facility: CLINIC | Age: 30
End: 2020-04-01

## 2020-04-04 ENCOUNTER — PATIENT MESSAGE (OUTPATIENT)
Dept: OBSTETRICS AND GYNECOLOGY | Facility: CLINIC | Age: 30
End: 2020-04-04

## 2020-04-09 ENCOUNTER — PATIENT OUTREACH (OUTPATIENT)
Dept: ADMINISTRATIVE | Facility: OTHER | Age: 30
End: 2020-04-09

## 2020-04-13 ENCOUNTER — TELEPHONE (OUTPATIENT)
Dept: OBSTETRICS AND GYNECOLOGY | Facility: CLINIC | Age: 30
End: 2020-04-13

## 2020-04-13 ENCOUNTER — ROUTINE PRENATAL (OUTPATIENT)
Dept: OBSTETRICS AND GYNECOLOGY | Facility: CLINIC | Age: 30
End: 2020-04-13
Payer: COMMERCIAL

## 2020-04-13 VITALS — WEIGHT: 195.31 LBS | SYSTOLIC BLOOD PRESSURE: 110 MMHG | DIASTOLIC BLOOD PRESSURE: 68 MMHG | BODY MASS INDEX: 32.5 KG/M2

## 2020-04-13 DIAGNOSIS — Z34.83 ENCOUNTER FOR SUPERVISION OF OTHER NORMAL PREGNANCY IN THIRD TRIMESTER: Primary | ICD-10-CM

## 2020-04-13 DIAGNOSIS — Z36.89 ENCOUNTER FOR ULTRASOUND TO ASSESS INTERVAL GROWTH OF FETUS: Primary | ICD-10-CM

## 2020-04-13 PROCEDURE — 0502F SUBSEQUENT PRENATAL CARE: CPT | Mod: CPTII,S$GLB,, | Performed by: OBSTETRICS & GYNECOLOGY

## 2020-04-13 PROCEDURE — 99999 PR PBB SHADOW E&M-EST. PATIENT-LVL II: ICD-10-PCS | Mod: PBBFAC,,, | Performed by: OBSTETRICS & GYNECOLOGY

## 2020-04-13 PROCEDURE — 0502F PR SUBSEQUENT PRENATAL CARE: ICD-10-PCS | Mod: CPTII,S$GLB,, | Performed by: OBSTETRICS & GYNECOLOGY

## 2020-04-13 PROCEDURE — 99999 PR PBB SHADOW E&M-EST. PATIENT-LVL II: CPT | Mod: PBBFAC,,, | Performed by: OBSTETRICS & GYNECOLOGY

## 2020-04-13 NOTE — TELEPHONE ENCOUNTER
----- Message from Osiris Brown MA sent at 4/13/2020  3:07 PM CDT -----      ----- Message -----  From: Chiara Smith MD  Sent: 4/13/2020   2:55 PM CDT  To: Osiris Brown MA    CAN YOU SEE IF THEY HAVE ULTRASOUND AVAILABLE WHEN SHE RETURNS IN 3 WEEKS FOR GROWTH. NOT URGENT SO IF NOT DONT WORRY ABOUT IT

## 2020-04-13 NOTE — PROGRESS NOTES
Discussed plan for TOLAC vs  and now she is unsure. Baby is currently vertex and OP. Will look again at 36 weeks. Last baby was OP and had to have a

## 2020-04-14 ENCOUNTER — PATIENT MESSAGE (OUTPATIENT)
Dept: OBSTETRICS AND GYNECOLOGY | Facility: CLINIC | Age: 30
End: 2020-04-14

## 2020-04-14 RX ORDER — SERTRALINE HYDROCHLORIDE 50 MG/1
50 TABLET, FILM COATED ORAL NIGHTLY
Qty: 30 TABLET | Refills: 4 | Status: SHIPPED | OUTPATIENT
Start: 2020-04-14 | End: 2020-09-12 | Stop reason: SDUPTHER

## 2020-04-15 ENCOUNTER — PATIENT MESSAGE (OUTPATIENT)
Dept: OBSTETRICS AND GYNECOLOGY | Facility: CLINIC | Age: 30
End: 2020-04-15

## 2020-04-20 ENCOUNTER — PATIENT MESSAGE (OUTPATIENT)
Dept: OBSTETRICS AND GYNECOLOGY | Facility: CLINIC | Age: 30
End: 2020-04-20

## 2020-04-23 ENCOUNTER — PATIENT MESSAGE (OUTPATIENT)
Dept: OBSTETRICS AND GYNECOLOGY | Facility: CLINIC | Age: 30
End: 2020-04-23

## 2020-04-29 ENCOUNTER — TELEPHONE (OUTPATIENT)
Dept: OBSTETRICS AND GYNECOLOGY | Facility: CLINIC | Age: 30
End: 2020-04-29

## 2020-04-29 NOTE — TELEPHONE ENCOUNTER
Notified her mom's FMLA/STD has been received and completed.  Completed form will be faxed to her mom.  Patient verbalized understanding

## 2020-05-01 ENCOUNTER — PATIENT OUTREACH (OUTPATIENT)
Dept: ADMINISTRATIVE | Facility: OTHER | Age: 30
End: 2020-05-01

## 2020-05-03 ENCOUNTER — HOSPITAL ENCOUNTER (OUTPATIENT)
Facility: HOSPITAL | Age: 30
Discharge: HOME | End: 2020-05-03
Attending: OBSTETRICS & GYNECOLOGY | Admitting: OBSTETRICS & GYNECOLOGY
Payer: COMMERCIAL

## 2020-05-03 VITALS
RESPIRATION RATE: 18 BRPM | SYSTOLIC BLOOD PRESSURE: 118 MMHG | HEART RATE: 84 BPM | TEMPERATURE: 98 F | OXYGEN SATURATION: 99 % | DIASTOLIC BLOOD PRESSURE: 72 MMHG

## 2020-05-03 DIAGNOSIS — R10.9 ABDOMINAL PAIN AFFECTING PREGNANCY: ICD-10-CM

## 2020-05-03 DIAGNOSIS — O26.899 ABDOMINAL PAIN AFFECTING PREGNANCY: ICD-10-CM

## 2020-05-03 PROCEDURE — 99211 OFF/OP EST MAY X REQ PHY/QHP: CPT | Mod: 25

## 2020-05-03 PROCEDURE — 96360 HYDRATION IV INFUSION INIT: CPT

## 2020-05-03 PROCEDURE — 63600175 PHARM REV CODE 636 W HCPCS: Performed by: OBSTETRICS & GYNECOLOGY

## 2020-05-03 PROCEDURE — 96372 THER/PROPH/DIAG INJ SC/IM: CPT

## 2020-05-03 RX ORDER — TERBUTALINE SULFATE 1 MG/ML
0.25 INJECTION SUBCUTANEOUS ONCE
Status: COMPLETED | OUTPATIENT
Start: 2020-05-03 | End: 2020-05-03

## 2020-05-03 RX ORDER — TERBUTALINE SULFATE 1 MG/ML
INJECTION SUBCUTANEOUS
Status: DISCONTINUED
Start: 2020-05-03 | End: 2020-05-03 | Stop reason: HOSPADM

## 2020-05-03 RX ORDER — TERBUTALINE SULFATE 1 MG/ML
INJECTION SUBCUTANEOUS
Status: DISCONTINUED
Start: 2020-05-03 | End: 2020-05-03 | Stop reason: WASHOUT

## 2020-05-03 RX ADMIN — SODIUM CHLORIDE, SODIUM LACTATE, POTASSIUM CHLORIDE, AND CALCIUM CHLORIDE 1000 ML: .6; .31; .03; .02 INJECTION, SOLUTION INTRAVENOUS at 07:05

## 2020-05-03 RX ADMIN — TERBUTALINE SULFATE 0.25 MG: 1 INJECTION SUBCUTANEOUS at 06:05

## 2020-05-03 RX ADMIN — TERBUTALINE SULFATE 0.25 MG: 1 INJECTION SUBCUTANEOUS at 07:05

## 2020-05-03 NOTE — NURSING
0600 Pt  L1 36 weeks, admitted with c/o ctx every 2 minutes since 4 am, did not go away with water or ambulation. Pt rates pain 7-8/10 with contractions. Pt is a previous C/S wanting to . Pt denies ROM, bleeding. +FM. Pt on EFM, BBS clear BS active abd soft in between ctx. SVE done 3 cm/thick/ -5 posterior, No GBS done to be done this week at visit.   0620 Dr Smith called , status on pt given, orders rec'd to give brethine, LR, and recheck in 2 hours.   0630 Report given to Shefali Kendrick oncoming nurse. POC discussed with pt.

## 2020-05-03 NOTE — DISCHARGE INSTRUCTIONS
Patient instructed to return to the ER for any further problems. Instructed to return to the unit for any leaking fluid, vaginal bleeding or continuous contractions causing pain. Encouraged to drink plenty of water and to rest as needed

## 2020-05-03 NOTE — NURSING
Update given to Dr. Smith, orders received for patient to be discharged home and keep scheduled appointment tomorrow in clinic.

## 2020-05-04 ENCOUNTER — ROUTINE PRENATAL (OUTPATIENT)
Dept: OBSTETRICS AND GYNECOLOGY | Facility: CLINIC | Age: 30
End: 2020-05-04
Payer: COMMERCIAL

## 2020-05-04 ENCOUNTER — PROCEDURE VISIT (OUTPATIENT)
Dept: OBSTETRICS AND GYNECOLOGY | Facility: CLINIC | Age: 30
End: 2020-05-04
Payer: COMMERCIAL

## 2020-05-04 VITALS
WEIGHT: 199.13 LBS | SYSTOLIC BLOOD PRESSURE: 120 MMHG | DIASTOLIC BLOOD PRESSURE: 76 MMHG | BODY MASS INDEX: 33.13 KG/M2

## 2020-05-04 DIAGNOSIS — Z36.89 ENCOUNTER FOR ULTRASOUND TO ASSESS INTERVAL GROWTH OF FETUS: ICD-10-CM

## 2020-05-04 DIAGNOSIS — Z34.83 ENCOUNTER FOR SUPERVISION OF OTHER NORMAL PREGNANCY IN THIRD TRIMESTER: Primary | ICD-10-CM

## 2020-05-04 DIAGNOSIS — Z36.85 SCREENING, ANTENATAL, FOR STREPTOCOCCUS B: ICD-10-CM

## 2020-05-04 PROCEDURE — 87081 CULTURE SCREEN ONLY: CPT

## 2020-05-04 PROCEDURE — 99999 PR PBB SHADOW E&M-EST. PATIENT-LVL II: CPT | Mod: PBBFAC,,, | Performed by: OBSTETRICS & GYNECOLOGY

## 2020-05-04 PROCEDURE — 76816 PR  US,PREGNANT UTERUS,F/U,TRANSABD APP: ICD-10-PCS | Mod: S$GLB,,, | Performed by: OBSTETRICS & GYNECOLOGY

## 2020-05-04 PROCEDURE — 0502F SUBSEQUENT PRENATAL CARE: CPT | Mod: CPTII,S$GLB,, | Performed by: OBSTETRICS & GYNECOLOGY

## 2020-05-04 PROCEDURE — 0502F PR SUBSEQUENT PRENATAL CARE: ICD-10-PCS | Mod: CPTII,S$GLB,, | Performed by: OBSTETRICS & GYNECOLOGY

## 2020-05-04 PROCEDURE — 76816 OB US FOLLOW-UP PER FETUS: CPT | Mod: S$GLB,,, | Performed by: OBSTETRICS & GYNECOLOGY

## 2020-05-04 PROCEDURE — 99999 PR PBB SHADOW E&M-EST. PATIENT-LVL II: ICD-10-PCS | Mod: PBBFAC,,, | Performed by: OBSTETRICS & GYNECOLOGY

## 2020-05-04 NOTE — PROGRESS NOTES
Was on L&D and told 3cm but seems to be 2cm outer os 3cm. US today about same weight target as last baby. Baby is OP.

## 2020-05-04 NOTE — PROGRESS NOTES
Procedures presents for follow-up after long complicated course after left lower extremity melanoma excision sentinel biopsy followed by wound infection in the left groin.     She is back at home living by herself. She is functioning well  She has no residual open wounds she has no residual swelling her incisions are healed well and scars are flattening out nicely.    She is ambulating at home by herself.   She is considering left knee replacement.      She can follow-up with me p.r.n. from this point   See viewpoint US report

## 2020-05-06 ENCOUNTER — PATIENT MESSAGE (OUTPATIENT)
Dept: OBSTETRICS AND GYNECOLOGY | Facility: CLINIC | Age: 30
End: 2020-05-06

## 2020-05-07 ENCOUNTER — PATIENT OUTREACH (OUTPATIENT)
Dept: ADMINISTRATIVE | Facility: OTHER | Age: 30
End: 2020-05-07

## 2020-05-07 LAB — BACTERIA SPEC AEROBE CULT: NORMAL

## 2020-05-11 ENCOUNTER — ROUTINE PRENATAL (OUTPATIENT)
Dept: OBSTETRICS AND GYNECOLOGY | Facility: CLINIC | Age: 30
End: 2020-05-11
Payer: COMMERCIAL

## 2020-05-11 ENCOUNTER — CLINICAL SUPPORT (OUTPATIENT)
Dept: OBSTETRICS AND GYNECOLOGY | Facility: CLINIC | Age: 30
End: 2020-05-11
Payer: COMMERCIAL

## 2020-05-11 VITALS
WEIGHT: 198.88 LBS | BODY MASS INDEX: 33.09 KG/M2 | DIASTOLIC BLOOD PRESSURE: 70 MMHG | SYSTOLIC BLOOD PRESSURE: 116 MMHG

## 2020-05-11 DIAGNOSIS — Z34.83 ENCOUNTER FOR SUPERVISION OF OTHER NORMAL PREGNANCY IN THIRD TRIMESTER: Primary | ICD-10-CM

## 2020-05-11 DIAGNOSIS — Z23 NEED FOR TDAP VACCINATION: Primary | ICD-10-CM

## 2020-05-11 PROCEDURE — 0502F SUBSEQUENT PRENATAL CARE: CPT | Mod: CPTII,S$GLB,, | Performed by: OBSTETRICS & GYNECOLOGY

## 2020-05-11 PROCEDURE — 99999 PR PBB SHADOW E&M-EST. PATIENT-LVL II: CPT | Mod: PBBFAC,,, | Performed by: OBSTETRICS & GYNECOLOGY

## 2020-05-11 PROCEDURE — 90471 TDAP VACCINE GREATER THAN OR EQUAL TO 7YO IM: ICD-10-PCS | Mod: S$GLB,,, | Performed by: OBSTETRICS & GYNECOLOGY

## 2020-05-11 PROCEDURE — 99999 PR PBB SHADOW E&M-EST. PATIENT-LVL II: ICD-10-PCS | Mod: PBBFAC,,, | Performed by: OBSTETRICS & GYNECOLOGY

## 2020-05-11 PROCEDURE — 90715 TDAP VACCINE GREATER THAN OR EQUAL TO 7YO IM: ICD-10-PCS | Mod: S$GLB,,, | Performed by: OBSTETRICS & GYNECOLOGY

## 2020-05-11 PROCEDURE — 90715 TDAP VACCINE 7 YRS/> IM: CPT | Mod: S$GLB,,, | Performed by: OBSTETRICS & GYNECOLOGY

## 2020-05-11 PROCEDURE — 0502F PR SUBSEQUENT PRENATAL CARE: ICD-10-PCS | Mod: CPTII,S$GLB,, | Performed by: OBSTETRICS & GYNECOLOGY

## 2020-05-11 PROCEDURE — 90471 IMMUNIZATION ADMIN: CPT | Mod: S$GLB,,, | Performed by: OBSTETRICS & GYNECOLOGY

## 2020-05-11 NOTE — PROGRESS NOTES
5/11/2020 @ 10:50AM: Contraindication list reviewed with patient prior to administering vaccine.  No contraindications noted. TDAP 0.5 ML vaccine administered to left deltoid per 's instructions. Patient tolerated well and instructed to wait 15 minutes prior to leaving office for any adverse reactions.  Patient verbalized understanding.    : Adacel   Lot #: [id number]  Exp. Date: 4/4/2022

## 2020-05-17 ENCOUNTER — PATIENT MESSAGE (OUTPATIENT)
Dept: OBSTETRICS AND GYNECOLOGY | Facility: CLINIC | Age: 30
End: 2020-05-17

## 2020-05-18 RX ORDER — TERCONAZOLE 4 MG/G
1 CREAM VAGINAL NIGHTLY
Qty: 45 G | Refills: 0 | Status: SHIPPED | OUTPATIENT
Start: 2020-05-18 | End: 2020-05-25

## 2020-05-18 NOTE — TELEPHONE ENCOUNTER
Patient states she may have a yeast infection or UTI, symptoms are vaginal itch and also burning when she urinates, she's also complaining of a smell strong urine odor. Preferred Pharmacy is listed.

## 2020-05-20 ENCOUNTER — ROUTINE PRENATAL (OUTPATIENT)
Dept: OBSTETRICS AND GYNECOLOGY | Facility: CLINIC | Age: 30
End: 2020-05-20
Payer: COMMERCIAL

## 2020-05-20 ENCOUNTER — PATIENT OUTREACH (OUTPATIENT)
Dept: ADMINISTRATIVE | Facility: OTHER | Age: 30
End: 2020-05-20

## 2020-05-20 VITALS
WEIGHT: 199.31 LBS | BODY MASS INDEX: 33.17 KG/M2 | DIASTOLIC BLOOD PRESSURE: 70 MMHG | SYSTOLIC BLOOD PRESSURE: 112 MMHG

## 2020-05-20 DIAGNOSIS — R30.0 DYSURIA: ICD-10-CM

## 2020-05-20 DIAGNOSIS — Z34.83 ENCOUNTER FOR SUPERVISION OF OTHER NORMAL PREGNANCY IN THIRD TRIMESTER: Primary | ICD-10-CM

## 2020-05-20 PROCEDURE — 87086 URINE CULTURE/COLONY COUNT: CPT

## 2020-05-20 PROCEDURE — 99999 PR PBB SHADOW E&M-EST. PATIENT-LVL II: ICD-10-PCS | Mod: PBBFAC,,, | Performed by: OBSTETRICS & GYNECOLOGY

## 2020-05-20 PROCEDURE — 0502F SUBSEQUENT PRENATAL CARE: CPT | Mod: CPTII,S$GLB,, | Performed by: OBSTETRICS & GYNECOLOGY

## 2020-05-20 PROCEDURE — 99999 PR PBB SHADOW E&M-EST. PATIENT-LVL II: CPT | Mod: PBBFAC,,, | Performed by: OBSTETRICS & GYNECOLOGY

## 2020-05-20 PROCEDURE — 0502F PR SUBSEQUENT PRENATAL CARE: ICD-10-PCS | Mod: CPTII,S$GLB,, | Performed by: OBSTETRICS & GYNECOLOGY

## 2020-05-20 NOTE — PROGRESS NOTES
Pt states she is having some dysuria and lower back pain. She has also been having some contractions but nothing regular

## 2020-05-21 ENCOUNTER — PATIENT MESSAGE (OUTPATIENT)
Dept: OBSTETRICS AND GYNECOLOGY | Facility: CLINIC | Age: 30
End: 2020-05-21

## 2020-05-23 LAB — BACTERIA UR CULT: NORMAL

## 2020-05-26 ENCOUNTER — PATIENT OUTREACH (OUTPATIENT)
Dept: ADMINISTRATIVE | Facility: OTHER | Age: 30
End: 2020-05-26

## 2020-05-27 ENCOUNTER — ROUTINE PRENATAL (OUTPATIENT)
Dept: OBSTETRICS AND GYNECOLOGY | Facility: CLINIC | Age: 30
End: 2020-05-27
Payer: COMMERCIAL

## 2020-05-27 VITALS — WEIGHT: 201 LBS | DIASTOLIC BLOOD PRESSURE: 60 MMHG | BODY MASS INDEX: 33.45 KG/M2 | SYSTOLIC BLOOD PRESSURE: 100 MMHG

## 2020-05-27 DIAGNOSIS — Z34.83 ENCOUNTER FOR SUPERVISION OF OTHER NORMAL PREGNANCY IN THIRD TRIMESTER: Primary | ICD-10-CM

## 2020-05-27 PROCEDURE — 0502F SUBSEQUENT PRENATAL CARE: CPT | Mod: CPTII,S$GLB,, | Performed by: OBSTETRICS & GYNECOLOGY

## 2020-05-27 PROCEDURE — 99999 PR PBB SHADOW E&M-EST. PATIENT-LVL II: CPT | Mod: PBBFAC,,, | Performed by: OBSTETRICS & GYNECOLOGY

## 2020-05-27 PROCEDURE — 0502F PR SUBSEQUENT PRENATAL CARE: ICD-10-PCS | Mod: CPTII,S$GLB,, | Performed by: OBSTETRICS & GYNECOLOGY

## 2020-05-27 PROCEDURE — 99999 PR PBB SHADOW E&M-EST. PATIENT-LVL II: ICD-10-PCS | Mod: PBBFAC,,, | Performed by: OBSTETRICS & GYNECOLOGY

## 2020-05-27 NOTE — PROGRESS NOTES
Very posterior. Has a cut off of  if doesn't go into spontaneous labor for proceeding with repeat

## 2020-05-28 ENCOUNTER — PATIENT MESSAGE (OUTPATIENT)
Dept: OBSTETRICS AND GYNECOLOGY | Facility: CLINIC | Age: 30
End: 2020-05-28

## 2020-05-28 NOTE — TELEPHONE ENCOUNTER
Spoke with patient , she states since Tuesday she has notice discharge with itch and  no odor, thought it was her mucus plug but was not , had some contractions but were not steady . Some tenderness with sex.     Please advise.

## 2020-06-01 ENCOUNTER — ROUTINE PRENATAL (OUTPATIENT)
Dept: OBSTETRICS AND GYNECOLOGY | Facility: CLINIC | Age: 30
End: 2020-06-01
Payer: COMMERCIAL

## 2020-06-01 ENCOUNTER — PATIENT OUTREACH (OUTPATIENT)
Dept: ADMINISTRATIVE | Facility: OTHER | Age: 30
End: 2020-06-01

## 2020-06-01 VITALS — SYSTOLIC BLOOD PRESSURE: 110 MMHG | BODY MASS INDEX: 33.53 KG/M2 | WEIGHT: 201.5 LBS | DIASTOLIC BLOOD PRESSURE: 60 MMHG

## 2020-06-01 DIAGNOSIS — Z34.83 ENCOUNTER FOR SUPERVISION OF OTHER NORMAL PREGNANCY IN THIRD TRIMESTER: Primary | ICD-10-CM

## 2020-06-01 PROCEDURE — 99999 PR PBB SHADOW E&M-EST. PATIENT-LVL II: CPT | Mod: PBBFAC,,, | Performed by: OBSTETRICS & GYNECOLOGY

## 2020-06-01 PROCEDURE — 0502F PR SUBSEQUENT PRENATAL CARE: ICD-10-PCS | Mod: CPTII,S$GLB,, | Performed by: OBSTETRICS & GYNECOLOGY

## 2020-06-01 PROCEDURE — 99999 PR PBB SHADOW E&M-EST. PATIENT-LVL II: ICD-10-PCS | Mod: PBBFAC,,, | Performed by: OBSTETRICS & GYNECOLOGY

## 2020-06-01 PROCEDURE — 0502F SUBSEQUENT PRENATAL CARE: CPT | Mod: CPTII,S$GLB,, | Performed by: OBSTETRICS & GYNECOLOGY

## 2020-06-04 ENCOUNTER — ANESTHESIA (OUTPATIENT)
Dept: OBSTETRICS AND GYNECOLOGY | Facility: HOSPITAL | Age: 30
End: 2020-06-04
Payer: COMMERCIAL

## 2020-06-04 ENCOUNTER — HOSPITAL ENCOUNTER (INPATIENT)
Facility: HOSPITAL | Age: 30
LOS: 2 days | Discharge: HOME | End: 2020-06-06
Attending: OBSTETRICS & GYNECOLOGY | Admitting: OBSTETRICS & GYNECOLOGY
Payer: COMMERCIAL

## 2020-06-04 ENCOUNTER — ANESTHESIA EVENT (OUTPATIENT)
Dept: OBSTETRICS AND GYNECOLOGY | Facility: HOSPITAL | Age: 30
End: 2020-06-04
Payer: COMMERCIAL

## 2020-06-04 LAB
ABO + RH BLD: NORMAL
BASOPHILS # BLD AUTO: 0.01 K/UL (ref 0–0.2)
BASOPHILS NFR BLD: 0.1 % (ref 0–1.9)
BLD GP AB SCN CELLS X3 SERPL QL: NORMAL
DIFFERENTIAL METHOD: ABNORMAL
EOSINOPHIL # BLD AUTO: 0 K/UL (ref 0–0.5)
EOSINOPHIL NFR BLD: 0.1 % (ref 0–8)
ERYTHROCYTE [DISTWIDTH] IN BLOOD BY AUTOMATED COUNT: 13 % (ref 11.5–14.5)
HCT VFR BLD AUTO: 40 % (ref 37–48.5)
HGB BLD-MCNC: 13.2 G/DL (ref 12–16)
IMM GRANULOCYTES # BLD AUTO: 0.03 K/UL (ref 0–0.04)
IMM GRANULOCYTES NFR BLD AUTO: 0.4 % (ref 0–0.5)
LYMPHOCYTES # BLD AUTO: 2.2 K/UL (ref 1–4.8)
LYMPHOCYTES NFR BLD: 29.8 % (ref 18–48)
MCH RBC QN AUTO: 31.8 PG (ref 27–31)
MCHC RBC AUTO-ENTMCNC: 33 G/DL (ref 32–36)
MCV RBC AUTO: 96 FL (ref 82–98)
MONOCYTES # BLD AUTO: 0.6 K/UL (ref 0.3–1)
MONOCYTES NFR BLD: 7.8 % (ref 4–15)
NEUTROPHILS # BLD AUTO: 4.6 K/UL (ref 1.8–7.7)
NEUTROPHILS NFR BLD: 61.8 % (ref 38–73)
NRBC BLD-RTO: 0 /100 WBC
PLATELET # BLD AUTO: 128 K/UL (ref 150–350)
PMV BLD AUTO: 11.9 FL (ref 9.2–12.9)
RBC # BLD AUTO: 4.15 M/UL (ref 4–5.4)
SARS-COV-2 RDRP RESP QL NAA+PROBE: NEGATIVE
WBC # BLD AUTO: 7.39 K/UL (ref 3.9–12.7)

## 2020-06-04 PROCEDURE — 37000008 HC ANESTHESIA 1ST 15 MINUTES: Performed by: OBSTETRICS & GYNECOLOGY

## 2020-06-04 PROCEDURE — 25000003 PHARM REV CODE 250: Performed by: OBSTETRICS & GYNECOLOGY

## 2020-06-04 PROCEDURE — 86850 RBC ANTIBODY SCREEN: CPT

## 2020-06-04 PROCEDURE — 63600175 PHARM REV CODE 636 W HCPCS: Performed by: OBSTETRICS & GYNECOLOGY

## 2020-06-04 PROCEDURE — 36000685 HC CESAREAN SECTION LEVEL I

## 2020-06-04 PROCEDURE — S0028 INJECTION, FAMOTIDINE, 20 MG: HCPCS | Performed by: OBSTETRICS & GYNECOLOGY

## 2020-06-04 PROCEDURE — 62322 NJX INTERLAMINAR LMBR/SAC: CPT | Performed by: HEALTH CARE PROVIDER

## 2020-06-04 PROCEDURE — 59510 CESAREAN DELIVERY: CPT | Mod: GB,,, | Performed by: OBSTETRICS & GYNECOLOGY

## 2020-06-04 PROCEDURE — 36415 COLL VENOUS BLD VENIPUNCTURE: CPT

## 2020-06-04 PROCEDURE — 63600175 PHARM REV CODE 636 W HCPCS: Performed by: HEALTH CARE PROVIDER

## 2020-06-04 PROCEDURE — 27200033

## 2020-06-04 PROCEDURE — 37000009 HC ANESTHESIA EA ADD 15 MINS: Performed by: OBSTETRICS & GYNECOLOGY

## 2020-06-04 PROCEDURE — 25000003 PHARM REV CODE 250: Performed by: HEALTH CARE PROVIDER

## 2020-06-04 PROCEDURE — 27201108 HC SURGICEL

## 2020-06-04 PROCEDURE — 85025 COMPLETE CBC W/AUTO DIFF WBC: CPT

## 2020-06-04 PROCEDURE — 11000001 HC ACUTE MED/SURG PRIVATE ROOM

## 2020-06-04 PROCEDURE — 86592 SYPHILIS TEST NON-TREP QUAL: CPT

## 2020-06-04 PROCEDURE — 27200688 HC TRAY, SPINAL-HYPER/ ISOBARIC: Performed by: ANESTHESIOLOGY

## 2020-06-04 PROCEDURE — 59510 PR FULL ROUT OBSTE CARE,CESAREAN DELIV: ICD-10-PCS | Mod: GB,,, | Performed by: OBSTETRICS & GYNECOLOGY

## 2020-06-04 PROCEDURE — 51702 INSERT TEMP BLADDER CATH: CPT

## 2020-06-04 PROCEDURE — U0002 COVID-19 LAB TEST NON-CDC: HCPCS

## 2020-06-04 RX ORDER — OXYTOCIN/RINGER'S LACTATE 30/500 ML
334 PLASTIC BAG, INJECTION (ML) INTRAVENOUS ONCE
Status: DISCONTINUED | OUTPATIENT
Start: 2020-06-04 | End: 2020-06-04

## 2020-06-04 RX ORDER — ADHESIVE BANDAGE
30 BANDAGE TOPICAL 2 TIMES DAILY PRN
Status: DISCONTINUED | OUTPATIENT
Start: 2020-06-05 | End: 2020-06-06 | Stop reason: HOSPADM

## 2020-06-04 RX ORDER — DIPHENHYDRAMINE HCL 25 MG
25 CAPSULE ORAL EVERY 4 HOURS PRN
Status: DISCONTINUED | OUTPATIENT
Start: 2020-06-04 | End: 2020-06-06 | Stop reason: HOSPADM

## 2020-06-04 RX ORDER — KETOROLAC TROMETHAMINE 30 MG/ML
INJECTION, SOLUTION INTRAMUSCULAR; INTRAVENOUS
Status: DISCONTINUED | OUTPATIENT
Start: 2020-06-04 | End: 2020-06-04

## 2020-06-04 RX ORDER — FAMOTIDINE 10 MG/ML
20 INJECTION INTRAVENOUS
Status: DISCONTINUED | OUTPATIENT
Start: 2020-06-04 | End: 2020-06-04

## 2020-06-04 RX ORDER — MORPHINE SULFATE 1 MG/ML
INJECTION, SOLUTION EPIDURAL; INTRATHECAL; INTRAVENOUS
Status: DISCONTINUED | OUTPATIENT
Start: 2020-06-04 | End: 2020-06-04

## 2020-06-04 RX ORDER — ONDANSETRON 2 MG/ML
4 INJECTION INTRAMUSCULAR; INTRAVENOUS EVERY 6 HOURS PRN
Status: ACTIVE | OUTPATIENT
Start: 2020-06-04 | End: 2020-06-05

## 2020-06-04 RX ORDER — OXYTOCIN/RINGER'S LACTATE 30/500 ML
95 PLASTIC BAG, INJECTION (ML) INTRAVENOUS ONCE
Status: DISCONTINUED | OUTPATIENT
Start: 2020-06-04 | End: 2020-06-06 | Stop reason: HOSPADM

## 2020-06-04 RX ORDER — IBUPROFEN 600 MG/1
600 TABLET ORAL EVERY 6 HOURS
Status: DISCONTINUED | OUTPATIENT
Start: 2020-06-05 | End: 2020-06-06 | Stop reason: HOSPADM

## 2020-06-04 RX ORDER — SODIUM CHLORIDE, SODIUM LACTATE, POTASSIUM CHLORIDE, CALCIUM CHLORIDE 600; 310; 30; 20 MG/100ML; MG/100ML; MG/100ML; MG/100ML
INJECTION, SOLUTION INTRAVENOUS CONTINUOUS
Status: DISCONTINUED | OUTPATIENT
Start: 2020-06-04 | End: 2020-06-04

## 2020-06-04 RX ORDER — BUPIVACAINE HYDROCHLORIDE 2.5 MG/ML
30 INJECTION, SOLUTION EPIDURAL; INFILTRATION; INTRACAUDAL ONCE
Status: COMPLETED | OUTPATIENT
Start: 2020-06-04 | End: 2020-06-04

## 2020-06-04 RX ORDER — ONDANSETRON HYDROCHLORIDE 2 MG/ML
INJECTION, SOLUTION INTRAMUSCULAR; INTRAVENOUS
Status: DISCONTINUED | OUTPATIENT
Start: 2020-06-04 | End: 2020-06-04

## 2020-06-04 RX ORDER — ONDANSETRON 8 MG/1
8 TABLET, ORALLY DISINTEGRATING ORAL EVERY 8 HOURS PRN
Status: DISCONTINUED | OUTPATIENT
Start: 2020-06-04 | End: 2020-06-06 | Stop reason: HOSPADM

## 2020-06-04 RX ORDER — SERTRALINE HYDROCHLORIDE 25 MG/1
50 TABLET, FILM COATED ORAL NIGHTLY
Status: DISCONTINUED | OUTPATIENT
Start: 2020-06-04 | End: 2020-06-06 | Stop reason: HOSPADM

## 2020-06-04 RX ORDER — CEFAZOLIN SODIUM 2 G/50ML
2 SOLUTION INTRAVENOUS
Status: COMPLETED | OUTPATIENT
Start: 2020-06-04 | End: 2020-06-04

## 2020-06-04 RX ORDER — SIMETHICONE 80 MG
1 TABLET,CHEWABLE ORAL EVERY 6 HOURS PRN
Status: DISCONTINUED | OUTPATIENT
Start: 2020-06-04 | End: 2020-06-06 | Stop reason: HOSPADM

## 2020-06-04 RX ORDER — CARBOPROST TROMETHAMINE 250 UG/ML
250 INJECTION, SOLUTION INTRAMUSCULAR
Status: DISCONTINUED | OUTPATIENT
Start: 2020-06-04 | End: 2020-06-04

## 2020-06-04 RX ORDER — SODIUM CITRATE AND CITRIC ACID MONOHYDRATE 334; 500 MG/5ML; MG/5ML
30 SOLUTION ORAL
Status: DISCONTINUED | OUTPATIENT
Start: 2020-06-04 | End: 2020-06-04

## 2020-06-04 RX ORDER — LEVOTHYROXINE SODIUM 50 UG/1
50 TABLET ORAL DAILY
Status: DISCONTINUED | OUTPATIENT
Start: 2020-06-05 | End: 2020-06-06 | Stop reason: HOSPADM

## 2020-06-04 RX ORDER — HYDROCORTISONE 25 MG/G
CREAM TOPICAL 3 TIMES DAILY PRN
Status: DISCONTINUED | OUTPATIENT
Start: 2020-06-04 | End: 2020-06-06 | Stop reason: HOSPADM

## 2020-06-04 RX ORDER — METHYLERGONOVINE MALEATE 0.2 MG/ML
200 INJECTION INTRAVENOUS
Status: DISCONTINUED | OUTPATIENT
Start: 2020-06-04 | End: 2020-06-04

## 2020-06-04 RX ORDER — AMOXICILLIN 250 MG
1 CAPSULE ORAL NIGHTLY PRN
Status: DISCONTINUED | OUTPATIENT
Start: 2020-06-04 | End: 2020-06-06 | Stop reason: HOSPADM

## 2020-06-04 RX ORDER — OXYCODONE AND ACETAMINOPHEN 10; 325 MG/1; MG/1
1 TABLET ORAL EVERY 4 HOURS PRN
Status: DISCONTINUED | OUTPATIENT
Start: 2020-06-04 | End: 2020-06-06 | Stop reason: HOSPADM

## 2020-06-04 RX ORDER — NALOXONE HCL 0.4 MG/ML
0.04 VIAL (ML) INJECTION EVERY 5 MIN PRN
Status: DISCONTINUED | OUTPATIENT
Start: 2020-06-04 | End: 2020-06-06 | Stop reason: HOSPADM

## 2020-06-04 RX ORDER — ACETAMINOPHEN 325 MG/1
650 TABLET ORAL EVERY 6 HOURS
Status: DISPENSED | OUTPATIENT
Start: 2020-06-04 | End: 2020-06-05

## 2020-06-04 RX ORDER — FAMOTIDINE 40 MG/5ML
10 POWDER, FOR SUSPENSION ORAL 2 TIMES DAILY
Status: DISCONTINUED | OUTPATIENT
Start: 2020-06-04 | End: 2020-06-06 | Stop reason: HOSPADM

## 2020-06-04 RX ORDER — OXYCODONE AND ACETAMINOPHEN 5; 325 MG/1; MG/1
1 TABLET ORAL EVERY 4 HOURS PRN
Status: DISCONTINUED | OUTPATIENT
Start: 2020-06-04 | End: 2020-06-06 | Stop reason: HOSPADM

## 2020-06-04 RX ORDER — DOCUSATE SODIUM 100 MG/1
200 CAPSULE, LIQUID FILLED ORAL 2 TIMES DAILY
Status: DISCONTINUED | OUTPATIENT
Start: 2020-06-04 | End: 2020-06-06 | Stop reason: HOSPADM

## 2020-06-04 RX ORDER — BISACODYL 10 MG
10 SUPPOSITORY, RECTAL RECTAL ONCE AS NEEDED
Status: DISCONTINUED | OUTPATIENT
Start: 2020-06-04 | End: 2020-06-06 | Stop reason: HOSPADM

## 2020-06-04 RX ORDER — OXYCODONE HYDROCHLORIDE 5 MG/1
10 TABLET ORAL EVERY 4 HOURS PRN
Status: ACTIVE | OUTPATIENT
Start: 2020-06-04 | End: 2020-06-05

## 2020-06-04 RX ORDER — SODIUM CHLORIDE, SODIUM LACTATE, POTASSIUM CHLORIDE, CALCIUM CHLORIDE 600; 310; 30; 20 MG/100ML; MG/100ML; MG/100ML; MG/100ML
INJECTION, SOLUTION INTRAVENOUS CONTINUOUS
Status: ACTIVE | OUTPATIENT
Start: 2020-06-04 | End: 2020-06-05

## 2020-06-04 RX ORDER — OXYCODONE HYDROCHLORIDE 5 MG/1
5 TABLET ORAL EVERY 4 HOURS PRN
Status: ACTIVE | OUTPATIENT
Start: 2020-06-04 | End: 2020-06-05

## 2020-06-04 RX ORDER — FENTANYL CITRATE 50 UG/ML
INJECTION, SOLUTION INTRAMUSCULAR; INTRAVENOUS
Status: DISCONTINUED | OUTPATIENT
Start: 2020-06-04 | End: 2020-06-04

## 2020-06-04 RX ORDER — MUPIROCIN 20 MG/G
OINTMENT TOPICAL
Status: DISCONTINUED | OUTPATIENT
Start: 2020-06-04 | End: 2020-06-04

## 2020-06-04 RX ORDER — SODIUM CHLORIDE 9 MG/ML
INJECTION, SOLUTION INTRAVENOUS CONTINUOUS PRN
Status: DISCONTINUED | OUTPATIENT
Start: 2020-06-04 | End: 2020-06-04

## 2020-06-04 RX ORDER — OXYTOCIN/RINGER'S LACTATE 30/500 ML
95 PLASTIC BAG, INJECTION (ML) INTRAVENOUS ONCE
Status: COMPLETED | OUTPATIENT
Start: 2020-06-04 | End: 2020-06-04

## 2020-06-04 RX ORDER — FAMOTIDINE 10 MG/1
10 TABLET ORAL 2 TIMES DAILY
Status: DISCONTINUED | OUTPATIENT
Start: 2020-06-04 | End: 2020-06-04

## 2020-06-04 RX ORDER — MUPIROCIN 20 MG/G
1 OINTMENT TOPICAL 2 TIMES DAILY
Status: DISCONTINUED | OUTPATIENT
Start: 2020-06-04 | End: 2020-06-06 | Stop reason: HOSPADM

## 2020-06-04 RX ORDER — MORPHINE SULFATE 4 MG/ML
2 INJECTION, SOLUTION INTRAMUSCULAR; INTRAVENOUS
Status: ACTIVE | OUTPATIENT
Start: 2020-06-04 | End: 2020-06-05

## 2020-06-04 RX ORDER — KETOROLAC TROMETHAMINE 30 MG/ML
30 INJECTION, SOLUTION INTRAMUSCULAR; INTRAVENOUS EVERY 6 HOURS
Status: COMPLETED | OUTPATIENT
Start: 2020-06-04 | End: 2020-06-05

## 2020-06-04 RX ORDER — MISOPROSTOL 200 UG/1
800 TABLET ORAL
Status: DISCONTINUED | OUTPATIENT
Start: 2020-06-04 | End: 2020-06-04

## 2020-06-04 RX ADMIN — FAMOTIDINE 10 MG: 40 POWDER, FOR SUSPENSION ORAL at 01:06

## 2020-06-04 RX ADMIN — SODIUM CHLORIDE, SODIUM LACTATE, POTASSIUM CHLORIDE, AND CALCIUM CHLORIDE 1000 ML: .6; .31; .03; .02 INJECTION, SOLUTION INTRAVENOUS at 07:06

## 2020-06-04 RX ADMIN — SODIUM CHLORIDE: 0.9 INJECTION, SOLUTION INTRAVENOUS at 09:06

## 2020-06-04 RX ADMIN — KETOROLAC TROMETHAMINE 60 MG: 30 INJECTION, SOLUTION INTRAMUSCULAR; INTRAVENOUS at 09:06

## 2020-06-04 RX ADMIN — DOCUSATE SODIUM 200 MG: 100 CAPSULE, LIQUID FILLED ORAL at 09:06

## 2020-06-04 RX ADMIN — FAMOTIDINE 10 MG: 40 POWDER, FOR SUSPENSION ORAL at 09:06

## 2020-06-04 RX ADMIN — ACETAMINOPHEN 650 MG: 325 TABLET ORAL at 11:06

## 2020-06-04 RX ADMIN — ONDANSETRON 4 MG: 2 INJECTION, SOLUTION INTRAMUSCULAR; INTRAVENOUS at 09:06

## 2020-06-04 RX ADMIN — MUPIROCIN: 20 OINTMENT TOPICAL at 07:06

## 2020-06-04 RX ADMIN — FAMOTIDINE 20 MG: 10 INJECTION, SOLUTION INTRAVENOUS at 08:06

## 2020-06-04 RX ADMIN — BUPIVACAINE HYDROCHLORIDE 75 MG: 2.5 INJECTION, SOLUTION EPIDURAL; INFILTRATION; INTRACAUDAL; PERINEURAL at 09:06

## 2020-06-04 RX ADMIN — SERTRALINE HYDROCHLORIDE 50 MG: 25 TABLET ORAL at 09:06

## 2020-06-04 RX ADMIN — SODIUM CHLORIDE, SODIUM LACTATE, POTASSIUM CHLORIDE, AND CALCIUM CHLORIDE: .6; .31; .03; .02 INJECTION, SOLUTION INTRAVENOUS at 01:06

## 2020-06-04 RX ADMIN — CEFAZOLIN SODIUM 2 G: 2 SOLUTION INTRAVENOUS at 08:06

## 2020-06-04 RX ADMIN — KETOROLAC TROMETHAMINE 30 MG: 30 INJECTION, SOLUTION INTRAMUSCULAR at 11:06

## 2020-06-04 RX ADMIN — SODIUM CITRATE AND CITRIC ACID MONOHYDRATE 30 ML: 500; 334 SOLUTION ORAL at 08:06

## 2020-06-04 RX ADMIN — MORPHINE SULFATE 0.2 MG: 1 INJECTION, SOLUTION EPIDURAL; INTRATHECAL; INTRAVENOUS at 09:06

## 2020-06-04 RX ADMIN — FENTANYL CITRATE 10 MCG: 50 INJECTION, SOLUTION INTRAMUSCULAR; INTRAVENOUS at 09:06

## 2020-06-04 RX ADMIN — MUPIROCIN 1 G: 20 OINTMENT TOPICAL at 09:06

## 2020-06-04 RX ADMIN — ONDANSETRON 8 MG: 8 TABLET, ORALLY DISINTEGRATING ORAL at 01:06

## 2020-06-04 RX ADMIN — KETOROLAC TROMETHAMINE 30 MG: 30 INJECTION, SOLUTION INTRAMUSCULAR at 03:06

## 2020-06-04 RX ADMIN — Medication 95 MILLI-UNITS/MIN: at 10:06

## 2020-06-04 NOTE — H&P
"HPI:   Sherlyn Monahan is a 30 y.o. female  at 40 weeks 5 days EGA who presents here for repeat . Her prenatal care was uncomplicated.    ROS:  GENERAL: Denies weight gain or weight loss. Feeling well overall.   SKIN: Denies rash or lesions.   HEAD: Denies head injury or headache.   CHEST: Denies chest pain or shortness of breath.   CARDIOVASCULAR: Denies palpitations or left sided chest pain.   ABDOMEN: No constipation, diarrhea, nausea, vomiting or rectal bleeding.   URINARY: No frequency, dysuria, hematuria, or burning on urination.  REPRODUCTIVE: See HPI.     Past Medical History:   Diagnosis Date    Acquired hypothyroidism 2016    Irritable bowel syndrome (IBS)     Obesity (BMI 30.0-34.9) 2016    loosing weight consistently on 21 Day Fix program     Urticaria      Past Surgical History:   Procedure Laterality Date    breast reduction  2012    BREAST SURGERY      breast reduction     SECTION      CHOLECYSTECTOMY  2019    LAPAROSCOPIC CHOLECYSTECTOMY N/A 2019    Procedure: CHOLECYSTECTOMY, LAPAROSCOPIC;  Surgeon: Missael Tolentino MD;  Location: [facility];  Service: General;  Laterality: N/A;  video     Family History   Problem Relation Age of Onset    Breast cancer Mother 45        remission for 10 years    Cancer Mother 45        breast     Hypertension Mother     Allergic rhinitis Mother     Diabetes Father     Diabetes Maternal Grandfather     Heart disease Maternal Grandfather     Diabetes Paternal Grandfather     Colon cancer Neg Hx     Ovarian cancer Neg Hx     Allergies Neg Hx     Angioedema Neg Hx     Asthma Neg Hx     Atopy Neg Hx     Eczema Neg Hx     Immunodeficiency Neg Hx     Rhinitis Neg Hx     Urticaria Neg Hx      Minocycline       PE:   Temp 98.4 °F (36.9 °C) (Oral)   Resp 18   Ht 5' 5" (1.651 m)   Wt 91.2 kg (201 lb)   LMP 2019 (Exact Date)   Breastfeeding? Yes   BMI 33.45 kg/m²   APPEARANCE: Well " nourished, well developed, in no acute distress.  CHEST: Lungs clear to auscultation.  HEART: Regular rate and rhythm, no murmurs, rubs or gallops.  ABDOMEN:  Gravid. NTND soft        Assessment:  IUP 40 weeks 5 days  Previous   Category 1 Fetal Heart Tracing    Plan:   Expect repeat

## 2020-06-04 NOTE — ANESTHESIA PREPROCEDURE EVALUATION
2020  Sherlyn Monahan is a 30 y.o., female for ,. Hx hypothyroidism, on SSRI    Past Medical History:   Diagnosis Date    Acquired hypothyroidism 2016    Irritable bowel syndrome (IBS)     Obesity (BMI 30.0-34.9) 2016    loosing weight consistently on 21 Day Fix program     Urticaria      Past Surgical History:   Procedure Laterality Date    breast reduction  2012    BREAST SURGERY      breast reduction     SECTION      CHOLECYSTECTOMY  2019    LAPAROSCOPIC CHOLECYSTECTOMY N/A 2019    Procedure: CHOLECYSTECTOMY, LAPAROSCOPIC;  Surgeon: Missael Tolentino MD;  Location: [facility];  Service: General;  Laterality: N/A;  video       Pre-op Assessment    I have reviewed the Patient Summary Reports.       I have reviewed the Medications.     Review of Systems  Anesthesia Hx:  No problems with previous Anesthesia  Denies Family Hx of Anesthesia complications.    Social:  Former Smoker, Social Alcohol Use    Hematology/Oncology:  Hematology Normal        Cardiovascular:  Cardiovascular Normal Exercise tolerance: good   Denies Angina.        Pulmonary:  Pulmonary Normal  Denies Shortness of breath.    Renal/:  Renal/ Normal     Hepatic/GI:   GERD, well controlled  Liver Disease, Fatty Liver    Neurological:  Neurology Normal    Endocrine:   Hypothyroidism        Physical Exam  General:  Well nourished    Airway/Jaw/Neck:  Airway Findings: Mouth Opening: Normal Tongue: Normal  General Airway Assessment: Adult  Mallampati: I  TM Distance: Normal, at least 6 cm  Jaw/Neck Findings:  Neck ROM: Normal ROM      Dental:  Dental Findings: In tact   Chest/Lungs:  Chest/Lungs Findings: Clear to auscultation, Normal Respiratory Rate     Heart/Vascular:  Heart Findings: Rate: Normal  Rhythm: Regular Rhythm  Sounds: Normal  Heart murmur: negative       Mental  Status:  Mental Status Findings:  Cooperative, Alert and Oriented         Anesthesia Plan  Type of Anesthesia, risks & benefits discussed:  Anesthesia Type:  spinal  Patient's Preference:   Intra-op Monitoring Plan: standard ASA monitors  Intra-op Monitoring Plan Comments:   Post Op Pain Control Plan: multimodal analgesia  Post Op Pain Control Plan Comments:   Induction:   IV  Beta Blocker:  Patient is not currently on a Beta-Blocker (No further documentation required).       Informed Consent: Patient understands risks and agrees with Anesthesia plan.  Questions answered.   ASA Score: 2     Day of Surgery Review of History & Physical:  There are no significant changes.      Anesthesia Plan Notes: Anesthesia consent to be signed prior to procedure on 6/11/19          Ready For Surgery From Anesthesia Perspective.

## 2020-06-04 NOTE — PLAN OF CARE
Lactation consult initiated. Mother w/ hx of breast reduction sx in 2012 and hypothyroidism. Discussed implications. Currently taking synthroid, zoloft, and flonase. Reviewed information from [facility] and Mother's Milk and provided handouts. Encouraged to review with infant's pediatrician. Mother states she  her now 1 yo for 10 months, however, had to supplement starting around 2 months due to low supply (evident by infant's weight). Discussed importance of frequent feeds. Mother states she is able to hand express. Praise given. Assisted w/ latch & feeding observed. Infant with multiple swallows. Large drop of colostrum noted on nipple after feeding. Mother will breastfeed on cue at least 8 or more times in 24 hours. Mother will monitor for adequate supply and monitor wet and dirty diapers. Mother will call for any breastfeeding needs.

## 2020-06-04 NOTE — NURSING
0705: Received report from Airam GUTIERREZ RN. 30 year old G 2 P 1 at 40 wks 5 days presented to L&D for scheduled, repeat . History/complications of CS x1, thyroid, obesity, IBS, cholecystectomy, and breast reduction. No vaginal bleeding, no leaking fluid. Fetus: fetal heart tones 135, + fetal movements. Contractions irregular. Abdomen soft, non-tender. Vital signs stable.  Cervix: unchecked. .Educated on electronic fetal monitoring and assessments. Questions answered. Will continue with  preparation.

## 2020-06-04 NOTE — TRANSFER OF CARE
"Anesthesia Transfer of Care Note    Patient: Sherlyn Monahan    Procedure(s) Performed: Procedure(s) (LRB):   SECTION (N/A)    Patient location: Labor and Delivery    Anesthesia Type: spinal    Transport from OR: Transported from OR on room air with adequate spontaneous ventilation    Post pain: adequate analgesia    Post assessment: no apparent anesthetic complications    Post vital signs: stable    Level of consciousness: awake and alert    Nausea/Vomiting: no nausea/vomiting    Complications: none    Transfer of care protocol was followed      Last vitals:   Visit Vitals  /67   Pulse 64   Temp 36.9 °C (98.4 °F) (Oral)   Resp 18   Ht 5' 5" (1.651 m)   Wt 91.2 kg (201 lb)   LMP 2019 (Exact Date)   SpO2 100%   Breastfeeding? Yes   BMI 33.45 kg/m²     "

## 2020-06-04 NOTE — L&D DELIVERY NOTE
Date of Surgery: 20    Pre-Operative Diagnosis:   IUP 40 weeks 5 days  Previous       Post-Operative Diagnosis:   Same  Viable female infant      Surgery:   Repeat LTCS    Surgeon: MD Constance  Assistant: HEYDI Arias LPN    Anesthesia: Spinal and local    EBL: 800 cc    Findings:   Normal bilateral tubes and ovaries.   Normal uterus  Viable female infant with 9/9 Apgars    Specimens: None    Complications: None    With patient in supine position, the legs are  and Li Catheter placed and positioning to supine done.   Abdomen prepped with Chloroprep and 3 minute drying time allowed prior to draping of the abdomen.   Time out taken with OR team members.  Pfannenstiel Incision made through the skin, transverse fascial incision developed, rectus muscles  in the midline and the peritoneum entered.   no adhesions noted.  The lower uterine segment and position of the fetus identified.   Bladder flap taken down through transverse peritoneal incision.    Low Transverse Incision made through well developed lower uterine segment and extended laterally with blunt dissection.   Clear fluid noted.  Infant delivered from vertex presentation.  Cord clamped after one minute and  handed to attending nurse.  Cord blood taken, placenta delivered.  The uterus was exteriorized.  Closure with running lock 0 Chromic, starting at right angle and tied at the left angle. Observation for bleeding along the hysterotomy line. Excellent hemostasis was noted and Gricel hemostatic agent was placed over hysterotomy site.    Uterus, right and left adnexa with normal anatomy.Closure of the rectus muscles with 0 Chromic suture in an interupted fashion. Fascial closure with 0 Vicryl starting at the right angle and tying the knot at the left angle. Bupivacaine was injected under the fascial layer.  Skin closure with 4 0 Monocryl subcuticular.  Wound dressed with Dermaflex surgical glue.   The patient tolerated  the procedure well and all counts were correct x 2.  The patient was taken to recovery room in stable condition.            Delivery Information for Girl Sherlyn Monahan    Birth information:  YOB: 2020   Time of birth: 9:20 AM   Sex: female   Head Delivery Date/Time: 2020  9:20 AM   Delivery type: , Low Transverse   Gestational Age: 40w5d    Delivery Providers    Delivering clinician:  Chiara Smith MD           Measurements    Weight:  3735 g  Length:           Apgars    Living status:  Living  Apgars:   1 min.:   5 min.:   10 min.:   15 min.:   20 min.:     Skin color:   1  1       Heart rate:   2  2       Reflex irritability:   2  2       Muscle tone:   2  2       Respiratory effort:   2  2       Total:   9  9       Apgars assigned by:  LUKAS WOODS RN         Operative Delivery    Forceps attempted?:  No  Vacuum extractor attempted?:  No         Shoulder Dystocia    Shoulder dystocia present?:  No           Presentation    Presentation:  Vertex           Interventions/Resuscitation    Method:  Bulb Suctioning, Tactile Stimulation       Cord    Vessels:  3 vessels  Complications:  None  Delayed Cord Clamping?:  No  Cord Clamped Date/Time:  2020  9:20 AM  Cord Blood Disposition:  Sent with Baby  Gases Sent?:  No  Stem Cell Collection (by MD):  No       Placenta    Placenta delivery date/time:    Placenta removal:  Manual removal  Placenta appearance:  Intact  Placenta disposition:  discarded           Labor Events:       labor: No     Labor Onset Date/Time:         Dilation Complete Date/Time:         Start Pushing Date/Time:       Rupture Date/Time: 20  09         Rupture type:           Fluid Amount:        Fluid Color:        Fluid Odor:        Membrane Status (PeriCalm):        Rupture Date/Time (PeriCalm):        Fluid Amount (PeriCalm):        Fluid Color (PeriCalm):         steroids: None     Antibiotics given for GBS: No     Induction: none      Indications for induction:        Augmentation:       Indications for augmentation:       Labor complications: None     Additional complications:          Cervical ripening:                     Delivery:      Episiotomy: None     Indication for Episiotomy:       Perineal Lacerations: None Repaired:      Periurethral Laceration:   Repaired:     Labial Laceration:   Repaired:     Sulcus Laceration:   Repaired:     Vaginal Laceration:   Repaired:     Cervical Laceration:   Repaired:     Repair suture: None     Repair # of packets:       Last Value - EBL - Nursing (mL): 0     Sum - EBL - Nursing (mL): 0     Last Value - EBL - Anesthesia (mL):      Calculated QBL (mL): 774      Vaginal Sweep Performed: Yes     Surgicount Correct: Yes       Other providers:       Anesthesia    Method:  Spinal          Details (if applicable):  Trial of Labor No    Categorization: Repeat    Priority: Routine   Indications for : Repeat Section   Incision Type: low transverse     Additional  information:  Forceps:    Vacuum:    Breech:    Observed anomalies    Other (Comments):

## 2020-06-04 NOTE — PLAN OF CARE
POC reviewed with pt and SO. Verbalized understanding. Questions encouraged and answered.  Continuous toco, U/S, and vital signs continued prior to procedure.  Fundal massage performed and bleeding assessed every 15 minutes x 1 hour, then every 30 minutes x 2 hours.   Pain assessed hourly. LR pre-load given prior to spinal.  Pt plans to breastfeed. Breastfeeding guide provided and reviewed.  Questions encouraged and answered.

## 2020-06-04 NOTE — LACTATION NOTE
[facility]  Lactation Note - Mom    SUMMARY     Maternal Assessment    Breast Size Issue: other (see comments)(hx breast reduction sx in 2012)  Breast Shape: Bilateral:, round  Breast Density: Bilateral:, soft  Areola: Bilateral:, surgical scarring, elastic  Nipples: Bilateral:, everted, graspable  Left Nipple Symptoms: other (see comments)(denies pain)  Right Nipple Symptoms: other (see comments)(denies pain)      LATCH Score      see  flowsheets   Breasts WDL    Breast WDL: WDL, nipple symptoms  Left Nipple Symptoms: other (see comments)(denies pain)  Right Nipple Symptoms: other (see comments)(denies pain)    Maternal Infant Feeding    Maternal Preparation: breast care, hand hygiene  Maternal Emotional State: relaxed, independent  Infant Positioning: clutch/football  Signs of Milk Transfer: audible swallow  Pain with Feeding: no  Comfort Measures Before/During Feeding: latch adjusted  Comfort Measures Following Feeding: air-drying encouraged, expressed milk applied  Nipple Shape After Feeding, Left: round, elongated  Nipple Shape After Feeding, Right: pinched (assisted w/ deeper latch on L)  Latch Assistance: yes    Lactation Referrals         Lactation Interventions    Breast Care: Breastfeeding: breast milk to nipples, other (see comments), open to air, lanolin to nipples  Breastfeeding Assistance: assisted with positioning, assisted with techniques for flat/inverted nipples, feeding cue recognition promoted, feeding on demand promoted, feeding session observed, support offered, infant suck/swallow verified  Breast Care: Breastfeeding: breast milk to nipples, other (see comments), open to air, lanolin to nipples  Breastfeeding Assistance: assisted with positioning, assisted with techniques for flat/inverted nipples, feeding cue recognition promoted, feeding on demand promoted, feeding session observed, support offered, infant suck/swallow verified  Fetal Wellbeing Promotion: fetal  heart rate monitored, uterine contraction activity assessed  Breastfeeding Support: diary/feeding log utilized, encouragement provided, infant-mother separation minimized, maternal rest encouraged, maternal nutrition promoted, maternal hydration promoted       Breastfeeding Session    Infant Positioning: clutch/football  Signs of Milk Transfer: audible swallow    Maternal Information    Maternal Reason for Referral: breast surgery/injury history, milk supply inadequate history  Infant Reason for Referral:  infant

## 2020-06-04 NOTE — ANESTHESIA PROCEDURE NOTES
Spinal    Diagnosis: labor  Patient location during procedure: OB  Start time: 6/4/2020 9:01 AM  Timeout: 6/4/2020 9:00 AM  End time: 6/4/2020 9:05 AM    Staffing  Authorizing Provider: Haider Rodríguez MD  Performing Provider: Reginald Rose MD    Preanesthetic Checklist  Completed: patient identified, site marked, surgical consent, pre-op evaluation, timeout performed, IV checked, risks and benefits discussed and monitors and equipment checked  Spinal Block  Patient position: sitting  Prep: ChloraPrep  Patient monitoring: heart rate, continuous pulse ox and frequent blood pressure checks  Approach: midline  Location: L3-4  Injection technique: single shot  CSF Fluid: clear free-flowing CSF  Needle  Needle type: pencil-tip   Needle gauge: 24 G  Needle length: 3.5 in  Additional Documentation: incremental injection, negative aspiration for heme and no paresthesia on injection  Needle localization: anatomical landmarks  Assessment  Sensory level: T8   Dermatomal levels determined by pinch or prick  Ease of block: easy  Patient's tolerance of the procedure: comfortable throughout block

## 2020-06-04 NOTE — NURSING
1400: Pericare, catheter care, and absorbant pad changed. 900 ml clear, yellow urine emptied from catheter. Pt given scheduled oral pepcid, but threw up medication. Zofran given. Pt denies continued nausea. Pt transferred to room 307 via bed with infant breastfeeding in arms. Spouse to remain at bedside. Pitocin infusion completed. LR maintenance fluid continued. Call light within reach. Report given to TOMAS BARBER.

## 2020-06-05 LAB
BASOPHILS # BLD AUTO: 0.01 K/UL (ref 0–0.2)
BASOPHILS NFR BLD: 0.1 % (ref 0–1.9)
DIFFERENTIAL METHOD: ABNORMAL
EOSINOPHIL # BLD AUTO: 0 K/UL (ref 0–0.5)
EOSINOPHIL NFR BLD: 0.4 % (ref 0–8)
ERYTHROCYTE [DISTWIDTH] IN BLOOD BY AUTOMATED COUNT: 12.9 % (ref 11.5–14.5)
HCT VFR BLD AUTO: 35.5 % (ref 37–48.5)
HGB BLD-MCNC: 11.7 G/DL (ref 12–16)
IMM GRANULOCYTES # BLD AUTO: 0.05 K/UL (ref 0–0.04)
IMM GRANULOCYTES NFR BLD AUTO: 0.4 % (ref 0–0.5)
LYMPHOCYTES # BLD AUTO: 2.4 K/UL (ref 1–4.8)
LYMPHOCYTES NFR BLD: 20.9 % (ref 18–48)
MCH RBC QN AUTO: 32.3 PG (ref 27–31)
MCHC RBC AUTO-ENTMCNC: 33 G/DL (ref 32–36)
MCV RBC AUTO: 98 FL (ref 82–98)
MONOCYTES # BLD AUTO: 0.9 K/UL (ref 0.3–1)
MONOCYTES NFR BLD: 8.3 % (ref 4–15)
NEUTROPHILS # BLD AUTO: 8 K/UL (ref 1.8–7.7)
NEUTROPHILS NFR BLD: 69.9 % (ref 38–73)
NRBC BLD-RTO: 0 /100 WBC
PLATELET # BLD AUTO: 115 K/UL (ref 150–350)
PLATELET BLD QL SMEAR: ABNORMAL
PMV BLD AUTO: 11.5 FL (ref 9.2–12.9)
RBC # BLD AUTO: 3.62 M/UL (ref 4–5.4)
RPR SER QL: NORMAL
WBC # BLD AUTO: 11.33 K/UL (ref 3.9–12.7)

## 2020-06-05 PROCEDURE — 25000003 PHARM REV CODE 250: Performed by: OBSTETRICS & GYNECOLOGY

## 2020-06-05 PROCEDURE — 85025 COMPLETE CBC W/AUTO DIFF WBC: CPT

## 2020-06-05 PROCEDURE — 63600175 PHARM REV CODE 636 W HCPCS: Performed by: OBSTETRICS & GYNECOLOGY

## 2020-06-05 PROCEDURE — 36415 COLL VENOUS BLD VENIPUNCTURE: CPT

## 2020-06-05 PROCEDURE — 11000001 HC ACUTE MED/SURG PRIVATE ROOM

## 2020-06-05 RX ORDER — OXYCODONE AND ACETAMINOPHEN 5; 325 MG/1; MG/1
1 TABLET ORAL EVERY 4 HOURS PRN
Qty: 25 TABLET | Refills: 0 | Status: SHIPPED | OUTPATIENT
Start: 2020-06-05 | End: 2020-07-10

## 2020-06-05 RX ORDER — IBUPROFEN 600 MG/1
600 TABLET ORAL EVERY 6 HOURS
Qty: 60 TABLET | Refills: 0 | Status: SHIPPED | OUTPATIENT
Start: 2020-06-05 | End: 2022-01-28

## 2020-06-05 RX ADMIN — OXYCODONE HYDROCHLORIDE AND ACETAMINOPHEN 1 TABLET: 10; 325 TABLET ORAL at 08:06

## 2020-06-05 RX ADMIN — IBUPROFEN 600 MG: 600 TABLET, FILM COATED ORAL at 05:06

## 2020-06-05 RX ADMIN — ACETAMINOPHEN 650 MG: 325 TABLET ORAL at 05:06

## 2020-06-05 RX ADMIN — SERTRALINE HYDROCHLORIDE 50 MG: 25 TABLET ORAL at 08:06

## 2020-06-05 RX ADMIN — IBUPROFEN 600 MG: 600 TABLET, FILM COATED ORAL at 11:06

## 2020-06-05 RX ADMIN — LEVOTHYROXINE SODIUM 50 MCG: 50 TABLET ORAL at 06:06

## 2020-06-05 RX ADMIN — OXYCODONE HYDROCHLORIDE AND ACETAMINOPHEN 1 TABLET: 10; 325 TABLET ORAL at 12:06

## 2020-06-05 RX ADMIN — MUPIROCIN 1 G: 20 OINTMENT TOPICAL at 08:06

## 2020-06-05 RX ADMIN — DOCUSATE SODIUM 200 MG: 100 CAPSULE, LIQUID FILLED ORAL at 08:06

## 2020-06-05 RX ADMIN — KETOROLAC TROMETHAMINE 30 MG: 30 INJECTION, SOLUTION INTRAMUSCULAR at 05:06

## 2020-06-05 RX ADMIN — OXYCODONE HYDROCHLORIDE AND ACETAMINOPHEN 1 TABLET: 10; 325 TABLET ORAL at 04:06

## 2020-06-05 RX ADMIN — OXYCODONE HYDROCHLORIDE AND ACETAMINOPHEN 1 TABLET: 10; 325 TABLET ORAL at 07:06

## 2020-06-05 RX ADMIN — DOCUSATE SODIUM 200 MG: 100 CAPSULE, LIQUID FILLED ORAL at 09:06

## 2020-06-05 RX ADMIN — MUPIROCIN 1 G: 20 OINTMENT TOPICAL at 09:06

## 2020-06-05 NOTE — PLAN OF CARE
Pt is tolerating a regular diet, passing gas.  Pain well managed with ordered pain medication.  NAD. Pt catheter removed at 2200 per MD order, pt instructed to call when ready to void and time frame given to void. Pt still had not voided at 0400, 6 hrs post cath removal and she was straight cath to empty bladder. 1300cc emptied from bladder

## 2020-06-05 NOTE — LACTATION NOTE
Discussed with patient history of breast reduction surgery. Recommended pump set up to provide additional stimulation. Pt states she has a persona breast pump for home use from previous baby. Recommended new one- informed can go through insurance/Medicaid. Encouraged to call for latch check and assistance and pump set up if interested. States ok and appreciation.       [facility]  Lactation Note - Mom    SUMMARY     Maternal Assessment    Breast Size Issue: other (see comments)(hx breast reduction sx in 2012)  Breast Shape: Bilateral:, round  Breast Density: Bilateral:, soft  Areola: Bilateral:, surgical scarring, elastic  Nipples: Bilateral:, everted, graspable  Left Nipple Symptoms: tender  Right Nipple Symptoms: tender      LATCH Score         Breasts WDL    Breast WDL: WDL except, nipple symptoms  Left Nipple Symptoms: tender  Right Nipple Symptoms: tender    Maternal Infant Feeding    Maternal Preparation: breast care, hand hygiene  Maternal Emotional State: relaxed  Infant Positioning: clutch/football  Signs of Milk Transfer: audible swallow  Pain with Feeding: yes  Pain Location: nipples, bilateral  Pain Description: soreness  Comfort Measures Before/During Feeding: latch adjusted  Comfort Measures Following Feeding: air-drying encouraged, expressed milk applied, other (see comments)(lanolin being used, provided gel pads)  Nipple Shape After Feeding, Left: round, elongated  Nipple Shape After Feeding, Right: pinched (assisted w/ deeper latch on L)  Latch Assistance: other (see comments)(offered, instructed to call with next feeding for check/assi)    Lactation Referrals    Lactation Referrals: pediatric care provider, outpatient lactation program, other (see comments)(medical coverage to obtain new pump)  Outpatient Lactation Program Lactation Follow-up Date/Time: call lact ctr PRN  Pediatric Care Provider Lactation Follow-up Date/Time: f/u with ped for frequent weight checks    Lactation  Interventions    Breast Care: Breastfeeding: breast milk to nipples, Hydrogel dressing applied, manual expression to soften breast, milk massaged towards nipple  Breastfeeding Assistance: feeding cue recognition promoted, feeding on demand promoted, support offered  Breast Care: Breastfeeding: breast milk to nipples, Hydrogel dressing applied, manual expression to soften breast, milk massaged towards nipple  Breastfeeding Assistance: feeding cue recognition promoted, feeding on demand promoted, support offered  Fetal Wellbeing Promotion: intake and output monitored  Breastfeeding Support: encouragement provided, lactation counseling provided       Breastfeeding Session    Breast Pumping Interventions: early pumping promoted, frequent pumping encouraged, post-feed pumping encouraged, other (see comments)(due to history of reduction surgery)  Infant Positioning: clutch/football  Signs of Milk Transfer: audible swallow    Maternal Information    Maternal Reason for Referral: breast surgery/injury history, milk supply inadequate history  Infant Reason for Referral:  infant

## 2020-06-05 NOTE — LACTATION NOTE
This note was copied from a baby's chart.  Baby remained sleepy for majority of day, mom reports few sucks only. Mother states she is ready for pump set up at this time. States she did not call earlier because she fell asleep. Discussed medical indications for supplementation including weight loss percentage of 7-10% with poor intake and output or signs/symptoms of hypoglycemia. Review symptoms that they should notify baby's nurse about including jitters and lethargy. Baby appears free of jitters at this time and arouses to tactile stimulation.      Recommended alternative feeding methods if supplementation is medically indicated. Discussed spoon, cup, syringe and finger feeding. Informed parents that if they choose a bottle instead, it may make it more difficult for the baby to latch due to shape and texture of nipple and flow. Recommended avoid bottles/pacis until breastfeeding established.     [employer] pump, tubing, collections containers brought to bedside. Informed that she needs breast milk labels if she has any milk that needs to be stored. Instructed to give baby any amount of breast milk she expresses at this time and not to store it due to concerns of possible inadequate intake. Mom and dad report adequate output today. Discussed proper pump setting of initiation phase.  Instructed on proper usage of pump and to adjust suction according to maximum comfort level.  Verified appropriate flange fit.  Educated on the frequency and duration of pumping in order to promote and maintain a full milk supply.  Hands on pumping technique reviewed.  Encouraged hand expression after pumping.  Instructed on cleaning of breast pump parts.  Written instructions also given.  Pt verbalized understanding and appropriate recall for proper milk handling, collection, labeling, storage.    Parents had opportunity to ask questions. Denied any questions or needs at this time. Informed baby's nurse Mandi of mother's pump set  up due to poor feeding and history of breast reduction surgery.

## 2020-06-05 NOTE — LACTATION NOTE
Pt called me in to room for latch check. Pt able to position and latch baby correctly. States pain 4-5/10. Cannot rule out posterior tongue restriction. Tongue extends passed gumline but feels like baby has high arch in palate. Pt states her previous baby had a lip tie and posterior tongue tie. Discussed speaking with pediatrician and seeking a referral as needed for speech therapist, ENT, etc. Recommended pump set up for adequate stimulation given history of breast reduction surgery and weak inconsistent suck at this time. Pt states she is going to finish eating her lunch then shower and will call when she is ready for pump setup.

## 2020-06-05 NOTE — PROGRESS NOTES
"POD #1 s/p     Subjective: No complaints but was unable to void and had in and out cath about 4:30am with about 1300cc in bladder. Positive flatus    Objective: /72 (Patient Position: Lying)   Pulse 71   Temp 97.6 °F (36.4 °C) (Oral)   Resp 18   Ht 5' 5" (1.651 m)   Wt 91.2 kg (201 lb)   LMP 2019 (Exact Date)   SpO2 98%   Breastfeeding? Yes   BMI 33.45 kg/m²     H/H:   Lab Results   Component Value Date    WBC 11.33 2020    HGB 11.7 (L) 2020    HCT 35.5 (L) 2020    MCV 98 2020     (L) 2020       Chest: Clear to auscultation  CV: Regular rate and rhythm  Abdomen: Non-tender, non-distended, soft, positive bowel sounds  Incision: Bandaged  Extremities: Non-tender, no edema    Assessment: S/P     Plan: Routine progressive care      "

## 2020-06-05 NOTE — ANESTHESIA POSTPROCEDURE EVALUATION
Anesthesia Post Evaluation    Patient: Sherlyn Monahan    Procedure(s) Performed: Procedure(s) (LRB):   SECTION (N/A)    Final Anesthesia Type: spinal    Patient location during evaluation: labor & delivery  Patient participation: Yes- Able to Participate  Level of consciousness: awake and alert  Post-procedure vital signs: reviewed and stable  Pain management: adequate  Airway patency: patent    PONV status at discharge: No PONV  Anesthetic complications: no      Cardiovascular status: blood pressure returned to baseline and hemodynamically stable  Respiratory status: unassisted  Hydration status: euvolemic  Follow-up not needed.          Vitals Value Taken Time   /72 2020  7:05 AM   Temp 36.4 °C (97.6 °F) 2020  7:05 AM   Pulse 71 2020  7:05 AM   Resp 18 2020  7:05 AM   SpO2 98 % 2020  7:05 AM         No case tracking events are documented in the log.      Pain/Nahun Score: Pain Rating Prior to Med Admin: 7 (2020  7:10 AM)  Pain Rating Post Med Admin: 0 (2020 12:20 AM)    Pt denies headaches or back pain. Pt reports that LE function has returned to baseline. Pt denies issues with ambulation or urination.

## 2020-06-05 NOTE — PLAN OF CARE
Mother will exclusively breastfeed on cue 8 or more times in 24 hours. Will pump and supplement with expressed breast milk as available. Will record voids/stools. Will monitor baby for signs of adequate feedings. Will call for assistance if needed. Significant other supportive at bedside.

## 2020-06-05 NOTE — LACTATION NOTE
This note was copied from a baby's chart.  No signs of milk transfer noted. Baby sleepy with weak suck. Skin to skin and tactile stimulation provided. Mom reports mild-moderate discomfort. Recommended pump set up and hand expression to provide adequate stimulation and supplementation as needed for sleepy times. Pt to eat lunch and shower than will call when ready for pump set up.       [facility]  Lactation Note - Baby    SUMMARY     Feeding Method    breastfeeding    Breastfeeding    breastfeeding, left side only    LATCH Score    Latch: 1-->repeated attempts, holds nipple in mouth, stimulate to suck  Audible Swallowin-->none  Type of Nipple: 2-->everted (after stimulation)  Comfort (Breast/Nipple): 1-->filling, red/small blisters/bruises, mild/mod discomfort  Hold (Positioning): 2-->no assist from staff, mother able to position/hold infant  Score: 6    Breastfeeding Supplementation         Nutrition Interventions    Hypoglycemia Management (Infant): breastfeeding promoted, other (see comments)(supplement with expressed breast milk, hand express/pump)

## 2020-06-06 VITALS
RESPIRATION RATE: 18 BRPM | BODY MASS INDEX: 33.49 KG/M2 | OXYGEN SATURATION: 100 % | HEIGHT: 65 IN | HEART RATE: 68 BPM | DIASTOLIC BLOOD PRESSURE: 83 MMHG | WEIGHT: 201 LBS | SYSTOLIC BLOOD PRESSURE: 119 MMHG | TEMPERATURE: 98 F

## 2020-06-06 PROCEDURE — 25000003 PHARM REV CODE 250: Performed by: OBSTETRICS & GYNECOLOGY

## 2020-06-06 RX ADMIN — OXYCODONE HYDROCHLORIDE AND ACETAMINOPHEN 1 TABLET: 5; 325 TABLET ORAL at 01:06

## 2020-06-06 RX ADMIN — IBUPROFEN 600 MG: 600 TABLET, FILM COATED ORAL at 01:06

## 2020-06-06 RX ADMIN — DOCUSATE SODIUM 200 MG: 100 CAPSULE, LIQUID FILLED ORAL at 08:06

## 2020-06-06 RX ADMIN — OXYCODONE HYDROCHLORIDE AND ACETAMINOPHEN 1 TABLET: 5; 325 TABLET ORAL at 11:06

## 2020-06-06 RX ADMIN — IBUPROFEN 600 MG: 600 TABLET, FILM COATED ORAL at 05:06

## 2020-06-06 RX ADMIN — IBUPROFEN 600 MG: 600 TABLET, FILM COATED ORAL at 11:06

## 2020-06-06 RX ADMIN — MUPIROCIN 1 G: 20 OINTMENT TOPICAL at 08:06

## 2020-06-06 RX ADMIN — OXYCODONE HYDROCHLORIDE AND ACETAMINOPHEN 1 TABLET: 10; 325 TABLET ORAL at 05:06

## 2020-06-06 RX ADMIN — LEVOTHYROXINE SODIUM 50 MCG: 50 TABLET ORAL at 05:06

## 2020-06-06 RX ADMIN — SIMETHICONE CHEW TAB 80 MG 80 MG: 80 TABLET ORAL at 01:06

## 2020-06-06 NOTE — LACTATION NOTE
[facility]  Lactation Note - Mom    SUMMARY     Maternal Assessment    Breast Size Issue: none  Breast Shape: Bilateral:, round  Breast Density: Bilateral:, soft  Areola: Bilateral:, elastic  Nipples: Bilateral:, graspable, everted(w/ stimulation)  Left Nipple Symptoms: tender  Right Nipple Symptoms: tender      LATCH Score         Breasts WDL    Breast WDL: WDL except, nipple symptoms  Left Nipple Symptoms: tender  Right Nipple Symptoms: tender    Maternal Infant Feeding    Maternal Preparation: breast care, hand hygiene  Maternal Emotional State: relaxed, independent  Infant Positioning: clutch/football  Signs of Milk Transfer: infant jaw motion present  Pain with Feeding: yes(2-3/10 per mom;enc deeper latch)  Pain Location: nipples, bilateral  Pain Description: soreness  Comfort Measures Before/During Feeding: infant position adjusted, latch adjusted, maternal position adjusted(nipple shield used)  Milk Ejection Reflex: absent  Comfort Measures Following Feeding: air-drying encouraged, expressed milk applied  Nipple Shape After Feeding, Left: round;elongated in shield  Nipple Shape After Feeding, Right: pinched (assisted w/ deeper latch on L)  Latch Assistance: no    Lactation Referrals    Lactation Referrals: outpatient lactation program, pediatric care provider  Outpatient Lactation Program Lactation Follow-up Date/Time: enc to call warmline w/questions prn  Pediatric Care Provider Lactation Follow-up Date/Time: within 2-3 days;Dr Gee-has appt Mon 6/8/2020 per mom    Lactation Interventions    Breast Care: Breastfeeding: breast milk to nipples, Hydrogel dressing applied, supportive bra utilized(has lanolin also)  Breastfeeding Assistance: assisted with positioning, assisted with techniques for flat/inverted nipples, feeding cue recognition promoted, feeding on demand promoted, feeding session observed, infant latch-on verified, infant stimulated to wakeful state, infant suck/swallow  verified, nipple shield utilized, support offered  Breast Care: Breastfeeding: breast milk to nipples, Hydrogel dressing applied, supportive bra utilized(has lanolin also)  Breastfeeding Assistance: assisted with positioning, assisted with techniques for flat/inverted nipples, feeding cue recognition promoted, feeding on demand promoted, feeding session observed, infant latch-on verified, infant stimulated to wakeful state, infant suck/swallow verified, nipple shield utilized, support offered  Fetal Wellbeing Promotion: intake and output monitored  Breastfeeding Support: diary/feeding log utilized, encouragement provided, lactation counseling provided, maternal hydration promoted, maternal nutrition promoted, maternal rest encouraged       Breastfeeding Session    Breast Pumping Interventions: post-feed pumping encouraged  Infant Positioning: clutch/football  Signs of Milk Transfer: infant jaw motion present    Maternal Information    Maternal Reason for Referral: breast surgery/injury history, milk supply inadequate history  Infant Reason for Referral:  infant

## 2020-06-06 NOTE — PLAN OF CARE
POC reviewed with pt around 0820; verbalized acceptance and understanding.  Pt's VS stable.  Remains free from falls and injury.  Pain controlled with ordered meds.  Bonding well with baby.  Baby tolerating feedings; voiding/stooling appropriately.  Family at bedside to offer support.     Discharge instructions given verbally and in writing at 1120.  Verbalized understanding.  Received Mother-Baby care guide during hospital stay.  Prescriptions given with explanation for use.  Verbalized understanding.  States she feels comfortable taking care of baby and has demonstrated ability to care for  and herself.  Says she will have assistance when she returns home.  Discharged to home in stable condition via wheelchair with infant in arms.

## 2020-06-06 NOTE — DISCHARGE SUMMARY
[facility]  Obstetrics  Discharge Summary      Patient Name: Sherlyn Monahan  MRN: [id number]  Admission Date: 2020  Hospital Length of Stay: 2 days  Discharge Date and Time:  2020 8:48 AM  Attending Physician: Chiara Smith MD   Discharging Provider: Elke Triplett MD  Primary Care Provider: Alea Moses MD    HPI: 29 yo  female at 40+ weeks gestation and h/o previous LTCS who presented for repeat LTCS.    Procedure(s) (LRB):   SECTION (N/A)     Hospital Course: the patient's surgery was uncomplicated. Please see Dr. Smith's note for details of procedure.  The patient's postpartum course was also uncomplicated. On day of discharge, she was tolerating regular diet. Pain was well controlled with PO pain meds. The patient was voiding spontaneously.  She was passing flatus.    Consults (From admission, onward)        Status Ordering Provider     Consult to Lactation  Use PRN     Provider:  (Not yet assigned)    Acknowledged CHIARA SMITH          Final Active Diagnoses:    Diagnosis Date Noted POA    PRINCIPAL PROBLEM:  Delivery of pregnancy by  section [O82] 2020 Yes      Problems Resolved During this Admission:      Lab Results   Component Value Date    WBC 11.33 2020    HGB 11.7 (L) 2020    HCT 35.5 (L) 2020    MCV 98 2020     (L) 2020       O POS      Feeding Method: both breast and bottle    Immunizations     None          Delivery:    Episiotomy: None   Lacerations: None   Repair suture: None   Repair # of packets:     Blood loss (ml): 0     Birth information:  YOB: 2020   Time of birth: 9:20 AM   Sex: female   Delivery type: , Low Transverse   Gestational Age: 40w5d    Delivery Clinician:      Other providers:       Additional  information:  Forceps:    Vacuum:    Breech:    Observed anomalies      Living?:           APGARS  One minute Five minutes Ten minutes   Skin color:          Heart rate:         Grimace:         Muscle tone:         Breathing:         Totals: 9  9        Placenta: Delivered:       appearance    Pending Diagnostic Studies:     None          Discharged Condition: good    Disposition: Home or Self Care    Follow Up:  Follow-up Information     Chiara Smith MD On 6/11/2020.    Specialties:  Obstetrics, Obstetrics and Gynecology  Why:  3pm for incision check  Contact information:  [address] [facility]  [place] [address] [phone]                 Patient Instructions:   No discharge procedures on file.  Medications:  Current Discharge Medication List      START taking these medications    Details   ibuprofen (ADVIL,MOTRIN) 600 MG tablet Take 1 tablet (600 mg total) by mouth every 6 (six) hours.  Qty: 60 tablet, Refills: 0      oxyCODONE-acetaminophen (PERCOCET) 5-325 mg per tablet Take 1 tablet by mouth every 4 (four) hours as needed.  Qty: 25 tablet, Refills: 0    Comments: Quantity prescribed more than 7 day supply? No         CONTINUE these medications which have NOT CHANGED    Details   calcium-vitamin D3 (OYSTER SHELL CALCIUM-VIT D3) 500 mg(1,250mg) -200 unit per tablet Take 1 tablet by mouth.      cetirizine (ZYRTEC) 5 MG tablet Take 5 mg by mouth once daily.      fluticasone (FLONASE) 50 mcg/actuation nasal spray 1 spray by Nasal route.      prenatal vit37-iron-folic acid 29 mg iron- 1 mg Chew Take by mouth.      sertraline (ZOLOFT) 50 MG tablet Take 1 tablet (50 mg total) by mouth every evening.  Qty: 30 tablet, Refills: 4      SYNTHROID 50 mcg tablet Take 1 tablet (50 mcg total) by mouth once daily.  Qty: 90 tablet, Refills: 4    Associated Diagnoses: Acquired hypothyroidism      b complex vitamins capsule Take 1 capsule by mouth once daily.      famotidine (PEPCID) 10 MG tablet Take 10 mg by mouth 2 (two) times daily.      mupirocin calcium 2% nasl oint (BACTROBAN) 2 % Oint by Nasal route 2 (two) times daily.  Qty: 1 Tube, Refills: 3             Elke CARLSON  MD [facility]  Obstetrics  [facility]

## 2020-06-06 NOTE — PLAN OF CARE
Rounded on pt. D/c teaching done. Mom stated that she has been pumping & feeding EBM by syringe & supplementing with formula as needed. Has not put baby to breast recently due to nipple soreness. Offered assistance -agreed. Assisted with position & latch. Good latch noted in football hold with use of nipple shield per mom's preference. Sucking off & on with stimulation. Only couple swallows noted. No colostrum visible in shield. Encouraged to do lots of skin to skin/BR/pump/supplement with EBM & formula as needed until BR effectively & able to obtain adequate amount of EBM from pumping. Discussed hx of breast reduction surgery & hypothyroidism. Discussed probable need to continue to supplement with formula due to surgery & impact on BR. Encouraged mom to discuss with ped-Dr Gee. Encouraged mom to have baby's weight checked at ped's office 1-2x/week until certain plan of care is effective & baby is gaining weight appropriately. Discussed normal weight loss/gain patterns. Mom will continue to breastfeed frequently & on cue at least 8+ times/24 hrs.  Will monitor for signs of deep latch & adequate fdg; I&O. Will pump after BR as noted above. Will have baby's weight checked at ped's office in the next couple of days after d/c from hospital as recommended-has appt with ped on Mon 6/8/2020. Discussed available resources in Breastfeeding Guide. Lots of praise, encouragement & reassurance provided. Instructed to call for any questions/needs. Verbalized understanding.

## 2020-06-06 NOTE — PROGRESS NOTES
POD #2    S: patient doing well. Voiding spontaneously. No other complaints this am. Ready for d/c to home.    O  Temp:  [97.5 °F (36.4 °C)-98.7 °F (37.1 °C)]   Pulse:  [46-87]   Resp:  [16-18]   BP: ()/(54-76)   SpO2:  [97 %-100 %]    Gen: A&Ox3, nAD  Abd: soft, fundus firm at level of umbilicus, min pain, incision with bandage c/d/i  Ext: no lower extremity edeam    Lab Results   Component Value Date    WBC 11.33 2020    HGB 11.7 (L) 2020    HCT 35.5 (L) 2020    MCV 98 2020     (L) 2020       O POS    AP: 31 yo now  female s/p rLTCS, doing well.  Continue routine pp care  D/c to home today    zulma rice MD

## 2020-06-06 NOTE — DISCHARGE INSTRUCTIONS
"Patient Discharge Instructions for Postpartum Women    Resume Regular Diet  Increase activity gradually, no heavy lifting  Shower  No tampons, douching or sexual intercourse.  Discuss birth control options with your physician.  Wear a support bra  Return to work/school when you've been cleared by a physician    Call your physician if     *Fever of 100.4 or higher  *Persistent nausea/ vomiting  *Incisional drainage  *Heavy vaginal bleeding or large clots (Heavy bleeding is soaking 1 pad in an hour)  *Swelling and pain in arms or legs  *Severe headaches, blurred vision or fainting  *Shortness of breath  *Frequency and burning with urination  *Signs of postpartum depression, discuss these signs with your physician    Call lactation services for questions regarding feeding, nipple and breast care, and general questions about lactation.  They can be reached at [phone]         Understanding Postpartum Depression    You've just had a baby.  You know you should be excited and happy.  But instead you find yourself crying for no reason.  You may have trouble coping with your daily tasks.  You feel sad, tired, and hopeless most of the time.  You may even feel ashamed or guilty.  But what you're going through is not your fault and you can feel better.  Talk to your doctor.  He or she can help.    Depression After Childbirth    You may be weepy and tired right after giving birth.  These feelings are normal.  They're sometimes called the "baby blues."  These blues go away 2-3 weeks.  However, postpartum (meaning "after birth") depression lasts much longer and is more sever than the "baby blues."  It can make you feel sad and hopeless.  You may also fear that your baby will be harmed and worry about being a bad mother.      What is Depression?    Depression is a mood disorder that affects the way you think and feel.  The most common symptom is a feeling of deep sadness.  You may also feel as if you just can't cope with life.  "   Other symptoms include:      * Gaining or loosing weight  * Sleeping too much or too little  * Feeling tired all the time  * Feeling restless  * Fears of harming your baby   * Lack of interest in your baby  * Feeling worthless or guilty  * No longer finding pleasure in things you used to  * Having trouble thinking clearly or making decisions  * Thoughts of hurting yourself or your baby    What Causes Postpartum Depression    The exact causes of postpartum depression isn't known.  It may be due to changes in your hormones during and after childbirth.  You may also be tired from caring for your baby and adjusting to being a mother.  All these factors may make you feel depressed.  In some cases, your genes may also play a role.    Depression Can Be Treated    The good news is that there are many ways to treat postpartum depression.  Talking to your doctor is the first step toward feeling better.    Resources:    * National [place] of Mental Health  -- 325.733.6648    www.nimh.nih.gov    * National [place] on Mental Illness --258.926.2735    Www.césar.org    * Mental Health [place] -- 981.818.4070     Www.[facility].org    * National Suicide Hotline --292.384.1141 (800-SUICIDE)    4578-5258 The [employer].com  All rights reserved.  This information is not intended as a substitute for professional medical care.  Always follow up with your healthcare professional's instructions.          Breastfeeding Discharge Instructions       Feed the baby at the earliest sign of hunger or comfort  o Hands to mouth, sucking motions  o Rooting or searching for something to suck on  o Dont wait for crying - it is a sign of distress     The feedings may be 8-12 times per 24hrs and will not follow a schedule   Avoid pacifiers and bottles for the first 4 weeks   Alternate the breast you start the feeding with, or start with the breast that feels the fullest   Switch breasts when the baby takes himself off the breast or falls  asleep   Keep offering breasts until the baby looks full, no longer gives hunger signs, and stays asleep when placed on his back in the crib   If the baby is sleepy and wont wake for a feeding, put the baby skin-to-skin dressed in a diaper against the mothers bare chest   Sleep near your baby   The baby should be positioned and latched on to the breast correctly  o Chest-to-chest, chin in the breast  o Babys lips are flipped outward  o Babys mouth is stretched open wide like a shout  o Babys sucking should feel like tugging to the mother  - The baby should be drinking at the breast:  o You should hear swallowing or gulping throughout the feeding  o You should see milk on the babys lips when he comes off the breast  o Your breasts should be softer when the baby is finished feeding  o The baby should look relaxed at the end of feedings  o After the 4th day and your milk is in:  o The babys poop should turn bright yellow and be loose, watery, and seedy  o The baby should have at least 3-4 poops the size of the palm of your hand per day  o The baby should have at least 5-6 wet diapers per day  o The urine should be light yellow in color  You should drink when you are thirsty and eat a healthy diet when you are    hungry.     Take naps to get the rest you need.   Take medications and/or drink alcohol only with permission of your obstetrician    or the babys pediatrician.  You can also call the Infant Risk Center,   ([phone]), Monday-Friday, 8am-5pm Central time, to get the most   up-to-date evidence-based information on the use of medications during   pregnancy and breastfeeding.      The baby should be examined by a pediatrician at 3-5 days of age.  Once your   milk comes in, the baby should be gaining at least ½ - 1oz each day and should be back to birthweight no later than 10-14 days of age.          Community Resources    [facility] [place] Breastfeeding Warmline: [phone]  Local  WIC clinics: provide incentives and breastpumps to eligible mothers  La Leche League International (LLLI):  mother-to-mother support group website        www.lll.org  [facility] La Leche League mother-to-mother support groups:        www.llniallMRI Interventions.[employer]        La Leche League [facility]   Dr. Richardson Donahue website for latch videos and general information:        www.breastfeedinginc.ca  Infant Risk Center is a call center that provides information about the safety of taking medications while breastfeeding.  Call 1-145.311.4428, M-F, 8am-5pm, CT.  International Lactation Consultant Association provides resources for assistance:        www.ilca.org  [facility] Breastfeeding Coalition provides informationand resources for parents  and the community    http://[facility]astfeeding.org     Dianne Meyer is a mom-to-mom support group:                             www.[employer]//breastfeedng-support/  Partners for Healthy Babies:  3-271-454-BABY(8966)  [employer]: a breastfeeding support group for women of color, 348.812.4093

## 2020-06-09 ENCOUNTER — PATIENT MESSAGE (OUTPATIENT)
Dept: OBSTETRICS AND GYNECOLOGY | Facility: CLINIC | Age: 30
End: 2020-06-09

## 2020-06-10 ENCOUNTER — TELEPHONE (OUTPATIENT)
Dept: OBSTETRICS AND GYNECOLOGY | Facility: CLINIC | Age: 30
End: 2020-06-10

## 2020-06-10 NOTE — TELEPHONE ENCOUNTER
Pt called warmline requesting name of nipple cream that a friend was telling her about. States her nipples were bleeding and noted to be scabbed. Referred to wellness store for Lewis's cream. Discuss with doctor first.  Has f/u appt with ped on Friday. Recommended nipple rest x24 hours. Pump to maintain supply. States has nipple shield but the baby does not latch well with it. States has gel pads that were provided to her in the hospital. Encouraged continued use to aid in healing. Recommended let breasts air dry several times a day to prevent yeast infections- change bra pads frequently. States she has been pumping and syringe feeding baby but that it is very hard and the baby is demanding more milk. Discussed differences between breast and bottle feeding, information provided on safe bottle feeding. Recommended pump and supplement with bottle as needed since pt stating alternative feeding methods are too difficult.  Encouraged to call lactation center back PRN. Support provided.

## 2020-06-11 ENCOUNTER — PATIENT MESSAGE (OUTPATIENT)
Dept: OBSTETRICS AND GYNECOLOGY | Facility: CLINIC | Age: 30
End: 2020-06-11

## 2020-06-11 ENCOUNTER — OFFICE VISIT (OUTPATIENT)
Dept: OBSTETRICS AND GYNECOLOGY | Facility: CLINIC | Age: 30
End: 2020-06-11
Payer: COMMERCIAL

## 2020-06-11 VITALS
HEIGHT: 65 IN | BODY MASS INDEX: 30.82 KG/M2 | DIASTOLIC BLOOD PRESSURE: 70 MMHG | WEIGHT: 185 LBS | SYSTOLIC BLOOD PRESSURE: 120 MMHG

## 2020-06-11 DIAGNOSIS — Z98.890 POST-OPERATIVE STATE: Primary | ICD-10-CM

## 2020-06-11 PROCEDURE — 99024 POSTOP FOLLOW-UP VISIT: CPT | Mod: S$GLB,,, | Performed by: OBSTETRICS & GYNECOLOGY

## 2020-06-11 PROCEDURE — 99999 PR PBB SHADOW E&M-EST. PATIENT-LVL III: CPT | Mod: PBBFAC,,, | Performed by: OBSTETRICS & GYNECOLOGY

## 2020-06-11 PROCEDURE — 99999 PR PBB SHADOW E&M-EST. PATIENT-LVL III: ICD-10-PCS | Mod: PBBFAC,,, | Performed by: OBSTETRICS & GYNECOLOGY

## 2020-06-11 PROCEDURE — 99024 PR POST-OP FOLLOW-UP VISIT: ICD-10-PCS | Mod: S$GLB,,, | Performed by: OBSTETRICS & GYNECOLOGY

## 2020-06-11 NOTE — PROGRESS NOTES
CC: Post-op     HPI: 30 y.o.  female patient presents today following  for incision check.  There are complaints of breast pain from latching issues. Talked to lactation yesterday and got Lewis cream. The procedure and hospitalization occured without complications.     MEDICATIONS: See Med Card    ALLERGIES: See Allergy Card    MEDICAL HISTORY:   PAST MEDICAL HISTORY:   Past Medical History:   Diagnosis Date    Acquired hypothyroidism 2016    Irritable bowel syndrome (IBS)     Obesity (BMI 30.0-34.9) 2016    loosing weight consistently on 21 Day Fix program     Urticaria         PAST SURGICAL HISTORY:   Past Surgical History:   Procedure Laterality Date    breast reduction  2012    BREAST SURGERY      breast reduction     SECTION       SECTION N/A 2020    Procedure:  SECTION;  Surgeon: Chiara Smith MD;  Location: [facility] L&D;  Service: OB/GYN;  Laterality: N/A;    CHOLECYSTECTOMY  2019    LAPAROSCOPIC CHOLECYSTECTOMY N/A 2019    Procedure: CHOLECYSTECTOMY, LAPAROSCOPIC;  Surgeon: Missael Tolentino MD;  Location: [facility] OR;  Service: General;  Laterality: N/A;  video        FAMILY HISTORY:   Family History   Problem Relation Age of Onset    Breast cancer Mother 45        remission for 10 years    Cancer Mother 45        breast     Hypertension Mother     Allergic rhinitis Mother     Diabetes Father     Diabetes Maternal Grandfather     Heart disease Maternal Grandfather     Diabetes Paternal Grandfather     Colon cancer Neg Hx     Ovarian cancer Neg Hx     Allergies Neg Hx     Angioedema Neg Hx     Asthma Neg Hx     Atopy Neg Hx     Eczema Neg Hx     Immunodeficiency Neg Hx     Rhinitis Neg Hx     Urticaria Neg Hx         SOCIAL HISTORY:   Social History     Tobacco Use    Smoking status: Former Smoker    Smokeless tobacco: Never Used   Substance Use Topics    Alcohol use: No     Frequency: Monthly or less     Drinks  per session: 1 or 2     Binge frequency: Never     Comment: very rare    Drug use: No          ROS: Negative other than HPI.    PE:   General: Appears well, breasts: nipples with scabs  Abdomen: Soft, no tenderness, no distention, no hepatosplenomegaly. Incisions are well healed  Extremities: No cords or edema      ASSESSMENT:   Routine postoperative follow-up exam  Breasts-- if feels like getting yeast call for Diflucan and check baby for thrush    PLAN:   Follow-up with me in 4 weeks.

## 2020-06-20 ENCOUNTER — TELEPHONE (OUTPATIENT)
Dept: OBSTETRICS AND GYNECOLOGY | Facility: CLINIC | Age: 30
End: 2020-06-20

## 2020-06-20 NOTE — TELEPHONE ENCOUNTER
States things are much better after using Lewis's cream and a day of nipple rest. States the baby is latching much better now and it is not damaging or painful. States she is supplementing baby after offering both breasts without difficulty. Praise and encouragement provided. Encouraged to call lactation center with any further questions or needs.

## 2020-06-24 ENCOUNTER — TELEPHONE (OUTPATIENT)
Dept: OBSTETRICS AND GYNECOLOGY | Facility: CLINIC | Age: 30
End: 2020-06-24

## 2020-06-24 NOTE — TELEPHONE ENCOUNTER
----- Message from Ly Mendoza sent at 6/24/2020 10:10 AM CDT -----  Regarding: advice  Type:  Needs Medical Advice    Who Called: pt  Advice Regarding: odor in incision  Would the patient rather a call back or a response via [employer]? call  Best Call Back Number: [phone]  Additional Information: n/a

## 2020-06-24 NOTE — TELEPHONE ENCOUNTER
Pt is complaining that her incision has a musky odor to it. Pt denies having any redness, streaking, having any drainage from it, or denies it being hot to touch. Pt has been using lotrimin powder on it twice a day and trying to keep it as dry as possible. Pt would like to know if there is anything else she can do. please advise

## 2020-06-24 NOTE — TELEPHONE ENCOUNTER
Send a picture through the portal and clean first with hydrogen peroxide then use Lotrimin powder 3 x daily

## 2020-06-24 NOTE — TELEPHONE ENCOUNTER
----- Message from Ly Mendoza sent at 6/24/2020  3:22 PM CDT -----  Regarding: returned call  Contact: [phone] patient  Type:  Patient Returning Call    Who Called: Patient  Who Left Message for Patient: Unknown  Does the patient know what this is regarding?: Unknown  Would the patient rather a call back or a response via [facility]? Call back  Best Call Back Number: [phone]  Additional Information: n/a

## 2020-07-10 ENCOUNTER — POSTPARTUM VISIT (OUTPATIENT)
Dept: OBSTETRICS AND GYNECOLOGY | Facility: CLINIC | Age: 30
End: 2020-07-10
Payer: COMMERCIAL

## 2020-07-10 VITALS
HEIGHT: 65 IN | BODY MASS INDEX: 32.32 KG/M2 | SYSTOLIC BLOOD PRESSURE: 120 MMHG | WEIGHT: 194 LBS | DIASTOLIC BLOOD PRESSURE: 60 MMHG

## 2020-07-10 PROCEDURE — 0503F PR POSTPARTUM CARE VISIT: ICD-10-PCS | Mod: S$GLB,,, | Performed by: OBSTETRICS & GYNECOLOGY

## 2020-07-10 PROCEDURE — 99999 PR PBB SHADOW E&M-EST. PATIENT-LVL III: CPT | Mod: PBBFAC,,, | Performed by: OBSTETRICS & GYNECOLOGY

## 2020-07-10 PROCEDURE — 0503F POSTPARTUM CARE VISIT: CPT | Mod: S$GLB,,, | Performed by: OBSTETRICS & GYNECOLOGY

## 2020-07-10 PROCEDURE — 99999 PR PBB SHADOW E&M-EST. PATIENT-LVL III: ICD-10-PCS | Mod: PBBFAC,,, | Performed by: OBSTETRICS & GYNECOLOGY

## 2020-07-10 NOTE — PROGRESS NOTES
CC: Post-partum follow-up    Sherlyn Monahan is a 30 y.o. female  presents for post-partum visit s/p a , due to previous .    Delivery Date: 2020  Delivery MD: Luis  Gender: female     Breast Feeding: YES  Depression: NO  Contraception: condoms    Pregnancy was complicated by:  None    Past Medical History:   Diagnosis Date    Acquired hypothyroidism 2016    Irritable bowel syndrome (IBS)     Obesity (BMI 30.0-34.9) 2016    loosing weight consistently on 21 Day Fix program     Urticaria      Past Surgical History:   Procedure Laterality Date    breast reduction  2012    BREAST SURGERY      breast reduction     SECTION       SECTION N/A 2020    Procedure:  SECTION;  Surgeon: Chiara Smith MD;  Location: [facility] L&D;  Service: OB/GYN;  Laterality: N/A;    CHOLECYSTECTOMY  2019    LAPAROSCOPIC CHOLECYSTECTOMY N/A 2019    Procedure: CHOLECYSTECTOMY, LAPAROSCOPIC;  Surgeon: Missael Tolentino MD;  Location: [facility] OR;  Service: General;  Laterality: N/A;  video     Social History     Tobacco Use    Smoking status: Former Smoker    Smokeless tobacco: Never Used   Substance Use Topics    Alcohol use: No     Frequency: Monthly or less     Drinks per session: 1 or 2     Binge frequency: Never     Comment: very rare    Drug use: No     Family History   Problem Relation Age of Onset    Breast cancer Mother 45        remission for 10 years    Cancer Mother 45        breast     Hypertension Mother     Allergic rhinitis Mother     Diabetes Father     Diabetes Maternal Grandfather     Heart disease Maternal Grandfather     Diabetes Paternal Grandfather     Colon cancer Neg Hx     Ovarian cancer Neg Hx     Allergies Neg Hx     Angioedema Neg Hx     Asthma Neg Hx     Atopy Neg Hx     Eczema Neg Hx     Immunodeficiency Neg Hx     Rhinitis Neg Hx     Urticaria Neg Hx      OB History    Para Term  AB  "Living   2 2 2 0 0 2   SAB TAB Ectopic Multiple Live Births   0 0 0 0 2      # Outcome Date GA Lbr Bennie/2nd Weight Sex Delivery Anes PTL Lv   2 Term 06/04/20 40w5d  3.735 kg (8 lb 3.8 oz) F CS-LTranv Spinal N SHANNAN   1 Term 04/11/18 38w4d 12:49 / 01:33 3.434 kg (7 lb 9.1 oz) M CS-LTranv Spinal, EPI N SHANNAN      Complications: Cephalopelvic Disproportion       Current Outpatient Medications:     calcium-vitamin D3 (OYSTER SHELL CALCIUM-VIT D3) 500 mg(1,250mg) -200 unit per tablet, Take 1 tablet by mouth., Disp: , Rfl:     cetirizine (ZYRTEC) 5 MG tablet, Take 5 mg by mouth once daily., Disp: , Rfl:     fluticasone (FLONASE) 50 mcg/actuation nasal spray, 1 spray by Nasal route., Disp: , Rfl:     ibuprofen (ADVIL,MOTRIN) 600 MG tablet, Take 1 tablet (600 mg total) by mouth every 6 (six) hours., Disp: 60 tablet, Rfl: 0    mupirocin calcium 2% nasl oint (BACTROBAN) 2 % Oint, by Nasal route 2 (two) times daily., Disp: 1 Tube, Rfl: 3    prenatal vit37-iron-folic acid 29 mg iron- 1 mg Chew, Take by mouth., Disp: , Rfl:     sertraline (ZOLOFT) 50 MG tablet, Take 1 tablet (50 mg total) by mouth every evening., Disp: 30 tablet, Rfl: 4    SYNTHROID 50 mcg tablet, Take 1 tablet (50 mcg total) by mouth once daily., Disp: 90 tablet, Rfl: 4    b complex vitamins capsule, Take 1 capsule by mouth once daily., Disp: , Rfl:      /60 (BP Location: Right arm, Patient Position: Sitting)   Ht 5' 5" (1.651 m)   Wt 88 kg (194 lb)   LMP 08/23/2019 (Exact Date)   Breastfeeding Yes   BMI 32.28 kg/m²     ROS:  GENERAL: No fever, chills, fatigability or weight loss.  VULVAR: No pain, no lesions and no itching.  VAGINAL: No relaxation, no itching, no discharge, no abnormal bleeding and no lesions.  ABDOMEN: No abdominal pain. Denies nausea. Denies vomiting. No diarrhea. No constipation  BREAST: Denies pain. No lumps. No discharge.  URINARY: No incontinence, no nocturia, no frequency and no dysuria.  CARDIOVASCULAR: No chest pain. No " shortness of breath. No leg cramps.  NEUROLOGICAL: No headaches. No vision changes.    PE:   APPEARANCE: Well nourished, well developed, in no acute distress.  ABDOMEN: Soft. No tenderness or masses. No hernias. Incision well healed  PELVIC: External female genitalia without lesions. Normal hair distribution. Adequate perineal body, normal urethral meatus. Vagina moist and well rugated without lesions or discharge. Cervix pink and without lesions. No significant cystocele or rectocele. Bimanual exam showed uterus normal size, shape, position, mobile and nontender. Adnexa without masses or tenderness. Urethra and bladder normal.  EXTREMITIES: No edema.      ASSESSMENT:  1. Postpartum Exam    PLAN:  RTO 9/2020 for annual           Patient was counseled today on A.C.S. Pap guidelines and recommendations for yearly pelvic exams and monthly self breast exams; to see her PCP for other health maintenance and contraception options.

## 2020-08-28 ENCOUNTER — PATIENT MESSAGE (OUTPATIENT)
Dept: OBSTETRICS AND GYNECOLOGY | Facility: CLINIC | Age: 30
End: 2020-08-28

## 2020-08-31 RX ORDER — FLUCONAZOLE 200 MG/1
TABLET ORAL
Qty: 22 TABLET | Refills: 0 | Status: SHIPPED | OUTPATIENT
Start: 2020-08-31 | End: 2020-12-12

## 2020-08-31 NOTE — TELEPHONE ENCOUNTER
Patient states she is having stabbing pain when she is nursing.   Please send Diflucan to [facility] [address]

## 2020-08-31 NOTE — TELEPHONE ENCOUNTER
Lewis's cream has an antifungal but if she is having sharp stabbing pains in breasts then I would like to add Diflucan.

## 2020-09-14 ENCOUNTER — TELEPHONE (OUTPATIENT)
Dept: OBSTETRICS AND GYNECOLOGY | Facility: CLINIC | Age: 30
End: 2020-09-14

## 2020-09-14 DIAGNOSIS — E03.9 HYPOTHYROIDISM, UNSPECIFIED TYPE: Primary | ICD-10-CM

## 2020-09-14 RX ORDER — SERTRALINE HYDROCHLORIDE 50 MG/1
50 TABLET, FILM COATED ORAL NIGHTLY
Qty: 30 TABLET | Refills: 0 | Status: SHIPPED | OUTPATIENT
Start: 2020-09-14 | End: 2020-10-12

## 2020-09-15 NOTE — TELEPHONE ENCOUNTER
I can check labs, her give her a refill if needed, then she can continue to follow up with PCP for thyroid from there on out. Orders signed  I think her PCP is out on maternity leave anyway

## 2020-09-15 NOTE — TELEPHONE ENCOUNTER
Patient is requesting to have her thyroid levels checked next month and give new Rx for synthroid if needed.  Please advise if we will be continue to manage her thyroid medication or if needs to follow up with PCP or endocrinology since no longer pregnant.

## 2020-10-05 ENCOUNTER — PATIENT MESSAGE (OUTPATIENT)
Dept: ADMINISTRATIVE | Facility: HOSPITAL | Age: 30
End: 2020-10-05

## 2020-10-06 ENCOUNTER — PATIENT MESSAGE (OUTPATIENT)
Dept: OBSTETRICS AND GYNECOLOGY | Facility: CLINIC | Age: 30
End: 2020-10-06

## 2020-10-07 NOTE — TELEPHONE ENCOUNTER
Patient states she fell down stairs this morning bruising her back  She is breast feeding and is asking if it is safe to use icy hot/ lidocaine patch or ibuprofen  Please advise.

## 2020-10-14 ENCOUNTER — PATIENT MESSAGE (OUTPATIENT)
Dept: OBSTETRICS AND GYNECOLOGY | Facility: CLINIC | Age: 30
End: 2020-10-14

## 2020-10-14 ENCOUNTER — OFFICE VISIT (OUTPATIENT)
Dept: URGENT CARE | Facility: CLINIC | Age: 30
End: 2020-10-14
Payer: COMMERCIAL

## 2020-10-14 VITALS
HEIGHT: 65 IN | OXYGEN SATURATION: 100 % | DIASTOLIC BLOOD PRESSURE: 66 MMHG | RESPIRATION RATE: 16 BRPM | BODY MASS INDEX: 32.32 KG/M2 | TEMPERATURE: 97 F | SYSTOLIC BLOOD PRESSURE: 115 MMHG | HEART RATE: 80 BPM | WEIGHT: 194 LBS

## 2020-10-14 DIAGNOSIS — J06.9 VIRAL URI WITH COUGH: Primary | ICD-10-CM

## 2020-10-14 LAB
CTP QC/QA: YES
SARS-COV-2 RDRP RESP QL NAA+PROBE: NEGATIVE

## 2020-10-14 PROCEDURE — U0002: ICD-10-PCS | Mod: QW,S$GLB,, | Performed by: NURSE PRACTITIONER

## 2020-10-14 PROCEDURE — 99214 OFFICE O/P EST MOD 30 MIN: CPT | Mod: S$GLB,,, | Performed by: NURSE PRACTITIONER

## 2020-10-14 PROCEDURE — 99214 PR OFFICE/OUTPT VISIT, EST, LEVL IV, 30-39 MIN: ICD-10-PCS | Mod: S$GLB,,, | Performed by: NURSE PRACTITIONER

## 2020-10-14 PROCEDURE — U0002 COVID-19 LAB TEST NON-CDC: HCPCS | Mod: QW,S$GLB,, | Performed by: NURSE PRACTITIONER

## 2020-10-14 NOTE — PROGRESS NOTES
"Subjective:       Patient ID: Sherlyn Monahan is a 30 y.o. female.    Vitals:  height is 5' 5" (1.651 m) and weight is 88 kg (194 lb). Her temporal temperature is 97.4 °F (36.3 °C). Her blood pressure is 115/66 and her pulse is 80. Her respiration is 16 and oxygen saturation is 100%.     Chief Complaint: Sinus Problem    This is a 30 y.o. female who presents today with a chief complaint of sore throat/scratchy throat, cough, postnasal drip since yesterday, patient denies fever, body aches or chills, denies nausea, vomiting, diarrhea or abdominal pain, denies cough or shortness of breath, denies loss of taste or smell, denies dizziness or chest pain, patient is currently breast feeding      Sinus Problem  This is a new problem. The current episode started yesterday. The problem has been gradually worsening since onset. There has been no fever. Her pain is at a severity of 3/10. The pain is mild. Associated symptoms include coughing, headaches and a sore throat. Pertinent negatives include no chills, congestion, diaphoresis, ear pain, shortness of breath or sinus pressure. (Post nasal drip  ) Past treatments include oral decongestants. The treatment provided no relief.       Constitution: Negative for chills, sweating, fatigue and fever.   HENT: Positive for sore throat. Negative for ear pain, congestion, sinus pain, sinus pressure and voice change.    Neck: Negative for painful lymph nodes.   Eyes: Negative for eye redness.   Respiratory: Positive for cough. Negative for chest tightness, sputum production, bloody sputum, COPD, shortness of breath, stridor, wheezing and asthma.    Gastrointestinal: Negative for nausea and vomiting.   Musculoskeletal: Negative for muscle ache.   Skin: Negative for rash.   Allergic/Immunologic: Negative for seasonal allergies and asthma.   Neurological: Positive for headaches.   Hematologic/Lymphatic: Negative for swollen lymph nodes.       Objective:      Physical Exam "   Constitutional: She is oriented to person, place, and time. She appears well-developed. She is cooperative.  Non-toxic appearance. She does not appear ill. No distress.   HENT:   Head: Normocephalic and atraumatic.   Ears:   Right Ear: Hearing, tympanic membrane, external ear and ear canal normal.   Left Ear: Hearing, tympanic membrane, external ear and ear canal normal.   Nose: Nose normal. No mucosal edema, rhinorrhea or nasal deformity. No epistaxis. Right sinus exhibits no maxillary sinus tenderness and no frontal sinus tenderness. Left sinus exhibits no maxillary sinus tenderness and no frontal sinus tenderness.   Mouth/Throat: Uvula is midline, oropharynx is clear and moist and mucous membranes are normal. No trismus in the jaw. Normal dentition. No uvula swelling. No oropharyngeal exudate, posterior oropharyngeal edema, posterior oropharyngeal erythema, tonsillar abscesses or cobblestoning.   Clear PND      Comments: Clear PND  Eyes: Conjunctivae and lids are normal. No scleral icterus.   Neck: Trachea normal, full passive range of motion without pain and phonation normal. Neck supple. No neck rigidity. No edema and no erythema present.   Cardiovascular: Normal rate, regular rhythm, normal heart sounds and normal pulses.   Pulmonary/Chest: Effort normal and breath sounds normal. No stridor. No respiratory distress. She has no decreased breath sounds. She has no wheezes. She has no rhonchi. She has no rales.   Abdominal: Normal appearance.   Musculoskeletal: Normal range of motion.         General: No deformity.   Neurological: She is alert and oriented to person, place, and time. She exhibits normal muscle tone. Coordination normal.   Skin: Skin is warm, dry, intact, not diaphoretic and not pale. Psychiatric: Her speech is normal and behavior is normal. Judgment and thought content normal.   Nursing note and vitals reviewed.        Results for orders placed or performed in visit on 10/14/20   POCT COVID-19  Rapid Screening   Result Value Ref Range    POC Rapid COVID Negative Negative     Acceptable Yes          Negative COVID-19 results discussed with patient in depth.  Discussed results/diagnosis/plan in depth with patient in clinic. Strict precautions given to patient to monitor for worsening signs and symptoms. Advised to follow up with primary.All questions answered. Strict ER precautions given. If your symptoms worsens of fail to improve you should go to the Emergency Room. Discharge and follow-up instructions given verbally/printed. Patient voiced understanding and in agreement with current treatment plan.        Assessment:       1. Viral URI with cough        Plan:         Viral URI with cough  -     POCT COVID-19 Rapid Screening      Patient Instructions   If your condition worsens or fails to improve we recommend that you receive another evaluation at the ER immediately or contact your PCP to discuss your concerns or return here. You must understand that you've received an urgent care treatment only and that you may be released before all your medical problems are known or treated. You the patient will arrange for followup care as instructed.    If you were prescribed antibiotics, please take them to completion.  If you were prescribed a narcotic medication, do not drive or operate heavy equipment or machinery while taking these medications.  Please follow up with your primary care doctor or specialist as needed.  If you  smoke, please stop smoking.    Viral Upper Respiratory Illness (Adult)  You have a viral upper respiratory illness (URI), which is another term for the common cold. This illness is contagious during the first few days. It is spread through the air by coughing and sneezing. It may also be spread by direct contact (touching the sick person and then touching your own eyes, nose, or mouth). Frequent handwashing will decrease risk of spread. Most viral illnesses go away within 7 to  10 days with rest and simple home remedies. Sometimes the illness may last for several weeks. Antibiotics will not kill a virus, and they are generally not prescribed for this condition.    Home care  · If symptoms are severe, rest at home for the first 2 to 3 days. When you resume activity, don't let yourself get too tired.  · Avoid being exposed to cigarette smoke (yours or others).  · You may use acetaminophen or ibuprofen to control pain and fever, unless another medicine was prescribed. (Note: If you have chronic liver or kidney disease, have ever had a stomach ulcer or gastrointestinal bleeding, or are taking blood-thinning medicines, talk with your healthcare provider before using these medicines.) Aspirin should never be given to anyone under 18 years of age who is ill with a viral infection or fever. It may cause severe liver or brain damage.  · Your appetite may be poor, so a light diet is fine. Avoid dehydration by drinking 6 to 8 glasses of fluids per day (water, soft drinks, juices, tea, or soup). Extra fluids will help loosen secretions in the nose and lungs.  · Over-the-counter cold medicines will not shorten the length of time youre sick, but they may be helpful for the following symptoms: cough, sore throat, and nasal and sinus congestion. (Note: Do not use decongestants if you have high blood pressure.)  Follow-up care  Follow up with your healthcare provider, or as advised.  When to seek medical advice  Call your healthcare provider right away if any of these occur:  · Cough with lots of colored sputum (mucus)  · Severe headache; face, neck, or ear pain  · Difficulty swallowing due to throat pain  · Fever of 100.4°F (38°C)  Call 911, or get immediate medical care  Call emergency services right away if any of these occur:  · Chest pain, shortness of breath, wheezing, or difficulty breathing  · Coughing up blood  · Inability to swallow due to throat pain  Date Last Reviewed: 9/13/2015  © 7917-2237  [employer]. [address] [place] [address], [place] [address]. All rights reserved. This information is not intended as a substitute for professional medical care. Always follow your healthcare professional's instructions.

## 2020-10-14 NOTE — TELEPHONE ENCOUNTER
Mucinex DM for cough but they usually have you avoid antihistamines like Claritin, Zyrtec and Allegra because can dry up breast milk. Can gargle with warm salt water

## 2020-10-15 NOTE — PATIENT INSTRUCTIONS
If your condition worsens or fails to improve we recommend that you receive another evaluation at the ER immediately or contact your PCP to discuss your concerns or return here. You must understand that you've received an urgent care treatment only and that you may be released before all your medical problems are known or treated. You the patient will arrange for followup care as instructed.    If you were prescribed antibiotics, please take them to completion.  If you were prescribed a narcotic medication, do not drive or operate heavy equipment or machinery while taking these medications.  Please follow up with your primary care doctor or specialist as needed.  If you  smoke, please stop smoking.    Viral Upper Respiratory Illness (Adult)  You have a viral upper respiratory illness (URI), which is another term for the common cold. This illness is contagious during the first few days. It is spread through the air by coughing and sneezing. It may also be spread by direct contact (touching the sick person and then touching your own eyes, nose, or mouth). Frequent handwashing will decrease risk of spread. Most viral illnesses go away within 7 to 10 days with rest and simple home remedies. Sometimes the illness may last for several weeks. Antibiotics will not kill a virus, and they are generally not prescribed for this condition.    Home care  · If symptoms are severe, rest at home for the first 2 to 3 days. When you resume activity, don't let yourself get too tired.  · Avoid being exposed to cigarette smoke (yours or others).  · You may use acetaminophen or ibuprofen to control pain and fever, unless another medicine was prescribed. (Note: If you have chronic liver or kidney disease, have ever had a stomach ulcer or gastrointestinal bleeding, or are taking blood-thinning medicines, talk with your healthcare provider before using these medicines.) Aspirin should never be given to anyone under 18 years of age who is  ill with a viral infection or fever. It may cause severe liver or brain damage.  · Your appetite may be poor, so a light diet is fine. Avoid dehydration by drinking 6 to 8 glasses of fluids per day (water, soft drinks, juices, tea, or soup). Extra fluids will help loosen secretions in the nose and lungs.  · Over-the-counter cold medicines will not shorten the length of time youre sick, but they may be helpful for the following symptoms: cough, sore throat, and nasal and sinus congestion. (Note: Do not use decongestants if you have high blood pressure.)  Follow-up care  Follow up with your healthcare provider, or as advised.  When to seek medical advice  Call your healthcare provider right away if any of these occur:  · Cough with lots of colored sputum (mucus)  · Severe headache; face, neck, or ear pain  · Difficulty swallowing due to throat pain  · Fever of 100.4°F (38°C)  Call 911, or get immediate medical care  Call emergency services right away if any of these occur:  · Chest pain, shortness of breath, wheezing, or difficulty breathing  · Coughing up blood  · Inability to swallow due to throat pain  Date Last Reviewed: 9/13/2015  © 6392-8727 [employer]. [address] [place] [address], [place] [address]. All rights reserved. This information is not intended as a substitute for professional medical care. Always follow your healthcare professional's instructions.

## 2020-11-18 ENCOUNTER — OFFICE VISIT (OUTPATIENT)
Dept: OBSTETRICS AND GYNECOLOGY | Facility: CLINIC | Age: 30
End: 2020-11-18
Payer: COMMERCIAL

## 2020-11-18 ENCOUNTER — PATIENT MESSAGE (OUTPATIENT)
Dept: OBSTETRICS AND GYNECOLOGY | Facility: CLINIC | Age: 30
End: 2020-11-18

## 2020-11-18 VITALS
HEIGHT: 65 IN | SYSTOLIC BLOOD PRESSURE: 120 MMHG | DIASTOLIC BLOOD PRESSURE: 64 MMHG | WEIGHT: 210.63 LBS | BODY MASS INDEX: 35.09 KG/M2

## 2020-11-18 DIAGNOSIS — Z01.419 WELL WOMAN EXAM WITH ROUTINE GYNECOLOGICAL EXAM: Primary | ICD-10-CM

## 2020-11-18 DIAGNOSIS — Z12.4 SCREENING FOR CERVICAL CANCER: ICD-10-CM

## 2020-11-18 PROCEDURE — 3008F BODY MASS INDEX DOCD: CPT | Mod: CPTII,S$GLB,, | Performed by: OBSTETRICS & GYNECOLOGY

## 2020-11-18 PROCEDURE — 87624 HPV HI-RISK TYP POOLED RSLT: CPT

## 2020-11-18 PROCEDURE — 88175 CYTOPATH C/V AUTO FLUID REDO: CPT

## 2020-11-18 PROCEDURE — 1126F PR PAIN SEVERITY QUANTIFIED, NO PAIN PRESENT: ICD-10-PCS | Mod: S$GLB,,, | Performed by: OBSTETRICS & GYNECOLOGY

## 2020-11-18 PROCEDURE — 99395 PR PREVENTIVE VISIT,EST,18-39: ICD-10-PCS | Mod: S$GLB,,, | Performed by: OBSTETRICS & GYNECOLOGY

## 2020-11-18 PROCEDURE — 1126F AMNT PAIN NOTED NONE PRSNT: CPT | Mod: S$GLB,,, | Performed by: OBSTETRICS & GYNECOLOGY

## 2020-11-18 PROCEDURE — 99395 PREV VISIT EST AGE 18-39: CPT | Mod: S$GLB,,, | Performed by: OBSTETRICS & GYNECOLOGY

## 2020-11-18 PROCEDURE — 3008F PR BODY MASS INDEX (BMI) DOCUMENTED: ICD-10-PCS | Mod: CPTII,S$GLB,, | Performed by: OBSTETRICS & GYNECOLOGY

## 2020-11-18 PROCEDURE — 99999 PR PBB SHADOW E&M-EST. PATIENT-LVL III: ICD-10-PCS | Mod: PBBFAC,,, | Performed by: OBSTETRICS & GYNECOLOGY

## 2020-11-18 PROCEDURE — 99999 PR PBB SHADOW E&M-EST. PATIENT-LVL III: CPT | Mod: PBBFAC,,, | Performed by: OBSTETRICS & GYNECOLOGY

## 2020-11-18 NOTE — PROGRESS NOTES
"  OBSTETRIC HISTORY:   OB History        2    Para   2    Term   2       0    AB   0    Living   2       SAB   0    TAB   0    Ectopic   0    Multiple   0    Live Births   2                COMPREHENSIVE GYN HISTORY:  PAP History: Denies abnormal Paps.  Infection History: Reports STDs: Chlamydia. Denies PID.  Benign History: Denies uterine fibroids. Denies ovarian cysts. Denies endometriosis.   Cancer History: Denies cervical cancer. Denies uterine cancer or hyperplasia. Denies ovarian cancer. Denies vulvar cancer or pre-cancer. Denies vaginal cancer or pre-cancer. Denies breast cancer. Denies colon cancer.  Sexual Activity History:   reports that she currently engages in sexual activity and has had male partners. She reports using the following methods of birth control/protection: Condom.   Menstrual History:  Every 30 days, flows for 4 days. Moderate flow.  Dysmenorrhea History: Reports occ. dysmenorrhea.  Contraception: Condom         HPI:   30 y.o.  No LMP recorded (lmp unknown).   Patient is  here for her annual gynecologic exam.  She has no complaints. She denies bladder, breast and bowel complaints.    ROS:  GENERAL: Denies weight gain or weight loss. Feeling well overall.   SKIN: Denies rash or lesions.   HEAD: Denies headache.   NODES: Denies enlarged lymph nodes.   CHEST: Denies shortness of breath.   ABDOMEN: No abdominal pain, constipation, diarrhea, nausea, vomiting or rectal bleeding.   URINARY: No frequency, dysuria, hematuria, or burning on urination.  REPRODUCTIVE: See HPI.   BREASTS: The patient denies pain, lumps, or nipple discharge.   HEMATOLOGIC: No easy bruisability.   MUSCULOSKELETAL: Denies joint pain or back pain.   NEUROLOGIC: Denies weakness.   PSYCHIATRIC: Denies depression, anxiety or mood swings.    PE:   /64   Ht 5' 5" (1.651 m)   Wt 95.5 kg (210 lb 9.6 oz)   LMP  (LMP Unknown)   BMI 35.05 kg/m²   APPEARANCE: Well nourished, well developed, in no acute " distress.  NECK: Neck symmetric without  thyromegaly.  NODES: No inguinal, cervical lymph node enlargement.  CHEST: Lungs clear to auscultation.  HEART: Regular rate and rhythm, no murmurs, rubs or gallops.  ABDOMEN: Soft. No tenderness or masses. No hernias.  BREASTS: Symmetrical, no skin changes or visible lesions. No palpable masses, nipple discharge or adenopathy bilaterally.  PELVIC:   VULVA: No lesions. Normal female genitalia.  URETHRAL MEATUS: Normal size and location, no lesions, no prolapse.  URETHRA: No masses, tenderness, prolapse or scarring.  VAGINA: Moist and well rugated, no discharge, no significant cystocele or rectocele.  CERVIX: No lesions and discharge.  UTERUS: Normal size, regular shape, mobile, non-tender, bladder base nontender.  ADNEXA: No masses or tenderness.    PROCEDURES:  Pap smear    Assessment:  Normal Gynecologic Exam    Plan:  Mammogram and Colonoscopy if indicated by current recommendations.  Return to clinic in one year or for any problems or complaints.    Counseling:  Patient was counseled today on A.C.S. Pap guidelines and recommendations for yearly pelvic exams and monthly self breast exams; to see her PCP for other health maintenance. Regular exercise and healthy diet.

## 2020-11-25 LAB
HPV HR 12 DNA SPEC QL NAA+PROBE: NEGATIVE
HPV16 AG SPEC QL: NEGATIVE
HPV18 DNA SPEC QL NAA+PROBE: NEGATIVE

## 2020-12-12 ENCOUNTER — PATIENT MESSAGE (OUTPATIENT)
Dept: OBSTETRICS AND GYNECOLOGY | Facility: CLINIC | Age: 30
End: 2020-12-12

## 2020-12-12 RX ORDER — FLUCONAZOLE 200 MG/1
TABLET ORAL
Qty: 22 TABLET | Refills: 0 | Status: SHIPPED | OUTPATIENT
Start: 2020-12-12 | End: 2022-01-10

## 2020-12-22 ENCOUNTER — PATIENT MESSAGE (OUTPATIENT)
Dept: OBSTETRICS AND GYNECOLOGY | Facility: CLINIC | Age: 30
End: 2020-12-22

## 2020-12-30 ENCOUNTER — PATIENT MESSAGE (OUTPATIENT)
Dept: OBSTETRICS AND GYNECOLOGY | Facility: CLINIC | Age: 30
End: 2020-12-30

## 2021-01-04 ENCOUNTER — PATIENT MESSAGE (OUTPATIENT)
Dept: ADMINISTRATIVE | Facility: HOSPITAL | Age: 31
End: 2021-01-04

## 2021-01-06 LAB
FINAL PATHOLOGIC DIAGNOSIS: NORMAL
Lab: NORMAL

## 2021-01-23 ENCOUNTER — PATIENT MESSAGE (OUTPATIENT)
Dept: OBSTETRICS AND GYNECOLOGY | Facility: CLINIC | Age: 31
End: 2021-01-23

## 2021-01-26 ENCOUNTER — CLINICAL SUPPORT (OUTPATIENT)
Dept: URGENT CARE | Facility: CLINIC | Age: 31
End: 2021-01-26
Payer: COMMERCIAL

## 2021-01-26 DIAGNOSIS — R19.7 DIARRHEA, UNSPECIFIED TYPE: Primary | ICD-10-CM

## 2021-01-26 LAB
CTP QC/QA: YES
SARS-COV-2 RDRP RESP QL NAA+PROBE: NEGATIVE

## 2021-01-26 PROCEDURE — U0002: ICD-10-PCS | Mod: QW,S$GLB,, | Performed by: FAMILY MEDICINE

## 2021-01-26 PROCEDURE — U0002 COVID-19 LAB TEST NON-CDC: HCPCS | Mod: QW,S$GLB,, | Performed by: FAMILY MEDICINE

## 2021-02-25 ENCOUNTER — IMMUNIZATION (OUTPATIENT)
Dept: PHARMACY | Facility: CLINIC | Age: 31
End: 2021-02-25
Payer: COMMERCIAL

## 2021-02-25 DIAGNOSIS — Z23 NEED FOR VACCINATION: Primary | ICD-10-CM

## 2021-03-12 ENCOUNTER — OFFICE VISIT (OUTPATIENT)
Dept: FAMILY MEDICINE | Facility: CLINIC | Age: 31
End: 2021-03-12
Payer: COMMERCIAL

## 2021-03-12 VITALS
OXYGEN SATURATION: 98 % | DIASTOLIC BLOOD PRESSURE: 78 MMHG | BODY MASS INDEX: 34.6 KG/M2 | SYSTOLIC BLOOD PRESSURE: 128 MMHG | HEIGHT: 65 IN | WEIGHT: 207.69 LBS | HEART RATE: 89 BPM

## 2021-03-12 DIAGNOSIS — Z00.00 ROUTINE GENERAL MEDICAL EXAMINATION AT A HEALTH CARE FACILITY: Primary | ICD-10-CM

## 2021-03-12 DIAGNOSIS — K76.0 FATTY LIVER: ICD-10-CM

## 2021-03-12 DIAGNOSIS — E03.9 ACQUIRED HYPOTHYROIDISM: ICD-10-CM

## 2021-03-12 DIAGNOSIS — Z11.59 NEED FOR HEPATITIS C SCREENING TEST: ICD-10-CM

## 2021-03-12 DIAGNOSIS — Z98.891 S/P C-SECTION: ICD-10-CM

## 2021-03-12 DIAGNOSIS — E66.9 OBESITY (BMI 30.0-34.9): ICD-10-CM

## 2021-03-12 DIAGNOSIS — G43.709 CHRONIC MIGRAINE WITHOUT AURA WITHOUT STATUS MIGRAINOSUS, NOT INTRACTABLE: ICD-10-CM

## 2021-03-12 PROCEDURE — 3008F PR BODY MASS INDEX (BMI) DOCUMENTED: ICD-10-PCS | Mod: CPTII,S$GLB,, | Performed by: FAMILY MEDICINE

## 2021-03-12 PROCEDURE — 1126F AMNT PAIN NOTED NONE PRSNT: CPT | Mod: S$GLB,,, | Performed by: FAMILY MEDICINE

## 2021-03-12 PROCEDURE — 99999 PR PBB SHADOW E&M-EST. PATIENT-LVL IV: ICD-10-PCS | Mod: PBBFAC,,, | Performed by: FAMILY MEDICINE

## 2021-03-12 PROCEDURE — 3008F BODY MASS INDEX DOCD: CPT | Mod: CPTII,S$GLB,, | Performed by: FAMILY MEDICINE

## 2021-03-12 PROCEDURE — 99395 PREV VISIT EST AGE 18-39: CPT | Mod: S$GLB,,, | Performed by: FAMILY MEDICINE

## 2021-03-12 PROCEDURE — 99999 PR PBB SHADOW E&M-EST. PATIENT-LVL IV: CPT | Mod: PBBFAC,,, | Performed by: FAMILY MEDICINE

## 2021-03-12 PROCEDURE — 1126F PR PAIN SEVERITY QUANTIFIED, NO PAIN PRESENT: ICD-10-PCS | Mod: S$GLB,,, | Performed by: FAMILY MEDICINE

## 2021-03-12 PROCEDURE — 99395 PR PREVENTIVE VISIT,EST,18-39: ICD-10-PCS | Mod: S$GLB,,, | Performed by: FAMILY MEDICINE

## 2021-03-12 RX ORDER — LEVOTHYROXINE SODIUM 50 UG/1
50 TABLET ORAL DAILY
Qty: 90 TABLET | Refills: 4 | Status: SHIPPED | OUTPATIENT
Start: 2021-03-12 | End: 2022-04-06 | Stop reason: SDUPTHER

## 2021-03-13 ENCOUNTER — LAB VISIT (OUTPATIENT)
Dept: LAB | Facility: HOSPITAL | Age: 31
End: 2021-03-13
Attending: FAMILY MEDICINE
Payer: COMMERCIAL

## 2021-03-13 DIAGNOSIS — Z00.00 ROUTINE GENERAL MEDICAL EXAMINATION AT A HEALTH CARE FACILITY: ICD-10-CM

## 2021-03-13 LAB
ALBUMIN SERPL BCP-MCNC: 4.2 G/DL (ref 3.5–5.2)
ALP SERPL-CCNC: 98 U/L (ref 55–135)
ALT SERPL W/O P-5'-P-CCNC: 12 U/L (ref 10–44)
ANION GAP SERPL CALC-SCNC: 9 MMOL/L (ref 8–16)
AST SERPL-CCNC: 16 U/L (ref 10–40)
BASOPHILS # BLD AUTO: 0.01 K/UL (ref 0–0.2)
BASOPHILS NFR BLD: 0.1 % (ref 0–1.9)
BILIRUB SERPL-MCNC: 0.4 MG/DL (ref 0.1–1)
BUN SERPL-MCNC: 15 MG/DL (ref 6–20)
CALCIUM SERPL-MCNC: 9.5 MG/DL (ref 8.7–10.5)
CHLORIDE SERPL-SCNC: 105 MMOL/L (ref 95–110)
CHOLEST SERPL-MCNC: 197 MG/DL (ref 120–199)
CHOLEST/HDLC SERPL: 3.7 {RATIO} (ref 2–5)
CO2 SERPL-SCNC: 25 MMOL/L (ref 23–29)
CREAT SERPL-MCNC: 0.7 MG/DL (ref 0.5–1.4)
DIFFERENTIAL METHOD: ABNORMAL
EOSINOPHIL # BLD AUTO: 0.1 K/UL (ref 0–0.5)
EOSINOPHIL NFR BLD: 0.7 % (ref 0–8)
ERYTHROCYTE [DISTWIDTH] IN BLOOD BY AUTOMATED COUNT: 11.8 % (ref 11.5–14.5)
EST. GFR  (AFRICAN AMERICAN): >60 ML/MIN/1.73 M^2
EST. GFR  (NON AFRICAN AMERICAN): >60 ML/MIN/1.73 M^2
ESTIMATED AVG GLUCOSE: 97 MG/DL (ref 68–131)
FERRITIN SERPL-MCNC: 155 NG/ML (ref 20–300)
GLUCOSE SERPL-MCNC: 89 MG/DL (ref 70–110)
HBA1C MFR BLD: 5 % (ref 4–5.6)
HCT VFR BLD AUTO: 43.6 % (ref 37–48.5)
HDLC SERPL-MCNC: 53 MG/DL (ref 40–75)
HDLC SERPL: 26.9 % (ref 20–50)
HGB BLD-MCNC: 14.4 G/DL (ref 12–16)
IMM GRANULOCYTES # BLD AUTO: 0.02 K/UL (ref 0–0.04)
IMM GRANULOCYTES NFR BLD AUTO: 0.2 % (ref 0–0.5)
IRON SERPL-MCNC: 85 UG/DL (ref 30–160)
LDLC SERPL CALC-MCNC: 127.2 MG/DL (ref 63–159)
LYMPHOCYTES # BLD AUTO: 2.6 K/UL (ref 1–4.8)
LYMPHOCYTES NFR BLD: 29.8 % (ref 18–48)
MCH RBC QN AUTO: 31.4 PG (ref 27–31)
MCHC RBC AUTO-ENTMCNC: 33 G/DL (ref 32–36)
MCV RBC AUTO: 95 FL (ref 82–98)
MONOCYTES # BLD AUTO: 0.7 K/UL (ref 0.3–1)
MONOCYTES NFR BLD: 7.9 % (ref 4–15)
NEUTROPHILS # BLD AUTO: 5.3 K/UL (ref 1.8–7.7)
NEUTROPHILS NFR BLD: 61.3 % (ref 38–73)
NONHDLC SERPL-MCNC: 144 MG/DL
NRBC BLD-RTO: 0 /100 WBC
PLATELET # BLD AUTO: 217 K/UL (ref 150–350)
PMV BLD AUTO: 11.4 FL (ref 9.2–12.9)
POTASSIUM SERPL-SCNC: 4.4 MMOL/L (ref 3.5–5.1)
PROT SERPL-MCNC: 7.4 G/DL (ref 6–8.4)
RBC # BLD AUTO: 4.59 M/UL (ref 4–5.4)
SATURATED IRON: 24 % (ref 20–50)
SODIUM SERPL-SCNC: 139 MMOL/L (ref 136–145)
T4 FREE SERPL-MCNC: 0.89 NG/DL (ref 0.71–1.51)
TOTAL IRON BINDING CAPACITY: 357 UG/DL (ref 250–450)
TRANSFERRIN SERPL-MCNC: 241 MG/DL (ref 200–375)
TRIGL SERPL-MCNC: 84 MG/DL (ref 30–150)
TSH SERPL DL<=0.005 MIU/L-ACNC: 0.84 UIU/ML (ref 0.4–4)
WBC # BLD AUTO: 8.69 K/UL (ref 3.9–12.7)

## 2021-03-13 PROCEDURE — 84443 ASSAY THYROID STIM HORMONE: CPT | Performed by: FAMILY MEDICINE

## 2021-03-13 PROCEDURE — 80053 COMPREHEN METABOLIC PANEL: CPT | Performed by: FAMILY MEDICINE

## 2021-03-13 PROCEDURE — 86803 HEPATITIS C AB TEST: CPT | Performed by: FAMILY MEDICINE

## 2021-03-13 PROCEDURE — 85025 COMPLETE CBC W/AUTO DIFF WBC: CPT | Performed by: FAMILY MEDICINE

## 2021-03-13 PROCEDURE — 80061 LIPID PANEL: CPT | Performed by: FAMILY MEDICINE

## 2021-03-13 PROCEDURE — 83036 HEMOGLOBIN GLYCOSYLATED A1C: CPT | Performed by: FAMILY MEDICINE

## 2021-03-13 PROCEDURE — 82728 ASSAY OF FERRITIN: CPT | Performed by: FAMILY MEDICINE

## 2021-03-13 PROCEDURE — 83540 ASSAY OF IRON: CPT | Performed by: FAMILY MEDICINE

## 2021-03-13 PROCEDURE — 84439 ASSAY OF FREE THYROXINE: CPT | Performed by: FAMILY MEDICINE

## 2021-03-13 PROCEDURE — 36415 COLL VENOUS BLD VENIPUNCTURE: CPT | Performed by: FAMILY MEDICINE

## 2021-03-15 LAB — HCV AB SERPL QL IA: NEGATIVE

## 2021-03-25 ENCOUNTER — IMMUNIZATION (OUTPATIENT)
Dept: PHARMACY | Facility: CLINIC | Age: 31
End: 2021-03-25
Payer: COMMERCIAL

## 2021-03-25 DIAGNOSIS — Z23 NEED FOR VACCINATION: Primary | ICD-10-CM

## 2021-03-29 ENCOUNTER — PATIENT MESSAGE (OUTPATIENT)
Dept: OBSTETRICS AND GYNECOLOGY | Facility: CLINIC | Age: 31
End: 2021-03-29

## 2021-04-16 ENCOUNTER — TELEPHONE (OUTPATIENT)
Dept: LACTATION | Facility: HOSPITAL | Age: 31
End: 2021-04-16

## 2021-04-16 ENCOUNTER — PATIENT MESSAGE (OUTPATIENT)
Dept: OBSTETRICS AND GYNECOLOGY | Facility: CLINIC | Age: 31
End: 2021-04-16

## 2021-06-08 ENCOUNTER — OFFICE VISIT (OUTPATIENT)
Dept: URGENT CARE | Facility: CLINIC | Age: 31
End: 2021-06-08
Payer: COMMERCIAL

## 2021-06-08 VITALS
HEIGHT: 65 IN | HEART RATE: 99 BPM | BODY MASS INDEX: 34.99 KG/M2 | TEMPERATURE: 100 F | OXYGEN SATURATION: 96 % | SYSTOLIC BLOOD PRESSURE: 123 MMHG | DIASTOLIC BLOOD PRESSURE: 85 MMHG | RESPIRATION RATE: 15 BRPM | WEIGHT: 210 LBS

## 2021-06-08 DIAGNOSIS — J06.9 VIRAL URI WITH COUGH: Primary | ICD-10-CM

## 2021-06-08 DIAGNOSIS — R50.9 FEVER, UNSPECIFIED FEVER CAUSE: ICD-10-CM

## 2021-06-08 LAB
CTP QC/QA: YES
SARS-COV-2 RDRP RESP QL NAA+PROBE: NEGATIVE

## 2021-06-08 PROCEDURE — 99214 PR OFFICE/OUTPT VISIT, EST, LEVL IV, 30-39 MIN: ICD-10-PCS | Mod: S$GLB,,, | Performed by: PHYSICIAN ASSISTANT

## 2021-06-08 PROCEDURE — 3008F BODY MASS INDEX DOCD: CPT | Mod: CPTII,S$GLB,, | Performed by: PHYSICIAN ASSISTANT

## 2021-06-08 PROCEDURE — U0002 COVID-19 LAB TEST NON-CDC: HCPCS | Mod: QW,S$GLB,, | Performed by: PHYSICIAN ASSISTANT

## 2021-06-08 PROCEDURE — 3008F PR BODY MASS INDEX (BMI) DOCUMENTED: ICD-10-PCS | Mod: CPTII,S$GLB,, | Performed by: PHYSICIAN ASSISTANT

## 2021-06-08 PROCEDURE — U0002: ICD-10-PCS | Mod: QW,S$GLB,, | Performed by: PHYSICIAN ASSISTANT

## 2021-06-08 PROCEDURE — 99214 OFFICE O/P EST MOD 30 MIN: CPT | Mod: S$GLB,,, | Performed by: PHYSICIAN ASSISTANT

## 2021-11-11 ENCOUNTER — OFFICE VISIT (OUTPATIENT)
Dept: ORTHOPEDICS | Facility: CLINIC | Age: 31
End: 2021-11-11
Payer: COMMERCIAL

## 2021-11-11 VITALS — BODY MASS INDEX: 34.95 KG/M2 | WEIGHT: 210 LBS

## 2021-11-11 DIAGNOSIS — G56.01 CARPAL TUNNEL SYNDROME OF RIGHT WRIST: ICD-10-CM

## 2021-11-11 PROCEDURE — 1159F MED LIST DOCD IN RCRD: CPT | Mod: CPTII,S$GLB,, | Performed by: ORTHOPAEDIC SURGERY

## 2021-11-11 PROCEDURE — 99203 PR OFFICE/OUTPT VISIT, NEW, LEVL III, 30-44 MIN: ICD-10-PCS | Mod: 25,S$GLB,, | Performed by: ORTHOPAEDIC SURGERY

## 2021-11-11 PROCEDURE — 20526 PR INJECT CARPAL TUNNEL: ICD-10-PCS | Mod: RT,S$GLB,, | Performed by: ORTHOPAEDIC SURGERY

## 2021-11-11 PROCEDURE — 3008F PR BODY MASS INDEX (BMI) DOCUMENTED: ICD-10-PCS | Mod: CPTII,S$GLB,, | Performed by: ORTHOPAEDIC SURGERY

## 2021-11-11 PROCEDURE — 1159F PR MEDICATION LIST DOCUMENTED IN MEDICAL RECORD: ICD-10-PCS | Mod: CPTII,S$GLB,, | Performed by: ORTHOPAEDIC SURGERY

## 2021-11-11 PROCEDURE — 3008F BODY MASS INDEX DOCD: CPT | Mod: CPTII,S$GLB,, | Performed by: ORTHOPAEDIC SURGERY

## 2021-11-11 PROCEDURE — 20526 THER INJECTION CARP TUNNEL: CPT | Mod: RT,S$GLB,, | Performed by: ORTHOPAEDIC SURGERY

## 2021-11-11 PROCEDURE — 3044F PR MOST RECENT HEMOGLOBIN A1C LEVEL <7.0%: ICD-10-PCS | Mod: CPTII,S$GLB,, | Performed by: ORTHOPAEDIC SURGERY

## 2021-11-11 PROCEDURE — 3044F HG A1C LEVEL LT 7.0%: CPT | Mod: CPTII,S$GLB,, | Performed by: ORTHOPAEDIC SURGERY

## 2021-11-11 PROCEDURE — 99203 OFFICE O/P NEW LOW 30 MIN: CPT | Mod: 25,S$GLB,, | Performed by: ORTHOPAEDIC SURGERY

## 2021-11-11 PROCEDURE — 99999 PR PBB SHADOW E&M-EST. PATIENT-LVL III: ICD-10-PCS | Mod: PBBFAC,,, | Performed by: ORTHOPAEDIC SURGERY

## 2021-11-11 PROCEDURE — 99999 PR PBB SHADOW E&M-EST. PATIENT-LVL III: CPT | Mod: PBBFAC,,, | Performed by: ORTHOPAEDIC SURGERY

## 2021-11-11 RX ORDER — TRIAMCINOLONE ACETONIDE 40 MG/ML
20 INJECTION, SUSPENSION INTRA-ARTICULAR; INTRAMUSCULAR
Status: COMPLETED | OUTPATIENT
Start: 2021-11-11 | End: 2021-11-11

## 2021-11-11 RX ADMIN — TRIAMCINOLONE ACETONIDE 20 MG: 40 INJECTION, SUSPENSION INTRA-ARTICULAR; INTRAMUSCULAR at 05:11

## 2021-11-23 ENCOUNTER — PATIENT MESSAGE (OUTPATIENT)
Dept: ORTHOPEDICS | Facility: CLINIC | Age: 31
End: 2021-11-23
Payer: COMMERCIAL

## 2021-12-02 ENCOUNTER — PATIENT MESSAGE (OUTPATIENT)
Dept: ORTHOPEDICS | Facility: CLINIC | Age: 31
End: 2021-12-02
Payer: COMMERCIAL

## 2021-12-07 ENCOUNTER — PATIENT MESSAGE (OUTPATIENT)
Dept: FAMILY MEDICINE | Facility: CLINIC | Age: 31
End: 2021-12-07
Payer: COMMERCIAL

## 2021-12-18 ENCOUNTER — IMMUNIZATION (OUTPATIENT)
Dept: PRIMARY CARE CLINIC | Facility: CLINIC | Age: 31
End: 2021-12-18
Payer: COMMERCIAL

## 2021-12-18 DIAGNOSIS — Z23 NEED FOR VACCINATION: Primary | ICD-10-CM

## 2021-12-18 PROCEDURE — 0064A COVID-19, MRNA, LNP-S, PF, 100 MCG/0.25 ML DOSE VACCINE (MODERNA BOOSTER): CPT | Mod: CV19,PBBFAC | Performed by: INTERNAL MEDICINE

## 2021-12-20 ENCOUNTER — PATIENT MESSAGE (OUTPATIENT)
Dept: FAMILY MEDICINE | Facility: CLINIC | Age: 31
End: 2021-12-20
Payer: COMMERCIAL

## 2021-12-30 ENCOUNTER — OFFICE VISIT (OUTPATIENT)
Dept: ORTHOPEDICS | Facility: CLINIC | Age: 31
End: 2021-12-30
Payer: COMMERCIAL

## 2021-12-30 VITALS — WEIGHT: 210 LBS | BODY MASS INDEX: 34.99 KG/M2 | HEIGHT: 65 IN

## 2021-12-30 DIAGNOSIS — M25.531 PAIN IN BOTH WRISTS: Primary | ICD-10-CM

## 2021-12-30 DIAGNOSIS — G56.01 CARPAL TUNNEL SYNDROME OF RIGHT WRIST: ICD-10-CM

## 2021-12-30 DIAGNOSIS — M25.532 PAIN IN BOTH WRISTS: Primary | ICD-10-CM

## 2021-12-30 PROCEDURE — 1159F MED LIST DOCD IN RCRD: CPT | Mod: CPTII,S$GLB,, | Performed by: ORTHOPAEDIC SURGERY

## 2021-12-30 PROCEDURE — 99999 PR PBB SHADOW E&M-EST. PATIENT-LVL III: ICD-10-PCS | Mod: PBBFAC,,, | Performed by: ORTHOPAEDIC SURGERY

## 2021-12-30 PROCEDURE — 3008F PR BODY MASS INDEX (BMI) DOCUMENTED: ICD-10-PCS | Mod: CPTII,S$GLB,, | Performed by: ORTHOPAEDIC SURGERY

## 2021-12-30 PROCEDURE — 3044F PR MOST RECENT HEMOGLOBIN A1C LEVEL <7.0%: ICD-10-PCS | Mod: CPTII,S$GLB,, | Performed by: ORTHOPAEDIC SURGERY

## 2021-12-30 PROCEDURE — 1159F PR MEDICATION LIST DOCUMENTED IN MEDICAL RECORD: ICD-10-PCS | Mod: CPTII,S$GLB,, | Performed by: ORTHOPAEDIC SURGERY

## 2021-12-30 PROCEDURE — 99213 PR OFFICE/OUTPT VISIT, EST, LEVL III, 20-29 MIN: ICD-10-PCS | Mod: S$GLB,,, | Performed by: ORTHOPAEDIC SURGERY

## 2021-12-30 PROCEDURE — 3044F HG A1C LEVEL LT 7.0%: CPT | Mod: CPTII,S$GLB,, | Performed by: ORTHOPAEDIC SURGERY

## 2021-12-30 PROCEDURE — 3008F BODY MASS INDEX DOCD: CPT | Mod: CPTII,S$GLB,, | Performed by: ORTHOPAEDIC SURGERY

## 2021-12-30 PROCEDURE — 99213 OFFICE O/P EST LOW 20 MIN: CPT | Mod: S$GLB,,, | Performed by: ORTHOPAEDIC SURGERY

## 2021-12-30 PROCEDURE — 99999 PR PBB SHADOW E&M-EST. PATIENT-LVL III: CPT | Mod: PBBFAC,,, | Performed by: ORTHOPAEDIC SURGERY

## 2022-01-07 ENCOUNTER — TELEPHONE (OUTPATIENT)
Dept: ORTHOPEDICS | Facility: CLINIC | Age: 32
End: 2022-01-07
Payer: COMMERCIAL

## 2022-01-07 NOTE — TELEPHONE ENCOUNTER
----- Message from Rochelle Kessler sent at 1/7/2022 11:05 AM CST -----  Contact: [phone]    Who Called: Jenni with [employer]   Regarding: prior auth    Would the patient rather a call back or a response via [facility]? Call back  Best Call Back Number: [phone]  Additional Information:

## 2022-01-10 ENCOUNTER — OFFICE VISIT (OUTPATIENT)
Dept: OBSTETRICS AND GYNECOLOGY | Facility: CLINIC | Age: 32
End: 2022-01-10
Payer: COMMERCIAL

## 2022-01-10 DIAGNOSIS — Z78.9 DATE OF LAST MENSTRUAL PERIOD (LMP) UNKNOWN: ICD-10-CM

## 2022-01-10 DIAGNOSIS — Z12.4 ROUTINE CERVICAL SMEAR: ICD-10-CM

## 2022-01-10 DIAGNOSIS — Z01.419 WELL WOMAN EXAM WITH ROUTINE GYNECOLOGICAL EXAM: Primary | ICD-10-CM

## 2022-01-10 DIAGNOSIS — Z80.3 FAMILY HISTORY OF BREAST CANCER IN MOTHER: ICD-10-CM

## 2022-01-10 PROCEDURE — 99395 PR PREVENTIVE VISIT,EST,18-39: ICD-10-PCS | Mod: S$GLB,,, | Performed by: OBSTETRICS & GYNECOLOGY

## 2022-01-10 PROCEDURE — 99999 PR PBB SHADOW E&M-EST. PATIENT-LVL II: CPT | Mod: PBBFAC,,, | Performed by: OBSTETRICS & GYNECOLOGY

## 2022-01-10 PROCEDURE — 1159F PR MEDICATION LIST DOCUMENTED IN MEDICAL RECORD: ICD-10-PCS | Mod: CPTII,S$GLB,, | Performed by: OBSTETRICS & GYNECOLOGY

## 2022-01-10 PROCEDURE — 87624 HPV HI-RISK TYP POOLED RSLT: CPT | Performed by: OBSTETRICS & GYNECOLOGY

## 2022-01-10 PROCEDURE — 1159F MED LIST DOCD IN RCRD: CPT | Mod: CPTII,S$GLB,, | Performed by: OBSTETRICS & GYNECOLOGY

## 2022-01-10 PROCEDURE — 88175 CYTOPATH C/V AUTO FLUID REDO: CPT | Performed by: OBSTETRICS & GYNECOLOGY

## 2022-01-10 PROCEDURE — 99395 PREV VISIT EST AGE 18-39: CPT | Mod: S$GLB,,, | Performed by: OBSTETRICS & GYNECOLOGY

## 2022-01-10 PROCEDURE — 99999 PR PBB SHADOW E&M-EST. PATIENT-LVL II: ICD-10-PCS | Mod: PBBFAC,,, | Performed by: OBSTETRICS & GYNECOLOGY

## 2022-01-10 NOTE — PROGRESS NOTES
OBSTETRIC HISTORY:   OB History        2    Para   2    Term   2       0    AB   0    Living   2       SAB   0    IAB   0    Ectopic   0    Multiple   0    Live Births   2                COMPREHENSIVE GYN HISTORY:  PAP History: Denies abnormal Paps.  Infection History: Reports STDs: Chlamydia. Denies PID.  Benign History: Denies uterine fibroids. Denies ovarian cysts. Denies endometriosis.   Cancer History: Denies cervical cancer. Denies uterine cancer or hyperplasia. Denies ovarian cancer. Denies vulvar cancer or pre-cancer. Denies vaginal cancer or pre-cancer. Denies breast cancer. Denies colon cancer.  Sexual Activity History:   reports that she currently engages in sexual activity and has had male partners. She reports using the following methods of birth control/protection: Condom.   Menstrual History:  Every 30 days, flows for 4 days. Moderate flow.  Dysmenorrhea History: Reports occ. dysmenorrhea.  Contraception: Condom       HPI:   31 y.o.  No LMP recorded. Unknown LMP (UPT negative)  Patient is  here for her annual gynecologic exam.  She has no GYN complaints. Discussed getting pregnant and taking Zoloft. States work has gotten very stressful and does not think she can stop it as the anxiety was overwhelming in the past. She is on a lower dose so risk/benefit wise there is probably more benefit to taking it. She denies bladder, breast and bowel complaints.    ROS:  GENERAL: Denies weight gain or weight loss. Feeling well overall.   SKIN: Denies rash or lesions.   HEAD: Denies headache.   NODES: Denies enlarged lymph nodes.   CHEST: Denies shortness of breath.   ABDOMEN: No abdominal pain, constipation, diarrhea, nausea, vomiting or rectal bleeding.   URINARY: No frequency, dysuria, hematuria, or burning on urination.  REPRODUCTIVE: See HPI.   BREASTS: The patient denies pain, lumps, or nipple discharge.   HEMATOLOGIC: No easy bruisability.   MUSCULOSKELETAL: Denies joint pain or back pain.    NEUROLOGIC: Denies weakness.   PSYCHIATRIC: Denies depression, anxiety or mood swings.    PE:   There were no vitals taken for this visit.  APPEARANCE: Well nourished, well developed, in no acute distress.  NECK: Neck symmetric without  thyromegaly.  NODES: No inguinal, cervical lymph node enlargement.  ABDOMEN: Soft. No tenderness or masses. No hernias.  BREASTS: Symmetrical, no skin changes or visible lesions. No palpable masses, nipple discharge or adenopathy bilaterally.  PELVIC:   VULVA: No lesions. Normal female genitalia.  URETHRAL MEATUS: Normal size and location, no lesions, no prolapse.  URETHRA: No masses, tenderness, prolapse or scarring.  VAGINA: Moist and well rugated, no discharge, no significant cystocele or rectocele.  CERVIX: No lesions and discharge.  UTERUS: Normal size, regular shape, mobile, non-tender, bladder base nontender.  ADNEXA: No masses or tenderness.    PROCEDURES:  Pap smear  HRHPV    Assessment:  Normal Gynecologic Exam  Hives from Moderna booster--try taking Pepcid with Zyrtec or Benadryl with Zyrtec    Plan:  Mammogram and Colonoscopy if indicated by current recommendations.  Return to clinic in one year or for any problems or complaints.    Counseling:  Patient was counseled today on A.C.S. Pap guidelines and recommendations for yearly pelvic exams and monthly self breast exams; to see her PCP for other health maintenance. Regular exercise and healthy diet.

## 2022-01-11 ENCOUNTER — TELEPHONE (OUTPATIENT)
Dept: GENETICS | Facility: CLINIC | Age: 32
End: 2022-01-11
Payer: COMMERCIAL

## 2022-01-11 NOTE — TELEPHONE ENCOUNTER
----- Message from Jackie Burger sent at 1/11/2022 11:36 AM CST -----  Good morning,  Can you please assist in scheduling.    Thank you,

## 2022-01-13 LAB
FINAL PATHOLOGIC DIAGNOSIS: NORMAL
Lab: NORMAL

## 2022-01-24 ENCOUNTER — PATIENT MESSAGE (OUTPATIENT)
Dept: FAMILY MEDICINE | Facility: CLINIC | Age: 32
End: 2022-01-24
Payer: COMMERCIAL

## 2022-01-24 DIAGNOSIS — L50.9 HIVES: Primary | ICD-10-CM

## 2022-01-24 RX ORDER — METHYLPREDNISOLONE 4 MG/1
TABLET ORAL
Qty: 21 EACH | Refills: 0 | Status: SHIPPED | OUTPATIENT
Start: 2022-01-24 | End: 2022-02-14

## 2022-01-28 ENCOUNTER — OFFICE VISIT (OUTPATIENT)
Dept: ORTHOPEDICS | Facility: CLINIC | Age: 32
End: 2022-01-28
Payer: COMMERCIAL

## 2022-01-28 ENCOUNTER — HOSPITAL ENCOUNTER (OUTPATIENT)
Dept: RADIOLOGY | Facility: HOSPITAL | Age: 32
Discharge: HOME | End: 2022-01-28
Attending: PHYSICIAN ASSISTANT
Payer: COMMERCIAL

## 2022-01-28 ENCOUNTER — TELEPHONE (OUTPATIENT)
Dept: ORTHOPEDICS | Facility: CLINIC | Age: 32
End: 2022-01-28
Payer: COMMERCIAL

## 2022-01-28 VITALS — HEIGHT: 65 IN | BODY MASS INDEX: 35.01 KG/M2 | WEIGHT: 210.13 LBS

## 2022-01-28 DIAGNOSIS — M25.531 PAIN IN BOTH WRISTS: ICD-10-CM

## 2022-01-28 DIAGNOSIS — M25.531 PAIN IN BOTH WRISTS: Primary | ICD-10-CM

## 2022-01-28 DIAGNOSIS — G56.23 CUBITAL TUNNEL SYNDROME OF BOTH UPPER EXTREMITIES: ICD-10-CM

## 2022-01-28 DIAGNOSIS — M25.532 PAIN IN BOTH WRISTS: Primary | ICD-10-CM

## 2022-01-28 DIAGNOSIS — M25.532 PAIN IN BOTH WRISTS: ICD-10-CM

## 2022-01-28 DIAGNOSIS — G56.01 CARPAL TUNNEL SYNDROME OF RIGHT WRIST: Primary | ICD-10-CM

## 2022-01-28 PROCEDURE — 3008F BODY MASS INDEX DOCD: CPT | Mod: CPTII,S$GLB,, | Performed by: PHYSICIAN ASSISTANT

## 2022-01-28 PROCEDURE — 99203 PR OFFICE/OUTPT VISIT, NEW, LEVL III, 30-44 MIN: ICD-10-PCS | Mod: S$GLB,,, | Performed by: PHYSICIAN ASSISTANT

## 2022-01-28 PROCEDURE — 99203 OFFICE O/P NEW LOW 30 MIN: CPT | Mod: S$GLB,,, | Performed by: PHYSICIAN ASSISTANT

## 2022-01-28 PROCEDURE — 1159F PR MEDICATION LIST DOCUMENTED IN MEDICAL RECORD: ICD-10-PCS | Mod: CPTII,S$GLB,, | Performed by: PHYSICIAN ASSISTANT

## 2022-01-28 PROCEDURE — 3008F PR BODY MASS INDEX (BMI) DOCUMENTED: ICD-10-PCS | Mod: CPTII,S$GLB,, | Performed by: PHYSICIAN ASSISTANT

## 2022-01-28 PROCEDURE — 1159F MED LIST DOCD IN RCRD: CPT | Mod: CPTII,S$GLB,, | Performed by: PHYSICIAN ASSISTANT

## 2022-01-28 PROCEDURE — 99999 PR PBB SHADOW E&M-EST. PATIENT-LVL III: ICD-10-PCS | Mod: PBBFAC,,, | Performed by: PHYSICIAN ASSISTANT

## 2022-01-28 PROCEDURE — 73110 XR WRIST COMPLETE 3 VIEWS BILATERAL: ICD-10-PCS | Mod: 26,,, | Performed by: RADIOLOGY

## 2022-01-28 PROCEDURE — 73110 X-RAY EXAM OF WRIST: CPT | Mod: 26,,, | Performed by: RADIOLOGY

## 2022-01-28 PROCEDURE — 73110 X-RAY EXAM OF WRIST: CPT | Mod: TC,50,PO

## 2022-01-28 PROCEDURE — 99999 PR PBB SHADOW E&M-EST. PATIENT-LVL III: CPT | Mod: PBBFAC,,, | Performed by: PHYSICIAN ASSISTANT

## 2022-01-28 NOTE — PROGRESS NOTES
[facility]  History & Physical  Orthopedics    SUBJECTIVE:      COVID-19 attestation:  This patient was treated during the COVID-19 pandemic.  This was discussed with the patient, they are aware of our current policies and procedures, were given the option of delaying their visit and or switching to a virtual visit, delaying their surgery when applicable, and they elect to proceed.    Chief Complaint: Bilateral hand numbness    Referring Provider: Self, Aaareferral     History of Present Illness:  Patient is a 31 y.o. right hand dominant female who presents today with complaints of bilateral hand numbness, right greater than left. She reports symptoms for several years, progressive over the past 6-9 months. She saw Dr. Terrell in November 2021 and was treated with right carpal tunnel CSI, she reports 80% improvement in her symptoms. He then sent her for EMG which was performed 4 weeks after injection, EMG was normal. He did not offer her surgery based on normal EMG findings. She has taken NSAIDs, is not able to sleep with nocturnal braces. Numbness in all fingers, palm only, numbness wakes her at night and will be present during the day. She denies any weakness.     Onset of symptoms/DOI was several years.    Symptoms are aggravated by activity and at night.    Symptoms are alleviated by rest.    Symptoms consist of numbness/tingling.    The patient rates their pain as a 0/10.    Attempted treatment(s) and/or interventions include activity modifications, rest, anti-inflammatory medications and steroid injection.     The patient denies any fevers, chills, N/V, D/C and presents for evaluation.       Past Medical History:   Diagnosis Date    Acquired hypothyroidism 1/11/2016    Irritable bowel syndrome (IBS)     Obesity (BMI 30.0-34.9) 1/11/2016    loosing weight consistently on 21 Day Fix program     Urticaria      Past Surgical History:   Procedure Laterality Date    breast reduction  06/2012     BREAST SURGERY      breast reduction     SECTION       SECTION N/A 2020    Procedure:  SECTION;  Surgeon: Chiara Smith MD;  Location: [facility] L&D;  Service: OB/GYN;  Laterality: N/A;    CHOLECYSTECTOMY  2019    LAPAROSCOPIC CHOLECYSTECTOMY N/A 2019    Procedure: CHOLECYSTECTOMY, LAPAROSCOPIC;  Surgeon: Missael Tolentino MD;  Location: [facility] OR;  Service: General;  Laterality: N/A;  video     Review of patient's allergies indicates:   Allergen Reactions    Minocycline Swelling     Social History     Social History Narrative    Not on file     Family History   Problem Relation Age of Onset    Breast cancer Mother 45        remission for 10 years    Cancer Mother 45        breast     Hypertension Mother     Allergic rhinitis Mother     Diabetes Father     Diabetes Maternal Grandfather     Heart disease Maternal Grandfather     Diabetes Paternal Grandfather     Colon cancer Neg Hx     Ovarian cancer Neg Hx     Allergies Neg Hx     Angioedema Neg Hx     Asthma Neg Hx     Atopy Neg Hx     Eczema Neg Hx     Immunodeficiency Neg Hx     Rhinitis Neg Hx     Urticaria Neg Hx          Current Outpatient Medications:     b complex vitamins capsule, Take 1 capsule by mouth once daily., Disp: , Rfl:     calcium-vitamin D3 (OS-ELISABETH 500 + D3) 500 mg(1,250mg) -200 unit per tablet, Take 1 tablet by mouth., Disp: , Rfl:     cetirizine (ZYRTEC) 5 MG tablet, Take 5 mg by mouth once daily., Disp: , Rfl:     methylPREDNISolone (MEDROL DOSEPACK) 4 mg tablet, use as directed, Disp: 21 each, Rfl: 0    mupirocin calcium 2% nasl oint (BACTROBAN) 2 % Oint, by Nasal route 2 (two) times daily., Disp: 1 Tube, Rfl: 3    prenatal vit37-iron-folic acid 29 mg iron- 1 mg Chew, Take by mouth., Disp: , Rfl:     sertraline (ZOLOFT) 50 MG tablet, TAKE 1 TABLET(50 MG) BY MOUTH EVERY EVENING, Disp: 30 tablet, Rfl: 11    SYNTHROID 50 mcg tablet, Take 1 tablet (50 mcg total) by mouth once  "daily., Disp: 90 tablet, Rfl: 4      Review of Systems:  Constitutional: no fever or chills  Eyes: no visual changes  ENT: no nasal congestion or sore throat  Respiratory: no cough or shortness of breath  Cardiovascular: no chest pain  Gastrointestinal: no nausea or vomiting, tolerating diet  Musculoskeletal: numbness/tingling    OBJECTIVE:      Vital Signs (Most Recent):  Vitals:    01/28/22 1609   Weight: 95.3 kg (210 lb 1.6 oz)   Height: 5' 5" (1.651 m)     Body mass index is 34.96 kg/m².      Physical Exam:  Constitutional: The patient appears well-developed and well-nourished. No distress.   Skin: No lesions appreciated  Head: Normocephalic and atraumatic.   Nose: Nose normal.   Ears: No deformities seen  Eyes: Conjunctivae and EOM are normal.   Neck: No tracheal deviation present.   Cardiovascular: Normal rate and intact distal pulses.    Pulmonary/Chest: Effort normal. No respiratory distress.   Abdominal: There is no guarding.   Neurological: The patient is alert.   Psychiatric: The patient has a normal mood and affect.     Bilateral Hand/Wrist Examination:    Observation/Inspection:  Swelling  none    Deformity  none  Discoloration  none     Scars   none    Atrophy  none    HAND/WRIST EXAMINATION:  Finkelstein's Test   Neg  WHAT Test    Neg  Snuff box tenderness   Neg  Block's Test    Neg  Hook of Hamate Tenderness  Neg  CMC grind    Neg  Circumduction test   Neg    Neurovascular Exam:  Digits WWP, brisk CR < 3s throughout  NVI motor/LTS to M/R/U nerves, radial pulse 2+  Tinel's Test - Carpal Tunnel  POS Right side  Tinel's Test - Cubital Tunnel  POS bilateral  Phalen's Test    Neg  Median Nerve Compression Test POS bilateral    ROM hand full, painless    ROM wrist full, painless    ROM elbow full, painless    Abdomen not guarded  Respirations nonlabored  Perfusion intact    Diagnostic Results:     Imaging - I independently viewed the patient's imaging as well as the radiology report.  Xrays of the " patient's bilateral hand demonstrate no evidence of any acute fractures or dislocations or significant degenerative changes.    EMG - 12/15/21  Normal study    ASSESSMENT/PLAN:      31 y.o. yo female with Clinically positive for bilateral CTS, possible cubital tunnel as well. Normal EMG.    Plan: The patient and I had a thorough discussion today.  We discussed the working diagnosis as well as several other potential alternative diagnoses.  Treatment options were discussed, both conservative and surgical.  Conservative treatment options would include things such as activity modifications, workplace modifications, a period of rest, oral vs topical OTC and prescription anti-inflammatory medications, occupational therapy, splinting/bracing, immobilization, corticosteroid injections, and others.  Surgical options were discussed as well.     At this time, the patient would like to see Dr. Rojas for surgical evaluation. Possibility EMG reading was skewed due to CSI that was performed 4 weeks prior to study. She can continue with NSAIDs, brace/elbow sleeve for extension while sleeping. However, she does appear to be a surgical candidate.    Should the patient's symptoms worsen, persist, or fail to improve they should return for reevaluation and I would be happy to see them back anytime.           Please do not hesitate to reach out to us via email, phone, or MyChart with any questions, concerns, or feedback.

## 2022-03-28 ENCOUNTER — PATIENT MESSAGE (OUTPATIENT)
Dept: FAMILY MEDICINE | Facility: CLINIC | Age: 32
End: 2022-03-28
Payer: COMMERCIAL

## 2022-03-29 NOTE — PROGRESS NOTES
"Subjective:       Patient ID: Sherlyn Monahan is a 28 y.o. female.    Vitals:  height is 5' 5" (1.651 m) and weight is 93 kg (205 lb). Her oral temperature is 98.5 °F (36.9 °C). Her blood pressure is 101/72 and her pulse is 86. Her respiration is 18 and oxygen saturation is 100%.     Chief Complaint: Back Pain (12 hours ago )    C/o mid /lower LBP  Since yesterday after lifting 20 pound child yesterday, pain is non radiating, no paresthesias.  Minimal relief with Aleve, no urinary Sx.      Back Pain   This is a new problem. The current episode started yesterday. The problem occurs constantly. The problem is unchanged. The pain is present in the lumbar spine. The quality of the pain is described as aching. The pain does not radiate. The pain is at a severity of 4/10. The symptoms are aggravated by bending, sitting, standing, twisting and lying down. Stiffness is present all day. Pertinent negatives include no abdominal pain, bladder incontinence, bowel incontinence, dysuria, leg pain or numbness. She has tried heat and NSAIDs for the symptoms. The treatment provided mild relief.       Constitution: Negative for fatigue.   Gastrointestinal: Negative for abdominal pain and bowel incontinence.   Genitourinary: Negative for dysuria, urgency, bladder incontinence and hematuria.   Musculoskeletal: Positive for trauma, back pain and muscle ache. Negative for muscle cramps and history of spine disorder.   Skin: Negative for rash.   Neurological: Negative for coordination disturbances, numbness and tingling.       Objective:      Physical Exam   Constitutional: She is oriented to person, place, and time. She appears well-developed and well-nourished. She is cooperative.  Non-toxic appearance. She does not appear ill. No distress.   HENT:   Head: Normocephalic and atraumatic.   Right Ear: Hearing, tympanic membrane, external ear and ear canal normal.   Left Ear: Hearing, tympanic membrane, external ear and ear canal normal. "   Nose: Nose normal. No mucosal edema, rhinorrhea or nasal deformity. No epistaxis. Right sinus exhibits no maxillary sinus tenderness and no frontal sinus tenderness. Left sinus exhibits no maxillary sinus tenderness and no frontal sinus tenderness.   Mouth/Throat: Uvula is midline, oropharynx is clear and moist and mucous membranes are normal. No trismus in the jaw. Normal dentition. No uvula swelling. No oropharyngeal exudate or posterior oropharyngeal erythema.   Eyes: Conjunctivae and lids are normal. Right eye exhibits no discharge. Left eye exhibits no discharge. No scleral icterus.   Sclera clear bilat   Neck: Trachea normal, normal range of motion, full passive range of motion without pain and phonation normal. Neck supple. No JVD present. No tracheal deviation present.   Cardiovascular: Normal rate, regular rhythm, normal heart sounds, intact distal pulses and normal pulses. Exam reveals no gallop and no friction rub.   No murmur heard.  Pulmonary/Chest: Effort normal and breath sounds normal. No stridor. No respiratory distress. She has no wheezes. She has no rales.   Abdominal: Soft. Normal appearance and bowel sounds are normal. She exhibits no distension, no pulsatile midline mass and no mass. There is no tenderness.   Musculoskeletal: Normal range of motion. She exhibits no edema or deformity.   BACK: Non tender, flexion/extension normal  SLRE negative   Lymphadenopathy:     She has no cervical adenopathy.   Neurological: She is alert and oriented to person, place, and time. She exhibits normal muscle tone. Coordination normal.   Skin: Skin is warm, dry and intact. She is not diaphoretic. No pallor.   Psychiatric: She has a normal mood and affect. Her speech is normal and behavior is normal. Judgment and thought content normal. Cognition and memory are normal.   Nursing note and vitals reviewed.      Assessment:       1. Strain of lumbar region, initial encounter        Plan:         Strain of lumbar  [General Appearance - Well Developed] : well developed region, initial encounter  -     ketorolac injection 60 mg        Pt advised to continue Aleve 2 po bid  FU with PCP   [General Appearance - Well Nourished] : well nourished [Normal Appearance] : normal appearance [Well Groomed] : well groomed [General Appearance - In No Acute Distress] : no acute distress [Abdomen Soft] : soft [Abdomen Tenderness] : non-tender [Costovertebral Angle Tenderness] : no ~M costovertebral angle tenderness [Edema] : no peripheral edema [] : no respiratory distress [Respiration, Rhythm And Depth] : normal respiratory rhythm and effort [Exaggerated Use Of Accessory Muscles For Inspiration] : no accessory muscle use [Oriented To Time, Place, And Person] : oriented to person, place, and time [Normal Station and Gait] : the gait and station were normal for the patient's age

## 2022-04-01 DIAGNOSIS — E03.9 ACQUIRED HYPOTHYROIDISM: ICD-10-CM

## 2022-04-01 NOTE — TELEPHONE ENCOUNTER
Care Due:                  Date            Visit Type   Department     Provider  --------------------------------------------------------------------------------                                MYCHART                              ANNUAL                              CHECKUP/PHY  [facility]  Last Visit: 03-      S            MEDICINE       Alea Moses                              EP -                              PRIMARY      [facility]  Next Visit: 04-      CARE ([facility])   MEDICINE       Alea Moses                                                            Last  Test          Frequency    Reason                     Performed    Due Date  --------------------------------------------------------------------------------    TSH.........  12 months..  SYNTHROID................  03- 03-    Powered by [employer]. Reference number: [id number].   4/01/2022 2:38:07 PM CDT

## 2022-04-05 ENCOUNTER — PATIENT MESSAGE (OUTPATIENT)
Dept: ORTHOPEDICS | Facility: CLINIC | Age: 32
End: 2022-04-05
Payer: COMMERCIAL

## 2022-04-06 ENCOUNTER — OFFICE VISIT (OUTPATIENT)
Dept: FAMILY MEDICINE | Facility: CLINIC | Age: 32
End: 2022-04-06
Payer: COMMERCIAL

## 2022-04-06 ENCOUNTER — LAB VISIT (OUTPATIENT)
Dept: LAB | Facility: HOSPITAL | Age: 32
End: 2022-04-06
Attending: FAMILY MEDICINE
Payer: COMMERCIAL

## 2022-04-06 VITALS
OXYGEN SATURATION: 99 % | BODY MASS INDEX: 35.26 KG/M2 | HEART RATE: 80 BPM | HEIGHT: 65 IN | WEIGHT: 211.63 LBS | SYSTOLIC BLOOD PRESSURE: 92 MMHG | DIASTOLIC BLOOD PRESSURE: 62 MMHG

## 2022-04-06 DIAGNOSIS — Z00.00 ROUTINE GENERAL MEDICAL EXAMINATION AT A HEALTH CARE FACILITY: ICD-10-CM

## 2022-04-06 DIAGNOSIS — E66.9 OBESITY, CLASS II, BMI 35-39.9, NO COMORBIDITY: ICD-10-CM

## 2022-04-06 DIAGNOSIS — G43.709 CHRONIC MIGRAINE WITHOUT AURA WITHOUT STATUS MIGRAINOSUS, NOT INTRACTABLE: ICD-10-CM

## 2022-04-06 DIAGNOSIS — G56.01 CARPAL TUNNEL SYNDROME OF RIGHT WRIST: ICD-10-CM

## 2022-04-06 DIAGNOSIS — K76.0 FATTY LIVER: ICD-10-CM

## 2022-04-06 DIAGNOSIS — K58.0 IRRITABLE BOWEL SYNDROME WITH DIARRHEA: ICD-10-CM

## 2022-04-06 DIAGNOSIS — Z79.899 MEDICATION MANAGEMENT: ICD-10-CM

## 2022-04-06 DIAGNOSIS — G56.21 CUBITAL TUNNEL SYNDROME, RIGHT: ICD-10-CM

## 2022-04-06 DIAGNOSIS — T50.B95A ADVERSE EFFECT OF COVID-19 VACCINE: ICD-10-CM

## 2022-04-06 DIAGNOSIS — E03.9 ACQUIRED HYPOTHYROIDISM: ICD-10-CM

## 2022-04-06 DIAGNOSIS — Z00.00 ROUTINE GENERAL MEDICAL EXAMINATION AT A HEALTH CARE FACILITY: Primary | ICD-10-CM

## 2022-04-06 DIAGNOSIS — Z90.49 STATUS POST CHOLECYSTECTOMY: ICD-10-CM

## 2022-04-06 DIAGNOSIS — Z80.3 FAMILY HISTORY OF BREAST CANCER IN MOTHER: ICD-10-CM

## 2022-04-06 DIAGNOSIS — F41.8 DEPRESSION WITH ANXIETY: ICD-10-CM

## 2022-04-06 LAB
25(OH)D3+25(OH)D2 SERPL-MCNC: 21 NG/ML (ref 30–96)
ALBUMIN SERPL BCP-MCNC: 4.5 G/DL (ref 3.5–5.2)
ALP SERPL-CCNC: 99 U/L (ref 55–135)
ALT SERPL W/O P-5'-P-CCNC: 19 U/L (ref 10–44)
ANION GAP SERPL CALC-SCNC: 11 MMOL/L (ref 8–16)
AST SERPL-CCNC: 20 U/L (ref 10–40)
BASOPHILS # BLD AUTO: 0.01 K/UL (ref 0–0.2)
BASOPHILS NFR BLD: 0.1 % (ref 0–1.9)
BILIRUB SERPL-MCNC: 0.5 MG/DL (ref 0.1–1)
BUN SERPL-MCNC: 12 MG/DL (ref 6–20)
CALCIUM SERPL-MCNC: 9.9 MG/DL (ref 8.7–10.5)
CHLORIDE SERPL-SCNC: 105 MMOL/L (ref 95–110)
CHOLEST SERPL-MCNC: 201 MG/DL (ref 120–199)
CHOLEST/HDLC SERPL: 4.3 {RATIO} (ref 2–5)
CO2 SERPL-SCNC: 24 MMOL/L (ref 23–29)
CREAT SERPL-MCNC: 0.8 MG/DL (ref 0.5–1.4)
DIFFERENTIAL METHOD: NORMAL
EOSINOPHIL # BLD AUTO: 0.1 K/UL (ref 0–0.5)
EOSINOPHIL NFR BLD: 0.5 % (ref 0–8)
ERYTHROCYTE [DISTWIDTH] IN BLOOD BY AUTOMATED COUNT: 12.1 % (ref 11.5–14.5)
EST. GFR  (AFRICAN AMERICAN): >60 ML/MIN/1.73 M^2
EST. GFR  (NON AFRICAN AMERICAN): >60 ML/MIN/1.73 M^2
ESTIMATED AVG GLUCOSE: 100 MG/DL (ref 68–131)
GLUCOSE SERPL-MCNC: 90 MG/DL (ref 70–110)
HBA1C MFR BLD: 5.1 % (ref 4–5.6)
HCT VFR BLD AUTO: 44.1 % (ref 37–48.5)
HDLC SERPL-MCNC: 47 MG/DL (ref 40–75)
HDLC SERPL: 23.4 % (ref 20–50)
HGB BLD-MCNC: 14.6 G/DL (ref 12–16)
IMM GRANULOCYTES # BLD AUTO: 0.03 K/UL (ref 0–0.04)
IMM GRANULOCYTES NFR BLD AUTO: 0.3 % (ref 0–0.5)
LDLC SERPL CALC-MCNC: 92.6 MG/DL (ref 63–159)
LYMPHOCYTES # BLD AUTO: 3.3 K/UL (ref 1–4.8)
LYMPHOCYTES NFR BLD: 33.9 % (ref 18–48)
MCH RBC QN AUTO: 31 PG (ref 27–31)
MCHC RBC AUTO-ENTMCNC: 33.1 G/DL (ref 32–36)
MCV RBC AUTO: 94 FL (ref 82–98)
MONOCYTES # BLD AUTO: 0.7 K/UL (ref 0.3–1)
MONOCYTES NFR BLD: 7.2 % (ref 4–15)
NEUTROPHILS # BLD AUTO: 5.7 K/UL (ref 1.8–7.7)
NEUTROPHILS NFR BLD: 58 % (ref 38–73)
NONHDLC SERPL-MCNC: 154 MG/DL
NRBC BLD-RTO: 0 /100 WBC
PLATELET # BLD AUTO: 239 K/UL (ref 150–450)
PMV BLD AUTO: 11.3 FL (ref 9.2–12.9)
POTASSIUM SERPL-SCNC: 4.2 MMOL/L (ref 3.5–5.1)
PROT SERPL-MCNC: 7.8 G/DL (ref 6–8.4)
RBC # BLD AUTO: 4.71 M/UL (ref 4–5.4)
SODIUM SERPL-SCNC: 140 MMOL/L (ref 136–145)
TRIGL SERPL-MCNC: 307 MG/DL (ref 30–150)
TSH SERPL DL<=0.005 MIU/L-ACNC: 1.4 UIU/ML (ref 0.4–4)
WBC # BLD AUTO: 9.85 K/UL (ref 3.9–12.7)

## 2022-04-06 PROCEDURE — 99395 PR PREVENTIVE VISIT,EST,18-39: ICD-10-PCS | Mod: S$GLB,,, | Performed by: FAMILY MEDICINE

## 2022-04-06 PROCEDURE — 1160F PR REVIEW ALL MEDS BY PRESCRIBER/CLIN PHARMACIST DOCUMENTED: ICD-10-PCS | Mod: CPTII,S$GLB,, | Performed by: FAMILY MEDICINE

## 2022-04-06 PROCEDURE — 3078F PR MOST RECENT DIASTOLIC BLOOD PRESSURE < 80 MM HG: ICD-10-PCS | Mod: CPTII,S$GLB,, | Performed by: FAMILY MEDICINE

## 2022-04-06 PROCEDURE — 1160F RVW MEDS BY RX/DR IN RCRD: CPT | Mod: CPTII,S$GLB,, | Performed by: FAMILY MEDICINE

## 2022-04-06 PROCEDURE — 85025 COMPLETE CBC W/AUTO DIFF WBC: CPT | Performed by: FAMILY MEDICINE

## 2022-04-06 PROCEDURE — 80053 COMPREHEN METABOLIC PANEL: CPT | Performed by: FAMILY MEDICINE

## 2022-04-06 PROCEDURE — 82306 VITAMIN D 25 HYDROXY: CPT | Performed by: FAMILY MEDICINE

## 2022-04-06 PROCEDURE — 80061 LIPID PANEL: CPT | Performed by: FAMILY MEDICINE

## 2022-04-06 PROCEDURE — 3008F BODY MASS INDEX DOCD: CPT | Mod: CPTII,S$GLB,, | Performed by: FAMILY MEDICINE

## 2022-04-06 PROCEDURE — 84443 ASSAY THYROID STIM HORMONE: CPT | Performed by: FAMILY MEDICINE

## 2022-04-06 PROCEDURE — 99999 PR PBB SHADOW E&M-EST. PATIENT-LVL III: ICD-10-PCS | Mod: PBBFAC,,, | Performed by: FAMILY MEDICINE

## 2022-04-06 PROCEDURE — 3078F DIAST BP <80 MM HG: CPT | Mod: CPTII,S$GLB,, | Performed by: FAMILY MEDICINE

## 2022-04-06 PROCEDURE — 3008F PR BODY MASS INDEX (BMI) DOCUMENTED: ICD-10-PCS | Mod: CPTII,S$GLB,, | Performed by: FAMILY MEDICINE

## 2022-04-06 PROCEDURE — 3074F SYST BP LT 130 MM HG: CPT | Mod: CPTII,S$GLB,, | Performed by: FAMILY MEDICINE

## 2022-04-06 PROCEDURE — 99395 PREV VISIT EST AGE 18-39: CPT | Mod: S$GLB,,, | Performed by: FAMILY MEDICINE

## 2022-04-06 PROCEDURE — 1159F MED LIST DOCD IN RCRD: CPT | Mod: CPTII,S$GLB,, | Performed by: FAMILY MEDICINE

## 2022-04-06 PROCEDURE — 83036 HEMOGLOBIN GLYCOSYLATED A1C: CPT | Performed by: FAMILY MEDICINE

## 2022-04-06 PROCEDURE — 3074F PR MOST RECENT SYSTOLIC BLOOD PRESSURE < 130 MM HG: ICD-10-PCS | Mod: CPTII,S$GLB,, | Performed by: FAMILY MEDICINE

## 2022-04-06 PROCEDURE — 36415 COLL VENOUS BLD VENIPUNCTURE: CPT | Performed by: FAMILY MEDICINE

## 2022-04-06 PROCEDURE — 1159F PR MEDICATION LIST DOCUMENTED IN MEDICAL RECORD: ICD-10-PCS | Mod: CPTII,S$GLB,, | Performed by: FAMILY MEDICINE

## 2022-04-06 PROCEDURE — 99999 PR PBB SHADOW E&M-EST. PATIENT-LVL III: CPT | Mod: PBBFAC,,, | Performed by: FAMILY MEDICINE

## 2022-04-06 RX ORDER — LEVOTHYROXINE SODIUM 50 UG/1
50 TABLET ORAL DAILY
Qty: 90 TABLET | Refills: 4 | Status: SHIPPED | OUTPATIENT
Start: 2022-04-06 | End: 2022-04-12 | Stop reason: SDUPTHER

## 2022-04-06 RX ORDER — SERTRALINE HYDROCHLORIDE 50 MG/1
50 TABLET, FILM COATED ORAL NIGHTLY
Qty: 90 TABLET | Refills: 4 | Status: SHIPPED | OUTPATIENT
Start: 2022-04-06 | End: 2023-04-10

## 2022-04-06 NOTE — PROGRESS NOTES
Office Visit    Patient Name: Sherlyn Monahan    : 1990  MRN: [id number]    Subjective:  Sherlyn is a 31 y.o. female who presents today for:    Annual Exam    Sherlyn presents today for annual wellness exam and monitoring of chronic conditions that include hypothyroidism,  section deliveries on 2018 and 2020 per Dr. Smith, lap mikal 19 per Dr Tolentino, h/o heart palpitations s/p normal EKG 19, h/o urticaria s/p allergy eval and treatment with extended course of antihistamine (hives not though secondary to amoxicillin), with repeat extended course of hives following the Moderna COVID-19 booster.    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      She is of childbearing age.  She has a gynecologist-- Dr Smith--  and is up to date with pap 1/10/22. She weaned her daughter who is almost 2 years old shortly after getting her MODERNA booster.      She has been feeling overall well.  Hives and finally become manageable after a course of steroids and continuing daily antihistamine.  Continues to have some IBS-D symptoms exacerbated by her cholecystectomy, overall managing with awareness of dietary triggers, but her symptoms are still negatively impactful.  Mood remains good on Zoloft 50.     General lifestyle habits are as follows:  Diet is described as FAIR:  Tries to bring some healthy snacks to eat during the school day even if she can eat lunch, life is too hectic rigth now for weight watchers.; exercise is described as poor-- hopes to start an upcoming walking routing. SLEEP: fair, some snoring and LV concerns(AM headaches). Weight has increased about 5 lbs in the last year-- BMI 35     Immunizations: TDap 3/19/18, yearly flu shot UTD; COVID-19 completed with Moderna booster 21.     Screening Tests: PAP 1/10/22 WNL/HPV (-) per GYN Dr Smith, STD screening during pregnancy (-), Labs today       Eye/Dental:  "eye UTD,  Dental UTD               PAST MEDICAL HISTORY, SURGICAL/SOCIAL/FAMILY HISTORY REVIEWED AS PER CHART, WITH PERTINENT FINDINGS INCLUDED IN HISTORY SECTION OF NOTE.     Current Medications    Medication List with Changes/Refills   Current Medications    B COMPLEX VITAMINS CAPSULE    Take 1 capsule by mouth once daily.    CALCIUM-VITAMIN D3 (OS-ELISABETH 500 + D3) 500 MG(1,250MG) -200 UNIT PER TABLET    Take 1 tablet by mouth.    CETIRIZINE (ZYRTEC) 5 MG TABLET    Take 5 mg by mouth once daily.    MUPIROCIN CALCIUM 2% NASL OINT (BACTROBAN) 2 % OINT    by Nasal route 2 (two) times daily.    PRENATAL VIT37-IRON-FOLIC ACID 29 MG IRON- 1 MG CHEW    Take by mouth.    SYNTHROID 50 MCG TABLET    Take 1 tablet (50 mcg total) by mouth once daily.   Changed and/or Refilled Medications    Modified Medication Previous Medication    SERTRALINE (ZOLOFT) 50 MG TABLET sertraline (ZOLOFT) 50 MG tablet       Take 1 tablet (50 mg total) by mouth every evening.    TAKE 1 TABLET(50 MG) BY MOUTH EVERY EVENING       Allergies   Review of patient's allergies indicates:   Allergen Reactions    Minocycline Swelling         Review of Systems (Pertinent positives)  Review of Systems   Constitutional: Positive for fatigue. Negative for unexpected weight change.   HENT: Positive for postnasal drip.    Respiratory: Negative for shortness of breath.    Cardiovascular: Negative for chest pain.   Gastrointestinal: Positive for diarrhea.   Genitourinary: Negative for menstrual problem.   Musculoskeletal: Positive for arthralgias (right elbow and hand).   Skin: Negative for rash.   Allergic/Immunologic: Positive for environmental allergies.   Neurological: Negative for dizziness and light-headedness.   Psychiatric/Behavioral: Negative for dysphoric mood. The patient is not nervous/anxious.        BP 92/62 (BP Location: Right arm, Patient Position: Sitting, BP Method: Medium (Manual))   Pulse 80   Ht 5' 5" (1.651 m)   Wt 96 kg (211 lb 10.3 oz)   " LMP 03/25/2022   SpO2 99%   Breastfeeding No   BMI 35.22 kg/m²     Physical Exam  Vitals reviewed.   Constitutional:       General: She is not in acute distress.     Appearance: Normal appearance. She is well-developed.   HENT:      Head: Normocephalic and atraumatic.      Right Ear: Ear canal normal. Tympanic membrane is not erythematous or bulging.      Left Ear: Ear canal normal. Tympanic membrane is not erythematous or bulging.      Nose: Nose normal.      Mouth/Throat:      Mouth: Mucous membranes are moist.      Pharynx: No oropharyngeal exudate.   Eyes:      Extraocular Movements: Extraocular movements intact.      Conjunctiva/sclera: Conjunctivae normal.   Neck:      Thyroid: No thyroid mass or thyromegaly.      Vascular: No carotid bruit.   Cardiovascular:      Rate and Rhythm: Normal rate and regular rhythm.      Pulses:           Dorsalis pedis pulses are 2+ on the right side and 2+ on the left side.      Heart sounds: Normal heart sounds. No murmur heard.  Pulmonary:      Effort: Pulmonary effort is normal. No respiratory distress.      Breath sounds: Normal breath sounds.   Abdominal:      General: Bowel sounds are normal. There is no distension.      Palpations: Abdomen is soft. There is no mass.      Tenderness: There is no abdominal tenderness.   Musculoskeletal:         General: Normal range of motion.      Right lower leg: No edema.      Left lower leg: No edema.   Lymphadenopathy:      Cervical: No cervical adenopathy.   Skin:     General: Skin is warm and dry.      Findings: No rash.   Neurological:      General: No focal deficit present.      Mental Status: She is alert and oriented to person, place, and time.   Psychiatric:         Mood and Affect: Mood normal.         Behavior: Behavior normal.           Assessment/Plan:  Sherlyn Monahan is a 31 y.o. female who presents today for :        ICD-10-CM ICD-9-CM    1. Routine general medical examination at a health care facility  Z00.00 V70.0  Hemoglobin A1C      Comprehensive Metabolic Panel      Lipid Panel      CBC Auto Differential      TSH      Vitamin D   2. Obesity, Class II, BMI 35-39.9, no comorbidity  E66.9 278.00    3. Acquired hypothyroidism  E03.9 244.9    4. Fatty liver  K76.0 571.8    5. Chronic migraine without aura without status migrainosus, not intractable  G43.709 346.70    6. Cubital tunnel syndrome, right  G56.21 354.2    7. Carpal tunnel syndrome of right wrist  G56.01 354.0     Saw ortho 01/28/2022, repeat nerve conduction study advised, considering surgical release.   8. Family history of breast cancer in mother  Z80.3 V16.3    9. Medication management  Z79.899 V58.69    10. Adverse effect of COVID-19 vaccine  T50.B95A E949.6     4/6/22: had 2 doses PLUS Moderna BOOSTER 12/18/21- had prolonged hives following booster & additonal dose not adivsed at this time   11. Depression with anxiety  F41.8 300.4 sertraline (ZOLOFT) 50 MG tablet   12. Status post cholecystectomy  Z90.49 V45.79    13. Irritable bowel syndrome with diarrhea  K58.0 564.1      ADVISED ON DIET/EXERCISE/SLEEP, ROUTINE EYE/DENTAL EXAMS, AND THE IMPORTANCE OF KEEPING UP WITH APPROPRIATE SCREENING TESTS BASED ON AGE AND RISK FACTORS.  PAP/HPV WNL AND UTD PER GYN 01/2022, MODERNA BOOSTER COMPLETED 12/2021, HEALTH MAINTENANCE OTHERWISE UP-TO-DATE.    OBESITY WITH CONCERN FOR POSSIBLE LV:  Advised on trial of diet with prepackaged/pre portioned foods like OPTIVIA, plans to start walking, ongoing monitoring    HYPOTHYROIDISM:  Has been stable on Synthroid 50, check TSH with labs and refill medication based on results.    FATTY LIVER:  Liver enzymes recently normal, recheck with labs today, advised attention to diet/exercise/weight loss    MIGRAINE HEADACHES:  Currently manageable    CARPAL/CUBITAL TUNNEL OF THE RIGHT UPPER EXTREMITY:  Using wrist and elbow splints at night, considering surgical release by Ortho Dr. Shireen fagan    DEPRESSION/ANXIETY:  Doing very well on  Zoloft 50    IBS/D SYMPTOMS EXACERBATED BY CHOLECYSTECTOMY:  Continue awareness of dietary triggers, trial of daily Benefiber or fiber supplement to see if this helps with bowel movement and regulation.        Patient Instructions   CONSIDER OPTIVIA DIET    TRY BENEFIBER DAILY TO SEE IF HELPS WITH STOOL REGULARITY            Follow up for to follow up on lab results, return as needed for new concerns.

## 2022-04-07 PROBLEM — R79.89 LOW VITAMIN D LEVEL: Status: ACTIVE | Noted: 2022-04-07

## 2022-04-07 RX ORDER — LEVOTHYROXINE SODIUM 50 UG/1
TABLET ORAL
Qty: 90 TABLET | Refills: 4 | OUTPATIENT
Start: 2022-04-07

## 2022-04-12 DIAGNOSIS — E03.9 ACQUIRED HYPOTHYROIDISM: ICD-10-CM

## 2022-04-12 RX ORDER — LEVOTHYROXINE SODIUM 50 UG/1
50 TABLET ORAL DAILY
Qty: 90 TABLET | Refills: 4 | Status: SHIPPED | OUTPATIENT
Start: 2022-04-12 | End: 2023-04-10

## 2022-04-12 NOTE — TELEPHONE ENCOUNTER
No new care gaps identified.  Powered by [employer]. Reference number: [id number].   4/12/2022 2:30:49 PM CDT

## 2022-05-02 ENCOUNTER — OFFICE VISIT (OUTPATIENT)
Dept: ORTHOPEDICS | Facility: CLINIC | Age: 32
End: 2022-05-02
Payer: COMMERCIAL

## 2022-05-02 VITALS — BODY MASS INDEX: 35.16 KG/M2 | HEIGHT: 65 IN | WEIGHT: 211 LBS

## 2022-05-02 DIAGNOSIS — G56.01 CARPAL TUNNEL SYNDROME OF RIGHT WRIST: Primary | ICD-10-CM

## 2022-05-02 DIAGNOSIS — G56.23 CUBITAL TUNNEL SYNDROME OF BOTH UPPER EXTREMITIES: ICD-10-CM

## 2022-05-02 DIAGNOSIS — G56.21 CUBITAL TUNNEL SYNDROME ON RIGHT: ICD-10-CM

## 2022-05-02 PROCEDURE — 1159F PR MEDICATION LIST DOCUMENTED IN MEDICAL RECORD: ICD-10-PCS | Mod: CPTII,S$GLB,, | Performed by: ORTHOPAEDIC SURGERY

## 2022-05-02 PROCEDURE — 99214 OFFICE O/P EST MOD 30 MIN: CPT | Mod: S$GLB,,, | Performed by: ORTHOPAEDIC SURGERY

## 2022-05-02 PROCEDURE — 3044F HG A1C LEVEL LT 7.0%: CPT | Mod: CPTII,S$GLB,, | Performed by: ORTHOPAEDIC SURGERY

## 2022-05-02 PROCEDURE — 99999 PR PBB SHADOW E&M-EST. PATIENT-LVL III: CPT | Mod: PBBFAC,,, | Performed by: ORTHOPAEDIC SURGERY

## 2022-05-02 PROCEDURE — 3008F BODY MASS INDEX DOCD: CPT | Mod: CPTII,S$GLB,, | Performed by: ORTHOPAEDIC SURGERY

## 2022-05-02 PROCEDURE — 3044F PR MOST RECENT HEMOGLOBIN A1C LEVEL <7.0%: ICD-10-PCS | Mod: CPTII,S$GLB,, | Performed by: ORTHOPAEDIC SURGERY

## 2022-05-02 PROCEDURE — 99999 PR PBB SHADOW E&M-EST. PATIENT-LVL III: ICD-10-PCS | Mod: PBBFAC,,, | Performed by: ORTHOPAEDIC SURGERY

## 2022-05-02 PROCEDURE — 3008F PR BODY MASS INDEX (BMI) DOCUMENTED: ICD-10-PCS | Mod: CPTII,S$GLB,, | Performed by: ORTHOPAEDIC SURGERY

## 2022-05-02 PROCEDURE — 1159F MED LIST DOCD IN RCRD: CPT | Mod: CPTII,S$GLB,, | Performed by: ORTHOPAEDIC SURGERY

## 2022-05-02 PROCEDURE — 99214 PR OFFICE/OUTPT VISIT, EST, LEVL IV, 30-39 MIN: ICD-10-PCS | Mod: S$GLB,,, | Performed by: ORTHOPAEDIC SURGERY

## 2022-05-02 RX ORDER — MUPIROCIN 20 MG/G
OINTMENT TOPICAL
Status: CANCELLED | OUTPATIENT
Start: 2022-05-02

## 2022-05-02 NOTE — H&P
Attestation signed by Phoenix Rojas MD at 5/2/2022  3:58 PM     I have seen the patient, reviewed the   Resident's       history and physical    . I have personally interviewed and examined the patient at bedside and   agree with the findings.            Patient with subjective symptoms and complaints consistent with right carpal and cubital tunnel syndrome.  EMG negative.  Discussed this discrepancy.  Positive Tinel's right wrist and elbow with positive median nerve compression test right wrist.  The patient would like to proceed with surgery which I feel is reasonable.  She understands no guarantee can be made with her negative EMG and would like to proceed.  We will proceed with right endoscopic versus open carpal tunnel release as well as right ulnar nerve decompression possible transposition on June 17, 2022.         The patient has not responded to adequate non operative treatment at this time and/or non operative treatment is not indicated. Thus, the risks, benefits and alternatives to surgery were discussed with the patient in detail.  Specific risks include but are not limited to bleeding, infection, vessel and/or nerve damage, pain, numbness, tingling, complex regional pain syndrome, compartment syndrome, failure to return to pre-injury and/or preoperative functional status, scar sensitivity, delayed healing, inability to return to work, pulley injury, tendon injury, bowstringing, partial and/or incomplete relief of symptoms, weakness, persistence of and/or worsening of symptoms, surgical failure, osteomyelitis, amputation, loss of function, stiffness, functional debility, dysfunction, decreased  strength, need for prolonged postoperative rehabilitation, need for further surgery, deep venous thrombosis, pulmonary embolism, arthritis and death.  The patient states an understanding and wishes to proceed with surgery.   All questions were answered.  No guarantees were implied or stated.  Written  informed consent was obtained.           Phoenix Rojas MD     [facility]             Expand AllCollapse All            []Hide copied text    []Mike for details    [facility]  History & Physical  Orthopedics     SUBJECTIVE:       COVID-19 attestation:  This patient was treated during the COVID-19 pandemic.  This was discussed with the patient, they are aware of our current policies and procedures, were given the option of delaying their visit and or switching to a virtual visit, delaying their surgery when applicable, and they elect to proceed.     Chief Complaint: Bilateral hand numbness     Referring Provider: No ref. provider found      History of Present Illness:  Patient is a 31 y.o. right hand dominant female who presents today with complaints of bilateral hand numbness, right greater than left. She reports symptoms for several years, and worsening over the past year. She saw Dr. Terrell in November 2021 and was treated with right carpal tunnel CSI, she reports 80% improvement in her symptoms. He then sent her for EMG which was performed 4 weeks after injection, EMG was normal. He did not offer her surgery based on normal EMG findings. She has taken NSAIDs, is not able to sleep with nocturnal braces. Numbness in all fingers, palm only, numbness wakes her at night and will be present during the day. She denies any weakness.      Interval history: He symptoms have continued. She has been wearing wrist splints at night with slight improvement. She continues to experience numbness and tingling worst in the right hand with most daily activities and after waking up in the morning.      Onset of symptoms/DOI was several years.     Symptoms are aggravated by activity and at night.     Symptoms are alleviated by rest.     Symptoms consist of numbness/tingling.     The patient rates their pain as a 0/10.     Attempted treatment(s) and/or interventions include activity modifications, rest,  anti-inflammatory medications and steroid injection.     The patient denies any fevers, chills, N/V, D/C and presents for evaluation.             Past Medical History:   Diagnosis Date    Acquired hypothyroidism 2016    Irritable bowel syndrome (IBS)      Obesity (BMI 30.0-34.9) 2016     loosing weight consistently on 21 Day Fix program     Urticaria              Past Surgical History:   Procedure Laterality Date    breast reduction   2012    BREAST SURGERY         breast reduction     SECTION         SECTION N/A 2020     Procedure:  SECTION;  Surgeon: Chiara Smith MD;  Location: [facility] L&D;  Service: OB/GYN;  Laterality: N/A;    CHOLECYSTECTOMY   2019    LAPAROSCOPIC CHOLECYSTECTOMY N/A 2019     Procedure: CHOLECYSTECTOMY, LAPAROSCOPIC;  Surgeon: Missael Tolentino MD;  Location: [facility] OR;  Service: General;  Laterality: N/A;  video           Review of patient's allergies indicates:   Allergen Reactions    Minocycline Swelling      Social History          Social History Narrative    Not on file            Family History   Problem Relation Age of Onset    Breast cancer Mother 45         remission for 10 years    Cancer Mother 45         breast     Hypertension Mother      Allergic rhinitis Mother      Diabetes Father      Diabetes Maternal Grandfather      Heart disease Maternal Grandfather      Diabetes Paternal Grandfather      Colon cancer Neg Hx      Ovarian cancer Neg Hx      Allergies Neg Hx      Angioedema Neg Hx      Asthma Neg Hx      Atopy Neg Hx      Eczema Neg Hx      Immunodeficiency Neg Hx      Rhinitis Neg Hx      Urticaria Neg Hx              Current Outpatient Medications:     b complex vitamins capsule, Take 1 capsule by mouth once daily., Disp: , Rfl:     calcium-vitamin D3 (OS-ELISABETH 500 + D3) 500 mg(1,250mg) -200 unit per tablet, Take 1 tablet by mouth., Disp: , Rfl:     cetirizine (ZYRTEC) 5 MG tablet, Take 5 mg  "by mouth once daily., Disp: , Rfl:     mupirocin calcium 2% nasl oint (BACTROBAN) 2 % Oint, by Nasal route 2 (two) times daily., Disp: 1 Tube, Rfl: 3    prenatal vit37-iron-folic acid 29 mg iron- 1 mg Chew, Take by mouth., Disp: , Rfl:     sertraline (ZOLOFT) 50 MG tablet, Take 1 tablet (50 mg total) by mouth every evening., Disp: 90 tablet, Rfl: 4    SYNTHROID 50 mcg tablet, Take 1 tablet (50 mcg total) by mouth once daily., Disp: 90 tablet, Rfl: 4        Review of Systems:  Per HPI      OBJECTIVE:       Vital Signs (Most Recent):  Vitals       Vitals:     05/02/22 1522   Weight: 95.7 kg (211 lb)   Height: 5' 5" (1.651 m)         Body mass index is 35.11 kg/m².        Physical Exam:  Constitutional: The patient appears well-developed and well-nourished. No distress.   Skin: No lesions appreciated  Head: Normocephalic and atraumatic.   Nose: Nose normal.   Ears: No deformities seen  Eyes: Conjunctivae and EOM are normal.   Neck: No tracheal deviation present.   Cardiovascular: Normal rate and intact distal pulses.    Pulmonary/Chest: Effort normal. No respiratory distress.   Abdominal: There is no guarding.   Neurological: The patient is alert.   Psychiatric: The patient has a normal mood and affect.      Bilateral Hand/Wrist Examination:     Observation/Inspection:  Swelling                       none                  Deformity                     none  Discoloration               none                  Scars                           none                  Atrophy                        none     HAND/WRIST EXAMINATION:  Finkelstein's Test                                Pos  WHAT Test                                         Neg  Snuff box tenderness                          Neg  Block's Test                                     Neg  Hook of Hamate Tenderness              Neg  CMC grind                                           Neg  Circumduction test                              Neg     Neurovascular " Exam:  Digits WWP, brisk CR < 3s throughout  NVI motor/LTS to M/R/U nerves, radial pulse 2+  Tinel's Test - Carpal Tunnel                POS Right side  Tinel's Test - Cubital Tunnel               POS  Phalen's Test                                      Neg  Median Nerve Compression Test       POS      ROM hand full, painless     ROM wrist full, painless    ROM elbow full, painless     Abdomen not guarded  Respirations nonlabored  Perfusion intact     Diagnostic Results:     Imaging - I independently viewed the patient's imaging as well as the radiology report.  Xrays of the patient's bilateral hand demonstrate no evidence of any acute fractures or dislocations or significant degenerative changes.     EMG - 12/15/21  Normal study     ASSESSMENT/PLAN:       31 y.o. yo female with symptoms of bilateral carpal tunnel worse on the right that the left additional symptoms of right cubital tunnel syndrome.      Plan: The patient and I had a thorough discussion today.  We discussed the working diagnosis as well as several other potential alternative diagnoses.  Treatment options were discussed, both conservative and surgical.  Conservative treatment options would include things such as activity modifications, workplace modifications, a period of rest, oral vs topical OTC and prescription anti-inflammatory medications, occupational therapy, splinting/bracing, immobilization, corticosteroid injections, and others.  Surgical options were discussed as well.      After discussion of symptoms and treatment options she would like to proceed with carpal and cubital tunnel releases (June 17th). We discussed that there was no guarantee of success or improvement after the surgery, but given her continued symptoms with conservative treatment surgery is a viable options for treatment.     Should the patient's symptoms worsen, persist, or fail to improve they should return for reevaluation and I would be happy to see them back  anytime.     Please do not hesitate to reach out to us via email, phone, or MyChart with any questions, concerns, or feedback.

## 2022-05-02 NOTE — PROGRESS NOTES
[facility]  History & Physical  Orthopedics    SUBJECTIVE:      COVID-19 attestation:  This patient was treated during the COVID-19 pandemic.  This was discussed with the patient, they are aware of our current policies and procedures, were given the option of delaying their visit and or switching to a virtual visit, delaying their surgery when applicable, and they elect to proceed.    Chief Complaint: Bilateral hand numbness    Referring Provider: No ref. provider found     History of Present Illness:  Patient is a 31 y.o. right hand dominant female who presents today with complaints of bilateral hand numbness, right greater than left. She reports symptoms for several years, and worsening over the past year. She saw Dr. Terrell in November 2021 and was treated with right carpal tunnel CSI, she reports 80% improvement in her symptoms. He then sent her for EMG which was performed 4 weeks after injection, EMG was normal. He did not offer her surgery based on normal EMG findings. She has taken NSAIDs, is not able to sleep with nocturnal braces. Numbness in all fingers, palm only, numbness wakes her at night and will be present during the day. She denies any weakness.     Interval history: He symptoms have continued. She has been wearing wrist splints at night with slight improvement. She continues to experience numbness and tingling worst in the right hand with most daily activities and after waking up in the morning.     Onset of symptoms/DOI was several years.    Symptoms are aggravated by activity and at night.    Symptoms are alleviated by rest.    Symptoms consist of numbness/tingling.    The patient rates their pain as a 0/10.    Attempted treatment(s) and/or interventions include activity modifications, rest, anti-inflammatory medications and steroid injection.     The patient denies any fevers, chills, N/V, D/C and presents for evaluation.       Past Medical History:   Diagnosis Date     Acquired hypothyroidism 2016    Irritable bowel syndrome (IBS)     Obesity (BMI 30.0-34.9) 2016    loosing weight consistently on 21 Day Fix program     Urticaria      Past Surgical History:   Procedure Laterality Date    breast reduction  2012    BREAST SURGERY      breast reduction     SECTION       SECTION N/A 2020    Procedure:  SECTION;  Surgeon: Chiara Smith MD;  Location: [facility] L&D;  Service: OB/GYN;  Laterality: N/A;    CHOLECYSTECTOMY  2019    LAPAROSCOPIC CHOLECYSTECTOMY N/A 2019    Procedure: CHOLECYSTECTOMY, LAPAROSCOPIC;  Surgeon: Missael Tolentino MD;  Location: [facility] OR;  Service: General;  Laterality: N/A;  video     Review of patient's allergies indicates:   Allergen Reactions    Minocycline Swelling     Social History     Social History Narrative    Not on file     Family History   Problem Relation Age of Onset    Breast cancer Mother 45        remission for 10 years    Cancer Mother 45        breast     Hypertension Mother     Allergic rhinitis Mother     Diabetes Father     Diabetes Maternal Grandfather     Heart disease Maternal Grandfather     Diabetes Paternal Grandfather     Colon cancer Neg Hx     Ovarian cancer Neg Hx     Allergies Neg Hx     Angioedema Neg Hx     Asthma Neg Hx     Atopy Neg Hx     Eczema Neg Hx     Immunodeficiency Neg Hx     Rhinitis Neg Hx     Urticaria Neg Hx          Current Outpatient Medications:     b complex vitamins capsule, Take 1 capsule by mouth once daily., Disp: , Rfl:     calcium-vitamin D3 (OS-ELISABETH 500 + D3) 500 mg(1,250mg) -200 unit per tablet, Take 1 tablet by mouth., Disp: , Rfl:     cetirizine (ZYRTEC) 5 MG tablet, Take 5 mg by mouth once daily., Disp: , Rfl:     mupirocin calcium 2% nasl oint (BACTROBAN) 2 % Oint, by Nasal route 2 (two) times daily., Disp: 1 Tube, Rfl: 3    prenatal vit37-iron-folic acid 29 mg iron- 1 mg Chew, Take by mouth., Disp: , Rfl:      "sertraline (ZOLOFT) 50 MG tablet, Take 1 tablet (50 mg total) by mouth every evening., Disp: 90 tablet, Rfl: 4    SYNTHROID 50 mcg tablet, Take 1 tablet (50 mcg total) by mouth once daily., Disp: 90 tablet, Rfl: 4      Review of Systems:  Per HPI     OBJECTIVE:      Vital Signs (Most Recent):  Vitals:    05/02/22 1522   Weight: 95.7 kg (211 lb)   Height: 5' 5" (1.651 m)     Body mass index is 35.11 kg/m².      Physical Exam:  Constitutional: The patient appears well-developed and well-nourished. No distress.   Skin: No lesions appreciated  Head: Normocephalic and atraumatic.   Nose: Nose normal.   Ears: No deformities seen  Eyes: Conjunctivae and EOM are normal.   Neck: No tracheal deviation present.   Cardiovascular: Normal rate and intact distal pulses.    Pulmonary/Chest: Effort normal. No respiratory distress.   Abdominal: There is no guarding.   Neurological: The patient is alert.   Psychiatric: The patient has a normal mood and affect.     Bilateral Hand/Wrist Examination:    Observation/Inspection:  Swelling  none    Deformity  none  Discoloration  none     Scars   none    Atrophy  none    HAND/WRIST EXAMINATION:  Finkelstein's Test   Pos  WHAT Test    Neg  Snuff box tenderness   Neg  Block's Test    Neg  Hook of Hamate Tenderness  Neg  CMC grind    Neg  Circumduction test   Neg    Neurovascular Exam:  Digits WWP, brisk CR < 3s throughout  NVI motor/LTS to M/R/U nerves, radial pulse 2+  Tinel's Test - Carpal Tunnel  POS Right side  Tinel's Test - Cubital Tunnel  POS  Phalen's Test    Neg  Median Nerve Compression Test POS     ROM hand full, painless    ROM wrist full, painless    ROM elbow full, painless    Abdomen not guarded  Respirations nonlabored  Perfusion intact    Diagnostic Results:     Imaging - I independently viewed the patient's imaging as well as the radiology report.  Xrays of the patient's bilateral hand demonstrate no evidence of any acute fractures or dislocations or significant " degenerative changes.    EMG - 12/15/21  Normal study    ASSESSMENT/PLAN:      31 y.o. yo female with symptoms of bilateral carpal tunnel worse on the right that the left additional symptoms of right cubital tunnel syndrome.     Plan: The patient and I had a thorough discussion today.  We discussed the working diagnosis as well as several other potential alternative diagnoses.  Treatment options were discussed, both conservative and surgical.  Conservative treatment options would include things such as activity modifications, workplace modifications, a period of rest, oral vs topical OTC and prescription anti-inflammatory medications, occupational therapy, splinting/bracing, immobilization, corticosteroid injections, and others.  Surgical options were discussed as well.     After discussion of symptoms and treatment options she would like to proceed with carpal and cubital tunnel releases (June 17th). We discussed that there was no guarantee of success or improvement after the surgery, but given her continued symptoms with conservative treatment surgery is a viable options for treatment.    Should the patient's symptoms worsen, persist, or fail to improve they should return for reevaluation and I would be happy to see them back anytime.    Please do not hesitate to reach out to us via email, phone, or MyChart with any questions, concerns, or feedback.

## 2022-05-10 ENCOUNTER — PATIENT MESSAGE (OUTPATIENT)
Dept: SURGERY | Facility: HOSPITAL | Age: 32
End: 2022-05-10
Payer: COMMERCIAL

## 2022-05-26 ENCOUNTER — NURSE TRIAGE (OUTPATIENT)
Dept: ADMINISTRATIVE | Facility: CLINIC | Age: 32
End: 2022-05-26
Payer: COMMERCIAL

## 2022-05-26 NOTE — TELEPHONE ENCOUNTER
OOC outgoing call - Pt c/o new onset covid per rapid test, cough, post nasal drip. Covid protocol followed and pt advised to contact a dr via OAC. OAC offered and accepted. Education provided. Encounter routed to PCP. Invited Pt to call ooc at 1 [phone] if she has any new questions or concerns after consulting oac doc.    Reason for Disposition   HIGH RISK for severe COVID complications (e.g., weak immune system, age > 64 years, obesity with BMI > 25, pregnant, chronic lung disease or other chronic medical condition)  (Exception: Already seen by PCP and no new or worsening symptoms.)    Additional Information   Negative: SEVERE difficulty breathing (e.g., struggling for each breath, speaks in single words)   Negative: Difficult to awaken or acting confused (e.g., disoriented, slurred speech)   Negative: Bluish (or gray) lips or face now   Negative: Shock suspected (e.g., cold/pale/clammy skin, too weak to stand, low BP, rapid pulse)   Negative: Sounds like a life-threatening emergency to the triager   Negative: SEVERE or constant chest pain or pressure  (Exception: Mild central chest pain, present only when coughing.)   Negative: MODERATE difficulty breathing (e.g., speaks in phrases, SOB even at rest, pulse 100-120)   Negative: [1] Headache AND [2] stiff neck (can't touch chin to chest)   Negative: Oxygen level (e.g., pulse oximetry) 90 percent or lower   Negative: Chest pain or pressure   Negative: Patient sounds very sick or weak to the triager   Negative: MILD difficulty breathing (e.g., minimal/no SOB at rest, SOB with walking, pulse <100)   Negative: Fever > 103 F (39.4 C)   Negative: [1] Fever > 101 F (38.3 C) AND [2] age > 60 years   Negative: [1] Fever > 100.0 F (37.8 C) AND [2] bedridden (e.g., nursing home patient, CVA, chronic illness, recovering from surgery)    Protocols used: CORONAVIRUS (COVID-19) DIAGNOSED OR [id number]

## 2022-06-01 ENCOUNTER — PATIENT MESSAGE (OUTPATIENT)
Dept: SURGERY | Facility: HOSPITAL | Age: 32
End: 2022-06-01
Payer: COMMERCIAL

## 2022-06-17 ENCOUNTER — PATIENT MESSAGE (OUTPATIENT)
Dept: SURGERY | Facility: HOSPITAL | Age: 32
End: 2022-06-17
Payer: COMMERCIAL

## 2022-06-19 ENCOUNTER — ANESTHESIA EVENT (OUTPATIENT)
Dept: SURGERY | Facility: HOSPITAL | Age: 32
End: 2022-06-19
Payer: COMMERCIAL

## 2022-06-20 ENCOUNTER — TELEPHONE (OUTPATIENT)
Dept: ORTHOPEDICS | Facility: CLINIC | Age: 32
End: 2022-06-20
Payer: COMMERCIAL

## 2022-06-20 NOTE — TELEPHONE ENCOUNTER
Spoke to patient about pre op instructions. Patient verbalized understanding about what she needs to do prior to surgery and was thankful.      Avis Ramirez MA  Medical Assistant to Dr. Ross Dunbar [facility]

## 2022-06-22 PROBLEM — G56.23 CUBITAL TUNNEL SYNDROME OF BOTH UPPER EXTREMITIES: Status: ACTIVE | Noted: 2022-04-06

## 2022-06-22 RX ORDER — TRAMADOL HYDROCHLORIDE 50 MG/1
50 TABLET ORAL EVERY 6 HOURS PRN
Qty: 10 TABLET | Refills: 0 | Status: SHIPPED | OUTPATIENT
Start: 2022-06-22 | End: 2022-11-04

## 2022-06-23 ENCOUNTER — TELEPHONE (OUTPATIENT)
Dept: ORTHOPEDICS | Facility: CLINIC | Age: 32
End: 2022-06-23
Payer: COMMERCIAL

## 2022-06-23 NOTE — TELEPHONE ENCOUNTER
Spoke with the patient. Notified of 830 AM arrival time to the [facility].  Informed of current visitor policy.  Reminded of NPO and need for transportation. Patient verbalized understanding to all.    Anamaria Montemayor MS, OTC  Clinical Assistant to Dr. Ross Dunbar [facility]

## 2022-06-24 ENCOUNTER — ANESTHESIA (OUTPATIENT)
Dept: SURGERY | Facility: HOSPITAL | Age: 32
End: 2022-06-24
Payer: COMMERCIAL

## 2022-06-24 ENCOUNTER — HOSPITAL ENCOUNTER (OUTPATIENT)
Facility: HOSPITAL | Age: 32
Discharge: HOME | End: 2022-06-24
Attending: ORTHOPAEDIC SURGERY | Admitting: ORTHOPAEDIC SURGERY
Payer: COMMERCIAL

## 2022-06-24 ENCOUNTER — PATIENT MESSAGE (OUTPATIENT)
Dept: SURGERY | Facility: HOSPITAL | Age: 32
End: 2022-06-24
Payer: COMMERCIAL

## 2022-06-24 DIAGNOSIS — G56.21 CUBITAL TUNNEL SYNDROME ON RIGHT: Primary | ICD-10-CM

## 2022-06-24 DIAGNOSIS — G56.01 CARPAL TUNNEL SYNDROME OF RIGHT WRIST: ICD-10-CM

## 2022-06-24 DIAGNOSIS — G56.23 CUBITAL TUNNEL SYNDROME OF BOTH UPPER EXTREMITIES: ICD-10-CM

## 2022-06-24 LAB
B-HCG UR QL: NEGATIVE
CTP QC/QA: YES

## 2022-06-24 PROCEDURE — 64415 SUPRACLAVICULAR BRACHIAL PLEXUS SINGLE INJECTION BLOCK: ICD-10-PCS | Mod: 59,RT,, | Performed by: ANESTHESIOLOGY

## 2022-06-24 PROCEDURE — 76942 SUPRACLAVICULAR BRACHIAL PLEXUS SINGLE INJECTION BLOCK: ICD-10-PCS | Mod: 26,,, | Performed by: ANESTHESIOLOGY

## 2022-06-24 PROCEDURE — 25000003 PHARM REV CODE 250: Performed by: NURSE ANESTHETIST, CERTIFIED REGISTERED

## 2022-06-24 PROCEDURE — D9220A PRA ANESTHESIA: Mod: CRNA,,, | Performed by: NURSE ANESTHETIST, CERTIFIED REGISTERED

## 2022-06-24 PROCEDURE — 81025 URINE PREGNANCY TEST: CPT | Mod: ,,, | Performed by: ORTHOPAEDIC SURGERY

## 2022-06-24 PROCEDURE — 29848 PR WRIST ARTHROSCOP,RELEASE XVERS LIG: ICD-10-PCS | Mod: 51,RT,, | Performed by: ORTHOPAEDIC SURGERY

## 2022-06-24 PROCEDURE — 76942 ECHO GUIDE FOR BIOPSY: CPT | Performed by: SURGERY

## 2022-06-24 PROCEDURE — 63600175 PHARM REV CODE 636 W HCPCS: Performed by: NURSE ANESTHETIST, CERTIFIED REGISTERED

## 2022-06-24 PROCEDURE — D9220A PRA ANESTHESIA: Mod: ANES,,, | Performed by: ANESTHESIOLOGY

## 2022-06-24 PROCEDURE — 37000008 HC ANESTHESIA 1ST 15 MINUTES: Performed by: ORTHOPAEDIC SURGERY

## 2022-06-24 PROCEDURE — 63600175 PHARM REV CODE 636 W HCPCS: Performed by: SURGERY

## 2022-06-24 PROCEDURE — 76942 ECHO GUIDE FOR BIOPSY: CPT | Mod: 26,,, | Performed by: ANESTHESIOLOGY

## 2022-06-24 PROCEDURE — 71000033 HC RECOVERY, INTIAL HOUR: Performed by: ORTHOPAEDIC SURGERY

## 2022-06-24 PROCEDURE — 25000003 PHARM REV CODE 250: Performed by: ORTHOPAEDIC SURGERY

## 2022-06-24 PROCEDURE — 64415 NJX AA&/STRD BRCH PLXS IMG: CPT | Mod: 59,RT,, | Performed by: ANESTHESIOLOGY

## 2022-06-24 PROCEDURE — 64718 REVISE ULNAR NERVE AT ELBOW: CPT | Mod: RT,,, | Performed by: ORTHOPAEDIC SURGERY

## 2022-06-24 PROCEDURE — 25000003 PHARM REV CODE 250: Performed by: SURGERY

## 2022-06-24 PROCEDURE — 81025 URINE PREGNANCY TEST: CPT | Performed by: ORTHOPAEDIC SURGERY

## 2022-06-24 PROCEDURE — D9220A PRA ANESTHESIA: ICD-10-PCS | Mod: CRNA,,, | Performed by: NURSE ANESTHETIST, CERTIFIED REGISTERED

## 2022-06-24 PROCEDURE — 29848 WRIST ENDOSCOPY/SURGERY: CPT | Mod: 51,RT,, | Performed by: ORTHOPAEDIC SURGERY

## 2022-06-24 PROCEDURE — 37000009 HC ANESTHESIA EA ADD 15 MINS: Performed by: ORTHOPAEDIC SURGERY

## 2022-06-24 PROCEDURE — 63600175 PHARM REV CODE 636 W HCPCS: Performed by: ORTHOPAEDIC SURGERY

## 2022-06-24 PROCEDURE — 81025 POCT URINE PREGNANCY: ICD-10-PCS | Mod: ,,, | Performed by: ORTHOPAEDIC SURGERY

## 2022-06-24 PROCEDURE — D9220A PRA ANESTHESIA: ICD-10-PCS | Mod: ANES,,, | Performed by: ANESTHESIOLOGY

## 2022-06-24 PROCEDURE — 63600175 PHARM REV CODE 636 W HCPCS: Performed by: ANESTHESIOLOGY

## 2022-06-24 PROCEDURE — 27201423 OPTIME MED/SURG SUP & DEVICES STERILE SUPPLY: Performed by: ORTHOPAEDIC SURGERY

## 2022-06-24 PROCEDURE — 36000707: Performed by: ORTHOPAEDIC SURGERY

## 2022-06-24 PROCEDURE — 71000015 HC POSTOP RECOV 1ST HR: Performed by: ORTHOPAEDIC SURGERY

## 2022-06-24 PROCEDURE — 64718 PR REVISE ULNAR NERVE AT ELBOW: ICD-10-PCS | Mod: RT,,, | Performed by: ORTHOPAEDIC SURGERY

## 2022-06-24 PROCEDURE — 36000706: Performed by: ORTHOPAEDIC SURGERY

## 2022-06-24 RX ORDER — OXYCODONE HYDROCHLORIDE 5 MG/1
5 TABLET ORAL
Status: DISCONTINUED | OUTPATIENT
Start: 2022-06-24 | End: 2022-06-24 | Stop reason: HOSPADM

## 2022-06-24 RX ORDER — KETAMINE HCL IN 0.9 % NACL 50 MG/5 ML
SYRINGE (ML) INTRAVENOUS
Status: DISCONTINUED | OUTPATIENT
Start: 2022-06-24 | End: 2022-06-24

## 2022-06-24 RX ORDER — PROPOFOL 10 MG/ML
VIAL (ML) INTRAVENOUS CONTINUOUS PRN
Status: DISCONTINUED | OUTPATIENT
Start: 2022-06-24 | End: 2022-06-24

## 2022-06-24 RX ORDER — MIDAZOLAM HYDROCHLORIDE 1 MG/ML
.5-4 INJECTION INTRAMUSCULAR; INTRAVENOUS
Status: DISPENSED | OUTPATIENT
Start: 2022-06-24

## 2022-06-24 RX ORDER — LIDOCAINE HYDROCHLORIDE 20 MG/ML
INJECTION INTRAVENOUS
Status: DISCONTINUED | OUTPATIENT
Start: 2022-06-24 | End: 2022-06-24

## 2022-06-24 RX ORDER — HALOPERIDOL 5 MG/ML
0.5 INJECTION INTRAMUSCULAR EVERY 10 MIN PRN
Status: DISCONTINUED | OUTPATIENT
Start: 2022-06-24 | End: 2022-06-24 | Stop reason: HOSPADM

## 2022-06-24 RX ORDER — ACETAMINOPHEN 500 MG
1000 TABLET ORAL
Status: COMPLETED | OUTPATIENT
Start: 2022-06-24 | End: 2022-06-24

## 2022-06-24 RX ORDER — HYDROCODONE BITARTRATE AND ACETAMINOPHEN 5; 325 MG/1; MG/1
1 TABLET ORAL EVERY 4 HOURS PRN
Status: DISCONTINUED | OUTPATIENT
Start: 2022-06-24 | End: 2022-06-24 | Stop reason: HOSPADM

## 2022-06-24 RX ORDER — ONDANSETRON 2 MG/ML
INJECTION INTRAMUSCULAR; INTRAVENOUS
Status: DISCONTINUED | OUTPATIENT
Start: 2022-06-24 | End: 2022-06-24

## 2022-06-24 RX ORDER — FAMOTIDINE 10 MG/ML
INJECTION INTRAVENOUS
Status: DISCONTINUED | OUTPATIENT
Start: 2022-06-24 | End: 2022-06-24

## 2022-06-24 RX ORDER — DEXAMETHASONE SODIUM PHOSPHATE 4 MG/ML
INJECTION, SOLUTION INTRA-ARTICULAR; INTRALESIONAL; INTRAMUSCULAR; INTRAVENOUS; SOFT TISSUE
Status: DISCONTINUED | OUTPATIENT
Start: 2022-06-24 | End: 2022-06-24

## 2022-06-24 RX ORDER — PROPOFOL 10 MG/ML
VIAL (ML) INTRAVENOUS
Status: DISCONTINUED | OUTPATIENT
Start: 2022-06-24 | End: 2022-06-24

## 2022-06-24 RX ORDER — FENTANYL CITRATE 50 UG/ML
25-200 INJECTION, SOLUTION INTRAMUSCULAR; INTRAVENOUS
Status: DISPENSED | OUTPATIENT
Start: 2022-06-24

## 2022-06-24 RX ORDER — CEFAZOLIN SODIUM/WATER 2 G/20 ML
2 SYRINGE (ML) INTRAVENOUS
Status: DISCONTINUED | OUTPATIENT
Start: 2022-06-24 | End: 2022-06-24 | Stop reason: HOSPADM

## 2022-06-24 RX ORDER — FENTANYL CITRATE 50 UG/ML
25 INJECTION, SOLUTION INTRAMUSCULAR; INTRAVENOUS EVERY 5 MIN PRN
Status: DISCONTINUED | OUTPATIENT
Start: 2022-06-24 | End: 2022-06-24 | Stop reason: HOSPADM

## 2022-06-24 RX ORDER — SODIUM CHLORIDE 0.9 % (FLUSH) 0.9 %
3 SYRINGE (ML) INJECTION EVERY 6 HOURS
Status: DISCONTINUED | OUTPATIENT
Start: 2022-06-24 | End: 2022-06-24 | Stop reason: HOSPADM

## 2022-06-24 RX ORDER — MUPIROCIN 20 MG/G
OINTMENT TOPICAL
Status: DISCONTINUED | OUTPATIENT
Start: 2022-06-24 | End: 2022-06-24 | Stop reason: HOSPADM

## 2022-06-24 RX ORDER — LIDOCAINE HYDROCHLORIDE 10 MG/ML
1 INJECTION, SOLUTION EPIDURAL; INFILTRATION; INTRACAUDAL; PERINEURAL ONCE
Status: ACTIVE | OUTPATIENT
Start: 2022-06-24

## 2022-06-24 RX ORDER — CARBOXYMETHYLCELLULOSE SODIUM 10 MG/ML
GEL OPHTHALMIC
Status: DISCONTINUED | OUTPATIENT
Start: 2022-06-24 | End: 2022-06-24

## 2022-06-24 RX ORDER — CELECOXIB 200 MG/1
400 CAPSULE ORAL ONCE
Status: COMPLETED | OUTPATIENT
Start: 2022-06-24 | End: 2022-06-24

## 2022-06-24 RX ORDER — DEXMEDETOMIDINE HYDROCHLORIDE 100 UG/ML
INJECTION, SOLUTION INTRAVENOUS
Status: DISCONTINUED | OUTPATIENT
Start: 2022-06-24 | End: 2022-06-24

## 2022-06-24 RX ADMIN — MUPIROCIN: 20 OINTMENT TOPICAL at 10:06

## 2022-06-24 RX ADMIN — CARBOXYMETHYLCELLULOSE SODIUM 4 DROP: 10 GEL OPHTHALMIC at 12:06

## 2022-06-24 RX ADMIN — DEXAMETHASONE SODIUM PHOSPHATE 8 MG: 4 INJECTION, SOLUTION INTRAMUSCULAR; INTRAVENOUS at 12:06

## 2022-06-24 RX ADMIN — Medication 2 G: at 12:06

## 2022-06-24 RX ADMIN — DEXMEDETOMIDINE HYDROCHLORIDE 25 MCG: 100 INJECTION, SOLUTION, CONCENTRATE INTRAVENOUS at 01:06

## 2022-06-24 RX ADMIN — SODIUM CHLORIDE: 9 INJECTION, SOLUTION INTRAVENOUS at 12:06

## 2022-06-24 RX ADMIN — ONDANSETRON 4 MG: 2 INJECTION INTRAMUSCULAR; INTRAVENOUS at 12:06

## 2022-06-24 RX ADMIN — Medication 25 MG: at 12:06

## 2022-06-24 RX ADMIN — LIDOCAINE HYDROCHLORIDE 80 MG: 20 INJECTION, SOLUTION INTRAVENOUS at 12:06

## 2022-06-24 RX ADMIN — ACETAMINOPHEN 1000 MG: 500 TABLET ORAL at 10:06

## 2022-06-24 RX ADMIN — PROPOFOL 100 MCG/KG/MIN: 10 INJECTION, EMULSION INTRAVENOUS at 12:06

## 2022-06-24 RX ADMIN — FAMOTIDINE 20 MG: 10 INJECTION, SOLUTION INTRAVENOUS at 12:06

## 2022-06-24 RX ADMIN — PROPOFOL 50 MG: 10 INJECTION, EMULSION INTRAVENOUS at 12:06

## 2022-06-24 RX ADMIN — CELECOXIB 400 MG: 200 CAPSULE ORAL at 10:06

## 2022-06-24 RX ADMIN — MIDAZOLAM 2 MG: 1 INJECTION INTRAMUSCULAR; INTRAVENOUS at 10:06

## 2022-06-24 RX ADMIN — FENTANYL CITRATE 50 MCG: 50 INJECTION INTRAMUSCULAR; INTRAVENOUS at 10:06

## 2022-06-24 RX ADMIN — MEPIVACAINE HYDROCHLORIDE 30 ML: 15 INJECTION, SOLUTION EPIDURAL; INFILTRATION at 10:06

## 2022-06-24 NOTE — ANESTHESIA PREPROCEDURE EVALUATION
2022  Sherlyn Monahan is a 32 y.o., female.    Active Ambulatory Problems     Diagnosis Date Noted    Acquired hypothyroidism 2016    Dysmenorrhea 2016    Chronic migraine without aura without status migrainosus, not intractable 2016    Family history of breast cancer in mother 2016    Irregular menstrual bleeding 2016    S/P  2018    Fatty liver 2019    Status post cholecystectomy 2019    Delivery of pregnancy by  section 2020    Carpal tunnel syndrome of right wrist 2021    Adverse effect of COVID-19 vaccine 2022    Cubital tunnel syndrome of both upper extremities 2022    Depression with anxiety 2022    Low vitamin D level 2022     Resolved Ambulatory Problems     Diagnosis Date Noted    Gall bladder stones 2019     Past Medical History:   Diagnosis Date    Irritable bowel syndrome (IBS)     Obesity (BMI 30.0-34.9) 2016    Urticaria            Pre-op Assessment    I have reviewed the Patient Summary Reports.     I have reviewed the Nursing Notes.       Review of Systems  Anesthesia Hx:  Denies Hx of Anesthetic complications    Social:  Non-Smoker    Cardiovascular:   Exercise tolerance: good Denies Hypertension.  Denies CAD.     Denies Angina.  Denies MCKEON.    Pulmonary:   Denies COPD.  Denies Asthma.  Denies Shortness of breath.  Denies Sleep Apnea.    Hepatic/GI:   Denies GERD. Liver Disease,    Neurological:   Denies CVA. Headaches Denies Seizures.    Endocrine:   Denies Diabetes. Hypothyroidism        Physical Exam  General: Well nourished    Airway:  Mallampati: III / II  Mouth Opening: Normal  TM Distance: Normal  Neck ROM: Normal ROM    Chest/Lungs:  Normal Respiratory Rate    Heart:  Rate: Normal        Anesthesia Plan  Type of Anesthesia, risks & benefits  discussed:    Anesthesia Type: Gen Natural Airway  Intra-op Monitoring Plan: Standard ASA Monitors  Post Op Pain Control Plan: multimodal analgesia, IV/PO Opioids PRN and peripheral nerve block  Induction:  IV  Informed Consent: Informed consent signed with the Patient and all parties understand the risks and agree with anesthesia plan.  All questions answered.   ASA Score: 2  Anesthesia Plan Notes: The patient's PMH was reviewed and PE was performed  Plan for GA and natural airway. Will convert to secured airway if needed      Ready For Surgery From Anesthesia Perspective.     .

## 2022-06-24 NOTE — H&P
Attestation signed by Phoenix Rojas MD at 5/2/2022  3:58 PM     I have seen the patient, reviewed the   Resident's       history and physical    . I have personally interviewed and examined the patient at bedside and   agree with the findings.            Patient with subjective symptoms and complaints consistent with right carpal and cubital tunnel syndrome.  EMG negative.  Discussed this discrepancy.  Positive Tinel's right wrist and elbow with positive median nerve compression test right wrist.  The patient would like to proceed with surgery which I feel is reasonable.  She understands no guarantee can be made with her negative EMG and would like to proceed.  We will proceed with right endoscopic versus open carpal tunnel release as well as right ulnar nerve decompression possible transposition on June 17, 2022.         The patient has not responded to adequate non operative treatment at this time and/or non operative treatment is not indicated. Thus, the risks, benefits and alternatives to surgery were discussed with the patient in detail.  Specific risks include but are not limited to bleeding, infection, vessel and/or nerve damage, pain, numbness, tingling, complex regional pain syndrome, compartment syndrome, failure to return to pre-injury and/or preoperative functional status, scar sensitivity, delayed healing, inability to return to work, pulley injury, tendon injury, bowstringing, partial and/or incomplete relief of symptoms, weakness, persistence of and/or worsening of symptoms, surgical failure, osteomyelitis, amputation, loss of function, stiffness, functional debility, dysfunction, decreased  strength, need for prolonged postoperative rehabilitation, need for further surgery, deep venous thrombosis, pulmonary embolism, arthritis and death.  The patient states an understanding and wishes to proceed with surgery.   All questions were answered.  No guarantees were implied or stated.  Written  informed consent was obtained.           Phoenix Rojas MD     [facility]             Expand AllCollapse All            []Hide copied text    []Mike for details    [facility]  History & Physical  Orthopedics     SUBJECTIVE:       COVID-19 attestation:  This patient was treated during the COVID-19 pandemic.  This was discussed with the patient, they are aware of our current policies and procedures, were given the option of delaying their visit and or switching to a virtual visit, delaying their surgery when applicable, and they elect to proceed.     Chief Complaint: Bilateral hand numbness     Referring Provider: No ref. provider found      History of Present Illness:  Patient is a 31 y.o. right hand dominant female who presents today with complaints of bilateral hand numbness, right greater than left. She reports symptoms for several years, and worsening over the past year. She saw Dr. Terrell in November 2021 and was treated with right carpal tunnel CSI, she reports 80% improvement in her symptoms. He then sent her for EMG which was performed 4 weeks after injection, EMG was normal. He did not offer her surgery based on normal EMG findings. She has taken NSAIDs, is not able to sleep with nocturnal braces. Numbness in all fingers, palm only, numbness wakes her at night and will be present during the day. She denies any weakness.      Interval history: He symptoms have continued. She has been wearing wrist splints at night with slight improvement. She continues to experience numbness and tingling worst in the right hand with most daily activities and after waking up in the morning.      Onset of symptoms/DOI was several years.     Symptoms are aggravated by activity and at night.     Symptoms are alleviated by rest.     Symptoms consist of numbness/tingling.     The patient rates their pain as a 0/10.     Attempted treatment(s) and/or interventions include activity modifications, rest,  anti-inflammatory medications and steroid injection.     The patient denies any fevers, chills, N/V, D/C and presents for evaluation.             Past Medical History:   Diagnosis Date    Acquired hypothyroidism 2016    Irritable bowel syndrome (IBS)      Obesity (BMI 30.0-34.9) 2016     loosing weight consistently on 21 Day Fix program     Urticaria              Past Surgical History:   Procedure Laterality Date    breast reduction   2012    BREAST SURGERY         breast reduction     SECTION         SECTION N/A 2020     Procedure:  SECTION;  Surgeon: Chiara Smith MD;  Location: [facility] L&D;  Service: OB/GYN;  Laterality: N/A;    CHOLECYSTECTOMY   2019    LAPAROSCOPIC CHOLECYSTECTOMY N/A 2019     Procedure: CHOLECYSTECTOMY, LAPAROSCOPIC;  Surgeon: Missael Tolentino MD;  Location: [facility] OR;  Service: General;  Laterality: N/A;  video           Review of patient's allergies indicates:   Allergen Reactions    Minocycline Swelling      Social History          Social History Narrative    Not on file            Family History   Problem Relation Age of Onset    Breast cancer Mother 45         remission for 10 years    Cancer Mother 45         breast     Hypertension Mother      Allergic rhinitis Mother      Diabetes Father      Diabetes Maternal Grandfather      Heart disease Maternal Grandfather      Diabetes Paternal Grandfather      Colon cancer Neg Hx      Ovarian cancer Neg Hx      Allergies Neg Hx      Angioedema Neg Hx      Asthma Neg Hx      Atopy Neg Hx      Eczema Neg Hx      Immunodeficiency Neg Hx      Rhinitis Neg Hx      Urticaria Neg Hx              Current Outpatient Medications:     b complex vitamins capsule, Take 1 capsule by mouth once daily., Disp: , Rfl:     calcium-vitamin D3 (OS-ELISABETH 500 + D3) 500 mg(1,250mg) -200 unit per tablet, Take 1 tablet by mouth., Disp: , Rfl:     cetirizine (ZYRTEC) 5 MG tablet, Take 5 mg  "by mouth once daily., Disp: , Rfl:     mupirocin calcium 2% nasl oint (BACTROBAN) 2 % Oint, by Nasal route 2 (two) times daily., Disp: 1 Tube, Rfl: 3    prenatal vit37-iron-folic acid 29 mg iron- 1 mg Chew, Take by mouth., Disp: , Rfl:     sertraline (ZOLOFT) 50 MG tablet, Take 1 tablet (50 mg total) by mouth every evening., Disp: 90 tablet, Rfl: 4    SYNTHROID 50 mcg tablet, Take 1 tablet (50 mcg total) by mouth once daily., Disp: 90 tablet, Rfl: 4        Review of Systems:  Per HPI      OBJECTIVE:       Vital Signs (Most Recent):  Vitals       Vitals:     05/02/22 1522   Weight: 95.7 kg (211 lb)   Height: 5' 5" (1.651 m)         Body mass index is 35.11 kg/m².        Physical Exam:  Constitutional: The patient appears well-developed and well-nourished. No distress.   Skin: No lesions appreciated  Head: Normocephalic and atraumatic.   Nose: Nose normal.   Ears: No deformities seen  Eyes: Conjunctivae and EOM are normal.   Neck: No tracheal deviation present.   Cardiovascular: Normal rate and intact distal pulses.    Pulmonary/Chest: Effort normal. No respiratory distress.   Abdominal: There is no guarding.   Neurological: The patient is alert.   Psychiatric: The patient has a normal mood and affect.      Bilateral Hand/Wrist Examination:     Observation/Inspection:  Swelling                       none                  Deformity                     none  Discoloration               none                  Scars                           none                  Atrophy                        none     HAND/WRIST EXAMINATION:  Finkelstein's Test                                Pos  WHAT Test                                         Neg  Snuff box tenderness                          Neg  Block's Test                                     Neg  Hook of Hamate Tenderness              Neg  CMC grind                                           Neg  Circumduction test                              Neg     Neurovascular " Exam:  Digits WWP, brisk CR < 3s throughout  NVI motor/LTS to M/R/U nerves, radial pulse 2+  Tinel's Test - Carpal Tunnel                POS Right side  Tinel's Test - Cubital Tunnel               POS  Phalen's Test                                      Neg  Median Nerve Compression Test       POS      ROM hand full, painless     ROM wrist full, painless    ROM elbow full, painless     Abdomen not guarded  Respirations nonlabored  Perfusion intact     Diagnostic Results:     Imaging - I independently viewed the patient's imaging as well as the radiology report.  Xrays of the patient's bilateral hand demonstrate no evidence of any acute fractures or dislocations or significant degenerative changes.     EMG - 12/15/21  Normal study     ASSESSMENT/PLAN:       31 y.o. yo female with symptoms of bilateral carpal tunnel worse on the right that the left additional symptoms of right cubital tunnel syndrome.      Plan: The patient and I had a thorough discussion today.  We discussed the working diagnosis as well as several other potential alternative diagnoses.  Treatment options were discussed, both conservative and surgical.  Conservative treatment options would include things such as activity modifications, workplace modifications, a period of rest, oral vs topical OTC and prescription anti-inflammatory medications, occupational therapy, splinting/bracing, immobilization, corticosteroid injections, and others.  Surgical options were discussed as well.      After discussion of symptoms and treatment options she would like to proceed with carpal and cubital tunnel releases (June 17th). We discussed that there was no guarantee of success or improvement after the surgery, but given her continued symptoms with conservative treatment surgery is a viable options for treatment.     Should the patient's symptoms worsen, persist, or fail to improve they should return for reevaluation and I would be happy to see them back  anytime.     Please do not hesitate to reach out to us via email, phone, or MyChart with any questions, concerns, or feedback.         Cosigned by: Phoenix Rojas MD at 5/2/2022  3:58 PM

## 2022-06-24 NOTE — BRIEF OP NOTE
[facility] - Surgery (Hospital)  Brief Operative Note  Discharge Note     SUMMARY     Surgery Date: 6/24/2022     Surgeon(s) and Role:     * Phoenix Rojas MD - Primary    Assistant: Benjie Jane,    Pre-op Diagnosis:  Carpal tunnel syndrome of right wrist [G56.01]  Cubital tunnel syndrome of both upper extremities [G56.23]    Post-op Diagnosis:  Post-Op Diagnosis Codes:     * Carpal tunnel syndrome of right wrist [G56.01]     * Cubital tunnel syndrome of both upper extremities [G56.23]    Procedure(s) (LRB):  TRANSPOSITION, NERVE, ULNAR (Right)  RELEASE, CARPAL TUNNEL, ENDOSCOPIC (Right)    Anesthesia: Regional    Description of the findings of the procedure: see op note    Findings/Key Components: See operative report    Estimated Blood Loss: * No values recorded between 6/24/2022 12:39 PM and 6/24/2022  1:23 PM *         Specimens:   Specimen (24h ago, onward)            None          Implants: * No implants in log *    Complications: None    Discharge Note    SUMMARY     Admit Date: 6/24/2022    Discharge Date and Time:  06/24/2022 1:29 PM    Hospital Course: Patient was placed in observation status for the above procedure.  See above.  Postoperatively was discharged home from the PACU once criteria was met.    Final Diagnosis: Post-Op Diagnosis Codes:     * Carpal tunnel syndrome of right wrist [G56.01]     * Cubital tunnel syndrome of both upper extremities [G56.23]    Disposition: Home or Self Care    Follow Up/Patient Instructions:     Medications:  Reconciled Home Medications:      Medication List      START taking these medications    traMADoL 50 mg tablet  Commonly known as: ULTRAM  Take 1 tablet (50 mg total) by mouth every 6 (six) hours as needed for Pain.        CONTINUE taking these medications    b complex vitamins capsule  Take 1 capsule by mouth once daily.     calcium-vitamin D3 500 mg-5 mcg (200 unit) per tablet  Commonly known as: OS-ELISABETH 500 + D3  Take 1 tablet by mouth.     cetirizine 5 MG  tablet  Commonly known as: ZYRTEC  Take 5 mg by mouth once daily.     mupirocin calcium 2% nasl oint 2 % Oint  Commonly known as: BACTROBAN  by Nasal route 2 (two) times daily.     prenatal vit37-iron-folic acid 29 mg iron- 1 mg Chew  Take by mouth.     sertraline 50 MG tablet  Commonly known as: ZOLOFT  Take 1 tablet (50 mg total) by mouth every evening.     SYNTHROID 50 MCG tablet  Generic drug: levothyroxine  Take 1 tablet (50 mcg total) by mouth once daily.          Discharge Procedure Orders   Diet general     Keep surgical extremity elevated     Leave dressing on - Keep it clean, dry, and intact until clinic visit     Call MD for:  temperature >100.4     Call MD for:  persistent nausea and vomiting     Call MD for:  severe uncontrolled pain     Call MD for:  difficulty breathing, headache or visual disturbances     Call MD for:  redness, tenderness, or signs of infection (pain, swelling, redness, odor or green/yellow discharge around incision site)     Call MD for:  hives     Call MD for:  persistent dizziness or light-headedness     Call MD for:  extreme fatigue

## 2022-06-24 NOTE — OP NOTE
ORTHOPEDIC SURGERY OPERATIVE NOTE     SERVICE: ORTHOPEDICS      DATE OF PROCEDURE: 06/24/2022     SURGEON: Phoenix Rojas M.D.     ASSISTANT: MD CECELIA Medeiros     PREOPERATIVE DIAGNOSIS:  1) right sided carpal tunnel syndrome  2) right cubital tunnel syndrome     POSTOPERATIVE DIAGNOSIS:  1) right sided carpal tunnel syndrome  2) right cubital tunnel syndrome     PROCEDURE PERFORMED:   1) Endoscopic right sided carpal tunnel release, CPT code 59374  2) right ulnar nerve decompression at the elbow/cubital tunnel release     ANESTHESIA: Regional/MAC     ESTIMATED BLOOD LOSS: Minimal           SPECIMENS: None     COMPLICATIONS: None              CONDITION: Stable     IV FLUIDS: Crystalloid per anesthesia     IMPLANTS: None     TOURNIQUET TIME: 34 minutes at 250 mmHg     INDICATIONS FOR PROCEDURE:  The patient is a 33yo female who presented to clinic with findings and workup consistent with carpal tunnel syndrome of the right  hand and cubital tunnel syndrome of the right upper extremity.  The patient had not responded to nonoperative management and wished to proceed with surgical intervention.  The risks, benefits and alternatives to surgery were discussed with the patient at great length and in great detail.  Specific risks discussed include but are not limited to bleeding, infection, vessel damage, nerve damage, pain, numbness, tingling, weakness, stiffness, complex regional pain syndrome, hardware/surgical failure, need for further surgery, DVT, PE, arthritis, scar sensitivity, delayed healing, need for prolonged postoperative rehabilitation, and death amongst others.  The patient stated an understanding of the risks and wished to proceed with surgery.   All questions were answered.  No guarantees were implied or stated.  Informed consent was obtained.     PROCEDURE IN DETAIL: With the patient's participation in the preoperative holding area, the right upper extremity was marked as the surgical sites. Preoperative  checks were completed and consents were verified.  Regional anesthesia was administered.  The patient was brought to the Operating Room and placed in supine position.  A well-padded nonsterile tourniquet was placed on the upper arm.  The right arm was then prepped and draped in the usual sterile fashion.  Timeout was called for to verify the correct site, procedure and patient.  All were in agreement and we proceeded.  MAC anesthesia was introduced.       Attention was 1st turned towards the right endoscopic carpal tunnel release.  Esmarch was utilized to exsanguinate thr right upper extremity and tourniquet was inflated to 250mmHg.  Next, a small 1 cm transverse incision was made in line with a proximal wrist flexion crease beginning radially at the palmaris longus and extending ulnarly for 1 cm.  Dissection was continued to level of antebrachial fascia.  This was transversely incised in line with the skin incision.  A narrow double skin hook was then utilized to elevate the distal most aspect of the incision to gain access to the carpal tunnel.  A series of small and large dilators were utilized to dilate our planned path with the endoscope.  A synovial elevator was then utilized to free synovium from the undersurface of the transverse carpal ligament.  Washboard effect was confirmed.  At this time, the Arthrex endoscopic carpal tunnel system was introduced into the incision.  Confirmation of placement within the carpal tunnel was confirmed.  The endoscope was advanced the distal aspect of the transverse carpal ligament and the distal fat was visualized.  The median nerve was identified radial to the endoscope and was protected throughout the duration of the procedure.  Excellent visualization of the transverse carpal ligament along its entire length was confirmed with no interposed soft tissue.  I then began my division of the transverse carpal ligament from distally and extending in a proximal direction.  The  knife was deployed and complete full-thickness transection of the transverse carpal ligament was performed beginning distally and working my way more proximally.  Complete division of the ligament was performed. Care was taken distally to ensure not to cut past [place] Cardinal Line or injure the superficial palmar arch. The distal fat was visualized at the distalmost aspect of the ligament division. Tension and pressure within the carpal tunnel was dramatically improved and decreased status post ligament division.  The endoscope was then once again advanced more distally and completion of the division was confirmed.  Fat protruded through the divided ligament throughout.  The median nerve was identified along the length of our ligament division and remained intact and uninjured.  The endoscope was then removed from the wound and the antebrachial forearm fascia was divided in line with the ligament division by hand with tenotomy scissors.  The wound was then irrigated with copious amounts of sterile saline.  Skin was closed with 4 0 nylon suture.        Attention was next turned towards the cubital tunnel release.     A 5 cm Skin incision just posterior to the medial epicondyle of the right elbow  was then made sharply with a scalpel and dissection was carried down   Through the skin and subcutaneous tissues.  Several branches of the medial antebrachial   cutaneous nerve were identified and protected throughout the duration of the   procedure.  Dissection was carried down more deeply just posterior to the medial   epicondyle and a gentle dissection was carried down to identify the ulnar nerve   in the proximal portion of our incision.  Dissection was then continued   proximally and the ulnar nerve was completely decompressed all the way up the   medial intermuscular septum for approximately 15 cm and well proximal to the   arcade of [place].  Finger dissection along the course of the nerve revealed   that there  was no additional constriction at this time and it was free and well   decompressed proximally.  I then continued my decompression of the ulnar nerve   more distally in the wound.  The nerve was decompressed in a proximal to distal   direction at this time, and motor branches to the flexor carpi ulnaris were   identified and protected throughout the dissection.  The nerve was very tightly compressed particularly at Flannery ligament.  Upon releasing Flannery ligament the nerve assumed an hourglass configuration.  It was very bulbous at this site after decompression and appeared to have been severely and chronically compressed.  The FCU fascia was also   released well distal to the mid aspect of the patient's forearm.  After   this, finger dissection was utilized to ensure complete release with no evidence   of any further constriction and I was very pleased with my decompression at   this time.  After I had ensured complete decompression with proximally and   distally, I then placed the operative elbow through a full range of motion with   flexion and extension and the nerve was stable and did not sublux with full   range of motion of the elbow.  At this time, I then irrigated the wound   copiously with sterile saline.  Tourniquet was then deflated and brisk capillary   refill ensued throughout the patient's operative upper extremity.  There was no   significant bleeding and hemostasis was achieved with bipolar electrocautery.    The subcutaneous tissues at the elbow incision were closed with 4-0 Vicryl   fascia in an inverted interrupted fashion and then the skin was closed with 4-0   Nylon.       Sterile dressings were applied consisting of Xeroform 4 x 4 gauze cast padding and a long-arm splint to the right upper extremity.  Tourniquet was deflated and brisk cap refill in [place] throughout the operative upper extremity.  There was no significant bleeding.  The patient was awakened from anesthesia return the post  anesthesia care in stable condition.  There were no complications as attending surgeon was present performed the entire procedure.      DISPOSITION: The patient will be discharged home with followup in approximately 2 weeks for suture removal.  Early active range of motion in the acute postoperative period was discussed and encouraged.  They are to keep the dressing clean, dry, and intact until that time.

## 2022-06-24 NOTE — INTERVAL H&P NOTE
The patient has been examined and the H&P has been reviewed:    I concur with the findings and no changes have occurred since H&P was written.    Surgery risks, benefits and alternative options discussed and understood by patient/family.          Active Hospital Problems    Diagnosis  POA    *Carpal tunnel syndrome of right wrist [G56.01]  Yes    Cubital tunnel syndrome of both upper extremities [G56.23]  Yes      Resolved Hospital Problems   No resolved problems to display.

## 2022-06-24 NOTE — PLAN OF CARE
Patient prepped for procedure.  POC reviewed with pt and spouse, who verbalized understanding.    Outpatient pharmacy confirmed.      Spouse to keep all belongings during procedure. He is wearing a black Tshirt with white writing and grey shorts

## 2022-06-24 NOTE — PLAN OF CARE
VSS. Pt able to tolerate oral liquids. Pt denies c/o pain. Dressing and sling intact. Spouse received home meds per bedside delivery.  No distress noted. Pt states she is ready for D/C. D/C instructions reviewed with pt and spouse, verbalized understanding.

## 2022-06-24 NOTE — TRANSFER OF CARE
"Anesthesia Transfer of Care Note    Patient: Sherlyn Monahan    Procedure(s) Performed: Procedure(s) (LRB):  TRANSPOSITION, NERVE, ULNAR (Right)  RELEASE, CARPAL TUNNEL, ENDOSCOPIC (Right)    Patient location: PACU    Anesthesia Type: general and regional    Transport from OR: Transported from OR on room air with adequate spontaneous ventilation. Transported from OR on 6-10 L/min O2 by face mask with adequate spontaneous ventilation    Post pain: adequate analgesia    Post assessment: no apparent anesthetic complications    Post vital signs: stable    Level of consciousness: awake    Nausea/Vomiting: no nausea/vomiting    Complications: none    Transfer of care protocol was followed      Last vitals:   Visit Vitals  /63 (BP Location: Left arm, Patient Position: Lying)   Pulse 84   Temp 36.5 °C (97.7 °F) (Oral)   Resp 19   Ht 5' 5" (1.651 m)   Wt 95.7 kg (211 lb)   LMP 05/27/2022 (Within Days)   SpO2 100%   Breastfeeding No   BMI 35.11 kg/m²     "

## 2022-06-26 NOTE — ANESTHESIA POSTPROCEDURE EVALUATION
Anesthesia Post Evaluation    Patient: Sherlyn Monahan    Procedure(s) Performed: Procedure(s) (LRB):  TRANSPOSITION, NERVE, ULNAR (Right)  RELEASE, CARPAL TUNNEL, ENDOSCOPIC (Right)    Final Anesthesia Type: general      Patient location during evaluation: PACU  Patient participation: Yes- Able to Participate  Level of consciousness: awake and alert and oriented  Post-procedure vital signs: reviewed and stable  Pain management: adequate  Airway patency: patent    PONV status at discharge: No PONV  Anesthetic complications: no      Cardiovascular status: stable  Respiratory status: unassisted  Hydration status: euvolemic  Follow-up not needed.          Vitals Value Taken Time   /69 06/24/22 1401   Temp 36.6 °C (97.9 °F) 06/24/22 1400   Pulse 75 06/24/22 1401   Resp 33 06/24/22 1401   SpO2 99 % 06/24/22 1401   Vitals shown include unvalidated device data.      Event Time   Out of Recovery 13:54:00         Pain/Nahun Score: No data recorded

## 2022-06-27 ENCOUNTER — NURSE TRIAGE (OUTPATIENT)
Dept: ADMINISTRATIVE | Facility: CLINIC | Age: 32
End: 2022-06-27
Payer: COMMERCIAL

## 2022-06-27 NOTE — TELEPHONE ENCOUNTER
Pt reports she has just spoke with the MD office in regard to her light headedness so no longer needs assistance, Pt encouraged to call back with any worsening symptoms or questions and verbalized understanding.    Reason for Disposition   Caller has already spoken with the PCP (or office), and has no further questions    Protocols used: NO CONTACT OR DUPLICATE CONTACT CALL-A-[place]

## 2022-06-28 VITALS
RESPIRATION RATE: 18 BRPM | WEIGHT: 211 LBS | HEART RATE: 73 BPM | BODY MASS INDEX: 35.16 KG/M2 | TEMPERATURE: 98 F | DIASTOLIC BLOOD PRESSURE: 69 MMHG | HEIGHT: 65 IN | SYSTOLIC BLOOD PRESSURE: 126 MMHG | OXYGEN SATURATION: 99 %

## 2022-07-07 ENCOUNTER — OFFICE VISIT (OUTPATIENT)
Dept: ORTHOPEDICS | Facility: CLINIC | Age: 32
End: 2022-07-07
Payer: COMMERCIAL

## 2022-07-07 DIAGNOSIS — Z98.890 POSTOPERATIVE STATE: ICD-10-CM

## 2022-07-07 DIAGNOSIS — G56.23 CUBITAL TUNNEL SYNDROME OF BOTH UPPER EXTREMITIES: ICD-10-CM

## 2022-07-07 DIAGNOSIS — G56.01 CARPAL TUNNEL SYNDROME OF RIGHT WRIST: Primary | ICD-10-CM

## 2022-07-07 PROCEDURE — 1159F PR MEDICATION LIST DOCUMENTED IN MEDICAL RECORD: ICD-10-PCS | Mod: CPTII,S$GLB,, | Performed by: PHYSICIAN ASSISTANT

## 2022-07-07 PROCEDURE — 99999 PR PBB SHADOW E&M-EST. PATIENT-LVL III: CPT | Mod: PBBFAC,,, | Performed by: PHYSICIAN ASSISTANT

## 2022-07-07 PROCEDURE — 3044F PR MOST RECENT HEMOGLOBIN A1C LEVEL <7.0%: ICD-10-PCS | Mod: CPTII,S$GLB,, | Performed by: PHYSICIAN ASSISTANT

## 2022-07-07 PROCEDURE — 99024 PR POST-OP FOLLOW-UP VISIT: ICD-10-PCS | Mod: S$GLB,,, | Performed by: PHYSICIAN ASSISTANT

## 2022-07-07 PROCEDURE — 1159F MED LIST DOCD IN RCRD: CPT | Mod: CPTII,S$GLB,, | Performed by: PHYSICIAN ASSISTANT

## 2022-07-07 PROCEDURE — 3044F HG A1C LEVEL LT 7.0%: CPT | Mod: CPTII,S$GLB,, | Performed by: PHYSICIAN ASSISTANT

## 2022-07-07 PROCEDURE — 99999 PR PBB SHADOW E&M-EST. PATIENT-LVL III: ICD-10-PCS | Mod: PBBFAC,,, | Performed by: PHYSICIAN ASSISTANT

## 2022-07-07 PROCEDURE — 99024 POSTOP FOLLOW-UP VISIT: CPT | Mod: S$GLB,,, | Performed by: PHYSICIAN ASSISTANT

## 2022-07-07 NOTE — PROGRESS NOTES
Dr. Rjoas is the supervising physician for this encounter/patient    Sherlyn Monahan presents for post-operative evaluation.  The patient is now 2 weeks s/p Right endoscopic carpal tunnel release and ulnar nerve decompression at the elbow with Dr. Rojas on 6/24/22.  Overall the patient reports doing well.  She reports 0/10 pain, denies any f/c/s and only takes Aleve as needed. She reports full resolution of her numbness.    PE:    AA&O x 4.  NAD  HEENT:  NCAT, sclera nonicteric  Lungs:  Respirations are equal and unlabored.  CV:  2+ bilateral upper and lower extremity pulses.  MSK: The wounds are healing well with no signs of erythema or warmth.  There is no drainage.  No clinical signs or symptoms of infection are present.  Full elbow, near full wrist, full hand motion. SILT. DNVI.    A/P: Status post above, doing well  1) OT ordered for postop rehab, activity as tolerated.  2) All sutures removed today. Wound care and signs of infection discussed.  3) F/U 4 weeks  4) Call with any questions/concerns in the interim      Jamie Russo-DiGeorge PA-C

## 2022-07-08 ENCOUNTER — CLINICAL SUPPORT (OUTPATIENT)
Dept: REHABILITATION | Facility: HOSPITAL | Age: 32
End: 2022-07-08
Payer: COMMERCIAL

## 2022-07-08 DIAGNOSIS — G56.23 CUBITAL TUNNEL SYNDROME OF BOTH UPPER EXTREMITIES: ICD-10-CM

## 2022-07-08 DIAGNOSIS — M25.421 SWELLING OF RIGHT ELBOW: ICD-10-CM

## 2022-07-08 DIAGNOSIS — M25.621 DECREASED RANGE OF MOTION OF RIGHT ELBOW: ICD-10-CM

## 2022-07-08 DIAGNOSIS — M25.631 DECREASED RANGE OF MOTION OF RIGHT WRIST: ICD-10-CM

## 2022-07-08 DIAGNOSIS — M25.431 SWELLING OF RIGHT WRIST: ICD-10-CM

## 2022-07-08 DIAGNOSIS — M25.531 PAIN IN RIGHT WRIST: ICD-10-CM

## 2022-07-08 DIAGNOSIS — M25.521 PAIN IN RIGHT ELBOW: ICD-10-CM

## 2022-07-08 DIAGNOSIS — Z78.9 DIFFICULTY WITH ACTIVITIES OF DAILY LIVING: ICD-10-CM

## 2022-07-08 DIAGNOSIS — R29.898 IMPAIRED DEXTERITY: ICD-10-CM

## 2022-07-08 DIAGNOSIS — Z98.890 POSTOPERATIVE STATE: ICD-10-CM

## 2022-07-08 DIAGNOSIS — G56.01 CARPAL TUNNEL SYNDROME OF RIGHT WRIST: ICD-10-CM

## 2022-07-08 PROCEDURE — 97166 OT EVAL MOD COMPLEX 45 MIN: CPT | Mod: PN

## 2022-07-08 PROCEDURE — 97110 THERAPEUTIC EXERCISES: CPT | Mod: PN

## 2022-07-08 PROCEDURE — 97140 MANUAL THERAPY 1/> REGIONS: CPT | Mod: PN

## 2022-07-08 NOTE — PLAN OF CARE
[facility] Occupational Therapy  Elbow, Wrist & Hand Initial Evaluation     Date: 7/8/2022  Patient: Sherlyn Monahan  Chart Number: [id number]  Referring Physician: Russo-Digeorge, Jamie L*  Therapy Diagnosis:   1. Carpal tunnel syndrome of right wrist  Ambulatory referral/consult to Physical/Occupational Therapy   2. Cubital tunnel syndrome of both upper extremities  Ambulatory referral/consult to Physical/Occupational Therapy   3. Postoperative state  Ambulatory referral/consult to Physical/Occupational Therapy   4. Decreased range of motion of right elbow     5. Decreased range of motion of right wrist     6. Pain in right elbow     7. Pain in right wrist     8. Impaired dexterity     9. Difficulty with activities of daily living     10. Swelling of right wrist     11. Swelling of right elbow         Medical Diagnosis: Carpal tunnel syndrome of right wrist [G56.01]   Cubital tunnel syndrome of both upper extremities [G56.23]   Postoperative state [Z98.890]  Physician Orders:   Post Surgical? Yes   Eval and Treat Yes   Type of Therapy Hand Therapy (CHT)     Evaluation Date: 7/8/2022  Insurance Authorization Period Expiration: 7/7/2023  Plan of Care from 7/8/2022 to 9/8/22   Surgery Date and Procedure: 6/24/22  1) Endoscopic right sided carpal tunnel release, CPT code 24031  2) right ulnar nerve decompression at the elbow/cubital tunnel release  Date of Return to MD/PA: 8/4/22 7/7/22: A/P: Status post above, doing well  1) OT ordered for postop rehab, activity as tolerated.  2) All sutures removed today. Wound care and signs of infection discussed.  3) F/U 4 weeks  4) Call with any questions/concerns in the interim     Visit #: 1 of 1  FOTO (DASH) at eval: 51.8%    Time In: 2:30 pm  Time Out: 3:40 pm  Total Billable Time: 70 min    Precautions: Standard , Weightbearing ; Restrictions/Precautions at po Day 14  DOS-6/24/22  Operative Comments: The nerve was very tightly compressed particularly at  "Flannery ligament.  Upon releasing Flannery ligament the nerve assumed an hourglass configuration.  It was very bulbous at this site after decompression and appeared to have been severely and chronically compressed.  Subjective     Involved Side: Right; Left CTS "mild". Pt wears a splint "sometimes"  Dominant Side: Right  Date of Onset: see sx specifics  History of Current Condition: Cast removed yesterday. Pt describes long-standing numbness & tingling and "near constant the last year or so".  per op report: The patient is a 33yo female who presented to clinic with findings and workup consistent with carpal tunnel syndrome of the right  hand and cubital tunnel syndrome of the right upper extremity.  The patient had not responded to nonoperative management   Imaging: n/a  Previous Therapy: none    Patient's Goals for Therapy: Sherlyn desires to restore pain-free function of the Right UE for participating in all customary & necessary I/ADLs and work such as lifting small children (has 3 yo and 3 yo) and being able to work on computer.    Pain:  Functional Pain Scale Rating 0-10:   3/10 on average  Location: wrist and elbow  Description: dull, achey  Aggravating Factors: cast removed yesterday "if I bend it too much", brushing hair, reaching to snap bra in back  Easing Factors: medication    Occupation:  School Councelor  Working presently: employed  Duties: Computer entry, occasional lifting    Functional Limitations/Social History:    Previous functional status includes: Independent with all ADLs.     Current Level of Function    Home/Living environment : lives with their family   Limitation of Functional Status as follows:  ADLs/IADLs:     - Feeding: Mod (I)    - Bathing: needs assistance to snap bra in back    - Dressing/Grooming: difficulty brushing hair    - Driving: not yet   Leisure: Gardening, Crafts and Movies, taking care of family     CMS Impairment/Limitation/Restriction for FOTO Hand Survey     Therapist " reviewed FOTO scores for Sherlyn on 2022.   FOTO documents entered into [employer] - see Media section.     Limitation Score: 51.8%        Past Medical History/Physical Systems Review:   Sherlyn Monahan  has a past medical history of Acquired hypothyroidism, Irritable bowel syndrome (IBS), Obesity (BMI 30.0-34.9), and Urticaria.    Sherlyn Monahan  has a past surgical history that includes breast reduction (2012); Breast surgery;  section; Laparoscopic cholecystectomy (N/A, 2019); Cholecystectomy (2019);  section (N/A, 2020); Ulnar nerve transposition (Right, 2022); and Endoscopic carpal tunnel release (Right, 2022).    Sherlyn has a current medication list which includes the following prescription(s): b complex vitamins, calcium-vitamin d3, cetirizine, mupirocin calcium 2% nasl oint, prenatal vit37-iron-folic acid, sertraline, synthroid, and tramadol, and the following Facility-Administered Medications: fentanyl, lidocaine (pf) 10 mg/ml (1%), and midazolam.    Review of patient's allergies indicates:   Allergen Reactions    Minocycline Swelling          Objective     Mental status: alert, interactive    Observation:   Wrist incision is closed and nearly healed. Elbow incision is red, not fully healed, yet no other signs of infection    Sensation: N/A Distribution   IE-2022 IE-2022    RIGHT LEFT   Monofilament Testing     Normal 1.65-2.83 Each digit and thumb tip respond timely Each digit and thumb tip respond timely   Diminished Light Touch 3.22-3.61     Diminished Protective 3.84-4.31     Loss of Protective 4.56-6.65     2-pt Discrimination Each digit and thumb discern 5 mm Each digit and thumb discern 5 mm       Edema: Circumferential measurements: In centimters   Right/Left    IE-2022   Elbow crease 29.2/28.3   Wrist crease 17.4/16.8   IF P1 6.8/6.9   Thumb P1 6.7/6.5   SF P1 5.8/5.6     Wrist AROM   Right/Left   DATE IE-22   EXT 49/71   FLX 60/67  "  RD 14/20   UD  18/36   GAMBINO 141/194        ELBOW & FOREARM AROM   Right/Left   DATE IE-7/8/22   EXT -14/+4   /138   SUP 69/85   PRO 83/85     MMT (5-point strength scale):    As compared to the Left Right    IE-7/8/2022   FDP RF/SF 4-   Lumbricals 3&4 4-   DABs/PADs 4   AddPolicis 4   Hypothenar 4     DEXTERITY Tests   Right/Left   DATE 7/8/22   Grooved Peg Timed Test 2'01"/1'12"        Treatment     Treatment Time In: 3:15  Treatment Time Out: 3:40  Total Treatment time separate from Evaluation time: 25 minutes    Sherlyn received the following supervised modalities after being cleared for contraindications for 0 minutes:   - defer    Sherlyn received the following manual therapy techniques for 8 minutes:   - fit and issued size Medium 3/4 finger compression glove and size F tubigrip for edema mgmt; precautions discussed & non-adherent bandage applied to cover elbow incision  - Scar massage provided and instructed for wrist incision    Sherlyn performed therapeutic exercises for 15 minutes including:  - HEP instructed, illustrated and return demonstrated    Sherlyn received Self Care Instruction and/or Orthotics Management while participated in a concurrent discussion of evaluation findings and specific home care, including:  - Current Protocol Restrictions and precautions  - Incision site care  - the anatomy and pathophysiology of surgery/diagnosis.  - instruction in activity modifications for goals of protecting healing tissues to include avoiding heavy lifting.  - Reducing positions of sustained full elbow flexion during the day and while sleeping  - Reducing positions of medial elbow compression during daytime and nighttime resting positions    Home Exercise Program and Home Care Resources/Education:  Issued HEP (see patient instructions in EMR) and educated on modality use for pain management . Exercises were reviewed and Sherlyn was able to demonstrate them prior to the end of the session.   Pt " received a written copy of exercises to perform at home. Sherlyn demonstrated good  understanding of the education provided.  Pt was advised to perform these exercises free of pain, and to stop performing them if pain occurs.    Patient/Family Education: role of OT, goals for OT, scheduling/cancellations - pt verbalized understanding. Discussed insurance limitations with patient.    Assessment     Sherlyn Monahan is a 32 y.o. female presents with limitations as measured and/or defined in the charts above and below. Patient can benefit from Occupational Therapy services for addressing these problems.  The patient was in agreement with this plan of care after discussing.     The patient's rehab potential is Excellent.     Anticipated barriers to occupational therapy: none  Pt has no cultural, educational or language barriers to learning provided.    Profile and History Assessment of Occupational Performance Level of Clinical Decision Making Complexity Score   Occupational Profile:   Sherlyn Monahan is a 32 y.o. female who is currently employed Sherlyn Monahan has difficulty with  ADLs and IADLs as listed previously, which  affecting his/her daily functional abilities.      Comorbidities:    has a past medical history of Acquired hypothyroidism, Irritable bowel syndrome (IBS), Obesity (BMI 30.0-34.9), and Urticaria.    Medical and Therapy History Review:   Brief               Performance Deficits    Physical:  Joint Mobility  Muscle Power/Strength  Skin Integrity/Scar Formation  Edema  Fine Motor Coordination  Pain    Cognitive:  No Deficits    Psychosocial:    No Deficits     Clinical Decision Making:  moderate    Assessment Process:  Detailed Assessments    Modification/Need for Assistance:  Not Necessary    Intervention Selection:  Limited Treatment Options       moderate  Based on PMHX, co morbidities , data from assessments and functional level of assistance required with task and clinical presentation  directly impacting function.         Goals:    LTG's (6-8 weeks):  1)   Intrinsic strength and FMC to be 100% of the unaffected hand for safely managing all fasteners, locks and latches.  2)    and pinch strength  to be > 90% of the unaffected side for maximizing grasp and stabilization tasks.  3)   Pt to demonstrate strength and tolerance (<3/10 pain) during up to 10# lifting, pushing and/or pulling tasks for increase functional Right UE use for ADL/work/leisure activities.  4)   Patient to score at 25% or less on FOTO or Quick/DASH to demonstrate improved perception of functional Right UE use and return to near normal prior level I/ADLs and childcare.    STG's (3-4 weeks)  1)   Patient to be IND with Phase II of HEP, orthotic use/wear/care/precautions and modalities for pain/edema managment.  2)   Pt to tolerate (reports <3/10 pain) during advancing PREs with self-graded intensity and effective symptom monitoring.   3)  Wrist, elbow & forearm GAMBINO to be >90% of the unaffected side for maximizing safe support function, brushing hair and reach to all body parts during self care.  4)  Decrease edema at .4 by .6 cm for evidencing soft tissue healing and effective pain & edema mgmt.   5)  Pt to report improved daily time postures and/or sleep position strategies effective for decreased episodes of pain and abnormal sensations.    Plan     Pt to be treated by Occupational Therapy 1-2 times per week for 6-8 weeks during the certification period from 7/8/2022 to 9/8/22 to achieve the established goals.     Treatment to include: Paraffin, Fluidotherapy, Manual therapy/joint mobilizations, Modalities for pain management, Therapeutic exercises/activities., Strengthening, Edema Control, Scar Management and Energy Conservation, as well as any other treatments deemed necessary based on the patient's needs or progress.     Leida Alexander, OT, CHT  Occupational Therapist, Certified Hand Therapist      I CERTIFY THE NEED FOR  THESE SERVICES FURNISHED UNDER THIS PLAN OF TREATMENT AND WHILE UNDER MY CARE    Physician's comments:        Physician's Signature: ___________________________________________________

## 2022-07-08 NOTE — PATIENT INSTRUCTIONS
"Date: 7/8/2022  Patient: Sherlyn Monahan  Chart Number: [id number]      Complete 10 repetitions of each exercise 4-6 times a day:            AROM: Isolated MCP Flexion / Extension ("Wave")   Bend only your large, bottom knuckles. Hold 3 seconds.   Keep the tips of your fingers straight. Straighten fingers.       AROM: MCP and PIP Flexion / Extension ("Straight Fist")  Bend your bottom and middle knuckles, keeping the tips of your fingers straight.   Try to touch the pads of your fingers on your palm. Hold 3 seconds.   Straighten your fingers.          AROM: Isolated IPJ Flexion / Extension ("Hook")   Bend only your middle and end knuckles. Hold 3 seconds.   Straighten your fingers.     AROM: Composite Flexion / Extension ("Full Fist")  Bend every joint in your hand into a fist. Hold 3 seconds.   Straighten your fingers.           AROM: Composte Extension ("Finger Lifts")  Place your hand palm down over a book or towel roll as needed, leaving your fingers hanging over the edge. Lift each finger up towards the ceiling one at a time. Hold 3 seconds.   Relax your finger. Then, lift all fingers and thumb up away from the table surface.     AROM: Abduction / Adduction  With hand above your heart (prop elbow up on a table surface), spread all fingers apart, then bring them together as close as possible. If your LF is mostly involved, wiggle it side to side while you have your other fingers in the spread position.        AROM: Composite Flesion ("Pinky Slides")  Touch thumb to tip of small finger. Slide thumb down   small finger into palm.     [facility], OCCUPATIONAL THERAPY  HOME EXERCISE PROGRAM       Complete 5 repetitions of each exercise 4-6 times a day.      Extend your arm in front of you keeping your elbow straight, and bend the wrist and fingers.  Extend your wrist and fingers.  Bend your elbow.  Bring your arm out to the side, bend your wrist and fingers.  Rotate your arm so that your palm is " facing behind you.  Tilt your head to the opposite side.    If ABNORMAL SENSATIONS (Pain, tingling and/or numbness) present at any of the steps above, rest at that point. Then, return to step 1 and proceed ONLY through the steps that are SYMPTOMS-FREE.     WAIT until the next day to determine if you can increase the exercise by adding the next step. You should have no residual symptoms before you attempt to advance through additional steps.     Proceed at your own pace using pain tolerance as your guide.

## 2022-07-08 NOTE — PROGRESS NOTES
[facility] Occupational Therapy  Elbow, Wrist & Hand Initial Evaluation     Date: 7/8/2022  Patient: Sherlyn Monahan  Chart Number: [id number]  Referring Physician: Russo-Digeorge, Jamie L*  Therapy Diagnosis:   1. Carpal tunnel syndrome of right wrist  Ambulatory referral/consult to Physical/Occupational Therapy   2. Cubital tunnel syndrome of both upper extremities  Ambulatory referral/consult to Physical/Occupational Therapy   3. Postoperative state  Ambulatory referral/consult to Physical/Occupational Therapy   4. Decreased range of motion of right elbow     5. Decreased range of motion of right wrist     6. Pain in right elbow     7. Pain in right wrist     8. Impaired dexterity     9. Difficulty with activities of daily living     10. Swelling of right wrist     11. Swelling of right elbow         Medical Diagnosis: Carpal tunnel syndrome of right wrist [G56.01]   Cubital tunnel syndrome of both upper extremities [G56.23]   Postoperative state [Z98.890]  Physician Orders:   Post Surgical? Yes   Eval and Treat Yes   Type of Therapy Hand Therapy (CHT)     Evaluation Date: 7/8/2022  Insurance Authorization Period Expiration: 7/7/2023  Plan of Care from 7/8/2022 to 9/8/22   Surgery Date and Procedure: 6/24/22  1) Endoscopic right sided carpal tunnel release, CPT code 69951  2) right ulnar nerve decompression at the elbow/cubital tunnel release  Date of Return to MD/PA: 8/4/22 7/7/22: A/P: Status post above, doing well  1) OT ordered for postop rehab, activity as tolerated.  2) All sutures removed today. Wound care and signs of infection discussed.  3) F/U 4 weeks  4) Call with any questions/concerns in the interim     Visit #: 1 of 1  FOTO (DASH) at eval: 51.8%    Time In: 2:30 pm  Time Out: 3:40 pm  Total Billable Time: 70 min    Precautions: Standard , Weightbearing ; Restrictions/Precautions at po Day 14  DOS-6/24/22  Operative Comments: The nerve was very tightly compressed particularly at  "Flannery ligament.  Upon releasing Flannery ligament the nerve assumed an hourglass configuration.  It was very bulbous at this site after decompression and appeared to have been severely and chronically compressed.  Subjective     Involved Side: Right; Left CTS "mild". Pt wears a splint "sometimes"  Dominant Side: Right  Date of Onset: see sx specifics  History of Current Condition: Cast removed yesterday. Pt describes long-standing numbness & tingling and "near constant the last year or so".  per op report: The patient is a 33yo female who presented to clinic with findings and workup consistent with carpal tunnel syndrome of the right  hand and cubital tunnel syndrome of the right upper extremity.  The patient had not responded to nonoperative management   Imaging: n/a  Previous Therapy: none    Patient's Goals for Therapy: Sherlyn desires to restore pain-free function of the Right UE for participating in all customary & necessary I/ADLs and work such as lifting small children (has 1 yo and 5 yo) and being able to work on computer.    Pain:  Functional Pain Scale Rating 0-10:   3/10 on average  Location: wrist and elbow  Description: dull, achey  Aggravating Factors: cast removed yesterday "if I bend it too much", brushing hair, reaching to snap bra in back  Easing Factors: medication    Occupation:  School Councelor  Working presently: employed  Duties: Computer entry, occasional lifting    Functional Limitations/Social History:    Previous functional status includes: Independent with all ADLs.     Current Level of Function    Home/Living environment : lives with their family   Limitation of Functional Status as follows:  ADLs/IADLs:     - Feeding: Mod (I)    - Bathing: needs assistance to snap bra in back    - Dressing/Grooming: difficulty brushing hair    - Driving: not yet   Leisure: Gardening, Crafts and Movies, taking care of family     CMS Impairment/Limitation/Restriction for FOTO Hand Survey     Therapist " reviewed FOTO scores for Sherlyn on 2022.   FOTO documents entered into [employer] - see Media section.     Limitation Score: 51.8%        Past Medical History/Physical Systems Review:   Sherlyn Monahan  has a past medical history of Acquired hypothyroidism, Irritable bowel syndrome (IBS), Obesity (BMI 30.0-34.9), and Urticaria.    Sherlyn Monahan  has a past surgical history that includes breast reduction (2012); Breast surgery;  section; Laparoscopic cholecystectomy (N/A, 2019); Cholecystectomy (2019);  section (N/A, 2020); Ulnar nerve transposition (Right, 2022); and Endoscopic carpal tunnel release (Right, 2022).    Sherlyn has a current medication list which includes the following prescription(s): b complex vitamins, calcium-vitamin d3, cetirizine, mupirocin calcium 2% nasl oint, prenatal vit37-iron-folic acid, sertraline, synthroid, and tramadol, and the following Facility-Administered Medications: fentanyl, lidocaine (pf) 10 mg/ml (1%), and midazolam.    Review of patient's allergies indicates:   Allergen Reactions    Minocycline Swelling          Objective     Mental status: alert, interactive    Observation:   Wrist incision is closed and nearly healed. Elbow incision is red, not fully healed, yet no other signs of infection    Sensation: N/A Distribution   IE-2022 IE-2022    RIGHT LEFT   Monofilament Testing     Normal 1.65-2.83 Each digit and thumb tip respond timely Each digit and thumb tip respond timely   Diminished Light Touch 3.22-3.61     Diminished Protective 3.84-4.31     Loss of Protective 4.56-6.65     2-pt Discrimination Each digit and thumb discern 5 mm Each digit and thumb discern 5 mm       Edema: Circumferential measurements: In centimters   Right/Left    IE-2022   Elbow crease 29.2/28.3   Wrist crease 17.4/16.8   IF P1 6.8/6.9   Thumb P1 6.7/6.5   SF P1 5.8/5.6     Wrist AROM   Right/Left   DATE IE-22   EXT 49/71   FLX 60/67  "  RD 14/20   UD  18/36   GAMBINO 141/194        ELBOW & FOREARM AROM   Right/Left   DATE IE-7/8/22   EXT -14/+4   /138   SUP 69/85   PRO 83/85     MMT (5-point strength scale):    As compared to the Left Right    IE-7/8/2022   FDP RF/SF 4-   Lumbricals 3&4 4-   DABs/PADs 4   AddPolicis 4   Hypothenar 4     DEXTERITY Tests   Right/Left   DATE 7/8/22   Grooved Peg Timed Test 2'01"/1'12"        Treatment     Treatment Time In: 3:15  Treatment Time Out: 3:40  Total Treatment time separate from Evaluation time: 25 minutes    Sherlyn received the following supervised modalities after being cleared for contraindications for 0 minutes:   - defer    Sherlyn received the following manual therapy techniques for 8 minutes:   - fit and issued size Medium 3/4 finger compression glove and size F tubigrip for edema mgmt; precautions discussed & non-adherent bandage applied to cover elbow incision  - Scar massage provided and instructed for wrist incision    Sherlyn performed therapeutic exercises for 15 minutes including:  - HEP instructed, illustrated and return demonstrated    Sherlyn received Self Care Instruction and/or Orthotics Management while participated in a concurrent discussion of evaluation findings and specific home care, including:  - Current Protocol Restrictions and precautions  - Incision site care  - the anatomy and pathophysiology of surgery/diagnosis.  - instruction in activity modifications for goals of protecting healing tissues to include avoiding heavy lifting.  - Reducing positions of sustained full elbow flexion during the day and while sleeping  - Reducing positions of medial elbow compression during daytime and nighttime resting positions    Home Exercise Program and Home Care Resources/Education:  Issued HEP (see patient instructions in EMR) and educated on modality use for pain management . Exercises were reviewed and Sherlyn was able to demonstrate them prior to the end of the session.   Pt " received a written copy of exercises to perform at home. Sherlyn demonstrated good  understanding of the education provided.  Pt was advised to perform these exercises free of pain, and to stop performing them if pain occurs.    Patient/Family Education: role of OT, goals for OT, scheduling/cancellations - pt verbalized understanding. Discussed insurance limitations with patient.    Assessment     Sherlyn Monahan is a 32 y.o. female presents with limitations as measured and/or defined in the charts above and below. Patient can benefit from Occupational Therapy services for addressing these problems.  The patient was in agreement with this plan of care after discussing.     The patient's rehab potential is Excellent.     Anticipated barriers to occupational therapy: none  Pt has no cultural, educational or language barriers to learning provided.    Profile and History Assessment of Occupational Performance Level of Clinical Decision Making Complexity Score   Occupational Profile:   Sherlyn Monahan is a 32 y.o. female who is currently employed Sherlyn Monahan has difficulty with  ADLs and IADLs as listed previously, which  affecting his/her daily functional abilities.      Comorbidities:    has a past medical history of Acquired hypothyroidism, Irritable bowel syndrome (IBS), Obesity (BMI 30.0-34.9), and Urticaria.    Medical and Therapy History Review:   Brief               Performance Deficits    Physical:  Joint Mobility  Muscle Power/Strength  Skin Integrity/Scar Formation  Edema  Fine Motor Coordination  Pain    Cognitive:  No Deficits    Psychosocial:    No Deficits     Clinical Decision Making:  moderate    Assessment Process:  Detailed Assessments    Modification/Need for Assistance:  Not Necessary    Intervention Selection:  Limited Treatment Options       moderate  Based on PMHX, co morbidities , data from assessments and functional level of assistance required with task and clinical presentation  directly impacting function.         Goals:    LTG's (6-8 weeks):  1)   Intrinsic strength and FMC to be 100% of the unaffected hand for safely managing all fasteners, locks and latches.  2)    and pinch strength  to be > 90% of the unaffected side for maximizing grasp and stabilization tasks.  3)   Pt to demonstrate strength and tolerance (<3/10 pain) during up to 10# lifting, pushing and/or pulling tasks for increase functional Right UE use for ADL/work/leisure activities.  4)   Patient to score at 25% or less on FOTO or Quick/DASH to demonstrate improved perception of functional Right UE use and return to near normal prior level I/ADLs and childcare.    STG's (3-4 weeks)  1)   Patient to be IND with Phase II of HEP, orthotic use/wear/care/precautions and modalities for pain/edema managment.  2)   Pt to tolerate (reports <3/10 pain) during advancing PREs with self-graded intensity and effective symptom monitoring.   3)  Wrist, elbow & forearm GAMBINO to be >90% of the unaffected side for maximizing safe support function, brushing hair and reach to all body parts during self care.  4)  Decrease edema at .4 by .6 cm for evidencing soft tissue healing and effective pain & edema mgmt.   5)  Pt to report improved daily time postures and/or sleep position strategies effective for decreased episodes of pain and abnormal sensations.    Plan     Pt to be treated by Occupational Therapy 1-2 times per week for 6-8 weeks during the certification period from 7/8/2022 to 9/8/22 to achieve the established goals.     Treatment to include: Paraffin, Fluidotherapy, Manual therapy/joint mobilizations, Modalities for pain management, Therapeutic exercises/activities., Strengthening, Edema Control, Scar Management and Energy Conservation, as well as any other treatments deemed necessary based on the patient's needs or progress.     Leida Alexander, OT, CHT  Occupational Therapist, Certified Hand Therapist

## 2022-07-11 NOTE — ADDENDUM NOTE
Addendum  created 07/11/22 1204 by Haider Ravi MD    Attestation recorded in Intraprocedure, Intraprocedure Attestations filed

## 2022-07-13 NOTE — PROGRESS NOTES
"  Occupational Therapy Daily Treatment Note     Date: 7/14/2022  Name: Sherlyn Monahan  Clinic Number: [id number]    Therapy Diagnosis: No diagnosis found.  Physician: Russo-Digeorge, Jamie L*    Medical Diagnosis: Carpal tunnel syndrome of right wrist [G56.01]   Cubital tunnel syndrome of both upper extremities [G56.23]   Postoperative state [Z98.890]  Physician Orders:   Post Surgical? Yes   Eval and Treat Yes   Type of Therapy Hand Therapy (CHT)      Evaluation Date: 7/8/2022  Insurance Authorization Period Expiration: 7/7/2023  Plan of Care from 7/8/2022 to 9/8/22   Surgery Date and Procedure: 6/24/22  1) Endoscopic right sided carpal tunnel release, CPT code 05524  2) right ulnar nerve decompression at the elbow/cubital tunnel release  Date of Return to MD/PA: 8/4/22 7/7/22: A/P: Status post above, doing well  1) OT ordered for postop rehab, activity as tolerated.  2) All sutures removed today. Wound care and signs of infection discussed.  3) F/U 4 weeks  4) Call with any questions/concerns in the interim     Visit #: 2 of 1  FOTO (DASH) at eval: 51.8%     Time In: 1:05 pm  Time Out: 1:45 pm  Total Billable Time: 40 min     Precautions: Standard , Weightbearing ; Restrictions/Precautions at po Week 2  DOS-6/24/22  Operative Comments: The nerve was very tightly compressed particularly at Flannery ligament.  Upon releasing Flannery ligament the nerve assumed an hourglass configuration.  It was very bulbous at this site after decompression and appeared to have been severely and chronically compressed.      Subjective     Pt reports: Pt reports pain with HEP after 2 days. She took a rest break and it subsided.  Response to previous treatment: this is her 2nd visit    Pain: 2/10  Location: Right elbow "moreso"    Objective     Mental status: alert, interactive     Observation:   Wrist incision is closed and nearly healed. Elbow incision is red, not fully healed, yet no other signs of infection     Sensation: N/A " "Distribution    IE-7/8/2022 IE-7/8/2022     RIGHT LEFT   Monofilament Testing       Normal 1.65-2.83 Each digit and thumb tip respond timely Each digit and thumb tip respond timely   Diminished Light Touch 3.22-3.61       Diminished Protective 3.84-4.31       Loss of Protective 4.56-6.65       2-pt Discrimination Each digit and thumb discern 5 mm Each digit and thumb discern 5 mm       Edema: Circumferential measurements: In centimters    Right/Left Right     IE-7/8/2022 7/14/22   Elbow crease 29.2/28.3 28.3   Wrist crease 17.4/16.8 17.3   IF P1 6.8/6.9 6.5   Thumb P1 6.7/6.5 6.5   SF P1 5.8/5.6 5.5      Wrist AROM    Right/Left R   DATE IE-7/8/22 7/14/22   EXT 49/71 52   FLX 60/67 56   RD 14/20 13   UD  18/36 26   GAMBINO 141/194          ELBOW & FOREARM AROM    Right/Left R   DATE IE-7/8/22 7/14/22   EXT -14/+4 -6   /138 144   SUP 69/85 74   PRO 83/85 88      MMT (5-point strength scale):    As compared to the Left Right     -7/8/2022   FDP RF/SF 4-   Lumbricals 3&4 4-   DABs/PADs 4   AddPolicis 4   Hypothenar 4      DEXTERITY Tests    Right/Left Right   DATE 7/8/22 7/14/22   Grooved Peg Timed Test 2'01"/1'12" 1'12" (+49")            Treatment     Sherlyn received the following supervised modalities after being cleared for contraindications for 10 minutes:   - Fluidotherapy for promoting healing, reducing pain, desensitization and increasing tissue extensibility     Sherlyn received the following manual therapy techniques for 8 minutes:   - R/A girth; fit and effectivenness of size Medium 3/4 finger compression glove and size F tubigrip for edema mgmt;   - Scar massage provided and instructed for wrist incision     Sherlyn performed therapeutic exercises for 25 minutes including:  Shoulder/Elbow AROM 1 min each with bilateral dowel:  - Over head press  - Elbow f/e, palm up  - Elbow f/e, forearm neutral  - Elbow f/e, palm down   Wrist/forearm/Elbow AROM 2 min each:  - Ana M-kathie  - Flexbar sup/pronation " swings, progressing hold for increased torque   Proprioception 2 min each:  - Magnet stack   Hand strengthening 2 min each:  - Lifts with adduction pumps using green sponges       Objective Measures 7/14: R/A Wrist, forearm and elbow AROM   HEP As initially set          Sherlyn received Self Care Instruction and/or Orthotics Management while participated in a concurrent discussion of evaluation findings and specific home care, including:  - Current Protocol Restrictions and precautions  - Incision site care  - the anatomy and pathophysiology of surgery/diagnosis.  - instruction in activity modifications for goals of protecting healing tissues to include avoiding heavy lifting.  - Reducing positions of sustained full elbow flexion during the day and while sleeping  - Reducing positions of medial elbow compression during daytime and nighttime resting positions      Home Exercise Program and Home Care Resources/Education:     Education provided:   - HEP as initially set  - Education provided on all interventions performed during today's session   - Progress towards goals     Written Home Exercises Provided: Patient instructed to cont prior HEP.  Exercises were reviewed and Sherlyn was able to demonstrate them prior to the end of the session.  Sherlyn demonstrated good  understanding of the HEP provided.   .   See EMR under Patient Instructions for exercises provided prior visit.        Assessment   7/14: Good AROM improvements and reduced swelling.  Goals:   The following goals were discussed with the patient and patient is in agreement with them as to be addressed in the treatment plan.     Goals:   LTG's (6-8 weeks):  1)   Intrinsic strength and FMC to be 100% of the unaffected hand for safely managing all fasteners, locks and latches.  2)    and pinch strength  to be > 90% of the unaffected side for maximizing grasp and stabilization tasks.  3)   Pt to demonstrate strength and tolerance (<3/10 pain) during up  to 10# lifting, pushing and/or pulling tasks for increase functional Right UE use for ADL/work/leisure activities.  4)   Patient to score at 25% or less on FOTO or Quick/DASH to demonstrate improved perception of functional Right UE use and return to near normal prior level I/ADLs and childcare.     STG's (3-4 weeks)  1)   Patient to be IND with Phase II of HEP, orthotic use/wear/care/precautions and modalities for pain/edema managment.  2)   Pt to tolerate (reports <3/10 pain) during advancing PREs with self-graded intensity and effective symptom monitoring. Progressing  3)  Wrist, elbow & forearm GAMBINO to be >90% of the unaffected side for maximizing safe support function, brushing hair and reach to all body parts during self care. Progressing  4)  Decrease edema at .4 by .6 cm for evidencing soft tissue healing and effective pain & edema mgmt. MET 7/14/2022  5)  Pt to report improved daily time postures and/or sleep position strategies effective for decreased episodes of pain and abnormal sensations.    Pt would continue to benefit from skilled OT as per original POC     Sherlyn is progressing as expected towards her goals and there are no updates to goals at this time. Pt prognosis is Excellent.     Pt will continue to benefit from skilled outpatient occupational therapy to address the deficits listed in the problem list on initial evaluation provide pt/family education and to maximize pt's level of independence in the home and community environment.     Pt's spiritual, cultural and educational needs considered and pt agreeable to plan of care and goals.    Plan     Cont OT to address above goals as per original POC.    REJI Love, JAELYNT  Occupational Therapist, Certified Hand Therapist

## 2022-07-14 ENCOUNTER — CLINICAL SUPPORT (OUTPATIENT)
Dept: REHABILITATION | Facility: HOSPITAL | Age: 32
End: 2022-07-14
Payer: COMMERCIAL

## 2022-07-14 DIAGNOSIS — Z78.9 DIFFICULTY WITH ACTIVITIES OF DAILY LIVING: ICD-10-CM

## 2022-07-14 DIAGNOSIS — M25.631 DECREASED RANGE OF MOTION OF RIGHT WRIST: ICD-10-CM

## 2022-07-14 DIAGNOSIS — M25.621 DECREASED RANGE OF MOTION OF RIGHT ELBOW: Primary | ICD-10-CM

## 2022-07-14 DIAGNOSIS — M25.531 PAIN IN RIGHT WRIST: ICD-10-CM

## 2022-07-14 DIAGNOSIS — M25.431 SWELLING OF RIGHT WRIST: ICD-10-CM

## 2022-07-14 DIAGNOSIS — M25.521 PAIN IN RIGHT ELBOW: ICD-10-CM

## 2022-07-14 DIAGNOSIS — R29.898 IMPAIRED DEXTERITY: ICD-10-CM

## 2022-07-14 DIAGNOSIS — M25.421 SWELLING OF RIGHT ELBOW: ICD-10-CM

## 2022-07-14 PROCEDURE — 97110 THERAPEUTIC EXERCISES: CPT | Mod: PN

## 2022-07-14 PROCEDURE — 97022 WHIRLPOOL THERAPY: CPT | Mod: PN

## 2022-07-19 ENCOUNTER — PATIENT MESSAGE (OUTPATIENT)
Dept: REHABILITATION | Facility: HOSPITAL | Age: 32
End: 2022-07-19
Payer: COMMERCIAL

## 2022-07-20 ENCOUNTER — CLINICAL SUPPORT (OUTPATIENT)
Dept: REHABILITATION | Facility: HOSPITAL | Age: 32
End: 2022-07-20
Payer: COMMERCIAL

## 2022-07-20 DIAGNOSIS — R29.898 IMPAIRED DEXTERITY: ICD-10-CM

## 2022-07-20 DIAGNOSIS — M25.521 PAIN IN RIGHT ELBOW: ICD-10-CM

## 2022-07-20 DIAGNOSIS — M25.431 SWELLING OF RIGHT WRIST: ICD-10-CM

## 2022-07-20 DIAGNOSIS — Z78.9 DIFFICULTY WITH ACTIVITIES OF DAILY LIVING: ICD-10-CM

## 2022-07-20 DIAGNOSIS — M25.531 PAIN IN RIGHT WRIST: ICD-10-CM

## 2022-07-20 DIAGNOSIS — M25.621 DECREASED RANGE OF MOTION OF RIGHT ELBOW: Primary | ICD-10-CM

## 2022-07-20 DIAGNOSIS — M25.421 SWELLING OF RIGHT ELBOW: ICD-10-CM

## 2022-07-20 DIAGNOSIS — M25.631 DECREASED RANGE OF MOTION OF RIGHT WRIST: ICD-10-CM

## 2022-07-20 PROCEDURE — 97022 WHIRLPOOL THERAPY: CPT | Mod: PN

## 2022-07-20 PROCEDURE — 97110 THERAPEUTIC EXERCISES: CPT | Mod: PN

## 2022-07-20 PROCEDURE — 97140 MANUAL THERAPY 1/> REGIONS: CPT | Mod: PN

## 2022-07-20 NOTE — PROGRESS NOTES
Occupational Therapy Daily Treatment Note     Date: 7/20/2022  Name: Sherlyn Monahan  Clinic Number: [id number]    Therapy Diagnosis:   Encounter Diagnoses   Name Primary?    Decreased range of motion of right elbow Yes    Decreased range of motion of right wrist     Pain in right elbow     Pain in right wrist     Impaired dexterity     Difficulty with activities of daily living     Swelling of right wrist     Swelling of right elbow      Physician: Russo-Digeorge, Jamie L*    Medical Diagnosis: Carpal tunnel syndrome of right wrist [G56.01]   Cubital tunnel syndrome of both upper extremities [G56.23]   Postoperative state [Z98.890]  Physician Orders:   Post Surgical? Yes   Eval and Treat Yes   Type of Therapy Hand Therapy (CHT)      Evaluation Date: 7/8/2022  Insurance Authorization Period Expiration: 7/7/2023  Plan of Care from 7/8/2022 to 9/8/22   Surgery Date and Procedure: 6/24/22  1) Endoscopic right sided carpal tunnel release, CPT code 15430  2) right ulnar nerve decompression at the elbow/cubital tunnel release  Date of Return to MD/PA: 8/4/22 7/7/22: A/P: Status post above, doing well  1) OT ordered for postop rehab, activity as tolerated.  2) All sutures removed today. Wound care and signs of infection discussed.  3) F/U 4 weeks  4) Call with any questions/concerns in the interim     Visit #: 3 of 1  FOTO (DASH) at eval: 51.8%     Time In: 1:05 pm  Time Out: 1:45 pm  Total Billable Time: 40 min     Precautions: Standard , Weightbearing ; Restrictions/Precautions at po Week 3  DOS-6/24/22  Operative Comments: The nerve was very tightly compressed particularly at Flannery ligament.  Upon releasing Flannery ligament the nerve assumed an hourglass configuration.  It was very bulbous at this site after decompression and appeared to have been severely and chronically compressed.      Subjective     Pt reports: Typing and writing exacerbated pain. Intermittent pain in hand at rest.   Response to previous  "treatment: this is her 2nd visit    Pain: 3/10  Location: Right elbow "moreso"    Objective     Mental status: alert, interactive     Observation: Wound is healed nicely. Hypertrophic scarring noted.      Sensation: N/A Distribution    IE-7/8/2022 IE-7/8/2022     RIGHT LEFT   Monofilament Testing       Normal 1.65-2.83 Each digit and thumb tip respond timely Each digit and thumb tip respond timely   Diminished Light Touch 3.22-3.61       Diminished Protective 3.84-4.31       Loss of Protective 4.56-6.65       2-pt Discrimination Each digit and thumb discern 5 mm Each digit and thumb discern 5 mm       Edema: Circumferential measurements: In centimters    Right/Left Right     IE-7/8/2022 7/14/22   Elbow crease 29.2/28.3 28.3   Wrist crease 17.4/16.8 17.3   IF P1 6.8/6.9 6.5   Thumb P1 6.7/6.5 6.5   SF P1 5.8/5.6 5.5      Wrist AROM    Right/Left R   DATE IE-7/8/22 7/14/22   EXT 49/71 52   FLX 60/67 56   RD 14/20 13   UD  18/36 26   GAMBINO 141/194          ELBOW & FOREARM AROM    Right/Left R   DATE IE-7/8/22 7/14/22   EXT -14/+4 -6   /138 144   SUP 69/85 74   PRO 83/85 88      MMT (5-point strength scale):    As compared to the Left Right     IE-7/8/2022   FDP RF/SF 4-   Lumbricals 3&4 4-   DABs/PADs 4   AddPolicis 4   Hypothenar 4      DEXTERITY Tests    Right/Left Right   DATE 7/8/22 7/14/22   Grooved Peg Timed Test 2'01"/1'12" 1'12" (+49")            Treatment     Sherlyn received the following supervised modalities after being cleared for contraindications for 8 minutes:   - Fluidotherapy for promoting healing, reducing pain, desensitization and increasing tissue extensibility     Sherlyn received the following manual therapy techniques for 10 minutes:   - Continuous ultrasound 100% duty cycle. 3 MHz.   - Scar massage provided and instructed for wrist incision     Sherlyn performed therapeutic exercises for 27 minutes including:  Shoulder/Elbow AROM 1 min each with bilateral dowel:  - Over head press  - " Elbow f/e, palm up  - Elbow f/e, forearm neutral  - Elbow f/e, palm down   Wrist/forearm/Elbow AROM 2 min each:  - Jux-aciser  - Flexbar sup/pronation swings, progressing hold for increased torque   Proprioception 2 min each:  - Magnet stack   Hand strengthening 2 min each:  - Lifts with adduction pumps using green sponges   Flexor Tendon Glides 4 min in fluido   Objective Measures 7/14: R/A Wrist, forearm and elbow AROM   HEP As initially set          Sherlyn received Self Care Instruction and/or Orthotics Management while participated in a concurrent discussion of evaluation findings and specific home care, including:  - Current Protocol Restrictions and precautions  - Incision site care  - the anatomy and pathophysiology of surgery/diagnosis.  - instruction in activity modifications for goals of protecting healing tissues to include avoiding heavy lifting.  - Reducing positions of sustained full elbow flexion during the day and while sleeping  - Reducing positions of medial elbow compression during daytime and nighttime resting positions      Home Exercise Program and Home Care Resources/Education:     Education provided:   - HEP as initially set  - Education provided on all interventions performed during today's session   - Discussed proper workplace body mechanics for typing/writing for long periods  - Progress towards goals     Written Home Exercises Provided: Patient instructed to cont prior HEP.  Exercises were reviewed and Sherlyn was able to demonstrate them prior to the end of the session.  Sherlyn demonstrated good  understanding of the HEP provided.   .   See EMR under Patient Instructions for exercises provided prior visit.        Assessment   7/14: Good AROM improvements and reduced swelling.  Goals:   The following goals were discussed with the patient and patient is in agreement with them as to be addressed in the treatment plan.     Goals:   LTG's (6-8 weeks):  1)   Intrinsic strength and FMC to  be 100% of the unaffected hand for safely managing all fasteners, locks and latches. Progressing  2)    and pinch strength  to be > 90% of the unaffected side for maximizing grasp and stabilization tasks.Progressing  3)   Pt to demonstrate strength and tolerance (<3/10 pain) during up to 10# lifting, pushing and/or pulling tasks for increase functional Right UE use for ADL/work/leisure activities.Progressing  4)   Patient to score at 25% or less on FOTO or Quick/DASH to demonstrate improved perception of functional Right UE use and return to near normal prior level I/ADLs and childcare.Progressing   STG's (3-4 weeks)  1)   Patient to be IND with Phase II of HEP, orthotic use/wear/care/precautions and modalities for pain/edema managment.  2)   Pt to tolerate (reports <3/10 pain) during advancing PREs with self-graded intensity and effective symptom monitoring. Progressing  3)  Wrist, elbow & forearm GAMBINO to be >90% of the unaffected side for maximizing safe support function, brushing hair and reach to all body parts during self care. Progressing  4)  Decrease edema at .4 by .6 cm for evidencing soft tissue healing and effective pain & edema mgmt. MET 7/14/2022  5)  Pt to report improved daily time postures and/or sleep position strategies effective for decreased episodes of pain and abnormal sensations. Progressing    Pt would continue to benefit from skilled OT as per original POC     Sherlyn is progressing as expected towards her goals and there are no updates to goals at this time. Pt prognosis is Excellent.     Pt will continue to benefit from skilled outpatient occupational therapy to address the deficits listed in the problem list on initial evaluation provide pt/family education and to maximize pt's level of independence in the home and community environment.     Pt's spiritual, cultural and educational needs considered and pt agreeable to plan of care and goals.    Plan     Cont OT to address above goals  as per original POC.  Initiate progressive strengthening at 6 weeks postop (week of 8/8/22).     Claribel Raymond, OT

## 2022-07-25 ENCOUNTER — CLINICAL SUPPORT (OUTPATIENT)
Dept: REHABILITATION | Facility: HOSPITAL | Age: 32
End: 2022-07-25
Payer: COMMERCIAL

## 2022-07-25 DIAGNOSIS — M25.431 SWELLING OF RIGHT WRIST: ICD-10-CM

## 2022-07-25 DIAGNOSIS — M25.531 PAIN IN RIGHT WRIST: ICD-10-CM

## 2022-07-25 DIAGNOSIS — M25.621 DECREASED RANGE OF MOTION OF RIGHT ELBOW: Primary | ICD-10-CM

## 2022-07-25 DIAGNOSIS — R29.898 IMPAIRED DEXTERITY: ICD-10-CM

## 2022-07-25 DIAGNOSIS — M25.521 PAIN IN RIGHT ELBOW: ICD-10-CM

## 2022-07-25 DIAGNOSIS — M25.631 DECREASED RANGE OF MOTION OF RIGHT WRIST: ICD-10-CM

## 2022-07-25 DIAGNOSIS — M25.421 SWELLING OF RIGHT ELBOW: ICD-10-CM

## 2022-07-25 DIAGNOSIS — Z78.9 DIFFICULTY WITH ACTIVITIES OF DAILY LIVING: ICD-10-CM

## 2022-07-25 PROCEDURE — 97110 THERAPEUTIC EXERCISES: CPT | Mod: PN

## 2022-07-25 PROCEDURE — 97022 WHIRLPOOL THERAPY: CPT | Mod: PN

## 2022-07-25 NOTE — PROGRESS NOTES
"  Occupational Therapy Daily Treatment Note     Date: 7/25/2022  Name: Sherlyn Monahan  Clinic Number: [id number]    Therapy Diagnosis:   Encounter Diagnoses   Name Primary?    Decreased range of motion of right elbow Yes    Decreased range of motion of right wrist     Pain in right elbow     Pain in right wrist     Impaired dexterity     Difficulty with activities of daily living     Swelling of right wrist     Swelling of right elbow      Physician: Russo-Digeorge, Jamie L*    Medical Diagnosis: Carpal tunnel syndrome of right wrist [G56.01]   Cubital tunnel syndrome of both upper extremities [G56.23]   Postoperative state [Z98.890]  Physician Orders:   Post Surgical? Yes   Eval and Treat Yes   Type of Therapy Hand Therapy (CHT)      Evaluation Date: 7/8/2022  Insurance Authorization Period Expiration: 12/31/22  Plan of Care from 7/8/2022 to 9/8/22   Surgery Date and Procedure: 6/24/22  1) Endoscopic right sided carpal tunnel release, CPT code 78752  2) right ulnar nerve decompression at the elbow/cubital tunnel release  Date of Return to MD/PA: 8/4/22 7/7/22: A/P: Status post above, doing well  1) OT ordered for postop rehab, activity as tolerated.  2) All sutures removed today. Wound care and signs of infection discussed.  3) F/U 4 weeks  4) Call with any questions/concerns in the interim     Visit #: 4 of 50  FOTO (DASH) at eval: 51.8%     Time In: 4:15 pm  Time Out: 5:05 pm  Total Billable Time: 50 min     Precautions: Standard , Weightbearing ; Restrictions/Precautions at po Week 4  DOS-6/24/22  Operative Comments: The nerve was very tightly compressed particularly at Flannery ligament.  Upon releasing Flannery ligament the nerve assumed an hourglass configuration.  It was very bulbous at this site after decompression and appeared to have been severely and chronically compressed.    Subjective     Pt reports: Pt reports "scar sensitivity" and some pain at 1st CMC. She explains not yet able to use her " "Right hand for support or push up from a surface    Response to previous treatment: pt reports improved range of motion  Functional improvements:   Pain: 3/10 at worst  Location: Right elbow "more so"  Typing and writing exacerbated pain. Intermittent pain in hand at rest.   Objective     Mental status: alert, interactive     Observation: Wound is healed nicely. Hypertrophic scarring noted.      Sensation: N/A Distribution    IE-7/8/2022 IE-7/8/2022     RIGHT LEFT   Monofilament Testing       Normal 1.65-2.83 Each digit and thumb tip respond timely Each digit and thumb tip respond timely   2-pt Discrimination Each digit and thumb discern 5 mm Each digit and thumb discern 5 mm       Edema: Circumferential measurements: In centimters    Right/Left Right R     IE-7/8/2022 7/14/22 7/25/22   Elbow crease 29.2/28.3 28.3 (-.9) 28.7 (+.4)   Wrist crease 17.4/16.8 17.3 (-.1) 17.0 (-.3)   IF P1 6.8/6.9 6.5 (-.3)    Thumb P1 6.7/6.5 6.5 (-.2) 6.6 (+.1)   SF P1 5.8/5.6 5.5 (-.3)       Wrist AROM    Right/Left R R   DATE IE-7/8/22 7/14/22 7/25/22   EXT 49/71 52 69   FLX 60/67 56 62   RD 14/20 13 17   UD  18/36 26 40   GAMBINO 141/194 147 (+6) 188 (+41)         ELBOW & FOREARM AROM    Right/Left R R   DATE IE-7/8/22 7/14/22 7/25/22   EXT -14/+4 -6 (+8) -8   /138 144 (+14) 144   SUP 69/85 74 (+5) 86   PRO 83/85 88 (+5) (=)      MMT (5-point strength scale):    As compared to the Left Right     IE-7/8/2022   FDP RF/SF 4-   Lumbricals 3&4 4-   DABs/PADs 4   AddPolicis 4   Hypothenar 4      DEXTERITY Tests    Right/Left Right   DATE 7/8/22 7/14/22   Grooved Peg Timed Test 2'01"/1'12" 1'12" (+49")        Treatment     Sherlyn received the following supervised modalities after being cleared for contraindications for 15 minutes:   - Fluidotherapy for promoting healing, reducing pain, desensitization and increasing tissue extensibility     Sherlyn received the following manual therapy techniques for 5 minutes:   - Scar massage " provided and instructed for wrist incision  - Elastic tape applied to both incision scars for reducing hypersensitivity, decreasing pain and edema     Sherlyn performed therapeutic exercises for 30 minutes including:  Wrist, forearm and hand AROM and tendon glides 1x10 each performed during final minutes of Fluidotherapy     Shoulder/Elbow AROM 2 min each:  - Over head press with bilateral dowel  - 1# bar bell for Elbow f/e, alternating 3 ways; palm up, neutral, palm down   Wrist/forearm/Elbow AROM 2 min each:  - Yellow Flexbar sup/pronation swings, progressing hold for increased torque    - Yellow Flexbar, 2 min each:  -smiles  -frowns  -defer-twists  -defer-isometric wrist extension  defer--isometric wrist flexion  -defer-stabilization w/oscillations  -defer-isometric supination  -defer-isometric pronation     Proprioception-defer 2 min each:  - Magnet stack   Hand strengthening Soft , 1x10 each     Carrying defer   Weight bearing progession 2 min each:  - Extended hand presses over Yellow Powerweb   Objective Measures 7/14: R/A Wrist, forearm and elbow AROM   HEP As initially set          Sherlyn received Self Care Instruction and/or Orthotics Management while participated in a concurrent discussion of evaluation findings and specific home care, including:  - Current Protocol Restrictions and precautions  - Incision site care; elastic tape precautions  - the anatomy and pathophysiology of surgery/diagnosis.  - instruction in activity modifications for goals of protecting healing tissues to include:  - avoiding heavy lifting.  - Reducing positions of sustained full elbow flexion during the day and while sleeping  - Reducing positions of medial elbow compression during daytime and nighttime resting positions      Home Exercise Program and Home Care Resources/Education:     Education provided:   - HEP as initially set  - Education provided on all interventions performed during today's session   - Discussed  proper workplace body mechanics for typing/writing for long periods  - Progress towards goals     Written Home Exercises Provided: Patient instructed to cont prior HEP.  Exercises were reviewed and Sherlyn was able to demonstrate them prior to the end of the session.  Sherlyn demonstrated good  understanding of the HEP provided.     See EMR under Patient Instructions for exercises provided prior visit.        Assessment   7/25: AROM improving as expected at Week 4. Pt appeared to tolerate light PREs today.    Goals:   The following goals were discussed with the patient and patient is in agreement with them as to be addressed in the treatment plan.     Goals:   LTG's (6-8 weeks):  1)   Intrinsic strength and FMC to be 100% of the unaffected hand for safely managing all fasteners, locks and latches. Progressing  2)    and pinch strength  to be > 90% of the unaffected side for maximizing grasp and stabilization tasks.Progressing  3)   Pt to demonstrate strength and tolerance (<3/10 pain) during up to 10# lifting, pushing and/or pulling tasks for increase functional Right UE use for ADL/work/leisure activities.Progressing  4)   Patient to score at 25% or less on FOTO or Quick/DASH to demonstrate improved perception of functional Right UE use and return to near normal prior level I/ADLs and childcare.Progressing   STG's (3-4 weeks)  1)   Patient to be IND with Phase II of HEP, orthotic use/wear/care/precautions and modalities for pain/edema managment.  2)   Pt to tolerate (reports <3/10 pain) during advancing PREs with self-graded intensity and effective symptom monitoring. Progressing  3)  Wrist, elbow & forearm GAMBINO to be >90% of the unaffected side for maximizing safe support function, brushing hair and reach to all body parts during self care. Progressing  4)  Decrease edema at .4 by .6 cm for evidencing soft tissue healing and effective pain & edema mgmt. MET 7/14/2022  5)  Pt to report improved daily time  postures and/or sleep position strategies effective for decreased episodes of pain and abnormal sensations. Progressing    Pt would continue to benefit from skilled OT as per original POC     Sherlyn is progressing as expected towards her goals and there are no updates to goals at this time. Pt prognosis is Excellent.     Pt will continue to benefit from skilled outpatient occupational therapy to address the deficits listed in the problem list on initial evaluation provide pt/family education and to maximize pt's level of independence in the home and community environment.     Pt's spiritual, cultural and educational needs considered and pt agreeable to plan of care and goals.    Plan     Cont OT to address above goals as per original POC.    REJI Munoz, WYATT  Occupational Therapist, Certified Hand Therapist

## 2022-08-04 ENCOUNTER — OFFICE VISIT (OUTPATIENT)
Dept: ORTHOPEDICS | Facility: CLINIC | Age: 32
End: 2022-08-04
Payer: COMMERCIAL

## 2022-08-04 DIAGNOSIS — G56.01 CARPAL TUNNEL SYNDROME OF RIGHT WRIST: Primary | ICD-10-CM

## 2022-08-04 DIAGNOSIS — G56.21 CUBITAL TUNNEL SYNDROME ON RIGHT: ICD-10-CM

## 2022-08-04 DIAGNOSIS — Z98.890 POSTOPERATIVE STATE: ICD-10-CM

## 2022-08-04 PROCEDURE — 1159F MED LIST DOCD IN RCRD: CPT | Mod: CPTII,S$GLB,, | Performed by: PHYSICIAN ASSISTANT

## 2022-08-04 PROCEDURE — 1159F PR MEDICATION LIST DOCUMENTED IN MEDICAL RECORD: ICD-10-PCS | Mod: CPTII,S$GLB,, | Performed by: PHYSICIAN ASSISTANT

## 2022-08-04 PROCEDURE — 99999 PR PBB SHADOW E&M-EST. PATIENT-LVL II: CPT | Mod: PBBFAC,,, | Performed by: PHYSICIAN ASSISTANT

## 2022-08-04 PROCEDURE — 3044F HG A1C LEVEL LT 7.0%: CPT | Mod: CPTII,S$GLB,, | Performed by: PHYSICIAN ASSISTANT

## 2022-08-04 PROCEDURE — 99999 PR PBB SHADOW E&M-EST. PATIENT-LVL II: ICD-10-PCS | Mod: PBBFAC,,, | Performed by: PHYSICIAN ASSISTANT

## 2022-08-04 PROCEDURE — 99024 PR POST-OP FOLLOW-UP VISIT: ICD-10-PCS | Mod: S$GLB,,, | Performed by: PHYSICIAN ASSISTANT

## 2022-08-04 PROCEDURE — 3044F PR MOST RECENT HEMOGLOBIN A1C LEVEL <7.0%: ICD-10-PCS | Mod: CPTII,S$GLB,, | Performed by: PHYSICIAN ASSISTANT

## 2022-08-04 PROCEDURE — 99024 POSTOP FOLLOW-UP VISIT: CPT | Mod: S$GLB,,, | Performed by: PHYSICIAN ASSISTANT

## 2022-08-04 RX ORDER — MELOXICAM 15 MG/1
15 TABLET ORAL DAILY
Qty: 30 TABLET | Refills: 0 | Status: SHIPPED | OUTPATIENT
Start: 2022-08-04 | End: 2022-11-04

## 2022-08-04 NOTE — PROGRESS NOTES
Dr. Rojas is the supervising physician for this encounter/patient    Sherlyn Monahan presents for post-operative evaluation.  The patient is now 6 weeks s/p Right endoscopic carpal tunnel release and ulnar nerve decompression at the elbow with Dr. Rojas on 6/24/22.  Overall the patient reports doing well.  She reports 3/10 in the palm with some mild swelling. She took some advil yesterday which was helpful. She reports 100% of her neuropathy symptoms have resolved. She continued to work with OT for strengthening, doing all of her ADLs without issues.    PE:    AA&O x 4.  NAD  HEENT:  NCAT, sclera nonicteric  Lungs:  Respirations are equal and unlabored.  CV:  2+ bilateral upper and lower extremity pulses.  MSK: The wounds are well healed without any signs of infection. Mild edema in the palm only.  Full elbow, wrist and hand motion present. SILT. DNVI.    A/P: Status post above, doing well  1) Advance strengthening as tolerated.  2) Continue with scar massage. Mobic ordered today.  3) F/U as needed.  4) Call with any questions/concerns in the interim      Jamie Russo-DiGeorge PA-C

## 2022-08-09 NOTE — TELEPHONE ENCOUNTER
Please advise on mychart encounter   SUBJECTIVE  Rocky Sims is a 77 year old male referred by Dr. Giron for evaluation and management of obstructive sleep apnea.  He has multiple medical comorbidities including paroxysmal atrial fibrillation, diabetes, hypertension.  He has been off CPAP for a number of years.  Last time he tried it was a few months ago.  His sleep apnea may date around 1999 when he was first seen by retired pulmonary physician Dr. Biju Olson.  He denies daytime sleepiness but typically does not feel fully refreshed upon awakening.  [place] was only 3.  Typically goes to bed around 11 PM and is up and down all the way to 11 AM.  Essentially starts his day after 11 AM noon.  He is relatively inactive.  Pretty much watches TV most of the day.  He notes snoring and apnea.  He has fair recollection of dreams.  Complains of nasal drainage mainly in the mornings.  His last CPAP machine was a Desiree Respironics which he completely stopped using once the recall was ineffective.  He typically takes Ambien nightly to achieve sleep.    Medical history:  As noted in the active problem list    Social History: Retired, lives with his wife and planning on moving to a senior housing complex  Social History     Tobacco Use   • Smoking status: Never Smoker   • Smokeless tobacco: Never Used   Substance Use Topics   • Alcohol use: No     Patient Active Problem List   Diagnosis   • Paroxysmal atrial fibrillation (CMS/HCC)   • Dysthymic disorder   • History of aortic aneurysm repair   • Hyperlipidemia associated with type 2 diabetes mellitus (CMS/HCC)   • Obstructive sleep apnea   • RLS (restless legs syndrome)   • Type 2 diabetes mellitus (CMS/HCC)   • Essential hypertension, benign   • Irritable bowel syndrome with both constipation and diarrhea   • Osteoarthritis of hip   • Sleep disturbance   • Patient is Judaism   • Pulmonary embolism, bilateral (CMS/HCC)   • History of gout   • Class 1 obesity due to excess calories with serious  comorbidity and body mass index (BMI) of 33.0 to 33.9 in adult   • Atrial flutter with rapid ventricular response (CMS/HCC)   • Benign prostatic hyperplasia with nocturia     Medications:  Current Outpatient Medications   Medication Sig Dispense Refill   • allopurinol (ZYLOPRIM) 300 MG tablet Take 1 tablet by mouth daily. 90 tablet 0   • zolpidem (AMBIEN) 10 MG tablet TAKE 1 TABLET BY MOUTH NIGHTLY AS NEEDED FOR SLEEP 30 tablet 0   • tamsulosin (FLOMAX) 0.4 MG Cap Take 2 capsules by mouth daily. 60 capsule 0   • gabapentin (NEURONTIN) 300 MG capsule TAKE 3 CAPSULES BY MOUTH AT BEDTIME 270 capsule 1   • metoPROLOL succinate (TOPROL-XL) 100 MG 24 hr tablet Take 1 tablet by mouth every evening. 90 tablet 1   • benazepril (LOTENSIN) 20 MG tablet Take 1 tablet by mouth daily. 90 tablet 1   • metoPROLOL succinate (TOPROL-XL) 50 MG 24 hr tablet Take 1 tablet by mouth daily. 90 tablet 1   • acetaminophen-codeine (TYLENOL NO.3) 300-30 MG per tablet Take 1 tablet by mouth every 6 hours as needed for Pain. 60 tablet 0   • amiodarone (PACERONE) 400 MG tablet Take 0.5 tablets by mouth daily.     • Omega-3 Fatty Acids (Fish Oil) 1000 MG capsule Take 1,000 mg by mouth 2 times daily.     • acetaminophen (TYLENOL) 325 MG tablet Take 650 mg by mouth every 8 hours as needed.     • sertraline (ZOLOFT) 100 MG tablet Take 1.5 tablets by mouth daily. 135 tablet 1   • montelukast (SINGULAIR) 10 MG tablet Take 1 tablet by mouth daily. 90 tablet 0   • metFORMIN (GLUCOPHAGE-XR) 500 MG 24 hr tablet Take 1 tablet by mouth daily (with dinner). 90 tablet 1   • apixaBAN (ELIQUIS) 5 MG Tab Take 5 mg by mouth every 12 hours.      • Glucose Blood (BLOOD GLUCOSE TEST STRIPS) Strip Test once daily DX E11.9       No current facility-administered medications for this visit.   Family history: No sleep disorders    Review of systems: As noted in the HPI  AM nasal drainage as noted  No cough or shortness of breath  No chest pain or recent palpitations  No  significant chronic pain  + Fluid retention    OBJECTIVE:  Visit Vitals  /84   Pulse 65   Ht 6' (1.829 m)   Wt 114.3 kg (252 lb)   SpO2 96%   BMI 34.18 kg/m²       EXAM:   HEENT: Pupils equally round and reactive. Nasal mucosa clear. Turbinates normal. Pharynx unremarkable.  Mallampati class III  Neck: No adenopathy, tenderness. Thyroid negative.  Circumference-17 inches  Chest: Breath sounds are clear and symmetric. No crackles or wheezes. No accessory muscle usage.   Heart: Regular S1, S2. No murmur or gallop.  Abdomen: Soft, nontender. No asymmetry. No masses.   Extremities: 1+ pitting edema. No clubbing.       ASSESSMENT:  Longstanding IRENA by history presently not using CPAP  Multiple comorbidities as outlined including paroxysmal atrial fibrillation, hypertension and diabetes    PLAN:  Referral to [facility] for in lab sleep study.  Diagnosis and treatment of sleep apnea discussed.  IAdditional recommendations pending results.      Electronically signed: Andry Louis MD

## 2022-08-10 ENCOUNTER — CLINICAL SUPPORT (OUTPATIENT)
Dept: REHABILITATION | Facility: HOSPITAL | Age: 32
End: 2022-08-10
Payer: COMMERCIAL

## 2022-08-10 DIAGNOSIS — M25.521 PAIN IN RIGHT ELBOW: ICD-10-CM

## 2022-08-10 DIAGNOSIS — M25.431 SWELLING OF RIGHT WRIST: ICD-10-CM

## 2022-08-10 DIAGNOSIS — Z78.9 DIFFICULTY WITH ACTIVITIES OF DAILY LIVING: ICD-10-CM

## 2022-08-10 DIAGNOSIS — M25.631 DECREASED RANGE OF MOTION OF RIGHT WRIST: ICD-10-CM

## 2022-08-10 DIAGNOSIS — M25.531 PAIN IN RIGHT WRIST: ICD-10-CM

## 2022-08-10 DIAGNOSIS — M25.421 SWELLING OF RIGHT ELBOW: ICD-10-CM

## 2022-08-10 DIAGNOSIS — R29.898 IMPAIRED DEXTERITY: ICD-10-CM

## 2022-08-10 DIAGNOSIS — M25.621 DECREASED RANGE OF MOTION OF RIGHT ELBOW: Primary | ICD-10-CM

## 2022-08-10 PROCEDURE — 97140 MANUAL THERAPY 1/> REGIONS: CPT | Mod: PN

## 2022-08-10 PROCEDURE — 97110 THERAPEUTIC EXERCISES: CPT | Mod: PN

## 2022-08-10 PROCEDURE — 97535 SELF CARE MNGMENT TRAINING: CPT | Mod: PN

## 2022-08-10 NOTE — PROGRESS NOTES
"  Occupational Therapy Daily Treatment Note     Date: 8/10/2022  Name: Sherlyn Monahan  Clinic Number: [id number]    Therapy Diagnosis:   No diagnosis found.  Physician: Russo-Digeorge, Jamie L*    Medical Diagnosis: Carpal tunnel syndrome of right wrist [G56.01]   Cubital tunnel syndrome of both upper extremities [G56.23]   Postoperative state [Z98.890]  Physician Orders:   Post Surgical? Yes   Eval and Treat Yes   Type of Therapy Hand Therapy (CHT)      Evaluation Date: 7/8/2022  Insurance Authorization Period Expiration: 12/31/22  Plan of Care from 7/8/2022 to 9/8/22   Surgery Date and Procedure: 6/24/22  1) Endoscopic right sided carpal tunnel release, CPT code 40453  2) right ulnar nerve decompression at the elbow/cubital tunnel release  Date of Return to MD/PA: unknown     Visit #: 5 of 50  FOTO (DASH) at eval: 51.8%     Time In: 1615  Time Out: 1701  Total Billable Time: 46 min     Precautions: Standard , Weightbearing ; Restrictions/Precautions at po Week 6 as of 8/5/22  DOS-6/24/22    Subjective     Pt reports: Pt reports scar is sensitive. She reports brushing it "feel like i'm back to normal other than weightbearing."     Pt reports "scar sensitivity" and some pain at 1st CMC. She explains not yet able to use her Right hand for support or push up from a surface    Response to previous treatment: pt reports improved range of motion  Functional improvements:   Pain: 3/10 at worst  Location: Right elbow   Objective     Mental status: alert, interactive     Observation: Wound is healed nicely. Hypertrophic scarring noted at the elbow incision.      Sensation: N/A Distribution    IE-7/8/2022 IE-7/8/2022     RIGHT LEFT   Monofilament Testing       Normal 1.65-2.83 Each digit and thumb tip respond timely Each digit and thumb tip respond timely   2-pt Discrimination Each digit and thumb discern 5 mm Each digit and thumb discern 5 mm       Edema: Circumferential measurements: In centimters    Right/Left Right R     " "IE-7/8/2022 7/14/22 7/25/22   Elbow crease 29.2/28.3 28.3 (-.9) 28.7 (+.4)   Wrist crease 17.4/16.8 17.3 (-.1) 17.0 (-.3)   IF P1 6.8/6.9 6.5 (-.3)    Thumb P1 6.7/6.5 6.5 (-.2) 6.6 (+.1)   SF P1 5.8/5.6 5.5 (-.3)       Wrist AROM    Right/Left R R   DATE IE-7/8/22 7/14/22 7/25/22   EXT 49/71 52 69   FLX 60/67 56 62   RD 14/20 13 17   UD  18/36 26 40   GAMBINO 141/194 147 (+6) 188 (+41)         ELBOW & FOREARM AROM    Right/Left R R R   DATE IE-7/8/22 7/14/22 7/25/22 8/10/22   EXT -14/+4 -6 (+8) -8 0   /138 144 (+14) 144    SUP 69/85 74 (+5) 86    PRO 83/85 88 (+5) (=)       MMT (5-point strength scale):    As compared to the Left Right     IE-7/8/2022   FDP RF/SF 4-   Lumbricals 3&4 4-   DABs/PADs 4   AddPolicis 4   Hypothenar 4      DEXTERITY Tests    Right/Left Right   DATE 7/8/22 7/14/22   Grooved Peg Timed Test 2'01"/1'12" 1'12" (+49")         and Pinch: (measured in psi's)   R L    8/10/22 8/10/22    25 55   Lateral pinch 14 17   Tripod pinch 9 14   Pincer pinch 8 12             Treatment     Sherlyn received the following supervised modalities after being cleared for contraindications for 8 minutes:   - Fluidotherapy for promoting healing, reducing pain, desensitization and increasing tissue extensibility     Sherlyn received the following manual therapy techniques for 8 minutes:   - Scar massage provided to lateral epicondyle scar and volar wrist scar with IASTM tool  - Desensitization to elbow scar with strip of velcro    Sherlyn performed therapeutic exercises for 22 minutes including:  Wrist  1 min each with 2 lb:  - flexion  - extension  -ulnar/radial deviation   Functional pinching  1/3 container pom poms each with blue clip  - lateral  - tripod  - pincer    - pom pom pick ups Brown gripper 3rd rung 1 container    Pronation/supination  1 min with hammer   Intrinsic coordination   1 min Small metal balls   Digit abduction   Yellow spideyband 1 min    - flexbar twists  Twists with " yellow flexbar 1 min     Shoulder/Elbow AROM 1 min each with bilateral dowel:  - Over head press    3# for elbow flexion/extension  -palm up  - forearm neutral  -Elbow f/e, palm down   Wrist/forearm/Elbow AROM 2 min each:  - Jux-aciser   Hand strengthening 2 min each:  - Lifts with adduction pumps using green sponges   Flexor Tendon Glides 4 min in fluido        Sherlyn received Self Care Instruction for 8 mins while participated in a concurrent discussion of evaluation findings and specific home care, including:  - Current Protocol Restrictions and precautions  - Incision site care; elastic tape precautions  - the anatomy and pathophysiology of surgery/diagnosis.  - instruction in activity modifications for goals of protecting healing tissues to include:  - avoiding heavy lifting.  - Reducing positions of sustained full elbow flexion during the day and while sleeping  - Reducing positions of medial elbow compression during daytime and nighttime resting positions      Home Exercise Program and Home Care Resources/Education:     Education provided:   - HEP as upgraded - integrate weightbearing with putty exercises  - Issued strip of velcro to perform desensitization and encouraged   - Education provided on all interventions performed during today's session   - Discussed proper workplace body mechanics for typing/writing for long periods  - Progress towards goals     Written Home Exercises Provided: yes.  Exercises were reviewed and Sherlyn was able to demonstrate them prior to the end of the session.  Sherlyn demonstrated good  understanding of the HEP provided.     See EMR under Patient Instructions for exercises provided 8/10.        Assessment     Pt tolerated upgraded strengthening exercises well.  and pinch measured today - her right upper extremitiy  strength is 50% of the unaffected side. She demonstrates normal elbow range of motion. She shows good motivation for therapy and compliance with home  exercise program.    Goals:   The following goals were discussed with the patient and patient is in agreement with them as to be addressed in the treatment plan.     Goals:   LTG's (6-8 weeks):  1)   Intrinsic strength and FMC to be 100% of the unaffected hand for safely managing all fasteners, locks and latches. Progressing  2)    and pinch strength  to be > 90% of the unaffected side for maximizing grasp and stabilization tasks.Progressing  3)   Pt to demonstrate strength and tolerance (<3/10 pain) during up to 10# lifting, pushing and/or pulling tasks for increase functional Right UE use for ADL/work/leisure activities.Progressing  4)   Patient to score at 25% or less on FOTO or Quick/DASH to demonstrate improved perception of functional Right UE use and return to near normal prior level I/ADLs and childcare.Progressing     STG's (3-4 weeks)  1)   Patient to be IND with Phase II of HEP, orthotic use/wear/care/precautions and modalities for pain/edema managment. Met 8/10  2)   Pt to tolerate (reports <3/10 pain) during advancing PREs with self-graded intensity and effective symptom monitoring. Progressing  3)  Wrist, elbow & forearm GAMBINO to be >90% of the unaffected side for maximizing safe support function, brushing hair and reach to all body parts during self care. Met 8/10  4)  Decrease edema at .4 by .6 cm for evidencing soft tissue healing and effective pain & edema mgmt. MET 7/14/2022  5)  Pt to report improved daily time postures and/or sleep position strategies effective for decreased episodes of pain and abnormal sensations. Met 8/10    Pt would continue to benefit from skilled OT as per original POC     Sherlyn is progressing as expected towards her goals and there are no updates to goals at this time. Pt prognosis is Excellent.     Pt will continue to benefit from skilled outpatient occupational therapy to address the deficits listed in the problem list on initial evaluation provide pt/family  education and to maximize pt's level of independence in the home and community environment.     Pt's spiritual, cultural and educational needs considered and pt agreeable to plan of care and goals.    Plan     Cont OT to address above goals as per original POC. Continue progressive strengthening as tolerated.       REJI Jamison

## 2022-08-10 NOTE — PATIENT INSTRUCTIONS
[facility]  OCCUPATIONAL THERAPY  HOME EXERCISE PROGRAM     Resisted    Squeeze putty using thumb and all fingers.            Resisted Lateral/Key Pinch  Squeeze between thumb and side of index finger.      Resisted 3-Jaw and 2pt Pinch   Pinch putty between tip of thumb and tip of index/long fingers. Pinch putty between tip of thumb and tip of index.           - FLATTEN PUTTY INTO A PANCAKE IN STANDING      REJI Jamison  Occupational Therapist

## 2022-08-14 NOTE — PROGRESS NOTES
Occupational Therapy Daily Treatment Note     Date: 8/15/2022  Name: Sherlyn Monahan  Clinic Number: [id number]    Therapy Diagnosis:   Encounter Diagnoses   Name Primary?    Decreased range of motion of right elbow Yes    Decreased range of motion of right wrist     Pain in right elbow     Pain in right wrist     Impaired dexterity     Difficulty with activities of daily living     Swelling of right wrist     Swelling of right elbow      Physician: Russo-Digeorge, Jamie L*    Medical Diagnosis: Carpal tunnel syndrome of right wrist [G56.01]   Cubital tunnel syndrome of both upper extremities [G56.23]   Postoperative state [Z98.890]  Physician Orders:   Post Surgical? Yes   Eval and Treat Yes   Type of Therapy Hand Therapy (CHT)      Evaluation Date: 7/8/2022  Insurance Authorization Period Expiration: 12/31/22  Plan of Care from 7/8/2022 to 9/8/22   Surgery Date and Procedure: 6/24/22  1) Endoscopic right sided carpal tunnel release, CPT code 76845  2) right ulnar nerve decompression at the elbow/cubital tunnel release  Date of Return to MD/PA: unknown     Visit #: 6 of 50  FOTO (DASH) at eval: 51.8%     Time In: 4:15 pm  Time Out: 5:05  Total Billable Time: 50 min     Precautions: Standard , Restrictions/Precautions at po Week 7  DOS-6/24/22    Subjective     Pt reports: Pt reports hx of hypertrophic scars. She c/o pain with end range wrist extension over-pressure that is relieved with radio-carpal distraction.     Response to previous treatment: no residual symptoms; improved AROM  Functional improvements:   Pain: 3/10 at worst; 0/10 at rest  Location: Right wrist  Objective     Mental status: alert, interactive     Observation: Hypertrophic scarring noted at the elbow incision.      Sensation: N/A Distribution    IE-7/8/2022 IE-7/8/2022     RIGHT LEFT   Monofilament Testing       Normal 1.65-2.83 Each digit and thumb tip respond timely Each digit and thumb tip respond timely   2-pt Discrimination Each  "digit and thumb discern 5 mm Each digit and thumb discern 5 mm       Edema: Circumferential measurements: In centimters    Right/Left Right R R     IE-7/8/2022 7/14/22 7/25/22 8/15/22   Elbow crease 29.2/28.3 28.3 (-.9) 28.7 (+.4) 29.1/28.3-L   Wrist crease 17.4/16.8 17.3 (-.1) 17.0 (-.3) 16.9   IF P1 6.8/6.9 6.5 (-.3)     Thumb P1 6.7/6.5 6.5 (-.2) 6.6 (+.1) 6.6   SF P1 5.8/5.6 5.5 (-.3)        Wrist AROM    Right/Left R R R   DATE IE-7/8/22 7/14/22 7/25/22 8/15/22   EXT 49/71 52 69 71   FLX 60/67 56 62 62   RD 14/20 13 17 21   UD  18/36 26 40 44   GAMBINO 141/194 147 (+6) 188 (+41) 198 (+10)         ELBOW & FOREARM AROM    Right/Left R R R R   DATE IE-7/8/22 7/14/22 7/25/22 8/10/22 8/15/22   EXT -14/+4 -6 (+8) -8 0 +2   /138 144 (+14) 144  =   SUP 69/85 74 (+5) 86 (+12)  =   PRO 83/85 88 (+5) (=)  =      MMT (5-point strength scale):    As compared to the Left Right R     IE-7/8/2022 8/15/22   FDP RF/SF 4- 4+   Lumbricals 3&4 4- 4+   DABs/PADs 4 4+   AddPolicis 4 4   Hypothenar 4 4+      DEXTERITY Tests    Right/Left Right   DATE 7/8/22 7/14/22   Grooved Peg Timed Test 2'01"/1'12" 1'12" (+49")         and Pinch: (measured in psi's)   R/L R    8/10/22 8/15/22    25/55 35 (+10)   Lateral pinch 14/17 *18 (+4)   Tripod pinch 9/14 14 (+5)   Pincer pinch 8/12 11.5 (+3.5)    *Froment's sign         Treatment     Sherlyn received the following supervised modalities after being cleared for contraindications for 0 minutes:   - defer-Fluidotherapy for promoting healing, reducing pain, desensitization and increasing tissue extensibility     Sherlyn received the following manual therapy techniques for 10 minutes:   - defer-Scar massage provided to lateral epicondyle scar and volar wrist scar with IASTM tool  - Elastic tape applied to elbow incision scar for tension and desensitization  - Mobilization with movement; Rad/Carp distractions during wrist extension over-pressure, weight bearing progression     Sherlyn " performed therapeutic exercises for 30 minutes including:  Scapula/shoulder/Elbow/Wrist Strengthening -Green t-band, isometric, 2 min each for:  -wrist extension with Left and Right shld ER and scap retraction  -wrist flexion with elbow flexion  -wrist RD with biceps and opposite triceps  -wrist UD with Left and Right shld ER and Scap retraction     Wrist/forearm/Elbow AROM - Green Flexbar, 1 min each:  -smiles  -frowns  -twists  -stabilization w/oscillations  -isometric supination  -isometric pronation      Hand strengthening Defer to HEP: Grey t-putty, 5 10-sec       Carrying 3# bar bell, 2 gym laps   Weight bearing progession - Extended hands in stance over table top; 2x30 sec holds with OT providing Rad/Carp distraction  - Wall push ups 3x10, graduating the 1st set of 10 to a comfortable wrist extension position; then 2x10 at that level   Objective Measures 8/15: R/A Wrist, forearm and elbow AROM. R/A intrinsic strength,  and pinch   HEP as upgraded 8/15: Green t-band and Grey t-putty. Weight-bearing progression; Carrying progression         Home Exercise Program and Home Care Resources/Education:     Education provided:   - HEP as upgraded  - Education provided on all interventions performed during today's session   - Discussed proper workplace body mechanics for typing/writing for long periods  - Progress towards goals     Written Home Exercises Provided: yes.  Exercises were reviewed and Sherlyn was able to demonstrate them prior to the end of the session.  Sherlyn demonstrated good  understanding of the HEP provided.     See EMR under Patient Instructions for exercises provided 8/10. 8/15/22       Assessment   8/15: Pt is progressing as expected at week 7. AROM and pinch strength are now WNL. Elbow incision scar is hypertrophied. Elastic tape 24/7 recommended for this and to desensitize. Suspect Radial/carpal impingement at end range wrist extension over-pressure. Manual intervention provided for  this.     Goals:   The following goals were discussed with the patient and patient is in agreement with them as to be addressed in the treatment plan.     Goals:   LTG's (6-8 weeks):  1)   Intrinsic strength and FMC to be 100% of the unaffected hand for safely managing all fasteners, locks and latches. Progressing  2)    and pinch strength  to be > 90% of the unaffected side for maximizing grasp and stabilization tasks. Progressing  3)   Pt to demonstrate strength and tolerance (<3/10 pain) during up to 10# lifting, pushing and/or pulling tasks for increase functional Right UE use for ADL/work/leisure activities. Progressing  4)   Patient to score at 25% or less on FOTO or Quick/DASH to demonstrate improved perception of functional Right UE use and return to near normal prior level I/ADLs and childcare.     STG's (3-4 weeks)  1)   Patient to be IND with Phase II of HEP, orthotic use/wear/care/precautions and modalities for pain/edema managment. Met 8/10  2)   Pt to tolerate (reports <3/10 pain) during advancing PREs with self-graded intensity and effective symptom monitoring. Progressing  3)  Wrist, elbow & forearm GAMBINO to be >90% of the unaffected side for maximizing safe support function, brushing hair and reach to all body parts during self care. Met 8/10  4)  Decrease edema at .4 by .6 cm for evidencing soft tissue healing and effective pain & edema mgmt. MET 7/14/2022  5)  Pt to report improved daily time postures and/or sleep position strategies effective for decreased episodes of pain and abnormal sensations. Met 8/10    Pt would continue to benefit from skilled OT as per original POC     Sherlyn is progressing as expected towards her goals and there are no updates to goals at this time. Pt prognosis is Excellent.     Pt will continue to benefit from skilled outpatient occupational therapy to address the deficits listed in the problem list on initial evaluation provide pt/family education and to maximize  pt's level of independence in the home and community environment.     Pt's spiritual, cultural and educational needs considered and pt agreeable to plan of care and goals.    Plan   8/15: R/A FOTO #6. Advance PREs, manual intervention and weight bearing progression as tolerated.     REJI Munoz, JAELYNT  Occupational Therapist, Certified Hand Therapist

## 2022-08-15 ENCOUNTER — CLINICAL SUPPORT (OUTPATIENT)
Dept: REHABILITATION | Facility: HOSPITAL | Age: 32
End: 2022-08-15
Payer: COMMERCIAL

## 2022-08-15 DIAGNOSIS — M25.531 PAIN IN RIGHT WRIST: ICD-10-CM

## 2022-08-15 DIAGNOSIS — M25.631 DECREASED RANGE OF MOTION OF RIGHT WRIST: ICD-10-CM

## 2022-08-15 DIAGNOSIS — M25.421 SWELLING OF RIGHT ELBOW: ICD-10-CM

## 2022-08-15 DIAGNOSIS — M25.521 PAIN IN RIGHT ELBOW: ICD-10-CM

## 2022-08-15 DIAGNOSIS — M25.621 DECREASED RANGE OF MOTION OF RIGHT ELBOW: Primary | ICD-10-CM

## 2022-08-15 DIAGNOSIS — R29.898 IMPAIRED DEXTERITY: ICD-10-CM

## 2022-08-15 DIAGNOSIS — M25.431 SWELLING OF RIGHT WRIST: ICD-10-CM

## 2022-08-15 DIAGNOSIS — Z78.9 DIFFICULTY WITH ACTIVITIES OF DAILY LIVING: ICD-10-CM

## 2022-08-15 PROCEDURE — 97140 MANUAL THERAPY 1/> REGIONS: CPT | Mod: PN

## 2022-08-15 PROCEDURE — 97110 THERAPEUTIC EXERCISES: CPT | Mod: PN

## 2022-08-15 NOTE — PATIENT INSTRUCTIONS
[facility], OCCUPATIONAL THERAPY  HOME EXERCISE PROGRAM                   Green T-BAND FOR ISOMETRIC STRENGTHENING       PERFORM STRENGTHENING EXERCISES EVERY OTHER DAY      BEGIN WITH UPRIGHT POSTURE, SHOULDER BLADES PULLED BACK COMFORTABLE AND STOMACH MUSCLE ENGAGED.    (2 MINUTE EACH):    NOTE: Keep your wrists in neutral alignment. All exercises shoulder be done Pain-free    1) WRIST/DIGIT EXTENSION (PALMS FACING EACH OTHER)      2) WRIST FLEXION (PALMS FACING AWAY- UNAFFECTED TO CEILING AND THE AFFECTED TO THE FLOOR)    3) WRIST ULNA DEVIATION (BOTH PALMS FACE TO THE FLOOR); see picture above, only turn the palms down and keep your hands open    4) WRIST RADIAL DEVIATION (AFFECTED SIDE THUMB POINTS TO THE CEILING; PALMS FACE EACH OTHER)

## 2022-08-21 NOTE — PROGRESS NOTES
Occupational Therapy Discharge Summary and Daily Treatment Note     Date: 8/22/2022  Name: Sherlyn Monahan  Clinic Number: [id number]    Therapy Diagnosis:   Encounter Diagnoses   Name Primary?    Decreased range of motion of right elbow Yes    Decreased range of motion of right wrist     Pain in right elbow     Pain in right wrist     Impaired dexterity     Difficulty with activities of daily living     Swelling of right wrist     Swelling of right elbow      Physician: Russo-Digeorge, Jamie L*    Medical Diagnosis: Carpal tunnel syndrome of right wrist [G56.01]   Cubital tunnel syndrome of both upper extremities [G56.23]   Postoperative state [Z98.890]  Physician Orders:   Post Surgical? Yes   Eval and Treat Yes   Type of Therapy Hand Therapy (CHT)      Evaluation Date: 7/8/2022  Insurance Authorization Period Expiration: 12/31/22  Plan of Care from 7/8/2022 to 9/8/22   Surgery Date and Procedure: 6/24/22  1) Endoscopic right sided carpal tunnel release, CPT code 60689  2) right ulnar nerve decompression at the elbow/cubital tunnel release  Date of Return to MD/PA: unknown     Visit #: 6 of 50  FOTO (DASH) at eval: 51.8%     Time In: 4:20 pm  Time Out: 5:05  Total Billable Time: 45 min     Precautions: Standard , Restrictions/Precautions at po Week 8  DOS-6/24/22    Subjective     Pt reports: 8/22: Pt reports having purchased elastic tape after having positive symptoms relief with first application last session. Pt reports hx of hypertrophic scars. She reports less pain with end range wrist extension over-pressure. It is again relieved with radio-carpal distraction.     Response to previous treatment: no residual symptoms; improved  strength  Functional improvements: FOTO R/A as follows:    Pain: 3/10 at worst; 0/10 at rest  Location: Right wrist  Objective     Mental status: alert, interactive     Observation: Hypertrophic scarring noted at the elbow incision.      Sensation: N/A Distribution     "IE-7/8/2022 IE-7/8/2022     RIGHT LEFT   Monofilament Testing       Normal 1.65-2.83 Each digit and thumb tip respond timely Each digit and thumb tip respond timely   2-pt Discrimination Each digit and thumb discern 5 mm Each digit and thumb discern 5 mm       Edema: Circumferential measurements: In centimters    Right/Left Right R R R     IE-7/8/2022 7/14/22 7/25/22 8/15/22 8/22/22   Elbow crease 29.2/28.3 28.3 (-.9) 28.7 (+.4) 29.1/28.3-L 29.2   Wrist crease 17.4/16.8 17.3 (-.1) 17.0 (-.3) 16.9    IF P1 6.8/6.9 6.5 (-.3)      Thumb P1 6.7/6.5 6.5 (-.2) 6.6 (+.1) 6.6    SF P1 5.8/5.6 5.5 (-.3)         Wrist AROM    Right/Left R R R   DATE IE-7/8/22 7/14/22 7/25/22 8/15/22   EXT 49/71 52 69 71   FLX 60/67 56 62 62   RD 14/20 13 17 21   UD  18/36 26 40 44   GAMBINO 141/194 147 (+6) 188 (+41) 198 (+10)         ELBOW & FOREARM AROM    Right/Left R R R R   DATE IE-7/8/22 7/14/22 7/25/22 8/10/22 8/15/22   EXT -14/+4 -6 (+8) -8 0 +2   /138 144 (+14) 144  =   SUP 69/85 74 (+5) 86 (+12)  =   PRO 83/85 88 (+5) (=)  =      MMT (5-point strength scale):    As compared to the Left Right R     IE-7/8/2022 8/15/22   FDP RF/SF 4- 4+   Lumbricals 3&4 4- 4+   DABs/PADs 4 4+   AddPolicis 4 4   Hypothenar 4 4+      DEXTERITY Tests    Right/Left Right   DATE 7/8/22 7/14/22   Grooved Peg Timed Test 2'01"/1'12" 1'12" (+49")         and Pinch: (measured in psi's)   R/L R R    8/10/22 8/15/22 8/22/22    25/55 35 (+10) 45 (+10)   Lateral pinch 14/17 *18 (+4)    Tripod pinch 9/14 14 (+5)    Pincer pinch 8/12 11.5 (+3.5)     *Froment's sign         Treatment     Sherlyn received the following supervised modalities after being cleared for contraindications for 15 minutes:   - Fluidotherapy for promoting healing, reducing pain, desensitization and increasing tissue extensibility     Sherlyn received the following manual therapy techniques for 5 minutes:   - defer-Scar massage provided to lateral epicondyle scar and volar wrist scar " with IASTM tool  - defer to HEP: Elastic tape applied to elbow incision scar for tension and desensitization  - Mobilization with movement; Rad/Carp distractions during wrist extension over-pressure, weight bearing progression     Sherlyn performed therapeutic exercises for 25 minutes including:  Scapula/shoulder/Elbow/Wrist Strengthening -defer to HEP: Green t-band, isometric, 2 min each for:  -wrist extension with Left and Right shld ER and scap retraction  -wrist flexion with elbow flexion  -wrist RD with biceps and opposite triceps  -wrist UD with Left and Right shld ER and Scap retraction     Wrist/forearm/Elbow AROM - Green Flexbar, 2 min each:  -smiles  -frowns  -twists  -stabilization w/oscillations  -isometric supination  -isometric pronation      Hand strengthening Defer to HEP: Grey t-putty, 5 10-sec       Carrying 3# bar bell, 3 gym laps   Weight bearing progession - Extended hands in stance over table top; 3x30 sec holds with OT providing Rad/Carp distraction; these were alternated with carrying laps  - defer to HEP: Wall push ups 3x10, graduating the 1st set of 10 to a comfortable wrist extension position; then 2x10 at that level  - Attempted arm chair push-up; OT cautioned to progress this as per HEP upgraded today   Objective Measures 8/22: R/A   8/15: R/A Wrist, forearm and elbow AROM. R/A intrinsic strength,  and pinch   HEP as upgraded 8/22: Weight bearing progression as tolerated  8/15: Green t-band and Grey t-putty. Weight-bearing progression; Carrying progression         Home Exercise Program and Home Care Resources/Education:     Education provided:   - HEP as upgraded  - Education provided on all interventions performed during today's session   - Discussed proper workplace body mechanics for typing/writing for long periods  - Progress towards goals     Written Home Exercises Provided: Patient instructed to cont prior HEP.  Exercises were reviewed and Sherlyn was able to  demonstrate them prior to the end of the session.  Sherlyn demonstrated good  understanding of the HEP provided.     See EMR under Patient Instructions for exercises provided 8/10. 8/15/22, 8/22/22       Assessment   8/22: Pt is progressing as expected at week 8.  is improving as expected. AROM and pinch strength are now WNL. Elbow incision scar is hypertrophied and hypersensitive. Elastic tape applied last session was effective for desensitizing. Pt has chosen to continue with it. Radial/carpal impingement at end range wrist extension over-pressure relieved with distraction mobilization and carrying.    Goals:   The following goals were discussed with the patient and patient is in agreement with them as to be addressed in the treatment plan.     Goals:   LTG's (6-8 weeks):  1)   Intrinsic strength and FMC to be 100% of the unaffected hand for safely managing all fasteners, locks and latches. MET  2)    and pinch strength  to be > 90% of the unaffected side for maximizing grasp and stabilization tasks. Progressing for . MET for pinch  3)   Pt to demonstrate strength and tolerance (<3/10 pain) during up to 10# lifting, pushing and/or pulling tasks for increase functional Right UE use for ADL/work/leisure activities. Discontinued   4)   Patient to score at 25% or less on FOTO or Quick/DASH to demonstrate improved perception of functional Right UE use and return to near normal prior level I/ADLs and childcare. MET 8/22/2022    STG's (3-4 weeks)  1)   Patient to be IND with Phase II of HEP, orthotic use/wear/care/precautions and modalities for pain/edema managment. Met 8/10  2)   Pt to tolerate (reports <3/10 pain) during advancing PREs with self-graded intensity and effective symptom monitoring. MET 8/22/22  3)  Wrist, elbow & forearm GAMBINO to be >90% of the unaffected side for maximizing safe support function, brushing hair and reach to all body parts during self care. Met 8/10  4)  Decrease edema at .4 by  .6 cm for evidencing soft tissue healing and effective pain & edema mgmt. MET 7/14/2022  5)  Pt to report improved daily time postures and/or sleep position strategies effective for decreased episodes of pain and abnormal sensations. Met 8/10    Pt would continue to benefit from skilled OT as per original POC     Sherlyn is progressing as expected towards her goals and there are no updates to goals at this time. Pt prognosis is Excellent.     Pt will continue to benefit from skilled outpatient occupational therapy to address the deficits listed in the problem list on initial evaluation provide pt/family education and to maximize pt's level of independence in the home and community environment.     Pt's spiritual, cultural and educational needs considered and pt agreeable to plan of care and goals.    Plan   8/22: OT recommends discharge to [facility]. Pt agrees with this and confident she'll continue to strengthen  and weight bearing tolerance with HEP.    REJI Munoz, WYATT  Occupational Therapist, Certified Hand Therapist

## 2022-08-22 ENCOUNTER — CLINICAL SUPPORT (OUTPATIENT)
Dept: REHABILITATION | Facility: HOSPITAL | Age: 32
End: 2022-08-22
Payer: COMMERCIAL

## 2022-08-22 DIAGNOSIS — M25.631 DECREASED RANGE OF MOTION OF RIGHT WRIST: ICD-10-CM

## 2022-08-22 DIAGNOSIS — R29.898 IMPAIRED DEXTERITY: ICD-10-CM

## 2022-08-22 DIAGNOSIS — Z78.9 DIFFICULTY WITH ACTIVITIES OF DAILY LIVING: ICD-10-CM

## 2022-08-22 DIAGNOSIS — M25.421 SWELLING OF RIGHT ELBOW: ICD-10-CM

## 2022-08-22 DIAGNOSIS — M25.521 PAIN IN RIGHT ELBOW: ICD-10-CM

## 2022-08-22 DIAGNOSIS — M25.431 SWELLING OF RIGHT WRIST: ICD-10-CM

## 2022-08-22 DIAGNOSIS — M25.621 DECREASED RANGE OF MOTION OF RIGHT ELBOW: Primary | ICD-10-CM

## 2022-08-22 DIAGNOSIS — M25.531 PAIN IN RIGHT WRIST: ICD-10-CM

## 2022-08-22 PROCEDURE — 97110 THERAPEUTIC EXERCISES: CPT | Mod: PN

## 2022-08-22 PROCEDURE — 97022 WHIRLPOOL THERAPY: CPT | Mod: PN

## 2022-08-22 NOTE — PATIENT INSTRUCTIONS
WEIGHT BEARING PROGRESSION                       1) BEGIN WITH SEATED ARM CHAIR PUSH-Ups:     LIFT yourself from the chair, pushing on the arm rests while your feet are on the floor. Allow the weight to be shifted to your legs as needed to avoid wrist pain. Begin with a count of 3, holding elbows fully extended. Add time and repetitions SLOWLY in order to build your tolerance without over-challenging your current UE strength and wrist tolerance.    2) PROGRESS THE WEIGHT INTO YOUR WRISTS/HANDS AS TOLERATED by lifting the right foot off of the ground.    3) FURTHER PROGRESS THE SHIFT OF WEIGHT INTO YOUR right WRIST by lifting the left foot off of the ground.    4) INCREASE YOUR TIME AND REPETITIONS SLOWLY AS YOU MOVE THROUGH 1), 2) & 3) ABOVE; ALWAYS LISTENING CLOSELY TO THE RESPONSE FROM YOUR WRISTS TO AVOID RE-INJURY.

## 2022-08-23 NOTE — PLAN OF CARE
Problem: Patient Care Overview  Goal: Plan of Care Review  Outcome: Outcome(s) achieved Date Met: 04/13/18  Mom will continue to do skin to skin & attempt to breastfeed frequently & on cue at least 8+ times/24 hrs when nipple soreness has decreased. Will monitor for signs of adequate fdg. Will continue to alternate breast shells & gel pads as needed. Reviewed use/cleaning of each. Just finished syringe fdg formula-reviewed. Praise provided. Discussed importance of monitoring diapers & frequent weight checks at ped's office until certain BR effectively due to hx of breast reduction surgery. Will have baby's weight checked at ped's office in the next couple of days.  Encouraged to call for assistance with BR/pumping before d/c today. Symphony breast pump at bs. Has pumped several times due to increased nipple pain when BR. Reviewed use/cleaning of pump/kit. Mom has Spectra pump at home. Encouraged to BR/pump/hand express/supplement with EBM or formula as needed. Discussed signs of adequate milk production/supply. Will call for any needs.        [FreeTextEntry1] : No anal fissure seen on examination.\par Patient feels improvement in pain and anal spasms since botox injections.\par We discussed EUA, botox injection can be considered as part of her management of anal spasm, performed on a serial basis every 3 months.\par The nature of the procedure as well as risks and benefits were reviewed with the patient. \par She would like to set up next botox injection date as ambulatory surgery. Ambulatory surgery instructions were given to patient.\par She is pending further evaluation by [facility]

## 2022-10-14 ENCOUNTER — PATIENT MESSAGE (OUTPATIENT)
Dept: FAMILY MEDICINE | Facility: CLINIC | Age: 32
End: 2022-10-14
Payer: COMMERCIAL

## 2022-10-14 DIAGNOSIS — R00.2 PALPITATIONS: Primary | ICD-10-CM

## 2022-10-14 DIAGNOSIS — K76.0 FATTY LIVER: ICD-10-CM

## 2022-10-14 DIAGNOSIS — N64.3 GALACTORRHEA OF RIGHT BREAST: ICD-10-CM

## 2022-10-14 DIAGNOSIS — E03.9 ACQUIRED HYPOTHYROIDISM: ICD-10-CM

## 2022-10-15 NOTE — TELEPHONE ENCOUNTER
Please contact patient to schedule non-fasting labs and EKG for work up of palpitations and milky nipple discharge. I have notified her via my chart that someone will be contacting her, thanks

## 2022-10-20 ENCOUNTER — LAB VISIT (OUTPATIENT)
Dept: LAB | Facility: HOSPITAL | Age: 32
End: 2022-10-20
Attending: FAMILY MEDICINE
Payer: COMMERCIAL

## 2022-10-20 ENCOUNTER — OFFICE VISIT (OUTPATIENT)
Dept: OBSTETRICS AND GYNECOLOGY | Facility: CLINIC | Age: 32
End: 2022-10-20
Payer: COMMERCIAL

## 2022-10-20 VITALS
DIASTOLIC BLOOD PRESSURE: 77 MMHG | BODY MASS INDEX: 36.87 KG/M2 | SYSTOLIC BLOOD PRESSURE: 126 MMHG | WEIGHT: 221.56 LBS

## 2022-10-20 DIAGNOSIS — R00.2 PALPITATIONS: ICD-10-CM

## 2022-10-20 DIAGNOSIS — K76.0 FATTY LIVER: ICD-10-CM

## 2022-10-20 DIAGNOSIS — N64.3 GALACTORRHEA OF RIGHT BREAST: ICD-10-CM

## 2022-10-20 DIAGNOSIS — E03.9 ACQUIRED HYPOTHYROIDISM: ICD-10-CM

## 2022-10-20 DIAGNOSIS — N64.4 MASTODYNIA OF RIGHT BREAST: Primary | ICD-10-CM

## 2022-10-20 LAB
ALBUMIN SERPL BCP-MCNC: 4.1 G/DL (ref 3.5–5.2)
ALP SERPL-CCNC: 66 U/L (ref 55–135)
ALT SERPL W/O P-5'-P-CCNC: 17 U/L (ref 10–44)
ANION GAP SERPL CALC-SCNC: 11 MMOL/L (ref 8–16)
AST SERPL-CCNC: 19 U/L (ref 10–40)
BASOPHILS # BLD AUTO: 0.02 K/UL (ref 0–0.2)
BASOPHILS NFR BLD: 0.2 % (ref 0–1.9)
BILIRUB SERPL-MCNC: 0.4 MG/DL (ref 0.1–1)
BUN SERPL-MCNC: 14 MG/DL (ref 6–20)
CALCIUM SERPL-MCNC: 9.6 MG/DL (ref 8.7–10.5)
CHLORIDE SERPL-SCNC: 107 MMOL/L (ref 95–110)
CO2 SERPL-SCNC: 22 MMOL/L (ref 23–29)
CREAT SERPL-MCNC: 0.8 MG/DL (ref 0.5–1.4)
DIFFERENTIAL METHOD: ABNORMAL
EOSINOPHIL # BLD AUTO: 0.1 K/UL (ref 0–0.5)
EOSINOPHIL NFR BLD: 0.9 % (ref 0–8)
ERYTHROCYTE [DISTWIDTH] IN BLOOD BY AUTOMATED COUNT: 12 % (ref 11.5–14.5)
EST. GFR  (NO RACE VARIABLE): >60 ML/MIN/1.73 M^2
ESTIMATED AVG GLUCOSE: 97 MG/DL (ref 68–131)
FERRITIN SERPL-MCNC: 214 NG/ML (ref 20–300)
FSH SERPL-ACNC: 5.31 MIU/ML
GLUCOSE SERPL-MCNC: 83 MG/DL (ref 70–110)
HBA1C MFR BLD: 5 % (ref 4–5.6)
HCT VFR BLD AUTO: 41.7 % (ref 37–48.5)
HGB BLD-MCNC: 13.7 G/DL (ref 12–16)
IMM GRANULOCYTES # BLD AUTO: 0.02 K/UL (ref 0–0.04)
IMM GRANULOCYTES NFR BLD AUTO: 0.2 % (ref 0–0.5)
IRON SERPL-MCNC: 74 UG/DL (ref 30–160)
LH SERPL-ACNC: 2.4 MIU/ML
LYMPHOCYTES # BLD AUTO: 3.3 K/UL (ref 1–4.8)
LYMPHOCYTES NFR BLD: 35.6 % (ref 18–48)
MAGNESIUM SERPL-MCNC: 2 MG/DL (ref 1.6–2.6)
MCH RBC QN AUTO: 31.2 PG (ref 27–31)
MCHC RBC AUTO-ENTMCNC: 32.9 G/DL (ref 32–36)
MCV RBC AUTO: 95 FL (ref 82–98)
MONOCYTES # BLD AUTO: 0.6 K/UL (ref 0.3–1)
MONOCYTES NFR BLD: 6.4 % (ref 4–15)
NEUTROPHILS # BLD AUTO: 5.3 K/UL (ref 1.8–7.7)
NEUTROPHILS NFR BLD: 56.7 % (ref 38–73)
NRBC BLD-RTO: 0 /100 WBC
PLATELET # BLD AUTO: 222 K/UL (ref 150–450)
PMV BLD AUTO: 11 FL (ref 9.2–12.9)
POTASSIUM SERPL-SCNC: 3.8 MMOL/L (ref 3.5–5.1)
PROLACTIN SERPL IA-MCNC: 3.4 NG/ML (ref 5.2–26.5)
PROT SERPL-MCNC: 7.4 G/DL (ref 6–8.4)
RBC # BLD AUTO: 4.39 M/UL (ref 4–5.4)
SATURATED IRON: 23 % (ref 20–50)
SODIUM SERPL-SCNC: 140 MMOL/L (ref 136–145)
TOTAL IRON BINDING CAPACITY: 326 UG/DL (ref 250–450)
TRANSFERRIN SERPL-MCNC: 220 MG/DL (ref 200–375)
TSH SERPL DL<=0.005 MIU/L-ACNC: 1.46 UIU/ML (ref 0.4–4)
WBC # BLD AUTO: 9.33 K/UL (ref 3.9–12.7)

## 2022-10-20 PROCEDURE — 99999 PR PBB SHADOW E&M-EST. PATIENT-LVL III: CPT | Mod: PBBFAC,,, | Performed by: OBSTETRICS & GYNECOLOGY

## 2022-10-20 PROCEDURE — 83001 ASSAY OF GONADOTROPIN (FSH): CPT | Performed by: FAMILY MEDICINE

## 2022-10-20 PROCEDURE — 3078F DIAST BP <80 MM HG: CPT | Mod: CPTII,S$GLB,, | Performed by: OBSTETRICS & GYNECOLOGY

## 2022-10-20 PROCEDURE — 1159F PR MEDICATION LIST DOCUMENTED IN MEDICAL RECORD: ICD-10-PCS | Mod: CPTII,S$GLB,, | Performed by: OBSTETRICS & GYNECOLOGY

## 2022-10-20 PROCEDURE — 1160F RVW MEDS BY RX/DR IN RCRD: CPT | Mod: CPTII,S$GLB,, | Performed by: OBSTETRICS & GYNECOLOGY

## 2022-10-20 PROCEDURE — 3044F PR MOST RECENT HEMOGLOBIN A1C LEVEL <7.0%: ICD-10-PCS | Mod: CPTII,S$GLB,, | Performed by: OBSTETRICS & GYNECOLOGY

## 2022-10-20 PROCEDURE — 83002 ASSAY OF GONADOTROPIN (LH): CPT | Performed by: FAMILY MEDICINE

## 2022-10-20 PROCEDURE — 3044F HG A1C LEVEL LT 7.0%: CPT | Mod: CPTII,S$GLB,, | Performed by: OBSTETRICS & GYNECOLOGY

## 2022-10-20 PROCEDURE — 99212 OFFICE O/P EST SF 10 MIN: CPT | Mod: S$GLB,,, | Performed by: OBSTETRICS & GYNECOLOGY

## 2022-10-20 PROCEDURE — 1160F PR REVIEW ALL MEDS BY PRESCRIBER/CLIN PHARMACIST DOCUMENTED: ICD-10-PCS | Mod: CPTII,S$GLB,, | Performed by: OBSTETRICS & GYNECOLOGY

## 2022-10-20 PROCEDURE — 82728 ASSAY OF FERRITIN: CPT | Performed by: FAMILY MEDICINE

## 2022-10-20 PROCEDURE — 1159F MED LIST DOCD IN RCRD: CPT | Mod: CPTII,S$GLB,, | Performed by: OBSTETRICS & GYNECOLOGY

## 2022-10-20 PROCEDURE — 3074F PR MOST RECENT SYSTOLIC BLOOD PRESSURE < 130 MM HG: ICD-10-PCS | Mod: CPTII,S$GLB,, | Performed by: OBSTETRICS & GYNECOLOGY

## 2022-10-20 PROCEDURE — 80053 COMPREHEN METABOLIC PANEL: CPT | Performed by: FAMILY MEDICINE

## 2022-10-20 PROCEDURE — 99212 PR OFFICE/OUTPT VISIT, EST, LEVL II, 10-19 MIN: ICD-10-PCS | Mod: S$GLB,,, | Performed by: OBSTETRICS & GYNECOLOGY

## 2022-10-20 PROCEDURE — 3078F PR MOST RECENT DIASTOLIC BLOOD PRESSURE < 80 MM HG: ICD-10-PCS | Mod: CPTII,S$GLB,, | Performed by: OBSTETRICS & GYNECOLOGY

## 2022-10-20 PROCEDURE — 83036 HEMOGLOBIN GLYCOSYLATED A1C: CPT | Performed by: FAMILY MEDICINE

## 2022-10-20 PROCEDURE — 84443 ASSAY THYROID STIM HORMONE: CPT | Performed by: FAMILY MEDICINE

## 2022-10-20 PROCEDURE — 36415 COLL VENOUS BLD VENIPUNCTURE: CPT | Performed by: FAMILY MEDICINE

## 2022-10-20 PROCEDURE — 85025 COMPLETE CBC W/AUTO DIFF WBC: CPT | Performed by: FAMILY MEDICINE

## 2022-10-20 PROCEDURE — 84146 ASSAY OF PROLACTIN: CPT | Performed by: FAMILY MEDICINE

## 2022-10-20 PROCEDURE — 3074F SYST BP LT 130 MM HG: CPT | Mod: CPTII,S$GLB,, | Performed by: OBSTETRICS & GYNECOLOGY

## 2022-10-20 PROCEDURE — 83735 ASSAY OF MAGNESIUM: CPT | Performed by: FAMILY MEDICINE

## 2022-10-20 PROCEDURE — 99999 PR PBB SHADOW E&M-EST. PATIENT-LVL III: ICD-10-PCS | Mod: PBBFAC,,, | Performed by: OBSTETRICS & GYNECOLOGY

## 2022-10-20 PROCEDURE — 84466 ASSAY OF TRANSFERRIN: CPT | Performed by: FAMILY MEDICINE

## 2022-10-20 NOTE — PROGRESS NOTES
OBSTETRIC HISTORY:   OB History          2    Para   2    Term   2       0    AB   0    Living   2         SAB   0    IAB   0    Ectopic   0    Multiple   0    Live Births   2                  HPI:   32 y.o.  Patient's last menstrual period was 10/17/2022 (exact date).   Patient is  here for breast right breast pain and discharge started 10/13.She denies bladder and bowel complaints.      PE:   /77   Wt 100.5 kg (221 lb 9 oz)   LMP 10/17/2022 (Exact Date)   BMI 36.87 kg/m²   PE:   APPEARANCE: Well nourished, well developed, in no acute distress.  CHEST: Lungs clear to auscultation.  HEART: Regular rate and rhythm, no murmurs, rubs or gallops.  ABDOMEN: Soft. No tenderness or masses. No hernias.  BREASTS: Symmetrical, no skin changes or visible lesions. No palpable masses, nipple discharge or adenopathy bilaterally.    ASSESSMENT:  Mastodynia/breast discharge-- may have been sloughing of breast duct as timing would have been appropriate      PLAN:  Reassured  NSAIDs  Tight bra  Appropriate bra fitting   Decrease caffeine intake

## 2022-10-22 ENCOUNTER — PATIENT MESSAGE (OUTPATIENT)
Dept: FAMILY MEDICINE | Facility: CLINIC | Age: 32
End: 2022-10-22
Payer: COMMERCIAL

## 2022-10-22 DIAGNOSIS — R00.2 PALPITATIONS: Primary | ICD-10-CM

## 2022-11-01 ENCOUNTER — TELEPHONE (OUTPATIENT)
Dept: DERMATOLOGY | Facility: CLINIC | Age: 32
End: 2022-11-01
Payer: COMMERCIAL

## 2022-11-01 ENCOUNTER — PATIENT MESSAGE (OUTPATIENT)
Dept: DERMATOLOGY | Facility: CLINIC | Age: 32
End: 2022-11-01

## 2022-11-01 ENCOUNTER — OFFICE VISIT (OUTPATIENT)
Dept: DERMATOLOGY | Facility: CLINIC | Age: 32
End: 2022-11-01
Payer: COMMERCIAL

## 2022-11-01 DIAGNOSIS — D48.5 NEOPLASM OF UNCERTAIN BEHAVIOR OF SKIN: Primary | ICD-10-CM

## 2022-11-01 PROCEDURE — 1160F PR REVIEW ALL MEDS BY PRESCRIBER/CLIN PHARMACIST DOCUMENTED: ICD-10-PCS | Mod: CPTII,95,, | Performed by: DERMATOLOGY

## 2022-11-01 PROCEDURE — 1159F PR MEDICATION LIST DOCUMENTED IN MEDICAL RECORD: ICD-10-PCS | Mod: CPTII,95,, | Performed by: DERMATOLOGY

## 2022-11-01 PROCEDURE — 1159F MED LIST DOCD IN RCRD: CPT | Mod: CPTII,95,, | Performed by: DERMATOLOGY

## 2022-11-01 PROCEDURE — 99203 OFFICE O/P NEW LOW 30 MIN: CPT | Mod: 95,,, | Performed by: DERMATOLOGY

## 2022-11-01 PROCEDURE — 99203 PR OFFICE/OUTPT VISIT, NEW, LEVL III, 30-44 MIN: ICD-10-PCS | Mod: 95,,, | Performed by: DERMATOLOGY

## 2022-11-01 PROCEDURE — 3044F HG A1C LEVEL LT 7.0%: CPT | Mod: CPTII,95,, | Performed by: DERMATOLOGY

## 2022-11-01 PROCEDURE — 3044F PR MOST RECENT HEMOGLOBIN A1C LEVEL <7.0%: ICD-10-PCS | Mod: CPTII,95,, | Performed by: DERMATOLOGY

## 2022-11-01 PROCEDURE — 1160F RVW MEDS BY RX/DR IN RCRD: CPT | Mod: CPTII,95,, | Performed by: DERMATOLOGY

## 2022-11-01 NOTE — PROGRESS NOTES
The patient location is: home  The chief complaint leading to consultation is: lesion  Visit type: virtual visit with synchronous audio and video  Total time spent with patient: 7 minutes  Each patient to whom I provide medical services by telemedicine is:  (1) informed of the relationship between the physician and patient and the respective role of any other health care provider with respect to management of the patient; and (2) notified that he or she may decline to receive medical services by telemedicine and may withdraw from such care at any time.    Patient Information  Name: Sherlyn Monahan  : 1990  MRN: [id number]     Referring Physician:  No ref. provider found   Primary Care Physician:  Alea Moses MD   Date of Visit: 2022      Subjective:     History of Present lllness:    Sherlyn Monahan is a 32 y.o. female who presents with a chief complaint of lesion.  Location: forehead  Duration: over a year, has grown over the last couple of weeks  Signs/Symptoms: growing, itching, worsening  Relieving factors/Prior treatments: none    Clinical documentation obtained by nursing staff reviewed.    Review of Systems    Objective:   Physical Exam   Constitutional: She appears well-developed and well-nourished. No distress.   Neurological: She is alert and oriented to person, place, and time. She is not disoriented.   Psychiatric: She has a normal mood and affect.   Skin:   Areas Examined (abnormalities noted in diagram):   Head / Face Inspection Performed           [] Data reviewed  [] Prior external notes reviewed  [] Independent review of test  [] Management discussed with another provider  [] Independent historian    Assessment / Plan:        Neoplasm of uncertain behavior of skin  - new problem with uncertain prognosis  Differential diagnosis includes BCC vs IDN vs adnexal tumor.  Discussed risks, benefits, and alternatives of shave removal/biopsy, including but not limited to scarring,  bleeding, infection, recurrence, and need for additional treatment of site.  If biopsy positive for malignancy, discussed referral to Dr. Gee for Mohs surgery consultation.    Follow up for biopsy.      Wendy Arcos MD, FAAD  [facility]

## 2022-11-01 NOTE — TELEPHONE ENCOUNTER
----- Message from Wendy Arcos MD sent at 11/1/2022  3:33 PM CDT -----  Please call to schedule/add in for biopsy.

## 2022-11-04 ENCOUNTER — PATIENT MESSAGE (OUTPATIENT)
Dept: FAMILY MEDICINE | Facility: CLINIC | Age: 32
End: 2022-11-04
Payer: COMMERCIAL

## 2022-11-04 ENCOUNTER — OFFICE VISIT (OUTPATIENT)
Dept: DERMATOLOGY | Facility: CLINIC | Age: 32
End: 2022-11-04
Payer: COMMERCIAL

## 2022-11-04 DIAGNOSIS — D48.5 NEOPLASM OF UNCERTAIN BEHAVIOR OF SKIN: Primary | ICD-10-CM

## 2022-11-04 PROCEDURE — 88305 TISSUE EXAM BY PATHOLOGIST: CPT | Performed by: PATHOLOGY

## 2022-11-04 PROCEDURE — 99499 NO LOS: ICD-10-PCS | Mod: S$GLB,,, | Performed by: DERMATOLOGY

## 2022-11-04 PROCEDURE — 88305 TISSUE EXAM BY PATHOLOGIST: CPT | Mod: 26,,, | Performed by: PATHOLOGY

## 2022-11-04 PROCEDURE — 1159F PR MEDICATION LIST DOCUMENTED IN MEDICAL RECORD: ICD-10-PCS | Mod: CPTII,S$GLB,, | Performed by: DERMATOLOGY

## 2022-11-04 PROCEDURE — 88305 TISSUE EXAM BY PATHOLOGIST: ICD-10-PCS | Mod: 26,,, | Performed by: PATHOLOGY

## 2022-11-04 PROCEDURE — 1160F RVW MEDS BY RX/DR IN RCRD: CPT | Mod: CPTII,S$GLB,, | Performed by: DERMATOLOGY

## 2022-11-04 PROCEDURE — 3044F HG A1C LEVEL LT 7.0%: CPT | Mod: CPTII,S$GLB,, | Performed by: DERMATOLOGY

## 2022-11-04 PROCEDURE — 11102 TANGNTL BX SKIN SINGLE LES: CPT | Mod: S$GLB,,, | Performed by: DERMATOLOGY

## 2022-11-04 PROCEDURE — 1159F MED LIST DOCD IN RCRD: CPT | Mod: CPTII,S$GLB,, | Performed by: DERMATOLOGY

## 2022-11-04 PROCEDURE — 11102 PR TANGENTIAL BIOPSY, SKIN, SINGLE LESION: ICD-10-PCS | Mod: S$GLB,,, | Performed by: DERMATOLOGY

## 2022-11-04 PROCEDURE — 99499 UNLISTED E&M SERVICE: CPT | Mod: S$GLB,,, | Performed by: DERMATOLOGY

## 2022-11-04 PROCEDURE — 1160F PR REVIEW ALL MEDS BY PRESCRIBER/CLIN PHARMACIST DOCUMENTED: ICD-10-PCS | Mod: CPTII,S$GLB,, | Performed by: DERMATOLOGY

## 2022-11-04 PROCEDURE — 3044F PR MOST RECENT HEMOGLOBIN A1C LEVEL <7.0%: ICD-10-PCS | Mod: CPTII,S$GLB,, | Performed by: DERMATOLOGY

## 2022-11-04 NOTE — PATIENT INSTRUCTIONS
Biopsy Wound Care Instructions    Leave the bandage on for 24 hours without getting it wet.   Clean the area once a day with a gentle soap and water, then pat dry and apply Vaseline and a bandaid.  The site should be kept moist with Vaseline at all times to improve healing. Reapply a thick coating as needed. Do not let the site air out or form a scab, as this will delay healing and worsen scarring.  If any bleeding or oozing occurs once you return home, apply firm pressure to the area for 30 minutes straight without peeking. If bleeding continues, call the office immediately.  Please message us via [facility], call us at [phone], or return to the office at any sign of increasing redness, swelling, tenderness, pain, heat, yellow drainage/discharge, or continued bleeding.      Receiving Your Pathology Results    Your pathology results will be released to you on [facility] at the same time that Dr. Arcos receives them.   Dr. Arcos will then message you with her interpretation of the results and/or with the plan going forward.  If you do not use [facility] or if your pathology results require more of an explanation, you will receive your results via a phone call.  If 2 weeks go by and you have not received your results, please message us via [facility] or call us at [phone] to inform us.

## 2022-11-04 NOTE — PROGRESS NOTES
Patient Information  Name: Sherlyn Monahan  : 1990  MRN: [id number]     Referring Physician:  No ref. provider found   Primary Care Physician:  Alea Moses MD   Date of Visit: 2022      Subjective:     History of Present lllness:    Sherlyn Monahan is a 32 y.o. female who presents with a chief complaint of spot.    Location: right upper eyebrow  Duration: 2 years  Signs/Symptoms: raised, flesh colored spot above right eyebrow, growing over the past few weeks, sometimes itchy, flaking  Relieving factors/Prior treatments: none    Patient was last seen: 2022.  Prior notes by myself reviewed.   Clinical documentation obtained by nursing staff reviewed.    Review of Systems   Skin:  Positive for daily sunscreen use, activity-related sunscreen use and wears hat. Negative for itching and rash.     Objective:   Physical Exam   Constitutional: She appears well-developed and well-nourished. No distress.   Neurological: She is alert and oriented to person, place, and time. She is not disoriented.   Psychiatric: She has a normal mood and affect.   Skin:   Areas Examined (abnormalities noted in diagram):   Head / Face Inspection Performed          Diagram Legend     Erythematous scaling macule/papule c/w actinic keratosis       Vascular papule c/w angioma      Pigmented verrucoid papule/plaque c/w seborrheic keratosis      Yellow umbilicated papule c/w sebaceous hyperplasia      Irregularly shaped tan macule c/w lentigo     1-2 mm smooth white papules consistent with Milia      Movable subcutaneous cyst with punctum c/w epidermal inclusion cyst      Subcutaneous movable cyst c/w pilar cyst      Firm pink to brown papule c/w dermatofibroma      Pedunculated fleshy papule(s) c/w skin tag(s)      Evenly pigmented macule c/w junctional nevus     Mildly variegated pigmented, slightly irregular-bordered macule c/w mildly atypical nevus      Flesh colored to evenly pigmented papule c/w intradermal nevus        Pink pearly papule/plaque c/w basal cell carcinoma      Erythematous hyperkeratotic cursted plaque c/w SCC      Surgical scar with no sign of skin cancer recurrence      Open and closed comedones      Inflammatory papules and pustules      Verrucoid papule consistent consistent with wart     Erythematous eczematous patches and plaques     Dystrophic onycholytic nail with subungual debris c/w onychomycosis     Umbilicated papule    Erythematous-base heme-crusted tan verrucoid plaque consistent with inflamed seborrheic keratosis     Erythematous Silvery Scaling Plaque c/w Psoriasis     See annotation          [] Data reviewed  [] Prior external notes reviewed  [] Independent review of test  [] Management discussed with another provider  [] Independent historian    Assessment / Plan:      Pathology Orders:       Normal Orders This Visit    Specimen to Pathology, Dermatology     Questions:    Procedure Type: Dermatology and skin neoplasms    Number of Specimens: 1    ------------------------: -------------------------    Spec 1 Procedure: Biopsy    Spec 1 Clinical Impression: r/o ISK vs VV vs SCC    Spec 1 Source: right medial brow    Release to patient:           Neoplasm of uncertain behavior of skin  -     Specimen to Pathology, Dermatology    Shave biopsy procedure note:  Risk, benefits, and alternatives of biopsy are discussed with the patient, including risk of infection, scar, recurrence, and need for additional treatment of site. The patient agrees to the procedure by verbal consent. The area is marked and prepped with alcohol.  Approximately 1 mL of lidocaine 1% with epinephrine is used for local anesthesia. A sharp blade is used to remove a portion of the lesion. The specimen is sent for pathology. Hemostasis is obtained with aluminum chloride and/or monopolar hyfrecation if needed. The area is then dressed and bandaged. The patient tolerated the procedure well without adverse event. Written instructions on  wound care were given and were reviewed with the patient, who is to call for any signs of bleeding or infection. The patient will be notified of the pathology results.    Follow up for dependent on pathology results.      Wendy Arcos MD, FAAD  [facility]

## 2022-11-10 ENCOUNTER — HOSPITAL ENCOUNTER (OUTPATIENT)
Dept: CARDIOLOGY | Facility: HOSPITAL | Age: 32
Discharge: HOME | End: 2022-11-10
Attending: FAMILY MEDICINE
Payer: COMMERCIAL

## 2022-11-10 DIAGNOSIS — R00.2 PALPITATIONS: ICD-10-CM

## 2022-11-10 LAB
FINAL PATHOLOGIC DIAGNOSIS: NORMAL
GROSS: NORMAL
Lab: NORMAL
MICROSCOPIC EXAM: NORMAL

## 2022-11-10 PROCEDURE — 93226 XTRNL ECG REC<48 HR SCAN A/R: CPT

## 2022-11-10 PROCEDURE — 93227 XTRNL ECG REC<48 HR R&I: CPT | Mod: ,,, | Performed by: INTERNAL MEDICINE

## 2022-11-10 PROCEDURE — 93227 HOLTER MONITOR - 48 HOUR (CUPID ONLY): ICD-10-PCS | Mod: ,,, | Performed by: INTERNAL MEDICINE

## 2022-11-10 NOTE — PROGRESS NOTES
Please call patient with path results (benign inflamed SK) and see if she would like to schedule an appt to treat the remainder of the lesion.  KK      Final Pathologic Diagnosis   Skin, right medial brow, shave biopsy:   -SEBORRHEIC KERATOSIS, IRRITATED AND INFLAMED

## 2022-11-11 ENCOUNTER — TELEPHONE (OUTPATIENT)
Dept: DERMATOLOGY | Facility: CLINIC | Age: 32
End: 2022-11-11
Payer: COMMERCIAL

## 2022-11-11 NOTE — TELEPHONE ENCOUNTER
----- Message from Wendy Arcos MD sent at 11/10/2022  5:18 PM CST -----  Please call patient with path results (benign inflamed SK) and see if she would like to schedule an appt to treat the remainder of the lesion.  KK      Final Pathologic Diagnosis   Skin, right medial brow, shave biopsy:   -SEBORRHEIC KERATOSIS, IRRITATED AND INFLAMED

## 2022-11-15 ENCOUNTER — TELEPHONE (OUTPATIENT)
Dept: DERMATOLOGY | Facility: CLINIC | Age: 32
End: 2022-11-15
Payer: COMMERCIAL

## 2022-11-17 LAB
OHS CV EVENT MONITOR DAY: 0
OHS CV HOLTER LENGTH DECIMAL HOURS: 47.98
OHS CV HOLTER LENGTH HOURS: 47
OHS CV HOLTER LENGTH MINUTES: 59
OHS CV HOLTER SINUS AVERAGE HR: 66
OHS CV HOLTER SINUS MAX HR: 126
OHS CV HOLTER SINUS MIN HR: 42

## 2022-11-20 ENCOUNTER — OFFICE VISIT (OUTPATIENT)
Dept: URGENT CARE | Facility: CLINIC | Age: 32
End: 2022-11-20
Payer: COMMERCIAL

## 2022-11-20 VITALS
SYSTOLIC BLOOD PRESSURE: 112 MMHG | RESPIRATION RATE: 17 BRPM | OXYGEN SATURATION: 98 % | TEMPERATURE: 99 F | BODY MASS INDEX: 36.82 KG/M2 | HEART RATE: 85 BPM | WEIGHT: 221 LBS | DIASTOLIC BLOOD PRESSURE: 78 MMHG | HEIGHT: 65 IN

## 2022-11-20 DIAGNOSIS — J02.0 ACUTE STREPTOCOCCAL PHARYNGITIS: ICD-10-CM

## 2022-11-20 DIAGNOSIS — J06.9 URI, ACUTE: ICD-10-CM

## 2022-11-20 DIAGNOSIS — R05.9 COUGH, UNSPECIFIED TYPE: Primary | ICD-10-CM

## 2022-11-20 LAB
CTP QC/QA: YES
MOLECULAR STREP A: POSITIVE

## 2022-11-20 PROCEDURE — 3074F PR MOST RECENT SYSTOLIC BLOOD PRESSURE < 130 MM HG: ICD-10-PCS | Mod: CPTII,S$GLB,, | Performed by: FAMILY MEDICINE

## 2022-11-20 PROCEDURE — 3044F PR MOST RECENT HEMOGLOBIN A1C LEVEL <7.0%: ICD-10-PCS | Mod: CPTII,S$GLB,, | Performed by: FAMILY MEDICINE

## 2022-11-20 PROCEDURE — 87651 POCT STREP A MOLECULAR: ICD-10-PCS | Mod: QW,S$GLB,, | Performed by: FAMILY MEDICINE

## 2022-11-20 PROCEDURE — 3074F SYST BP LT 130 MM HG: CPT | Mod: CPTII,S$GLB,, | Performed by: FAMILY MEDICINE

## 2022-11-20 PROCEDURE — 99213 OFFICE O/P EST LOW 20 MIN: CPT | Mod: S$GLB,,, | Performed by: FAMILY MEDICINE

## 2022-11-20 PROCEDURE — 3044F HG A1C LEVEL LT 7.0%: CPT | Mod: CPTII,S$GLB,, | Performed by: FAMILY MEDICINE

## 2022-11-20 PROCEDURE — 1160F RVW MEDS BY RX/DR IN RCRD: CPT | Mod: CPTII,S$GLB,, | Performed by: FAMILY MEDICINE

## 2022-11-20 PROCEDURE — 3008F BODY MASS INDEX DOCD: CPT | Mod: CPTII,S$GLB,, | Performed by: FAMILY MEDICINE

## 2022-11-20 PROCEDURE — 87651 STREP A DNA AMP PROBE: CPT | Mod: QW,S$GLB,, | Performed by: FAMILY MEDICINE

## 2022-11-20 PROCEDURE — 1160F PR REVIEW ALL MEDS BY PRESCRIBER/CLIN PHARMACIST DOCUMENTED: ICD-10-PCS | Mod: CPTII,S$GLB,, | Performed by: FAMILY MEDICINE

## 2022-11-20 PROCEDURE — 99213 PR OFFICE/OUTPT VISIT, EST, LEVL III, 20-29 MIN: ICD-10-PCS | Mod: S$GLB,,, | Performed by: FAMILY MEDICINE

## 2022-11-20 PROCEDURE — 1159F PR MEDICATION LIST DOCUMENTED IN MEDICAL RECORD: ICD-10-PCS | Mod: CPTII,S$GLB,, | Performed by: FAMILY MEDICINE

## 2022-11-20 PROCEDURE — 3078F PR MOST RECENT DIASTOLIC BLOOD PRESSURE < 80 MM HG: ICD-10-PCS | Mod: CPTII,S$GLB,, | Performed by: FAMILY MEDICINE

## 2022-11-20 PROCEDURE — 3008F PR BODY MASS INDEX (BMI) DOCUMENTED: ICD-10-PCS | Mod: CPTII,S$GLB,, | Performed by: FAMILY MEDICINE

## 2022-11-20 PROCEDURE — 3078F DIAST BP <80 MM HG: CPT | Mod: CPTII,S$GLB,, | Performed by: FAMILY MEDICINE

## 2022-11-20 PROCEDURE — 1159F MED LIST DOCD IN RCRD: CPT | Mod: CPTII,S$GLB,, | Performed by: FAMILY MEDICINE

## 2022-11-20 RX ORDER — AMOXICILLIN 875 MG/1
875 TABLET, FILM COATED ORAL 2 TIMES DAILY
Qty: 20 TABLET | Refills: 0 | Status: SHIPPED | OUTPATIENT
Start: 2022-11-20 | End: 2022-11-30

## 2022-11-20 NOTE — PROGRESS NOTES
"Subjective:       Patient ID: Sherlyn Monahan is a 32 y.o. female.    Vitals:  height is 5' 5" (1.651 m) and weight is 100.2 kg (221 lb). Her temperature is 99.1 °F (37.3 °C). Her blood pressure is 112/78 and her pulse is 85. Her respiration is 17 and oxygen saturation is 98%.     Chief Complaint: Sore Throat    Patient presents to the clinic with a cough, sore throat, headache x yesterday   Denies f/c      Sore Throat   This is a new problem. The current episode started yesterday. The problem has been gradually worsening. The pain is at a severity of 8/10. The pain is moderate. Associated symptoms include coughing, a hoarse voice, swollen glands and trouble swallowing. She has tried NSAIDs for the symptoms. The treatment provided mild relief.     HENT:  Positive for sore throat and trouble swallowing.    Respiratory:  Positive for cough.      Objective:      Physical Exam   Constitutional: She is oriented to person, place, and time. She appears well-developed. She is cooperative.  Non-toxic appearance. She does not appear ill. No distress.   HENT:   Head: Normocephalic and atraumatic.   Ears:   Right Ear: Hearing, tympanic membrane, external ear and ear canal normal.   Left Ear: Hearing, tympanic membrane, external ear and ear canal normal.   Nose: Nose normal. No mucosal edema, rhinorrhea or nasal deformity. No epistaxis. Right sinus exhibits no maxillary sinus tenderness and no frontal sinus tenderness. Left sinus exhibits no maxillary sinus tenderness and no frontal sinus tenderness.   Mouth/Throat: Uvula is midline and mucous membranes are normal. Mucous membranes are moist. No trismus in the jaw. Normal dentition. No uvula swelling. Posterior oropharyngeal erythema present. No oropharyngeal exudate. Oropharynx is clear.   Eyes: Conjunctivae and lids are normal. Pupils are equal, round, and reactive to light. Right eye exhibits no discharge. Left eye exhibits no discharge. No scleral icterus. Extraocular " movement intact   Neck: Trachea normal and phonation normal. Neck supple.   Cardiovascular: Normal rate, regular rhythm, normal heart sounds and normal pulses.   Pulmonary/Chest: Effort normal and breath sounds normal. No respiratory distress.   Abdominal: Normal appearance and bowel sounds are normal. She exhibits no distension and no mass. Soft. There is no abdominal tenderness.   Musculoskeletal: Normal range of motion.         General: No deformity. Normal range of motion.   Neurological: She is alert and oriented to person, place, and time. She exhibits normal muscle tone. Coordination normal.   Skin: Skin is warm, dry, intact, not diaphoretic and not pale.   Psychiatric: Her speech is normal and behavior is normal. Judgment and thought content normal.   Nursing note and vitals reviewed.      Assessment:       1. Cough, unspecified type    2. URI, acute          Plan:         Cough, unspecified type  -     POCT Strep A, Molecular    URI, acute           >isaiah  Claritin or zyrtec one daily  Mucinex one bid  Tylenol alternate  with Ibuprofen 2 po q 6 hours prn fever/pain  Flonase 1-2 sprays once daily  Gargle with warm slt water

## 2022-12-19 ENCOUNTER — OFFICE VISIT (OUTPATIENT)
Dept: ENDOCRINOLOGY | Facility: CLINIC | Age: 32
End: 2022-12-19
Payer: COMMERCIAL

## 2022-12-19 VITALS
HEIGHT: 65 IN | HEART RATE: 72 BPM | WEIGHT: 206.25 LBS | BODY MASS INDEX: 34.36 KG/M2 | DIASTOLIC BLOOD PRESSURE: 78 MMHG | SYSTOLIC BLOOD PRESSURE: 124 MMHG

## 2022-12-19 DIAGNOSIS — E66.9 OBESITY (BMI 30-39.9): ICD-10-CM

## 2022-12-19 DIAGNOSIS — N92.6 IRREGULAR MENSTRUAL CYCLE: ICD-10-CM

## 2022-12-19 DIAGNOSIS — E03.9 ACQUIRED HYPOTHYROIDISM: Primary | ICD-10-CM

## 2022-12-19 DIAGNOSIS — L68.0 HIRSUTISM: ICD-10-CM

## 2022-12-19 PROCEDURE — 3044F HG A1C LEVEL LT 7.0%: CPT | Mod: CPTII,S$GLB,, | Performed by: GENERAL ACUTE CARE HOSPITAL

## 2022-12-19 PROCEDURE — 1159F PR MEDICATION LIST DOCUMENTED IN MEDICAL RECORD: ICD-10-PCS | Mod: CPTII,S$GLB,, | Performed by: GENERAL ACUTE CARE HOSPITAL

## 2022-12-19 PROCEDURE — 99999 PR PBB SHADOW E&M-EST. PATIENT-LVL IV: ICD-10-PCS | Mod: PBBFAC,,, | Performed by: GENERAL ACUTE CARE HOSPITAL

## 2022-12-19 PROCEDURE — 99204 OFFICE O/P NEW MOD 45 MIN: CPT | Mod: S$GLB,,, | Performed by: GENERAL ACUTE CARE HOSPITAL

## 2022-12-19 PROCEDURE — 99204 PR OFFICE/OUTPT VISIT, NEW, LEVL IV, 45-59 MIN: ICD-10-PCS | Mod: S$GLB,,, | Performed by: GENERAL ACUTE CARE HOSPITAL

## 2022-12-19 PROCEDURE — 1160F RVW MEDS BY RX/DR IN RCRD: CPT | Mod: CPTII,S$GLB,, | Performed by: GENERAL ACUTE CARE HOSPITAL

## 2022-12-19 PROCEDURE — 3044F PR MOST RECENT HEMOGLOBIN A1C LEVEL <7.0%: ICD-10-PCS | Mod: CPTII,S$GLB,, | Performed by: GENERAL ACUTE CARE HOSPITAL

## 2022-12-19 PROCEDURE — 3074F PR MOST RECENT SYSTOLIC BLOOD PRESSURE < 130 MM HG: ICD-10-PCS | Mod: CPTII,S$GLB,, | Performed by: GENERAL ACUTE CARE HOSPITAL

## 2022-12-19 PROCEDURE — 3008F PR BODY MASS INDEX (BMI) DOCUMENTED: ICD-10-PCS | Mod: CPTII,S$GLB,, | Performed by: GENERAL ACUTE CARE HOSPITAL

## 2022-12-19 PROCEDURE — 3078F DIAST BP <80 MM HG: CPT | Mod: CPTII,S$GLB,, | Performed by: GENERAL ACUTE CARE HOSPITAL

## 2022-12-19 PROCEDURE — 1160F PR REVIEW ALL MEDS BY PRESCRIBER/CLIN PHARMACIST DOCUMENTED: ICD-10-PCS | Mod: CPTII,S$GLB,, | Performed by: GENERAL ACUTE CARE HOSPITAL

## 2022-12-19 PROCEDURE — 99999 PR PBB SHADOW E&M-EST. PATIENT-LVL IV: CPT | Mod: PBBFAC,,, | Performed by: GENERAL ACUTE CARE HOSPITAL

## 2022-12-19 PROCEDURE — 3078F PR MOST RECENT DIASTOLIC BLOOD PRESSURE < 80 MM HG: ICD-10-PCS | Mod: CPTII,S$GLB,, | Performed by: GENERAL ACUTE CARE HOSPITAL

## 2022-12-19 PROCEDURE — 3008F BODY MASS INDEX DOCD: CPT | Mod: CPTII,S$GLB,, | Performed by: GENERAL ACUTE CARE HOSPITAL

## 2022-12-19 PROCEDURE — 3074F SYST BP LT 130 MM HG: CPT | Mod: CPTII,S$GLB,, | Performed by: GENERAL ACUTE CARE HOSPITAL

## 2022-12-19 PROCEDURE — 1159F MED LIST DOCD IN RCRD: CPT | Mod: CPTII,S$GLB,, | Performed by: GENERAL ACUTE CARE HOSPITAL

## 2022-12-19 NOTE — PATIENT INSTRUCTIONS
We will do fasting blood work at 8am on Day 3 of your next cycle to screen for PCOS as a possible cause of sings and symptoms discussed in clinic.     For now no changes on thyroid medications.     We will check thyroid levels next available for you.     Once I have results, I will contact you with next steps to follow.     If any questions feel free to contact me.     Have a nice day.     Sincerely,       Eyad Avery MD   Endocrinology   12/19/2022 9:22 AM

## 2022-12-19 NOTE — PROGRESS NOTES
Subjective:      Patient ID: Sherlyn Monahan is a 32 y.o.    Chief Complaint:  Hypothyroidism; Initial visit     Patient Active Problem List   Diagnosis    Acquired hypothyroidism    Dysmenorrhea    Chronic migraine without aura without status migrainosus, not intractable    Family history of breast cancer in mother    Irregular menstrual bleeding    S/P     Fatty liver    Status post cholecystectomy    Delivery of pregnancy by  section    Carpal tunnel syndrome of right wrist    Adverse effect of COVID-19 vaccine    Cubital tunnel syndrome of both upper extremities    Depression with anxiety    Low vitamin D level    Decreased range of motion of right elbow    Decreased range of motion of right wrist    Pain in right elbow    Pain in right wrist    Impaired dexterity    Difficulty with activities of daily living    Swelling of right wrist    Swelling of right elbow        History of Present Illness  33 YO Female w/ PMH as above that presents to the endocrine clinic for initial evaluation of hypothyroidism and symptoms of fatigue.  Pt reports that despite taking LT4 for the past 10 + years her symptoms of fatigue are persistent.  Pt also endorses difficulty loosing weight and irregular cycles.  She has 2 children and actively trying to conceive.                 With regards to hypothyroidism/ Irregular cycles/ Hirsutism:   -Type:  Unspecified       -Dx at 19 YO on blood work     -Mother has Hypothyroidism     - TSH at goal on Synthroid 50mcg daily     -Chemically Euthyroid     -Taking LT4 correctly?   Yes  on empty stomach         Lab Results   Component Value Date    TSH 1.458 10/20/2022      -  Lab Results   Component Value Date    TSH 1.458 10/20/2022    B7NIQNA 8.2 2004    FREET4 0.89 2021          -TPO Ab?  Non available       -Symptoms: Fatigue, weight gain , irregular periods     -Periods:?  28 - 23 days, Irregular,   Since having second child then cycles were more heavier and  painful     -Has 2 children  ( 4 and 3 yo)  trying to conceive)     TSH is at goal of < 2.6   (Cant consider cytomel due to the same)      -Trying to conceive since 6/2022.   Uses ovulation kit which she reports she is ovulating     She was told her ovaries had cysts.         -Hirsutism ++  plucking weekly,  Abdominal hair +      NO DM      NO Cushingoid features     Skin tags ++           ROS:   As above    Objective:     There were no vitals taken for this visit.    There is no height or weight on file to calculate BMI.      Physical Exam  Vitals reviewed.   Constitutional:       General: She is not in acute distress.     Appearance: Normal appearance. She is well-developed. She is not ill-appearing.   HENT:      Head:        Comments: Hirsutism +      Nose: Nose normal. No rhinorrhea.      Mouth/Throat:      Mouth: Mucous membranes are moist.   Eyes:      Extraocular Movements: Extraocular movements intact.      Pupils: Pupils are equal, round, and reactive to light.      Comments: No proptosis, No lid lag, No conjunctival erythema    Neck:      Thyroid: No thyromegaly.      Trachea: No tracheal deviation.      Comments: No thyromegaly or Thyroid nodules palpated on exam   Cardiovascular:      Rate and Rhythm: Normal rate and regular rhythm.      Pulses: Normal pulses.   Pulmonary:      Effort: Pulmonary effort is normal.      Breath sounds: Normal breath sounds.   Abdominal:      Palpations: Abdomen is soft. There is no mass.      Tenderness: There is no abdominal tenderness.      Hernia: No hernia is present.      Comments: No scar tissue, No Violaceous striae    Musculoskeletal:         General: No swelling.      Cervical back: Neck supple. No tenderness.      Right lower leg: No edema.      Left lower leg: No edema.   Skin:     General: Skin is warm.      Findings: No rash.   Neurological:      General: No focal deficit present.      Mental Status: She is alert and oriented to person, place, and time.    Psychiatric:         Mood and Affect: Mood normal.         Judgment: Judgment normal.              Lab Review:   Lab Results   Component Value Date    HGBA1C 5.0 10/20/2022     Lab Results   Component Value Date    CHOL 201 (H) 04/06/2022    HDL 47 04/06/2022    LDLCALC 92.6 04/06/2022    TRIG 307 (H) 04/06/2022    CHOLHDL 23.4 04/06/2022     Lab Results   Component Value Date     10/20/2022    K 3.8 10/20/2022     10/20/2022    CO2 22 (L) 10/20/2022    GLU 83 10/20/2022    BUN 14 10/20/2022    CREATININE 0.8 10/20/2022    CALCIUM 9.6 10/20/2022    PROT 7.4 10/20/2022    ALBUMIN 4.1 10/20/2022    BILITOT 0.4 10/20/2022    ALKPHOS 66 10/20/2022    AST 19 10/20/2022    ALT 17 10/20/2022    ANIONGAP 11 10/20/2022    ESTGFRAFRICA >60 04/06/2022    EGFRNONAA >60 04/06/2022    TSH 1.458 10/20/2022     Vit D, 25-Hydroxy   Date Value Ref Range Status   04/06/2022 21 (L) 30 - 96 ng/mL Final     Comment:     Vitamin D deficiency.........<10 ng/mL                              Vitamin D insufficiency......10-29 ng/mL       Vitamin D sufficiency........> or equal to 30 ng/mL  Vitamin D toxicity............>100 ng/mL         Assessment and Plan     Acquired hypothyroidism    TSH at goal < 2.5 due to reproductive age     Send TPO     Monitor TSH     C/w LT4 50mcg daily     Will monitor and adjust accordingly       Obesity (BMI 30-39.9)  Irregular cycles   Hirsutism     Likely Having PCOS     Actively trying to conceive     Will send work up  (Testosterone, DHEA-S,  Glucose tolerance, A1C, CMP, LH, FSH, 17 OHP)      Will consider Progesterone and AMH levels after initial workup     Will consider 1mg DST after initial work up     If evidence of IFG  will consider Metformin +/- GLP-1 to assist with weight loss and metabolic syndrome     Will monitor

## 2022-12-27 ENCOUNTER — TELEPHONE (OUTPATIENT)
Dept: DERMATOLOGY | Facility: CLINIC | Age: 32
End: 2022-12-27
Payer: COMMERCIAL

## 2022-12-27 NOTE — TELEPHONE ENCOUNTER
Spoke with Kristin for [facility] and explained that pt was not seen at main campus and was seen at the [facility] location and provided them with the fax number to the [facility] location.

## 2023-01-03 ENCOUNTER — OFFICE VISIT (OUTPATIENT)
Dept: ALLERGY | Facility: CLINIC | Age: 33
End: 2023-01-03
Payer: COMMERCIAL

## 2023-01-03 VITALS
BODY MASS INDEX: 34.27 KG/M2 | WEIGHT: 205.69 LBS | HEIGHT: 65 IN | SYSTOLIC BLOOD PRESSURE: 110 MMHG | DIASTOLIC BLOOD PRESSURE: 76 MMHG

## 2023-01-03 DIAGNOSIS — L29.9 PRURITUS: ICD-10-CM

## 2023-01-03 DIAGNOSIS — L50.8 CHRONIC URTICARIA: Primary | ICD-10-CM

## 2023-01-03 PROCEDURE — 1159F PR MEDICATION LIST DOCUMENTED IN MEDICAL RECORD: ICD-10-PCS | Mod: CPTII,S$GLB,, | Performed by: ALLERGY & IMMUNOLOGY

## 2023-01-03 PROCEDURE — 3008F BODY MASS INDEX DOCD: CPT | Mod: CPTII,S$GLB,, | Performed by: ALLERGY & IMMUNOLOGY

## 2023-01-03 PROCEDURE — 3078F PR MOST RECENT DIASTOLIC BLOOD PRESSURE < 80 MM HG: ICD-10-PCS | Mod: CPTII,S$GLB,, | Performed by: ALLERGY & IMMUNOLOGY

## 2023-01-03 PROCEDURE — 99204 OFFICE O/P NEW MOD 45 MIN: CPT | Mod: S$GLB,,, | Performed by: ALLERGY & IMMUNOLOGY

## 2023-01-03 PROCEDURE — 3008F PR BODY MASS INDEX (BMI) DOCUMENTED: ICD-10-PCS | Mod: CPTII,S$GLB,, | Performed by: ALLERGY & IMMUNOLOGY

## 2023-01-03 PROCEDURE — 3078F DIAST BP <80 MM HG: CPT | Mod: CPTII,S$GLB,, | Performed by: ALLERGY & IMMUNOLOGY

## 2023-01-03 PROCEDURE — 1160F PR REVIEW ALL MEDS BY PRESCRIBER/CLIN PHARMACIST DOCUMENTED: ICD-10-PCS | Mod: CPTII,S$GLB,, | Performed by: ALLERGY & IMMUNOLOGY

## 2023-01-03 PROCEDURE — 99999 PR PBB SHADOW E&M-EST. PATIENT-LVL III: ICD-10-PCS | Mod: PBBFAC,,, | Performed by: ALLERGY & IMMUNOLOGY

## 2023-01-03 PROCEDURE — 3074F PR MOST RECENT SYSTOLIC BLOOD PRESSURE < 130 MM HG: ICD-10-PCS | Mod: CPTII,S$GLB,, | Performed by: ALLERGY & IMMUNOLOGY

## 2023-01-03 PROCEDURE — 1159F MED LIST DOCD IN RCRD: CPT | Mod: CPTII,S$GLB,, | Performed by: ALLERGY & IMMUNOLOGY

## 2023-01-03 PROCEDURE — 99999 PR PBB SHADOW E&M-EST. PATIENT-LVL III: CPT | Mod: PBBFAC,,, | Performed by: ALLERGY & IMMUNOLOGY

## 2023-01-03 PROCEDURE — 99204 PR OFFICE/OUTPT VISIT, NEW, LEVL IV, 45-59 MIN: ICD-10-PCS | Mod: S$GLB,,, | Performed by: ALLERGY & IMMUNOLOGY

## 2023-01-03 PROCEDURE — 1160F RVW MEDS BY RX/DR IN RCRD: CPT | Mod: CPTII,S$GLB,, | Performed by: ALLERGY & IMMUNOLOGY

## 2023-01-03 PROCEDURE — 3074F SYST BP LT 130 MM HG: CPT | Mod: CPTII,S$GLB,, | Performed by: ALLERGY & IMMUNOLOGY

## 2023-01-03 RX ORDER — LANOLIN ALCOHOL/MO/W.PET/CERES
400 CREAM (GRAM) TOPICAL DAILY
COMMUNITY

## 2023-01-03 RX ORDER — LANSOPRAZOLE 30 MG/1
30 CAPSULE, DELAYED RELEASE ORAL DAILY
COMMUNITY

## 2023-01-03 NOTE — PROGRESS NOTES
Subjective:       Patient ID: Sherlyn Monahan is a 32 y.o. female.    Chief Complaint:  Urticaria and Itching      31 yo woman presents for new patient evaluation of hives. She states in November had strep throat. Treated with PCN. Few days after finished antibiotic she developed hives. Had red raised itchy bumps, come and go. She started zyrtec 10 mg and increased to BID. Hives are controled on this but if missed they come back. No angioedema. She had hives for few months last year after Covid booster vaccine. Not controlled with antihistamines so PCP gave steroid pack and this resolved hives. She had once 4 years ago and those went away on own as well. She does have hypothyroid and is being worked up by endocrinologist for Hashimoto. She has no known food, insect or latex allergy. She does have rhinitis off and on, stuffy runny nose.     Prior History taken 2018: 29 yo woman presents for new patient evaluation of hives. She states about November 21 she had strep throat so was on amoxil. She took full 10 days and on day 8 or 9 she started with hives. She would itch all over and get red raised bumps. Would last minutes to hour and move all around. She started benadryl with minimal help. Then PCP gave steroid fr 5 days and not much help. she is still itching and some hives but better now. Breaks out most days but not as many. No swelling of lips, eyes, tongue, etc. No SOB. She has stuffy, runny nose in spring and fall but none now. She has no asthma or eczema. No known food, insect or latex allergy. No sinus surgery. she has hypothyroid but no other medical issues.       Environmental History: see history section for home environment  Review of Systems   Constitutional:  Negative for appetite change, chills, fatigue and fever.   HENT:  Negative for congestion, ear discharge, ear pain, facial swelling, nosebleeds, postnasal drip, rhinorrhea, sinus pressure, sneezing, sore throat, trouble swallowing and voice change.     Eyes:  Negative for discharge, redness, itching and visual disturbance.   Respiratory:  Negative for cough, choking, chest tightness, shortness of breath and wheezing.    Cardiovascular:  Negative for chest pain, palpitations and leg swelling.   Gastrointestinal:  Negative for abdominal distention, abdominal pain, constipation, diarrhea, nausea and vomiting.   Genitourinary:  Negative for difficulty urinating.   Musculoskeletal:  Negative for arthralgias, gait problem, joint swelling and myalgias.   Skin:  Positive for color change and rash.   Neurological:  Negative for dizziness, syncope, weakness, light-headedness and headaches.   Hematological:  Negative for adenopathy. Does not bruise/bleed easily.   Psychiatric/Behavioral:  Negative for agitation, behavioral problems, confusion and sleep disturbance. The patient is not nervous/anxious.         Objective:      Physical Exam  Vitals and nursing note reviewed.   Constitutional:       General: She is not in acute distress.     Appearance: Normal appearance. She is well-developed. She is not ill-appearing.   HENT:      Head: Normocephalic and atraumatic.      Right Ear: Hearing and external ear normal.      Left Ear: Hearing and external ear normal.      Nose: No septal deviation, mucosal edema or rhinorrhea.      Right Sinus: No maxillary sinus tenderness or frontal sinus tenderness.      Left Sinus: No maxillary sinus tenderness or frontal sinus tenderness.      Mouth/Throat:      Pharynx: Uvula midline. No uvula swelling.   Eyes:      General:         Right eye: No discharge.         Left eye: No discharge.      Conjunctiva/sclera: Conjunctivae normal.   Neck:      Thyroid: No thyromegaly.   Cardiovascular:      Rate and Rhythm: Normal rate and regular rhythm.   Pulmonary:      Effort: Pulmonary effort is normal. No respiratory distress.      Breath sounds: Normal breath sounds.   Abdominal:      General: There is no distension.   Musculoskeletal:          General: No tenderness. Normal range of motion.      Cervical back: Normal range of motion.   Skin:     General: Skin is warm and dry.      Findings: No erythema or rash.   Neurological:      Mental Status: She is alert and oriented to person, place, and time.   Psychiatric:         Mood and Affect: Mood normal.         Behavior: Behavior normal.         Thought Content: Thought content normal.         Judgment: Judgment normal.       Laboratory:   none performed   Assessment:       1. Chronic urticaria    2. Pruritus         Plan:       1. Discussed with pt that chronic urticaria can be allergic vs systemic vs immune mediated. Given this is her 3rd episode post infection or vaccine this is immune mediated. Discussed mechanism in detail wit pt and she voiced understanding. No testing needed. Will treat with cetirizine 10 mg BID and can increase up to 20 mg BID if needed. Once hive free for 2 weeks then can try weaning antihistamines

## 2023-01-10 ENCOUNTER — PATIENT MESSAGE (OUTPATIENT)
Dept: ALLERGY | Facility: CLINIC | Age: 33
End: 2023-01-10
Payer: COMMERCIAL

## 2023-01-10 ENCOUNTER — PATIENT MESSAGE (OUTPATIENT)
Dept: ENDOCRINOLOGY | Facility: CLINIC | Age: 33
End: 2023-01-10
Payer: COMMERCIAL

## 2023-01-10 DIAGNOSIS — N92.6 IRREGULAR MENSTRUAL CYCLE: Primary | ICD-10-CM

## 2023-01-12 ENCOUNTER — LAB VISIT (OUTPATIENT)
Dept: LAB | Facility: HOSPITAL | Age: 33
End: 2023-01-12
Attending: FAMILY MEDICINE
Payer: COMMERCIAL

## 2023-01-12 DIAGNOSIS — E66.9 OBESITY (BMI 30-39.9): ICD-10-CM

## 2023-01-12 DIAGNOSIS — E03.9 ACQUIRED HYPOTHYROIDISM: ICD-10-CM

## 2023-01-12 LAB
ALBUMIN SERPL BCP-MCNC: 4.3 G/DL (ref 3.5–5.2)
ALP SERPL-CCNC: 64 U/L (ref 55–135)
ALT SERPL W/O P-5'-P-CCNC: 13 U/L (ref 10–44)
ANION GAP SERPL CALC-SCNC: 12 MMOL/L (ref 8–16)
AST SERPL-CCNC: 17 U/L (ref 10–40)
BILIRUB SERPL-MCNC: 0.4 MG/DL (ref 0.1–1)
BUN SERPL-MCNC: 11 MG/DL (ref 6–20)
CALCIUM SERPL-MCNC: 9.7 MG/DL (ref 8.7–10.5)
CHLORIDE SERPL-SCNC: 106 MMOL/L (ref 95–110)
CO2 SERPL-SCNC: 19 MMOL/L (ref 23–29)
CREAT SERPL-MCNC: 0.7 MG/DL (ref 0.5–1.4)
DHEA-S SERPL-MCNC: 199.1 UG/DL (ref 95.8–511.7)
EST. GFR  (NO RACE VARIABLE): >60 ML/MIN/1.73 M^2
ESTIMATED AVG GLUCOSE: 91 MG/DL (ref 68–131)
FSH SERPL-ACNC: 4.89 MIU/ML
GLUCOSE SERPL-MCNC: 112 MG/DL
GLUCOSE SERPL-MCNC: 130 MG/DL
GLUCOSE SERPL-MCNC: 79 MG/DL (ref 70–110)
GLUCOSE SERPL-MCNC: 83 MG/DL (ref 70–110)
HBA1C MFR BLD: 4.8 % (ref 4–5.6)
LH SERPL-ACNC: 1.8 MIU/ML
POTASSIUM SERPL-SCNC: 4.1 MMOL/L (ref 3.5–5.1)
PROT SERPL-MCNC: 7.3 G/DL (ref 6–8.4)
SODIUM SERPL-SCNC: 137 MMOL/L (ref 136–145)
THYROPEROXIDASE IGG SERPL-ACNC: <6 IU/ML
TSH SERPL DL<=0.005 MIU/L-ACNC: 1.31 UIU/ML (ref 0.4–4)

## 2023-01-12 PROCEDURE — 83002 ASSAY OF GONADOTROPIN (LH): CPT | Performed by: GENERAL ACUTE CARE HOSPITAL

## 2023-01-12 PROCEDURE — 84443 ASSAY THYROID STIM HORMONE: CPT | Performed by: GENERAL ACUTE CARE HOSPITAL

## 2023-01-12 PROCEDURE — 83001 ASSAY OF GONADOTROPIN (FSH): CPT | Performed by: GENERAL ACUTE CARE HOSPITAL

## 2023-01-12 PROCEDURE — 86376 MICROSOMAL ANTIBODY EACH: CPT | Performed by: GENERAL ACUTE CARE HOSPITAL

## 2023-01-12 PROCEDURE — 84270 ASSAY OF SEX HORMONE GLOBUL: CPT | Performed by: GENERAL ACUTE CARE HOSPITAL

## 2023-01-12 PROCEDURE — 83036 HEMOGLOBIN GLYCOSYLATED A1C: CPT | Performed by: GENERAL ACUTE CARE HOSPITAL

## 2023-01-12 PROCEDURE — 82951 GLUCOSE TOLERANCE TEST (GTT): CPT | Performed by: GENERAL ACUTE CARE HOSPITAL

## 2023-01-12 PROCEDURE — 80053 COMPREHEN METABOLIC PANEL: CPT | Performed by: GENERAL ACUTE CARE HOSPITAL

## 2023-01-12 PROCEDURE — 84403 ASSAY OF TOTAL TESTOSTERONE: CPT | Performed by: GENERAL ACUTE CARE HOSPITAL

## 2023-01-12 PROCEDURE — 82627 DEHYDROEPIANDROSTERONE: CPT | Performed by: GENERAL ACUTE CARE HOSPITAL

## 2023-01-12 PROCEDURE — 83498 ASY HYDROXYPROGESTERONE 17-D: CPT | Performed by: GENERAL ACUTE CARE HOSPITAL

## 2023-01-12 RX ORDER — CETIRIZINE HYDROCHLORIDE 10 MG/1
10 TABLET ORAL 2 TIMES DAILY
Qty: 60 TABLET | Refills: 12 | Status: SHIPPED | OUTPATIENT
Start: 2023-01-12

## 2023-01-16 LAB — 17OHP SERPL-MCNC: 84 NG/DL (ref 35–413)

## 2023-01-19 ENCOUNTER — OFFICE VISIT (OUTPATIENT)
Dept: OBSTETRICS AND GYNECOLOGY | Facility: CLINIC | Age: 33
End: 2023-01-19
Payer: COMMERCIAL

## 2023-01-19 ENCOUNTER — PATIENT MESSAGE (OUTPATIENT)
Dept: ENDOCRINOLOGY | Facility: CLINIC | Age: 33
End: 2023-01-19
Payer: COMMERCIAL

## 2023-01-19 VITALS — BODY MASS INDEX: 33.7 KG/M2 | DIASTOLIC BLOOD PRESSURE: 72 MMHG | SYSTOLIC BLOOD PRESSURE: 118 MMHG | WEIGHT: 202.5 LBS

## 2023-01-19 DIAGNOSIS — Z12.4 ROUTINE CERVICAL SMEAR: ICD-10-CM

## 2023-01-19 DIAGNOSIS — Z01.419 WELL WOMAN EXAM WITH ROUTINE GYNECOLOGICAL EXAM: Primary | ICD-10-CM

## 2023-01-19 LAB
ALBUMIN SERPL-MCNC: 4.6 G/DL (ref 3.6–5.1)
SHBG SERPL-SCNC: 19 NMOL/L (ref 17–124)
TESTOST FREE SERPL-MCNC: 4.8 PG/ML (ref 0.2–5)
TESTOST SERPL-MCNC: 28 NG/DL (ref 2–45)
TESTOSTERONE.FREE+WB SERPL-MCNC: 10.1 NG/DL (ref 0.5–8.5)

## 2023-01-19 PROCEDURE — 3044F PR MOST RECENT HEMOGLOBIN A1C LEVEL <7.0%: ICD-10-PCS | Mod: CPTII,S$GLB,, | Performed by: OBSTETRICS & GYNECOLOGY

## 2023-01-19 PROCEDURE — 99999 PR PBB SHADOW E&M-EST. PATIENT-LVL III: ICD-10-PCS | Mod: PBBFAC,,, | Performed by: OBSTETRICS & GYNECOLOGY

## 2023-01-19 PROCEDURE — 87624 HPV HI-RISK TYP POOLED RSLT: CPT | Performed by: OBSTETRICS & GYNECOLOGY

## 2023-01-19 PROCEDURE — 99395 PR PREVENTIVE VISIT,EST,18-39: ICD-10-PCS | Mod: S$GLB,,, | Performed by: OBSTETRICS & GYNECOLOGY

## 2023-01-19 PROCEDURE — 3044F HG A1C LEVEL LT 7.0%: CPT | Mod: CPTII,S$GLB,, | Performed by: OBSTETRICS & GYNECOLOGY

## 2023-01-19 PROCEDURE — 3078F PR MOST RECENT DIASTOLIC BLOOD PRESSURE < 80 MM HG: ICD-10-PCS | Mod: CPTII,S$GLB,, | Performed by: OBSTETRICS & GYNECOLOGY

## 2023-01-19 PROCEDURE — 3078F DIAST BP <80 MM HG: CPT | Mod: CPTII,S$GLB,, | Performed by: OBSTETRICS & GYNECOLOGY

## 2023-01-19 PROCEDURE — 3074F PR MOST RECENT SYSTOLIC BLOOD PRESSURE < 130 MM HG: ICD-10-PCS | Mod: CPTII,S$GLB,, | Performed by: OBSTETRICS & GYNECOLOGY

## 2023-01-19 PROCEDURE — 3008F BODY MASS INDEX DOCD: CPT | Mod: CPTII,S$GLB,, | Performed by: OBSTETRICS & GYNECOLOGY

## 2023-01-19 PROCEDURE — 88175 CYTOPATH C/V AUTO FLUID REDO: CPT | Performed by: OBSTETRICS & GYNECOLOGY

## 2023-01-19 PROCEDURE — 1159F MED LIST DOCD IN RCRD: CPT | Mod: CPTII,S$GLB,, | Performed by: OBSTETRICS & GYNECOLOGY

## 2023-01-19 PROCEDURE — 1159F PR MEDICATION LIST DOCUMENTED IN MEDICAL RECORD: ICD-10-PCS | Mod: CPTII,S$GLB,, | Performed by: OBSTETRICS & GYNECOLOGY

## 2023-01-19 PROCEDURE — 99395 PREV VISIT EST AGE 18-39: CPT | Mod: S$GLB,,, | Performed by: OBSTETRICS & GYNECOLOGY

## 2023-01-19 PROCEDURE — 3074F SYST BP LT 130 MM HG: CPT | Mod: CPTII,S$GLB,, | Performed by: OBSTETRICS & GYNECOLOGY

## 2023-01-19 PROCEDURE — 99999 PR PBB SHADOW E&M-EST. PATIENT-LVL III: CPT | Mod: PBBFAC,,, | Performed by: OBSTETRICS & GYNECOLOGY

## 2023-01-19 PROCEDURE — 3008F PR BODY MASS INDEX (BMI) DOCUMENTED: ICD-10-PCS | Mod: CPTII,S$GLB,, | Performed by: OBSTETRICS & GYNECOLOGY

## 2023-01-19 PROCEDURE — 1160F PR REVIEW ALL MEDS BY PRESCRIBER/CLIN PHARMACIST DOCUMENTED: ICD-10-PCS | Mod: CPTII,S$GLB,, | Performed by: OBSTETRICS & GYNECOLOGY

## 2023-01-19 PROCEDURE — 1160F RVW MEDS BY RX/DR IN RCRD: CPT | Mod: CPTII,S$GLB,, | Performed by: OBSTETRICS & GYNECOLOGY

## 2023-01-19 NOTE — PROGRESS NOTES
OBSTETRIC HISTORY:   OB History          2    Para   2    Term   2       0    AB   0    Living   2         SAB   0    IAB   0    Ectopic   0    Multiple   0    Live Births   2                COMPREHENSIVE GYN HISTORY:  PAP History: Denies abnormal Paps.  Infection History: Reports STDs: Chlamydia. Denies PID.  Benign History: Denies uterine fibroids. Denies ovarian cysts. Denies endometriosis.   Cancer History: Denies cervical cancer. Denies uterine cancer or hyperplasia. Denies ovarian cancer. Denies vulvar cancer or pre-cancer. Denies vaginal cancer or pre-cancer. Denies breast cancer. Denies colon cancer.  Sexual Activity History:   reports that she currently engages in sexual activity and has had male partners. She reports using the following methods of birth control/protection: Condom.   Menstrual History:  Every 30 days, flows for 4 days. Moderate flow.  Dysmenorrhea History: Reports occ. dysmenorrhea.  Contraception: Condom        HPI:   32 y.o.  Patient's last menstrual period was 01/10/2023 (exact date).   Patient is  here for her annual gynecologic exam.  She has no GYN complaints. Intentionally lost 20 pounds.She denies bladder, breast and bowel complaints.    ROS:  GENERAL: Denies weight gain or weight loss. Feeling well overall.   SKIN: Denies rash or lesions.   HEAD: Denies headache.   NODES: Denies enlarged lymph nodes.   CHEST: Denies shortness of breath.   ABDOMEN: No abdominal pain, constipation, diarrhea, nausea, vomiting or rectal bleeding.   URINARY: No frequency, dysuria, hematuria, or burning on urination.  REPRODUCTIVE: See HPI.   BREASTS: The patient denies pain, lumps, or nipple discharge.   HEMATOLOGIC: No easy bruisability.   MUSCULOSKELETAL: Denies joint pain or back pain.   NEUROLOGIC: Denies weakness.   PSYCHIATRIC: Denies depression, anxiety or mood swings.    PE:   /72   Wt 91.8 kg (202 lb 7.9 oz)   LMP 01/10/2023 (Exact Date)   BMI 33.70 kg/m²   APPEARANCE:  Well nourished, well developed, in no acute distress.  NECK: Neck symmetric without  thyromegaly.  NODES: No inguinal, cervical lymph node enlargement.  CHEST: Lungs clear to auscultation.  HEART: Regular rate and rhythm, no murmurs, rubs or gallops.  ABDOMEN: Soft. No tenderness or masses. No hernias.  BREASTS: Symmetrical, no skin changes or visible lesions. No palpable masses, nipple discharge or adenopathy bilaterally.  PELVIC:   VULVA: No lesions. Normal female genitalia.  URETHRAL MEATUS: Normal size and location, no lesions, no prolapse.  URETHRA: No masses, tenderness, prolapse or scarring.  VAGINA: Moist and well rugated, no discharge, no significant cystocele or rectocele.  CERVIX: No lesions and discharge.  UTERUS: Normal size, regular shape, mobile, non-tender, bladder base nontender.  ADNEXA: No masses or tenderness.    PROCEDURES:  Pap smear  HRHPV    Assessment:  Normal Gynecologic Exam    Plan:  Mammogram and Colonoscopy if indicated by current recommendations.  Return to clinic in one year or for any problems or complaints.    Counseling:  Patient was counseled today on A.C.S. Pap guidelines and recommendations for yearly pelvic exams and monthly self breast exams; to see her PCP for other health maintenance. Regular exercise and healthy diet.     As of April 1, 2021, the Cures Act has been passed nationally. This new law requires that all doctors progress notes, lab results, pathology reports and radiology reports be released IMMEDIATELY to the patient in the patient portal. That means that the results are released to you at the EXACT same time they are released to me. Therefore, with all of the patients that I have I am not able to reply to each patient exactly when the results come in. So there will be a delay from when you see the results to when I see them and have time to come up with a response to send you. Also I only see these results when I am on the computer at work. So if the results  come in over the weekend or after 5 pm of a work day, I will not see them until the next business day. As you can tell, this is a challenge as a physician to give every patient the quick response they hope for and deserve. So please be patient!     Thanks for understanding,

## 2023-01-26 LAB
FINAL PATHOLOGIC DIAGNOSIS: NORMAL
HPV HR 12 DNA SPEC QL NAA+PROBE: NEGATIVE
HPV16 AG SPEC QL: NEGATIVE
HPV18 DNA SPEC QL NAA+PROBE: NEGATIVE
Lab: NORMAL

## 2023-01-30 ENCOUNTER — LAB VISIT (OUTPATIENT)
Dept: LAB | Facility: HOSPITAL | Age: 33
End: 2023-01-30
Attending: GENERAL ACUTE CARE HOSPITAL
Payer: COMMERCIAL

## 2023-01-30 DIAGNOSIS — N92.6 IRREGULAR MENSTRUAL CYCLE: ICD-10-CM

## 2023-01-30 LAB — PROGEST SERPL-MCNC: 11.5 NG/ML

## 2023-01-30 PROCEDURE — 83520 IMMUNOASSAY QUANT NOS NONAB: CPT | Performed by: GENERAL ACUTE CARE HOSPITAL

## 2023-01-30 PROCEDURE — 84144 ASSAY OF PROGESTERONE: CPT | Performed by: GENERAL ACUTE CARE HOSPITAL

## 2023-02-01 ENCOUNTER — PATIENT MESSAGE (OUTPATIENT)
Dept: FAMILY MEDICINE | Facility: CLINIC | Age: 33
End: 2023-02-01
Payer: COMMERCIAL

## 2023-02-01 LAB — MIS SERPL-MCNC: 2.8 NG/ML (ref 0.58–8.1)

## 2023-02-03 ENCOUNTER — PATIENT MESSAGE (OUTPATIENT)
Dept: ENDOCRINOLOGY | Facility: CLINIC | Age: 33
End: 2023-02-03
Payer: COMMERCIAL

## 2023-04-10 DIAGNOSIS — F41.8 DEPRESSION WITH ANXIETY: ICD-10-CM

## 2023-04-10 DIAGNOSIS — E03.9 ACQUIRED HYPOTHYROIDISM: ICD-10-CM

## 2023-04-10 RX ORDER — SERTRALINE HYDROCHLORIDE 50 MG/1
TABLET, FILM COATED ORAL
Qty: 90 TABLET | Refills: 0 | Status: SHIPPED | OUTPATIENT
Start: 2023-04-10 | End: 2023-07-10

## 2023-04-10 RX ORDER — LEVOTHYROXINE SODIUM 50 UG/1
50 TABLET ORAL DAILY
Qty: 90 TABLET | Refills: 0 | Status: SHIPPED | OUTPATIENT
Start: 2023-04-10 | End: 2023-07-17

## 2023-04-10 NOTE — TELEPHONE ENCOUNTER
No new care gaps identified.  [facility] Embedded Care Gaps. Reference number: [id number]. 4/10/2023   5:27:41 AM AIT

## 2023-04-10 NOTE — TELEPHONE ENCOUNTER
Refill Decision Note   Sherlyn Monahan  is requesting a refill authorization.  Brief Assessment and Rationale for Refill:  Approve     Medication Therapy Plan:         Comments:     Note composed:2:31 PM 04/10/2023             Appointments     Last Visit   4/6/2022 Alea Moses MD   Next Visit   4/14/2023 Alea Moses MD

## 2023-04-14 ENCOUNTER — OFFICE VISIT (OUTPATIENT)
Dept: FAMILY MEDICINE | Facility: CLINIC | Age: 33
End: 2023-04-14
Payer: COMMERCIAL

## 2023-04-14 VITALS
TEMPERATURE: 98 F | OXYGEN SATURATION: 100 % | BODY MASS INDEX: 32.58 KG/M2 | HEART RATE: 78 BPM | DIASTOLIC BLOOD PRESSURE: 64 MMHG | WEIGHT: 195.56 LBS | SYSTOLIC BLOOD PRESSURE: 108 MMHG | HEIGHT: 65 IN

## 2023-04-14 DIAGNOSIS — E03.9 ACQUIRED HYPOTHYROIDISM: ICD-10-CM

## 2023-04-14 DIAGNOSIS — T50.B95A ADVERSE EFFECT OF COVID-19 VACCINE: ICD-10-CM

## 2023-04-14 DIAGNOSIS — K76.0 FATTY LIVER: ICD-10-CM

## 2023-04-14 DIAGNOSIS — Z79.899 ENCOUNTER FOR MONITORING LONG-TERM PROTON PUMP INHIBITOR THERAPY: ICD-10-CM

## 2023-04-14 DIAGNOSIS — Z51.81 ENCOUNTER FOR MONITORING LONG-TERM PROTON PUMP INHIBITOR THERAPY: ICD-10-CM

## 2023-04-14 DIAGNOSIS — Z00.00 ROUTINE GENERAL MEDICAL EXAMINATION AT A HEALTH CARE FACILITY: Primary | ICD-10-CM

## 2023-04-14 DIAGNOSIS — F41.8 DEPRESSION WITH ANXIETY: ICD-10-CM

## 2023-04-14 DIAGNOSIS — K58.0 IRRITABLE BOWEL SYNDROME WITH DIARRHEA: ICD-10-CM

## 2023-04-14 DIAGNOSIS — Z90.49 STATUS POST CHOLECYSTECTOMY: ICD-10-CM

## 2023-04-14 DIAGNOSIS — Z98.891 S/P C-SECTION: ICD-10-CM

## 2023-04-14 DIAGNOSIS — N94.6 DYSMENORRHEA: ICD-10-CM

## 2023-04-14 DIAGNOSIS — N92.6 IRREGULAR MENSTRUAL BLEEDING: ICD-10-CM

## 2023-04-14 PROCEDURE — 3078F PR MOST RECENT DIASTOLIC BLOOD PRESSURE < 80 MM HG: ICD-10-PCS | Mod: CPTII,S$GLB,, | Performed by: FAMILY MEDICINE

## 2023-04-14 PROCEDURE — 3074F SYST BP LT 130 MM HG: CPT | Mod: CPTII,S$GLB,, | Performed by: FAMILY MEDICINE

## 2023-04-14 PROCEDURE — 3044F HG A1C LEVEL LT 7.0%: CPT | Mod: CPTII,S$GLB,, | Performed by: FAMILY MEDICINE

## 2023-04-14 PROCEDURE — 3074F PR MOST RECENT SYSTOLIC BLOOD PRESSURE < 130 MM HG: ICD-10-PCS | Mod: CPTII,S$GLB,, | Performed by: FAMILY MEDICINE

## 2023-04-14 PROCEDURE — 3078F DIAST BP <80 MM HG: CPT | Mod: CPTII,S$GLB,, | Performed by: FAMILY MEDICINE

## 2023-04-14 PROCEDURE — 3008F PR BODY MASS INDEX (BMI) DOCUMENTED: ICD-10-PCS | Mod: CPTII,S$GLB,, | Performed by: FAMILY MEDICINE

## 2023-04-14 PROCEDURE — 99999 PR PBB SHADOW E&M-EST. PATIENT-LVL IV: CPT | Mod: PBBFAC,,, | Performed by: FAMILY MEDICINE

## 2023-04-14 PROCEDURE — 1159F PR MEDICATION LIST DOCUMENTED IN MEDICAL RECORD: ICD-10-PCS | Mod: CPTII,S$GLB,, | Performed by: FAMILY MEDICINE

## 2023-04-14 PROCEDURE — 99999 PR PBB SHADOW E&M-EST. PATIENT-LVL IV: ICD-10-PCS | Mod: PBBFAC,,, | Performed by: FAMILY MEDICINE

## 2023-04-14 PROCEDURE — 1159F MED LIST DOCD IN RCRD: CPT | Mod: CPTII,S$GLB,, | Performed by: FAMILY MEDICINE

## 2023-04-14 PROCEDURE — 99395 PR PREVENTIVE VISIT,EST,18-39: ICD-10-PCS | Mod: S$GLB,,, | Performed by: FAMILY MEDICINE

## 2023-04-14 PROCEDURE — 99395 PREV VISIT EST AGE 18-39: CPT | Mod: S$GLB,,, | Performed by: FAMILY MEDICINE

## 2023-04-14 PROCEDURE — 3044F PR MOST RECENT HEMOGLOBIN A1C LEVEL <7.0%: ICD-10-PCS | Mod: CPTII,S$GLB,, | Performed by: FAMILY MEDICINE

## 2023-04-14 PROCEDURE — 1160F RVW MEDS BY RX/DR IN RCRD: CPT | Mod: CPTII,S$GLB,, | Performed by: FAMILY MEDICINE

## 2023-04-14 PROCEDURE — 3008F BODY MASS INDEX DOCD: CPT | Mod: CPTII,S$GLB,, | Performed by: FAMILY MEDICINE

## 2023-04-14 PROCEDURE — 1160F PR REVIEW ALL MEDS BY PRESCRIBER/CLIN PHARMACIST DOCUMENTED: ICD-10-PCS | Mod: CPTII,S$GLB,, | Performed by: FAMILY MEDICINE

## 2023-04-14 NOTE — PROGRESS NOTES
Office Visit    Patient Name: Sherlyn Monahan    : 1990  MRN: [id number]    Subjective:  Sherlyn is a 32 y.o. female who presents today for:    Annual Exam    PRIOR PHYSICAL w/ ME 22    Sherlyn presents today for annual wellness exam and monitoring of chronic conditions that include hypothyroidism,  section deliveries on 2018 and 2020 per Dr. Smith, lap mikal 19 per Dr Tolentino, h/o heart palpitations s/p normal EKG 19, h/o urticaria s/p allergy eval and treatment with extended course of antihistamine (hives not though secondary to amoxicillin), with repeat extended course of hives following the Moderna COVID-19 booster.    Labs drawn 23 per endocrine and also 10/20/22 by me and Endocrine Dr. Torres overall unremarkable, though Endocrine does feel they support PCOS diagnosis.     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  Has 5 and almost 3 year old.      She is of childbearing age.  She has a gynecologist-- Dr Smith--  and is up to date with pap.   She has been try to achieve pregnancy for almost 1 year.   Prior pelvic ultrasound has shown cysts.  Having monthly periods that are about 10 days long.   Endocrine confirmed PCOS and suspected low egg count.      She has been feeling overall well except for headaches and IBS-Symptoms.  Feels these are exacerbated by recent stressors-working 2 jobs as per above.  Hives and finally become manageable after a course of steroids and continuing daily antihistamine.    Some IBS-D symptoms exacerbated by her cholecystectomy, overall managing with awareness of dietary triggers, but her symptoms are still negatively impactful.    Mood remains good on Zoloft 50.   Migraines are impactful-- overall about every 3 weeks at least.  Currently not on preventative such as amitriptyline or Topamax as  she is actively trying to conceive.     General lifestyle habits are as follows:    Diet: good, following a weight watchers program and trying to drink more water  Exercise: currently poor-- hopes to start exercising more in June when school is out  SLEEP: fair, some snoring and LV concerns(AM headaches).   Weight: down about 25 lbs in the last 6 months-- bmi now 32!      Immunizations: TDap 3/19/18, yearly flu shot-- WILL LIKELY OBTAIN IN THE FALL. ; COVID-19 completed with Moderna booster 12/18/21-- FURTHER COVID BOOSTERS DEFERRED AT THIS TIME GIVEN HER HISTORY OF ADVERSE REACTION (HIVES)     Screening Tests: PAP 1/19/23WNL/HPV (-) per GYN Dr Smith, STD screening during pregnancy (-), Labs UTD      Eye/Dental: eye UTD,  Dental UTD             PAST MEDICAL HISTORY, SURGICAL/SOCIAL/FAMILY HISTORY REVIEWED AS PER CHART, WITH PERTINENT FINDINGS INCLUDED IN HISTORY SECTION OF NOTE.     Current Medications    Medication List with Changes/Refills   Current Medications    B COMPLEX VITAMINS CAPSULE    Take 1 capsule by mouth once daily.    CALCIUM-VITAMIN D3 (OS-ELISABETH 500 + D3) 500 MG(1,250MG) -200 UNIT PER TABLET    Take 1 tablet by mouth.    CETIRIZINE (ZYRTEC) 10 MG TABLET    Take 1 tablet (10 mg total) by mouth 2 (two) times a day.    LANSOPRAZOLE (PREVACID) 30 MG CAPSULE    Take 30 mg by mouth once daily.    MAGNESIUM OXIDE (MAG-OX) 400 MG (241.3 MG MAGNESIUM) TABLET    Take 400 mg by mouth once daily.    MUPIROCIN CALCIUM 2% NASL OINT (BACTROBAN) 2 % OINT    by Nasal route 2 (two) times daily.    PRENATAL VIT37-IRON-FOLIC ACID 29 MG IRON- 1 MG CHEW    Take by mouth.    SERTRALINE (ZOLOFT) 50 MG TABLET    TAKE 1 TABLET(50 MG) BY MOUTH EVERY EVENING    SYNTHROID 50 MCG TABLET    Take 1 tablet (50 mcg total) by mouth once daily.       Allergies   Review of patient's allergies indicates:   Allergen Reactions    Minocycline Swelling         Review of Systems (Pertinent positives)  Review of Systems   Constitutional:   "Negative for activity change and unexpected weight change.   HENT:  Negative for hearing loss, rhinorrhea and trouble swallowing.    Eyes:  Negative for discharge and visual disturbance.   Respiratory:  Negative for chest tightness and wheezing.    Cardiovascular:  Negative for chest pain and palpitations.   Gastrointestinal:  Negative for blood in stool, constipation, diarrhea and vomiting.   Endocrine: Negative for polydipsia and polyuria.   Genitourinary:  Positive for menstrual problem. Negative for difficulty urinating, dysuria and hematuria.   Musculoskeletal:  Positive for neck pain. Negative for arthralgias and joint swelling.   Neurological:  Positive for headaches. Negative for weakness.   Psychiatric/Behavioral:  Negative for confusion and dysphoric mood.      /64 (BP Location: Right arm, Patient Position: Sitting, BP Method: Large (Manual))   Pulse 78   Temp 98.1 °F (36.7 °C) (Oral)   Ht 5' 5" (1.651 m)   Wt 88.7 kg (195 lb 8.8 oz)   LMP 03/31/2023 (Exact Date)   SpO2 100%   BMI 32.54 kg/m²     Physical Exam  Vitals reviewed.   Constitutional:       General: She is not in acute distress.     Appearance: Normal appearance. She is well-developed.   HENT:      Head: Normocephalic and atraumatic.      Right Ear: Ear canal normal. Tympanic membrane is not erythematous or bulging.      Left Ear: Ear canal normal. Tympanic membrane is not erythematous or bulging.      Nose: Nose normal.      Mouth/Throat:      Mouth: Mucous membranes are moist.      Pharynx: No oropharyngeal exudate.   Eyes:      Extraocular Movements: Extraocular movements intact.      Conjunctiva/sclera: Conjunctivae normal.   Neck:      Thyroid: No thyroid mass or thyromegaly.      Vascular: No carotid bruit.   Cardiovascular:      Rate and Rhythm: Normal rate and regular rhythm.      Pulses:           Dorsalis pedis pulses are 2+ on the right side and 2+ on the left side.      Heart sounds: Normal heart sounds. No murmur " heard.  Pulmonary:      Effort: Pulmonary effort is normal. No respiratory distress.      Breath sounds: Normal breath sounds.   Abdominal:      General: Bowel sounds are normal. There is no distension.      Palpations: Abdomen is soft. There is no mass.      Tenderness: There is no abdominal tenderness.   Musculoskeletal:         General: Normal range of motion.      Right lower leg: No edema.      Left lower leg: No edema.   Lymphadenopathy:      Cervical: No cervical adenopathy.   Skin:     General: Skin is warm and dry.      Findings: No rash.   Neurological:      General: No focal deficit present.      Mental Status: She is alert and oriented to person, place, and time.   Psychiatric:         Mood and Affect: Mood normal.         Behavior: Behavior normal.         Assessment/Plan:  Sherlyn Monahan is a 32 y.o. female who presents today for :        ICD-10-CM ICD-9-CM    1. Routine general medical examination at a health care facility  Z00.00 V70.0 Hemoglobin A1C      Comprehensive Metabolic Panel      CBC Auto Differential      TSH      Ferritin      Iron and TIBC      Vitamin D      Magnesium      Vitamin B12      Lipid Panel      2. Irregular menstrual bleeding  N92.6 626.4       3. Dysmenorrhea  N94.6 625.3       4. Acquired hypothyroidism  E03.9 244.9       5. Adverse effect of COVID-19 vaccine  T50.B95A E949.6       6. Depression with anxiety  F41.8 300.4       7. Fatty liver  K76.0 571.8       8. S/P   Z98.891 V45.89       9. Status post cholecystectomy  Z90.49 V45.79       10. Encounter for monitoring long-term proton pump inhibitor therapy  Z51.81 V58.83     Z79.899 V58.69         ADVISED ON DIET/EXERCISE/SLEEP, ROUTINE EYE/DENTAL EXAMS, AND THE IMPORTANCE OF KEEPING UP WITH APPROPRIATE SCREENING TESTS BASED ON AGE AND RISK FACTORS.  PAP/HPV WNL AND UTD PER GYN 23, MODERNA BOOSTER COMPLETED 2021- DEFER FURTHER BOOSTERS AT THIS TIME SECONDARY TO HISTORY OF HIVES, HEALTH MAINTENANCE  OTHERWISE UP-TO-DATE- DID NOT FLU SHOT THIS SEASON BUT WILL PLAN TO OBTAIN IN THE FALL.     OBESITY WITH CONCERN FOR POSSIBLE LV:  Has lost about 25 lb with following weight watchers, advised on the importance of hydration and incorporating regular exercise.    IRREGULAR MENSES WITH SUSPECTED PCOS:  Following with Endocrine, has lost weight, tracking cycles and confirms ovulation with ovulation monitor.  Trying to conceive-has been trying for almost 1 year.  Hormones evaluated by Endocrine, recheck iron studies, CBC in 6 months.  Up-to-date with gyn visit-Dr. Smith     HYPOTHYROIDISM:  Has been stable on Synthroid 50, check TSH periodically especially in light of recent weight loss.     FATTY LIVER:  Liver enzymes recently normal, advised continued attention to diet/exercise/weight loss     MIGRAINE HEADACHES:  Currently manageable oral analgesics such as Aleve p.r.n..  Not taking a preventative as she is actively trying to conceive.     DEPRESSION/ANXIETY:  Doing very well on Zoloft 50     IBS/D SYMPTOMS EXACERBATED BY CHOLECYSTECTOMY:  Continue awareness of dietary triggers, trial of daily Benefiber or fiber supplement to see if this helps with bowel movement and regulation.    GERD:  NOTES IMPROVEMENT WITH PREVACID, CHECK PPI LABS WITH NEXT BLOOD DRAW.    There are no Patient Instructions on file for this visit.      Follow up in about 6 months (around 10/14/2023) for to follow up on lab results, return as needed for new concerns.

## 2023-07-09 DIAGNOSIS — F41.8 DEPRESSION WITH ANXIETY: ICD-10-CM

## 2023-07-09 NOTE — TELEPHONE ENCOUNTER
No care due was identified.  [facility] Embedded Care Due Messages. Reference number: [id number].   7/09/2023 5:40:06 AM CDT

## 2023-07-10 RX ORDER — SERTRALINE HYDROCHLORIDE 50 MG/1
TABLET, FILM COATED ORAL
Qty: 90 TABLET | Refills: 3 | Status: SHIPPED | OUTPATIENT
Start: 2023-07-10 | End: 2023-10-24

## 2023-07-10 NOTE — TELEPHONE ENCOUNTER
Refill Decision Note   Sherlyn Monahan  is requesting a refill authorization.  Brief Assessment and Rationale for Refill:  Approve     Medication Therapy Plan:         Comments:     Note composed:3:39 AM 07/10/2023

## 2023-07-17 DIAGNOSIS — E03.9 ACQUIRED HYPOTHYROIDISM: ICD-10-CM

## 2023-07-17 RX ORDER — LEVOTHYROXINE SODIUM 50 UG/1
TABLET ORAL
Qty: 90 TABLET | Refills: 4 | Status: SHIPPED | OUTPATIENT
Start: 2023-07-17 | End: 2023-10-24

## 2023-07-17 NOTE — TELEPHONE ENCOUNTER
No care due was identified.  [facility] Embedded Care Due Messages. Reference number: [id number].   7/17/2023 8:14:01 AM CDT

## 2023-07-17 NOTE — TELEPHONE ENCOUNTER
Refill Routing Note   Medication(s) are not appropriate for processing by [facility] for the following reason(s):      Required labs outdated    [facility] action(s):  Defer Care Due:  None identified     Medication Therapy Plan: t4      Appointments  past 12m or future 3m with PCP    Date Provider   Last Visit   4/14/2023 Alea Moses MD   Next Visit   10/13/2023 Alea Moses MD   ED visits in past 90 days: 0        Note composed:9:43 AM 07/17/2023

## 2023-08-02 ENCOUNTER — OFFICE VISIT (OUTPATIENT)
Dept: OBSTETRICS AND GYNECOLOGY | Facility: CLINIC | Age: 33
End: 2023-08-02
Payer: COMMERCIAL

## 2023-08-02 VITALS
WEIGHT: 201.25 LBS | BODY MASS INDEX: 33.49 KG/M2 | SYSTOLIC BLOOD PRESSURE: 118 MMHG | DIASTOLIC BLOOD PRESSURE: 78 MMHG

## 2023-08-02 DIAGNOSIS — N93.9 ABNORMAL UTERINE BLEEDING (AUB): Primary | ICD-10-CM

## 2023-08-02 PROCEDURE — 1159F PR MEDICATION LIST DOCUMENTED IN MEDICAL RECORD: ICD-10-PCS | Mod: CPTII,S$GLB,, | Performed by: OBSTETRICS & GYNECOLOGY

## 2023-08-02 PROCEDURE — 3074F PR MOST RECENT SYSTOLIC BLOOD PRESSURE < 130 MM HG: ICD-10-PCS | Mod: CPTII,S$GLB,, | Performed by: OBSTETRICS & GYNECOLOGY

## 2023-08-02 PROCEDURE — 3074F SYST BP LT 130 MM HG: CPT | Mod: CPTII,S$GLB,, | Performed by: OBSTETRICS & GYNECOLOGY

## 2023-08-02 PROCEDURE — 1159F MED LIST DOCD IN RCRD: CPT | Mod: CPTII,S$GLB,, | Performed by: OBSTETRICS & GYNECOLOGY

## 2023-08-02 PROCEDURE — 1160F PR REVIEW ALL MEDS BY PRESCRIBER/CLIN PHARMACIST DOCUMENTED: ICD-10-PCS | Mod: CPTII,S$GLB,, | Performed by: OBSTETRICS & GYNECOLOGY

## 2023-08-02 PROCEDURE — 3078F DIAST BP <80 MM HG: CPT | Mod: CPTII,S$GLB,, | Performed by: OBSTETRICS & GYNECOLOGY

## 2023-08-02 PROCEDURE — 99999 PR PBB SHADOW E&M-EST. PATIENT-LVL IV: ICD-10-PCS | Mod: PBBFAC,,, | Performed by: OBSTETRICS & GYNECOLOGY

## 2023-08-02 PROCEDURE — 3008F BODY MASS INDEX DOCD: CPT | Mod: CPTII,S$GLB,, | Performed by: OBSTETRICS & GYNECOLOGY

## 2023-08-02 PROCEDURE — 3044F PR MOST RECENT HEMOGLOBIN A1C LEVEL <7.0%: ICD-10-PCS | Mod: CPTII,S$GLB,, | Performed by: OBSTETRICS & GYNECOLOGY

## 2023-08-02 PROCEDURE — 99213 OFFICE O/P EST LOW 20 MIN: CPT | Mod: S$GLB,,, | Performed by: OBSTETRICS & GYNECOLOGY

## 2023-08-02 PROCEDURE — 99213 PR OFFICE/OUTPT VISIT, EST, LEVL III, 20-29 MIN: ICD-10-PCS | Mod: S$GLB,,, | Performed by: OBSTETRICS & GYNECOLOGY

## 2023-08-02 PROCEDURE — 99999 PR PBB SHADOW E&M-EST. PATIENT-LVL IV: CPT | Mod: PBBFAC,,, | Performed by: OBSTETRICS & GYNECOLOGY

## 2023-08-02 PROCEDURE — 3008F PR BODY MASS INDEX (BMI) DOCUMENTED: ICD-10-PCS | Mod: CPTII,S$GLB,, | Performed by: OBSTETRICS & GYNECOLOGY

## 2023-08-02 PROCEDURE — 3044F HG A1C LEVEL LT 7.0%: CPT | Mod: CPTII,S$GLB,, | Performed by: OBSTETRICS & GYNECOLOGY

## 2023-08-02 PROCEDURE — 3078F PR MOST RECENT DIASTOLIC BLOOD PRESSURE < 80 MM HG: ICD-10-PCS | Mod: CPTII,S$GLB,, | Performed by: OBSTETRICS & GYNECOLOGY

## 2023-08-02 PROCEDURE — 1160F RVW MEDS BY RX/DR IN RCRD: CPT | Mod: CPTII,S$GLB,, | Performed by: OBSTETRICS & GYNECOLOGY

## 2023-08-02 RX ORDER — CLOMIPHENE CITRATE 50 MG/1
50 TABLET ORAL DAILY
Qty: 5 TABLET | Refills: 2 | Status: SHIPPED | OUTPATIENT
Start: 2023-08-02

## 2023-08-13 ENCOUNTER — PATIENT MESSAGE (OUTPATIENT)
Dept: OBSTETRICS AND GYNECOLOGY | Facility: CLINIC | Age: 33
End: 2023-08-13
Payer: COMMERCIAL

## 2023-08-21 ENCOUNTER — PATIENT MESSAGE (OUTPATIENT)
Dept: OBSTETRICS AND GYNECOLOGY | Facility: CLINIC | Age: 33
End: 2023-08-21
Payer: COMMERCIAL

## 2023-08-24 ENCOUNTER — HOSPITAL ENCOUNTER (OUTPATIENT)
Dept: RADIOLOGY | Facility: HOSPITAL | Age: 33
Discharge: HOME | End: 2023-08-24
Attending: OBSTETRICS & GYNECOLOGY
Payer: COMMERCIAL

## 2023-08-24 DIAGNOSIS — N93.9 ABNORMAL UTERINE BLEEDING (AUB): ICD-10-CM

## 2023-08-24 PROCEDURE — 76830 TRANSVAGINAL US NON-OB: CPT | Mod: 26,,, | Performed by: RADIOLOGY

## 2023-08-24 PROCEDURE — 76856 US PELVIS COMP WITH TRANSVAG NON-OB (XPD): ICD-10-PCS | Mod: 26,,, | Performed by: RADIOLOGY

## 2023-08-24 PROCEDURE — 76856 US EXAM PELVIC COMPLETE: CPT | Mod: 26,,, | Performed by: RADIOLOGY

## 2023-08-24 PROCEDURE — 76830 US PELVIS COMP WITH TRANSVAG NON-OB (XPD): ICD-10-PCS | Mod: 26,,, | Performed by: RADIOLOGY

## 2023-08-24 PROCEDURE — 76856 US EXAM PELVIC COMPLETE: CPT | Mod: TC

## 2023-08-26 ENCOUNTER — PATIENT MESSAGE (OUTPATIENT)
Dept: OBSTETRICS AND GYNECOLOGY | Facility: CLINIC | Age: 33
End: 2023-08-26
Payer: COMMERCIAL

## 2023-08-30 ENCOUNTER — PATIENT MESSAGE (OUTPATIENT)
Dept: PODIATRY | Facility: CLINIC | Age: 33
End: 2023-08-30
Payer: COMMERCIAL

## 2023-08-31 ENCOUNTER — OFFICE VISIT (OUTPATIENT)
Dept: PODIATRY | Facility: CLINIC | Age: 33
End: 2023-08-31
Payer: COMMERCIAL

## 2023-08-31 VITALS
SYSTOLIC BLOOD PRESSURE: 111 MMHG | TEMPERATURE: 99 F | HEART RATE: 71 BPM | DIASTOLIC BLOOD PRESSURE: 74 MMHG | WEIGHT: 199.31 LBS | BODY MASS INDEX: 33.21 KG/M2 | RESPIRATION RATE: 18 BRPM | HEIGHT: 65 IN

## 2023-08-31 DIAGNOSIS — S91.209D TRAUMATIC AVULSION OF NAIL PLATE OF TOE, SUBSEQUENT ENCOUNTER: Primary | ICD-10-CM

## 2023-08-31 DIAGNOSIS — M79.675 PAIN OF LEFT GREAT TOE: ICD-10-CM

## 2023-08-31 PROCEDURE — 3044F HG A1C LEVEL LT 7.0%: CPT | Mod: CPTII,S$GLB,, | Performed by: STUDENT IN AN ORGANIZED HEALTH CARE EDUCATION/TRAINING PROGRAM

## 2023-08-31 PROCEDURE — 3078F DIAST BP <80 MM HG: CPT | Mod: CPTII,S$GLB,, | Performed by: STUDENT IN AN ORGANIZED HEALTH CARE EDUCATION/TRAINING PROGRAM

## 2023-08-31 PROCEDURE — 3044F PR MOST RECENT HEMOGLOBIN A1C LEVEL <7.0%: ICD-10-PCS | Mod: CPTII,S$GLB,, | Performed by: STUDENT IN AN ORGANIZED HEALTH CARE EDUCATION/TRAINING PROGRAM

## 2023-08-31 PROCEDURE — 3074F PR MOST RECENT SYSTOLIC BLOOD PRESSURE < 130 MM HG: ICD-10-PCS | Mod: CPTII,S$GLB,, | Performed by: STUDENT IN AN ORGANIZED HEALTH CARE EDUCATION/TRAINING PROGRAM

## 2023-08-31 PROCEDURE — 99999 PR PBB SHADOW E&M-EST. PATIENT-LVL IV: CPT | Mod: PBBFAC,,, | Performed by: STUDENT IN AN ORGANIZED HEALTH CARE EDUCATION/TRAINING PROGRAM

## 2023-08-31 PROCEDURE — 3008F BODY MASS INDEX DOCD: CPT | Mod: CPTII,S$GLB,, | Performed by: STUDENT IN AN ORGANIZED HEALTH CARE EDUCATION/TRAINING PROGRAM

## 2023-08-31 PROCEDURE — 1159F MED LIST DOCD IN RCRD: CPT | Mod: CPTII,S$GLB,, | Performed by: STUDENT IN AN ORGANIZED HEALTH CARE EDUCATION/TRAINING PROGRAM

## 2023-08-31 PROCEDURE — 3074F SYST BP LT 130 MM HG: CPT | Mod: CPTII,S$GLB,, | Performed by: STUDENT IN AN ORGANIZED HEALTH CARE EDUCATION/TRAINING PROGRAM

## 2023-08-31 PROCEDURE — 99203 PR OFFICE/OUTPT VISIT, NEW, LEVL III, 30-44 MIN: ICD-10-PCS | Mod: S$GLB,,, | Performed by: STUDENT IN AN ORGANIZED HEALTH CARE EDUCATION/TRAINING PROGRAM

## 2023-08-31 PROCEDURE — 1159F PR MEDICATION LIST DOCUMENTED IN MEDICAL RECORD: ICD-10-PCS | Mod: CPTII,S$GLB,, | Performed by: STUDENT IN AN ORGANIZED HEALTH CARE EDUCATION/TRAINING PROGRAM

## 2023-08-31 PROCEDURE — 3078F PR MOST RECENT DIASTOLIC BLOOD PRESSURE < 80 MM HG: ICD-10-PCS | Mod: CPTII,S$GLB,, | Performed by: STUDENT IN AN ORGANIZED HEALTH CARE EDUCATION/TRAINING PROGRAM

## 2023-08-31 PROCEDURE — 99999 PR PBB SHADOW E&M-EST. PATIENT-LVL IV: ICD-10-PCS | Mod: PBBFAC,,, | Performed by: STUDENT IN AN ORGANIZED HEALTH CARE EDUCATION/TRAINING PROGRAM

## 2023-08-31 PROCEDURE — 99203 OFFICE O/P NEW LOW 30 MIN: CPT | Mod: S$GLB,,, | Performed by: STUDENT IN AN ORGANIZED HEALTH CARE EDUCATION/TRAINING PROGRAM

## 2023-08-31 PROCEDURE — 3008F PR BODY MASS INDEX (BMI) DOCUMENTED: ICD-10-PCS | Mod: CPTII,S$GLB,, | Performed by: STUDENT IN AN ORGANIZED HEALTH CARE EDUCATION/TRAINING PROGRAM

## 2023-08-31 RX ORDER — DEXAMETHASONE 4 MG/1
4 TABLET ORAL DAILY
Qty: 7 TABLET | Refills: 0 | Status: SHIPPED | OUTPATIENT
Start: 2023-08-31 | End: 2023-09-07

## 2023-08-31 RX ORDER — MELOXICAM 15 MG/1
15 TABLET ORAL DAILY
Qty: 14 TABLET | Refills: 0 | Status: SHIPPED | OUTPATIENT
Start: 2023-08-31 | End: 2023-10-13 | Stop reason: ALTCHOICE

## 2023-08-31 NOTE — PROGRESS NOTES
Subjective:     Patient    Sherlyn Monahan is a 33 y.o. female.    Problem    Presents for left 1st toenail which had traumatic avulsion earlier this week, then seen in ED where the nail was removed completely. Has been applying mupirocin and a dressing 2x/day. Pain is ongoing, using OTC pain medication. No major concerns.     History    History obtained from patient and review of medical records.     Past Medical History:   Diagnosis Date    Acquired hypothyroidism 2016    Allergy 2009    Seasonal    Anxiety     Depression     Irritable bowel syndrome (IBS)     Keloid cicatrix     Obesity (BMI 30.0-34.9) 2016    loosing weight consistently on 21 Day Fix program     Urticaria        Past Surgical History:   Procedure Laterality Date    breast reduction  2012    BREAST SURGERY      breast reduction     SECTION       SECTION N/A 2020    Procedure:  SECTION;  Surgeon: Chiara Smith MD;  Location: [facility] L&D;  Service: OB/GYN;  Laterality: N/A;    CHOLECYSTECTOMY  2019    ENDOSCOPIC CARPAL TUNNEL RELEASE Right 2022    Procedure: RELEASE, CARPAL TUNNEL, ENDOSCOPIC;  Surgeon: Phoenix Rojas MD;  Location: [facility] OR;  Service: Orthopedics;  Laterality: Right;    LAPAROSCOPIC CHOLECYSTECTOMY N/A 2019    Procedure: CHOLECYSTECTOMY, LAPAROSCOPIC;  Surgeon: Missael Tolentino MD;  Location: [facility] OR;  Service: General;  Laterality: N/A;  video    ULNAR NERVE TRANSPOSITION Right 2022    Procedure: TRANSPOSITION, NERVE, ULNAR;  Surgeon: Phoenix Rojas MD;  Location: [facility] OR;  Service: Orthopedics;  Laterality: Right;        Objective:     Vitals  Wt Readings from Last 1 Encounters:   23 90.4 kg (199 lb 4.7 oz)     Temp Readings from Last 1 Encounters:   23 98.9 °F (37.2 °C)     BP Readings from Last 1 Encounters:   23 111/74     Pulse Readings from Last 1 Encounters:   23 71       Dermatological Exam    Skin:   Pedal  hair growth, skin color, and skin texture normal on left    Nails:  All nails normal in length, thickness, color except left 1st nail plate entirely avulsed, some loose skin present on nail bed but bed severely tender which prevents removal          Vascular Exam    Arteries:   Posterior tibial artery palpable on left  Dorsalis pedis artery palpable on left    Veins:   Superficial veins unremarkable on left    Swelling:   None on left    Neurological Exam    [place] touch test:  Light touch sensation intact to left foot      Musculoskeletal Exam    Footwear:  Casual on right  Casual on left    Gait Exam:   Ambulatory Status: Ambulatory  Gait: Normal  Assistive Devices: None    Foot Progression Angle:  Normal on right  Normal on left      Left Lower Extremity Additional Findings:  Left foot and ankle function, strength, and range of motion unremarkable except as noted above.    Imaging and Other Tests    Imaging:  Independently reviewed and interpreted imaging, findings are as follows: N/A     Assessment:     Encounter Diagnoses   Name Primary?    Traumatic avulsion of nail plate of toe, subsequent encounter Yes    Pain of left great toe         Plan:     I counseled the patient on her conditions, their implications and medical management.    Left 1st nail plate traumatic avulsion, pain: acute improving  -Ordered meloxicam and dexamethasone.   -OK to shower, cleanse with soap and water.  -Apply adhesive bandage daily for about 1 more week, antibiotic ointment is optional.  -Physical activity as tolerated.     Return to clinic PRN.

## 2023-09-18 ENCOUNTER — PATIENT MESSAGE (OUTPATIENT)
Dept: OBSTETRICS AND GYNECOLOGY | Facility: CLINIC | Age: 33
End: 2023-09-18
Payer: COMMERCIAL

## 2023-10-04 ENCOUNTER — LAB VISIT (OUTPATIENT)
Dept: LAB | Facility: HOSPITAL | Age: 33
End: 2023-10-04
Payer: COMMERCIAL

## 2023-10-04 DIAGNOSIS — Z00.00 ROUTINE GENERAL MEDICAL EXAMINATION AT A HEALTH CARE FACILITY: ICD-10-CM

## 2023-10-04 LAB
25(OH)D3+25(OH)D2 SERPL-MCNC: 24 NG/ML (ref 30–96)
ALBUMIN SERPL BCP-MCNC: 4 G/DL (ref 3.5–5.2)
ALP SERPL-CCNC: 52 U/L (ref 55–135)
ALT SERPL W/O P-5'-P-CCNC: 8 U/L (ref 10–44)
ANION GAP SERPL CALC-SCNC: 10 MMOL/L (ref 8–16)
AST SERPL-CCNC: 14 U/L (ref 10–40)
BASOPHILS # BLD AUTO: 0.01 K/UL (ref 0–0.2)
BASOPHILS NFR BLD: 0.1 % (ref 0–1.9)
BILIRUB SERPL-MCNC: 0.2 MG/DL (ref 0.1–1)
BUN SERPL-MCNC: 11 MG/DL (ref 6–20)
CALCIUM SERPL-MCNC: 9.4 MG/DL (ref 8.7–10.5)
CHLORIDE SERPL-SCNC: 108 MMOL/L (ref 95–110)
CHOLEST SERPL-MCNC: 184 MG/DL (ref 120–199)
CHOLEST/HDLC SERPL: 4.2 {RATIO} (ref 2–5)
CO2 SERPL-SCNC: 22 MMOL/L (ref 23–29)
CREAT SERPL-MCNC: 0.8 MG/DL (ref 0.5–1.4)
DIFFERENTIAL METHOD: NORMAL
EOSINOPHIL # BLD AUTO: 0.1 K/UL (ref 0–0.5)
EOSINOPHIL NFR BLD: 1.3 % (ref 0–8)
ERYTHROCYTE [DISTWIDTH] IN BLOOD BY AUTOMATED COUNT: 12.2 % (ref 11.5–14.5)
EST. GFR  (NO RACE VARIABLE): >60 ML/MIN/1.73 M^2
ESTIMATED AVG GLUCOSE: 97 MG/DL (ref 68–131)
FERRITIN SERPL-MCNC: 156 NG/ML (ref 20–300)
GLUCOSE SERPL-MCNC: 87 MG/DL (ref 70–110)
HBA1C MFR BLD: 5 % (ref 4–5.6)
HCT VFR BLD AUTO: 41.5 % (ref 37–48.5)
HDLC SERPL-MCNC: 44 MG/DL (ref 40–75)
HDLC SERPL: 23.9 % (ref 20–50)
HGB BLD-MCNC: 13.8 G/DL (ref 12–16)
IMM GRANULOCYTES # BLD AUTO: 0.01 K/UL (ref 0–0.04)
IMM GRANULOCYTES NFR BLD AUTO: 0.1 % (ref 0–0.5)
IRON SERPL-MCNC: 60 UG/DL (ref 30–160)
LDLC SERPL CALC-MCNC: 104.4 MG/DL (ref 63–159)
LYMPHOCYTES # BLD AUTO: 2.7 K/UL (ref 1–4.8)
LYMPHOCYTES NFR BLD: 32.2 % (ref 18–48)
MAGNESIUM SERPL-MCNC: 2.1 MG/DL (ref 1.6–2.6)
MCH RBC QN AUTO: 30.9 PG (ref 27–31)
MCHC RBC AUTO-ENTMCNC: 33.3 G/DL (ref 32–36)
MCV RBC AUTO: 93 FL (ref 82–98)
MONOCYTES # BLD AUTO: 0.5 K/UL (ref 0.3–1)
MONOCYTES NFR BLD: 6.4 % (ref 4–15)
NEUTROPHILS # BLD AUTO: 5 K/UL (ref 1.8–7.7)
NEUTROPHILS NFR BLD: 59.9 % (ref 38–73)
NONHDLC SERPL-MCNC: 140 MG/DL
NRBC BLD-RTO: 0 /100 WBC
PLATELET # BLD AUTO: 183 K/UL (ref 150–450)
PMV BLD AUTO: 11 FL (ref 9.2–12.9)
POTASSIUM SERPL-SCNC: 4.4 MMOL/L (ref 3.5–5.1)
PROT SERPL-MCNC: 7.3 G/DL (ref 6–8.4)
RBC # BLD AUTO: 4.47 M/UL (ref 4–5.4)
SATURATED IRON: 17 % (ref 20–50)
SODIUM SERPL-SCNC: 140 MMOL/L (ref 136–145)
TOTAL IRON BINDING CAPACITY: 361 UG/DL (ref 250–450)
TRANSFERRIN SERPL-MCNC: 244 MG/DL (ref 200–375)
TRIGL SERPL-MCNC: 178 MG/DL (ref 30–150)
TSH SERPL DL<=0.005 MIU/L-ACNC: 1.66 UIU/ML (ref 0.4–4)
VIT B12 SERPL-MCNC: 657 PG/ML (ref 210–950)
WBC # BLD AUTO: 8.29 K/UL (ref 3.9–12.7)

## 2023-10-04 PROCEDURE — 80061 LIPID PANEL: CPT | Performed by: FAMILY MEDICINE

## 2023-10-04 PROCEDURE — 83735 ASSAY OF MAGNESIUM: CPT | Performed by: FAMILY MEDICINE

## 2023-10-04 PROCEDURE — 82607 VITAMIN B-12: CPT | Performed by: FAMILY MEDICINE

## 2023-10-04 PROCEDURE — 36415 COLL VENOUS BLD VENIPUNCTURE: CPT | Performed by: FAMILY MEDICINE

## 2023-10-04 PROCEDURE — 85025 COMPLETE CBC W/AUTO DIFF WBC: CPT | Performed by: FAMILY MEDICINE

## 2023-10-04 PROCEDURE — 83540 ASSAY OF IRON: CPT | Performed by: FAMILY MEDICINE

## 2023-10-04 PROCEDURE — 84466 ASSAY OF TRANSFERRIN: CPT | Performed by: FAMILY MEDICINE

## 2023-10-04 PROCEDURE — 82306 VITAMIN D 25 HYDROXY: CPT | Performed by: FAMILY MEDICINE

## 2023-10-04 PROCEDURE — 84443 ASSAY THYROID STIM HORMONE: CPT | Performed by: FAMILY MEDICINE

## 2023-10-04 PROCEDURE — 83036 HEMOGLOBIN GLYCOSYLATED A1C: CPT | Performed by: FAMILY MEDICINE

## 2023-10-04 PROCEDURE — 80053 COMPREHEN METABOLIC PANEL: CPT | Performed by: FAMILY MEDICINE

## 2023-10-04 PROCEDURE — 82728 ASSAY OF FERRITIN: CPT | Performed by: FAMILY MEDICINE

## 2023-10-13 ENCOUNTER — OFFICE VISIT (OUTPATIENT)
Dept: FAMILY MEDICINE | Facility: CLINIC | Age: 33
End: 2023-10-13
Payer: COMMERCIAL

## 2023-10-13 VITALS
BODY MASS INDEX: 33.91 KG/M2 | HEART RATE: 70 BPM | SYSTOLIC BLOOD PRESSURE: 100 MMHG | WEIGHT: 203.5 LBS | OXYGEN SATURATION: 99 % | DIASTOLIC BLOOD PRESSURE: 62 MMHG | TEMPERATURE: 100 F | HEIGHT: 65 IN

## 2023-10-13 DIAGNOSIS — Z23 NEEDS FLU SHOT: ICD-10-CM

## 2023-10-13 DIAGNOSIS — F41.8 DEPRESSION WITH ANXIETY: ICD-10-CM

## 2023-10-13 DIAGNOSIS — K76.0 FATTY LIVER: ICD-10-CM

## 2023-10-13 DIAGNOSIS — Z00.00 LABORATORY EXAMINATION ORDERED AS PART OF A ROUTINE GENERAL MEDICAL EXAMINATION: ICD-10-CM

## 2023-10-13 DIAGNOSIS — R79.89 LOW VITAMIN D LEVEL: Primary | ICD-10-CM

## 2023-10-13 DIAGNOSIS — E03.9 ACQUIRED HYPOTHYROIDISM: ICD-10-CM

## 2023-10-13 DIAGNOSIS — Z79.899 MEDICATION MANAGEMENT: ICD-10-CM

## 2023-10-13 DIAGNOSIS — G47.19 EXCESSIVE DAYTIME SLEEPINESS: ICD-10-CM

## 2023-10-13 DIAGNOSIS — G43.709 CHRONIC MIGRAINE WITHOUT AURA WITHOUT STATUS MIGRAINOSUS, NOT INTRACTABLE: ICD-10-CM

## 2023-10-13 PROCEDURE — 90471 FLU VACCINE (QUAD) GREATER THAN OR EQUAL TO 3YO PRESERVATIVE FREE IM: ICD-10-PCS | Mod: S$GLB,,, | Performed by: FAMILY MEDICINE

## 2023-10-13 PROCEDURE — 99214 PR OFFICE/OUTPT VISIT, EST, LEVL IV, 30-39 MIN: ICD-10-PCS | Mod: 25,S$GLB,, | Performed by: FAMILY MEDICINE

## 2023-10-13 PROCEDURE — 90471 IMMUNIZATION ADMIN: CPT | Mod: S$GLB,,, | Performed by: FAMILY MEDICINE

## 2023-10-13 PROCEDURE — 1160F RVW MEDS BY RX/DR IN RCRD: CPT | Mod: CPTII,S$GLB,, | Performed by: FAMILY MEDICINE

## 2023-10-13 PROCEDURE — 90686 IIV4 VACC NO PRSV 0.5 ML IM: CPT | Mod: S$GLB,,, | Performed by: FAMILY MEDICINE

## 2023-10-13 PROCEDURE — 99999 PR PBB SHADOW E&M-EST. PATIENT-LVL V: CPT | Mod: PBBFAC,,, | Performed by: FAMILY MEDICINE

## 2023-10-13 PROCEDURE — 90686 FLU VACCINE (QUAD) GREATER THAN OR EQUAL TO 3YO PRESERVATIVE FREE IM: ICD-10-PCS | Mod: S$GLB,,, | Performed by: FAMILY MEDICINE

## 2023-10-13 PROCEDURE — 1160F PR REVIEW ALL MEDS BY PRESCRIBER/CLIN PHARMACIST DOCUMENTED: ICD-10-PCS | Mod: CPTII,S$GLB,, | Performed by: FAMILY MEDICINE

## 2023-10-13 PROCEDURE — 3074F PR MOST RECENT SYSTOLIC BLOOD PRESSURE < 130 MM HG: ICD-10-PCS | Mod: CPTII,S$GLB,, | Performed by: FAMILY MEDICINE

## 2023-10-13 PROCEDURE — 3008F BODY MASS INDEX DOCD: CPT | Mod: CPTII,S$GLB,, | Performed by: FAMILY MEDICINE

## 2023-10-13 PROCEDURE — 1159F PR MEDICATION LIST DOCUMENTED IN MEDICAL RECORD: ICD-10-PCS | Mod: CPTII,S$GLB,, | Performed by: FAMILY MEDICINE

## 2023-10-13 PROCEDURE — 3078F PR MOST RECENT DIASTOLIC BLOOD PRESSURE < 80 MM HG: ICD-10-PCS | Mod: CPTII,S$GLB,, | Performed by: FAMILY MEDICINE

## 2023-10-13 PROCEDURE — 99999 PR PBB SHADOW E&M-EST. PATIENT-LVL V: ICD-10-PCS | Mod: PBBFAC,,, | Performed by: FAMILY MEDICINE

## 2023-10-13 PROCEDURE — 3044F HG A1C LEVEL LT 7.0%: CPT | Mod: CPTII,S$GLB,, | Performed by: FAMILY MEDICINE

## 2023-10-13 PROCEDURE — 3044F PR MOST RECENT HEMOGLOBIN A1C LEVEL <7.0%: ICD-10-PCS | Mod: CPTII,S$GLB,, | Performed by: FAMILY MEDICINE

## 2023-10-13 PROCEDURE — 99214 OFFICE O/P EST MOD 30 MIN: CPT | Mod: 25,S$GLB,, | Performed by: FAMILY MEDICINE

## 2023-10-13 PROCEDURE — 3008F PR BODY MASS INDEX (BMI) DOCUMENTED: ICD-10-PCS | Mod: CPTII,S$GLB,, | Performed by: FAMILY MEDICINE

## 2023-10-13 PROCEDURE — 3078F DIAST BP <80 MM HG: CPT | Mod: CPTII,S$GLB,, | Performed by: FAMILY MEDICINE

## 2023-10-13 PROCEDURE — 3074F SYST BP LT 130 MM HG: CPT | Mod: CPTII,S$GLB,, | Performed by: FAMILY MEDICINE

## 2023-10-13 PROCEDURE — 1159F MED LIST DOCD IN RCRD: CPT | Mod: CPTII,S$GLB,, | Performed by: FAMILY MEDICINE

## 2023-10-13 RX ORDER — CHOLECALCIFEROL (VITAMIN D3) 50 MCG
1 TABLET ORAL
Qty: 100 TABLET | Refills: 3 | Status: SHIPPED | OUTPATIENT
Start: 2023-10-13

## 2023-10-13 NOTE — PROGRESS NOTES
Office Visit    Patient Name: Sherlyn Monahan    : 1990  MRN: [id number]    Subjective:  Sherlyn is a 33 y.o. female who presents today for:    Follow-up (6 month)    SEEN BY ME FOR ANNUAL PHYSICAL 2023, HERE FOR LAB REVIEW AND SIX-MONTH FOLLOW-UP   She is on Clomid cycles per Gyn and about to start 2nd round.     Sherlyn presents today for lab review and 6 month follow up of chronic conditions that include hypothyroidism,  section deliveries on 2018 and 2020 per Dr. Smith, lap mikal 19 per Dr Tolentino, h/o heart palpitations s/p normal EKG 19, h/o urticaria s/p allergy eval and treatment with extended course of antihistamine (hives not though secondary to amoxicillin), with repeat extended course of hives following the Moderna COVID-19 booster.     Labs drawn 10/4/23 Show mild improvement in vitamin-D from 21->24 but is still low on her current calcium / vitamin-D supplement.  Triglycerides have improved with lifestyle improvements from the 300s to 178.   LDL is 104.    A1c is 5.0, CMP with normal liver and kidney function.  Normal CBC with iron studies.  TSH is 1.6 on Synthroid 50.    Has previously seen Endocrine who is suspicious of PCOS.     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   Has 5 and 3 year old.      She is of childbearing age.  She has a gynecologist-- Dr Smith--  and is up to date with pap.   She has been try to achieve pregnancy for about 1+ year.   Prior pelvic ultrasound has shown cysts.  Having monthly periods that are about 10 days long.   Endocrine confirmed PCOS and suspected low egg count.   She is about to start her 2nd cycle of Clomid      Today she reports still having low energy concerns-open to sleep med  referral for home sleep study.  Otherwise feeling okay,  migraines have decreased in frequency.  Takes magnesium oxide nightly but otherwise not on a preventative.  Takes NSAIDs as an abortive treatment.  She was doing better with exercise but had to stop temporarily due to a toenail injury, hopes to restart soon.  Has been more mindful of her diet and has a better work life balance since starting her own counseling practice. Mood stable on Zoloft.     General lifestyle habits are as follows:    Diet: good, following a weight watchers program and trying to drink more water  Exercise: was exercising-- some walking and you tube, currently out of her routine bc of toenail issue   SLEEP: fair, some snoring and LV concerns(AM headaches).   Weight: has maintained a 25 lb loss-- trended up a few lbs to BMI 33.8.     Immunizations: TDap 3/19/18, yearly flu shot-- TODAY 10/13/23;  COVID-19 completed with Moderna booster 12/18/21-- FURTHER COVID BOOSTERS DEFERRED AT THIS TIME GIVEN HER HISTORY OF ADVERSE REACTION (HIVES)     Screening Tests: PAP 1/19/23WNL/HPV (-) per GYN Dr Smith, STD screening during pregnancy (-), Labs UTD      Eye/Dental: eye UTD,  Dental UTD             PAST MEDICAL HISTORY, SURGICAL/SOCIAL/FAMILY HISTORY REVIEWED AS PER CHART, WITH PERTINENT FINDINGS INCLUDED IN HISTORY SECTION OF NOTE.     Current Medications    Medication List with Changes/Refills   New Medications    CHOLECALCIFEROL, VITAMIN D3, (VITAMIN D3) 50 MCG (2,000 UNIT) TAB    Take 1 tablet (2,000 Units total) by mouth daily with breakfast.   Current Medications    B COMPLEX VITAMINS CAPSULE    Take 1 capsule by mouth once daily.    CALCIUM-VITAMIN D3 (OS-ELISABETH 500 + D3) 500 MG(1,250MG) -200 UNIT PER TABLET    Take 1 tablet by mouth.    CETIRIZINE (ZYRTEC) 10 MG TABLET    Take 1 tablet (10 mg total) by mouth 2 (two) times a day.    CLOMIPHENE (CLOMID) 50 MG TABLET    Take 1 tablet (50 mg total) by mouth once daily. Take cycle day 3-7    LACTOBACILLUS RHAMNOSUS GG (CULTURELLE) 10 BILLION CELL  "CAPSULE    Take 1 capsule by mouth once daily.    LANSOPRAZOLE (PREVACID) 30 MG CAPSULE    Take 30 mg by mouth once daily.    MAGNESIUM OXIDE (MAG-OX) 400 MG (241.3 MG MAGNESIUM) TABLET    Take 400 mg by mouth once daily.    MUPIROCIN CALCIUM 2% NASL OINT (BACTROBAN) 2 % OINT    by Nasal route 2 (two) times daily.    PRENATAL VIT37-IRON-FOLIC ACID 29 MG IRON- 1 MG CHEW    Take by mouth.    SERTRALINE (ZOLOFT) 50 MG TABLET    TAKE 1 TABLET(50 MG) BY MOUTH EVERY EVENING    SYNTHROID 50 MCG TABLET    TAKE 1 TABLET(50 MCG) BY MOUTH EVERY DAY   Discontinued Medications    MELOXICAM (MOBIC) 15 MG TABLET    Take 1 tablet (15 mg total) by mouth once daily.       Allergies   Review of patient's allergies indicates:   Allergen Reactions    Minocycline Swelling         Review of Systems (Pertinent positives)  Review of Systems   Constitutional:  Positive for fatigue. Negative for unexpected weight change.   Respiratory:  Negative for shortness of breath.    Musculoskeletal:  Positive for neck pain.   Neurological:  Positive for headaches.   Psychiatric/Behavioral:  Negative for dysphoric mood. The patient is not nervous/anxious.        /62 (BP Location: Right arm, Patient Position: Sitting, BP Method: Large (Manual))   Pulse 70   Temp 99.5 °F (37.5 °C) (Oral)   Ht 5' 5" (1.651 m)   Wt 92.3 kg (203 lb 7.8 oz)   LMP 10/13/2023 (Exact Date)   SpO2 99%   BMI 33.86 kg/m²     Physical Exam  Vitals reviewed.   Constitutional:       General: She is not in acute distress.     Appearance: Normal appearance. She is well-developed. She is obese.   HENT:      Head: Normocephalic and atraumatic.   Eyes:      Conjunctiva/sclera: Conjunctivae normal.   Cardiovascular:      Rate and Rhythm: Normal rate and regular rhythm.   Pulmonary:      Effort: Pulmonary effort is normal.      Breath sounds: Normal breath sounds.   Musculoskeletal:      Right lower leg: No edema.      Left lower leg: No edema.   Skin:     General: Skin is warm " and dry.   Neurological:      General: No focal deficit present.      Mental Status: She is alert and oriented to person, place, and time.   Psychiatric:         Mood and Affect: Mood normal.         Behavior: Behavior normal.           Assessment/Plan:  Sherlyn Monahan is a 33 y.o. female who presents today for :        ICD-10-CM ICD-9-CM    1. Low vitamin D level  R79.89 790.6 cholecalciferol, vitamin D3, (VITAMIN D3) 50 mcg (2,000 unit) Tab      2. Fatty liver  K76.0 571.8       3. Acquired hypothyroidism  E03.9 244.9       4. Depression with anxiety  F41.8 300.4       5. Chronic migraine without aura without status migrainosus, not intractable  G43.709 346.70       6. Medication management  Z79.899 V58.69       7. Needs flu shot  Z23 V04.81 Influenza - Quadrivalent *Preferred* (6 months+) (PF)      8. Laboratory examination ordered as part of a routine general medical examination  Z00.00 V72.62 Hemoglobin A1C      Comprehensive Metabolic Panel      Lipid Panel      CBC Auto Differential      TSH      Vitamin D      9. Excessive daytime sleepiness  G47.19 780.54 Ambulatory referral/consult to Sleep Disorders        OBESITY WITH CONCERN FOR POSSIBLE LV:  Has lost about 25 lb with following weight watchers, advised on the importance of hydration and incorporating regular exercise.   STILL EXPERIENCING LV SYMPTOMS AND THEREFORE SLEEP MED REFERRAL PLACED SO SHE CAN HOPEFULLY OBTAIN A HOME SLEEP STUDY AND GATHER MORE INFORMATION.     IRREGULAR MENSES WITH SUSPECTED PCOS:  Following with Endocrine, has lost weight, tracking cycles and confirms ovulation with ovulation monitor.  Trying to conceive-has been trying for over 1 year.  Hormones evaluated by Endocrine,   On Clomid cycle 2. Per gyn Dr. Smith.    HYPOTHYROIDISM:  Has been stable on Synthroid 50, check TSH periodically especially in light of recent weight loss.     FATTY LIVER:  Liver enzymes recently normal, advised continued attention to  diet/exercise/weight loss      HYPERTRIGLYCERIDEMIA:   Levels have almost normalized with lifestyle improvements, urged ongoing attention to diet/ exercise/ weight loss.      LOW VITAMIN-D:   Advised on adding 2000 IU of D3 to her current calcium / vitamin-D supplement.     MIGRAINE HEADACHES:  Currently manageable oral analgesics such as Aleve p.r.n..  Not taking a preventative as she is actively trying to conceive. SHE IS TAKING MAGNESIUM OXIDE 400 DAILY.     DEPRESSION/ANXIETY:  Doing well on Zoloft 50     IBS/D SYMPTOMS EXACERBATED BY CHOLECYSTECTOMY:  Continue awareness of dietary triggers, trial of daily Benefiber or fiber supplement to see if this helps with bowel movement and regulation.           There are no Patient Instructions on file for this visit.      Follow up in about 6 months (around 4/13/2024) for to follow up on lab results, return as needed for new concerns.

## 2023-10-18 ENCOUNTER — PATIENT MESSAGE (OUTPATIENT)
Dept: OBSTETRICS AND GYNECOLOGY | Facility: CLINIC | Age: 33
End: 2023-10-18
Payer: COMMERCIAL

## 2023-10-18 ENCOUNTER — PATIENT MESSAGE (OUTPATIENT)
Dept: FAMILY MEDICINE | Facility: CLINIC | Age: 33
End: 2023-10-18
Payer: COMMERCIAL

## 2023-10-18 DIAGNOSIS — G89.29 CHRONIC BILATERAL LOW BACK PAIN WITHOUT SCIATICA: Primary | ICD-10-CM

## 2023-10-18 DIAGNOSIS — M54.50 CHRONIC BILATERAL LOW BACK PAIN WITHOUT SCIATICA: Primary | ICD-10-CM

## 2023-10-23 DIAGNOSIS — F41.8 DEPRESSION WITH ANXIETY: ICD-10-CM

## 2023-10-23 DIAGNOSIS — E03.9 ACQUIRED HYPOTHYROIDISM: ICD-10-CM

## 2023-10-23 NOTE — TELEPHONE ENCOUNTER
No care due was identified.  [facility] Embedded Care Due Messages. Reference number: [id number].   10/23/2023 9:44:16 AM CDT

## 2023-10-23 NOTE — TELEPHONE ENCOUNTER
No care due was identified.  [facility] Embedded Care Due Messages. Reference number: [id number].   10/23/2023 9:44:44 AM CDT

## 2023-10-24 RX ORDER — LEVOTHYROXINE SODIUM 50 UG/1
TABLET ORAL
Qty: 90 TABLET | Refills: 3 | Status: SHIPPED | OUTPATIENT
Start: 2023-10-24

## 2023-10-24 RX ORDER — LEVOTHYROXINE SODIUM 50 UG/1
TABLET ORAL
Qty: 90 TABLET | Refills: 4 | OUTPATIENT
Start: 2023-10-24

## 2023-10-24 RX ORDER — SERTRALINE HYDROCHLORIDE 50 MG/1
TABLET, FILM COATED ORAL
Qty: 90 TABLET | Refills: 3 | Status: SHIPPED | OUTPATIENT
Start: 2023-10-24

## 2023-10-24 NOTE — TELEPHONE ENCOUNTER
Refill Decision Note   Sherlyn Monahan  is requesting a refill authorization.  Brief Assessment and Rationale for Refill:  Quick Discontinue  Approve     Medication Therapy Plan:  Pharmacy confirmed not having any refills on flie.      Alert overridden per protocol: Yes   Medication reconciliation completed: Yes   Comments:     Note composed:9:09 AM 10/24/2023

## 2023-10-24 NOTE — TELEPHONE ENCOUNTER
Refill Decision Note   Sherlyn Monahan  is requesting a refill authorization.  Brief Assessment and Rationale for Refill:  Approve     Medication Therapy Plan:  Pharmacy stated they do not have recent refills on rx.      Medication reconciliation completed: Yes   Comments:     Note composed:7:43 AM 10/24/2023

## 2023-10-25 ENCOUNTER — OFFICE VISIT (OUTPATIENT)
Dept: SLEEP MEDICINE | Facility: CLINIC | Age: 33
End: 2023-10-25
Payer: COMMERCIAL

## 2023-10-25 DIAGNOSIS — F51.09 OTHER INSOMNIA NOT DUE TO A SUBSTANCE OR KNOWN PHYSIOLOGICAL CONDITION: ICD-10-CM

## 2023-10-25 DIAGNOSIS — G47.10 HYPERSOMNOLENCE: Primary | ICD-10-CM

## 2023-10-25 DIAGNOSIS — R06.83 SNORING: ICD-10-CM

## 2023-10-25 DIAGNOSIS — G47.19 EXCESSIVE DAYTIME SLEEPINESS: ICD-10-CM

## 2023-10-25 PROCEDURE — 3044F HG A1C LEVEL LT 7.0%: CPT | Mod: CPTII,95,, | Performed by: PHYSICIAN ASSISTANT

## 2023-10-25 PROCEDURE — 1160F PR REVIEW ALL MEDS BY PRESCRIBER/CLIN PHARMACIST DOCUMENTED: ICD-10-PCS | Mod: CPTII,95,, | Performed by: PHYSICIAN ASSISTANT

## 2023-10-25 PROCEDURE — 1159F MED LIST DOCD IN RCRD: CPT | Mod: CPTII,95,, | Performed by: PHYSICIAN ASSISTANT

## 2023-10-25 PROCEDURE — 1159F PR MEDICATION LIST DOCUMENTED IN MEDICAL RECORD: ICD-10-PCS | Mod: CPTII,95,, | Performed by: PHYSICIAN ASSISTANT

## 2023-10-25 PROCEDURE — 3044F PR MOST RECENT HEMOGLOBIN A1C LEVEL <7.0%: ICD-10-PCS | Mod: CPTII,95,, | Performed by: PHYSICIAN ASSISTANT

## 2023-10-25 PROCEDURE — 99204 OFFICE O/P NEW MOD 45 MIN: CPT | Mod: 95,,, | Performed by: PHYSICIAN ASSISTANT

## 2023-10-25 PROCEDURE — 1160F RVW MEDS BY RX/DR IN RCRD: CPT | Mod: CPTII,95,, | Performed by: PHYSICIAN ASSISTANT

## 2023-10-25 PROCEDURE — 99204 PR OFFICE/OUTPT VISIT, NEW, LEVL IV, 45-59 MIN: ICD-10-PCS | Mod: 95,,, | Performed by: PHYSICIAN ASSISTANT

## 2023-10-25 NOTE — PROGRESS NOTES
The chief complaint leading to consultation is: sleep problems    Visit type: audiovisual     13 minutes of total time spent on the encounter, which includes face to face time and non-face to face time preparing to see the patient (eg, review of tests), Obtaining and/or reviewing separately obtained history, Documenting clinical information in the electronic or other health record, Independently interpreting results (not separately reported) and communicating results to the patient/family/caregiver, or Care coordination (not separately reported).     Each patient to whom he or she provides medical services by telemedicine is:  (1) informed of the relationship between the physician and patient and the respective role of any other health care provider with respect to management of the patient; and (2) notified that he or she may decline to receive medical services by telemedicine and may withdraw from such care at any time.      The patient location is: [place]    Referred by Alea Moses MD     NEW PATIENT VISIT    Sherlyn Monahan  is a pleasant 33 y.o. female  with PMH significant for migraines, depression, anxiety, hypothyroidism, IBS, GERD, fatty liver, vit D def, BMI 33+ who presents for sleep evaluation       C/o excessive daytime sleepiness and fatigue. States she wakes feeling un refreshed despite adequate total sleep time. Takes naps when able. Does endorse hx of snoring, but denies witnessed apneas.      SLEEP SCHEDULE   Environment    Bed Time 9:30-10PM   Sleep Latency Up to 60mins   Arousals 0-2   Nocturia 0   Back to sleep Not long typically   Wake time 6:30-7AM   Naps Naps when able   Work        Past Medical History:   Diagnosis Date    Acquired hypothyroidism 01/11/2016    Allergy 8/2009    Seasonal    Anxiety 2019    Depression 2019    Irritable bowel syndrome (IBS)     Keloid cicatrix 2012    Obesity (BMI 30.0-34.9) 01/11/2016    loosing weight consistently on 21 Day Fix program     Urticaria       Patient Active Problem List   Diagnosis    Acquired hypothyroidism    Dysmenorrhea    Chronic migraine without aura without status migrainosus, not intractable    Family history of breast cancer in mother    Irregular menstrual bleeding    S/P     Fatty liver    Status post cholecystectomy    Delivery of pregnancy by  section    Carpal tunnel syndrome of right wrist    Adverse effect of COVID-19 vaccine    Cubital tunnel syndrome of both upper extremities    Depression with anxiety    Low vitamin D level    Decreased range of motion of right elbow    Decreased range of motion of right wrist    Pain in right elbow    Pain in right wrist    Impaired dexterity    Difficulty with activities of daily living    Swelling of right wrist    Swelling of right elbow    Irritable bowel syndrome with diarrhea       Current Outpatient Medications:     b complex vitamins capsule, Take 1 capsule by mouth once daily., Disp: , Rfl:     calcium-vitamin D3 (OS-ELISABETH 500 + D3) 500 mg(1,250mg) -200 unit per tablet, Take 1 tablet by mouth., Disp: , Rfl:     cetirizine (ZYRTEC) 10 MG tablet, Take 1 tablet (10 mg total) by mouth 2 (two) times a day., Disp: 60 tablet, Rfl: 12    cholecalciferol, vitamin D3, (VITAMIN D3) 50 mcg (2,000 unit) Tab, Take 1 tablet (2,000 Units total) by mouth daily with breakfast., Disp: 100 tablet, Rfl: 3    clomiPHENE (CLOMID) 50 mg tablet, Take 1 tablet (50 mg total) by mouth once daily. Take cycle day 3-7, Disp: 5 tablet, Rfl: 2    Lactobacillus rhamnosus GG (CULTURELLE) 10 billion cell capsule, Take 1 capsule by mouth once daily., Disp: , Rfl:     lansoprazole (PREVACID) 30 MG capsule, Take 30 mg by mouth once daily., Disp: , Rfl:     magnesium oxide (MAG-OX) 400 mg (241.3 mg magnesium) tablet, Take 400 mg by mouth once daily., Disp: , Rfl:     mupirocin calcium 2% nasl oint (BACTROBAN) 2 % Oint, by Nasal route 2 (two) times daily., Disp: 1 Tube, Rfl: 3    prenatal vit37-iron-folic acid 29  mg iron- 1 mg Chew, Take by mouth., Disp: , Rfl:     sertraline (ZOLOFT) 50 MG tablet, TAKE 1 TABLET(50 MG) BY MOUTH EVERY EVENING, Disp: 90 tablet, Rfl: 3    SYNTHROID 50 mcg tablet, TAKE 1 TABLET(50 MCG) BY MOUTH EVERY DAY, Disp: 90 tablet, Rfl: 3  No current facility-administered medications for this visit.    Facility-Administered Medications Ordered in Other Visits:     fentaNYL 50 mcg/mL injection  mcg,  mcg, Intravenous, PRN, Yury Ambrosio MD, 50 mcg at 06/24/22 1026    LIDOcaine (PF) 10 mg/ml (1%) injection 10 mg, 1 mL, Intradermal, Once, Yury Ambrosio MD    midazolam (VERSED) 1 mg/mL injection 0.5-4 mg, 0.5-4 mg, Intravenous, PRN, Yury Ambrosio MD, 2 mg at 06/24/22 1026     There were no vitals filed for this visit.    Physical Exam:    GEN:   Well-appearing  Psych:  Appropriate affect, demonstrates insight  SKIN:  No rash on the face or bridge of the nose      LABS:   Lab Results   Component Value Date    HGB 13.8 10/04/2023    CO2 22 (L) 10/04/2023       RECORDS REVIEWED PREVIOUSLY:    No prior sleep testing.    ASSESSMENT     [place]man Sleepiness Scale:  Sitting and reading:   3  Watching TV:    2  Passenger in a car x 1 hr:  2  Sitting quietly after lunch:  3  Lying down to rest in PM:  3  Sitting, inactive in public:  0  Sitting+ talking to someone:  1  Stopped in traffic:   0  Total    14/24     PROBLEM DESCRIPTION/ Sx on Presentation  STATUS   sx LV   + snoring, + rare choking arousals, no witnessed apneas  + wakes feeling un-refreshed  New   Daytime Sx   + sleepiness when inactive   ESS 14/24 on intake  New   Insomnia   Trouble falling asleep: sometimes difficult  Maintenance:         wakes occassionally, not typically difficult to return to sleep  Prior hypnotics:        Current hypnotics:     New   Other issues:     PLAN     -recommend sleep testing   -HST ordered  -discussed trial therapy if LV present and the patient is open to a trial of CPAP  therapy  -discussed LV and CPAP with patient in detail, including possible complications of untreated LV like heart attack/stroke  -advised on strict driving precautions; advised never to drive drowsy    Advised on plan of care. Answered all patient questions. Patient verbalized understanding and voiced agreement with plan of care.       RTC if dx of LV made and CPAP ordered, will need follow up 31-90 days after receiving machine for compliance      The patient was given open opportunity to ask questions and/or express concerns about treatment plan. All questions/concerns were discussed.     Two patient identifiers used prior to evaluation.

## 2023-10-27 ENCOUNTER — TELEPHONE (OUTPATIENT)
Dept: SLEEP MEDICINE | Facility: OTHER | Age: 33
End: 2023-10-27
Payer: COMMERCIAL

## 2023-11-06 ENCOUNTER — OFFICE VISIT (OUTPATIENT)
Dept: GASTROENTEROLOGY | Facility: CLINIC | Age: 33
End: 2023-11-06
Payer: COMMERCIAL

## 2023-11-06 ENCOUNTER — LAB VISIT (OUTPATIENT)
Dept: LAB | Facility: HOSPITAL | Age: 33
End: 2023-11-06
Attending: FAMILY MEDICINE
Payer: COMMERCIAL

## 2023-11-06 ENCOUNTER — PATIENT MESSAGE (OUTPATIENT)
Dept: GASTROENTEROLOGY | Facility: CLINIC | Age: 33
End: 2023-11-06

## 2023-11-06 VITALS — WEIGHT: 203.06 LBS | BODY MASS INDEX: 33.79 KG/M2

## 2023-11-06 DIAGNOSIS — R19.7 DIARRHEA, UNSPECIFIED TYPE: ICD-10-CM

## 2023-11-06 DIAGNOSIS — K92.1 BLOOD IN STOOL: ICD-10-CM

## 2023-11-06 DIAGNOSIS — R19.7 DIARRHEA, UNSPECIFIED TYPE: Primary | ICD-10-CM

## 2023-11-06 PROCEDURE — 99204 OFFICE O/P NEW MOD 45 MIN: CPT | Mod: S$GLB,,,

## 2023-11-06 PROCEDURE — 99999 PR PBB SHADOW E&M-EST. PATIENT-LVL III: CPT | Mod: PBBFAC,,,

## 2023-11-06 PROCEDURE — 1159F PR MEDICATION LIST DOCUMENTED IN MEDICAL RECORD: ICD-10-PCS | Mod: CPTII,S$GLB,,

## 2023-11-06 PROCEDURE — 36415 COLL VENOUS BLD VENIPUNCTURE: CPT

## 2023-11-06 PROCEDURE — 3008F BODY MASS INDEX DOCD: CPT | Mod: CPTII,S$GLB,,

## 2023-11-06 PROCEDURE — 3044F HG A1C LEVEL LT 7.0%: CPT | Mod: CPTII,S$GLB,,

## 2023-11-06 PROCEDURE — 3008F PR BODY MASS INDEX (BMI) DOCUMENTED: ICD-10-PCS | Mod: CPTII,S$GLB,,

## 2023-11-06 PROCEDURE — 3044F PR MOST RECENT HEMOGLOBIN A1C LEVEL <7.0%: ICD-10-PCS | Mod: CPTII,S$GLB,,

## 2023-11-06 PROCEDURE — 1159F MED LIST DOCD IN RCRD: CPT | Mod: CPTII,S$GLB,,

## 2023-11-06 PROCEDURE — 99999 PR PBB SHADOW E&M-EST. PATIENT-LVL III: ICD-10-PCS | Mod: PBBFAC,,,

## 2023-11-06 PROCEDURE — 82784 ASSAY IGA/IGD/IGG/IGM EACH: CPT

## 2023-11-06 PROCEDURE — 99204 PR OFFICE/OUTPT VISIT, NEW, LEVL IV, 45-59 MIN: ICD-10-PCS | Mod: S$GLB,,,

## 2023-11-06 PROCEDURE — 86364 TISS TRNSGLTMNASE EA IG CLAS: CPT

## 2023-11-06 NOTE — PATIENT INSTRUCTIONS
Stool Collection Kit Instructions    Place stool Collection Hat under your toilet seat   With and disposable spoon scoop and fill the provided cup with stool to the half way fletcher on the stool cup (if your stool is runny or liquid that is fine)  Place the top on the stool cup  Place the stool cup in the biohazard bag and zip the bag closed   Place the biohazard bag in the brown paper bag that is provided   Bring the stool sample to any [facility] lab (Any location where you can get blood work done)  Discard any extra equipment (throw away the stool hat, spoon WE DO NOT NEED THAT BACK)        If the sample is collected after clinic hours, please place in refrigerator or cooler until the next morning.  Please make sure that there is no Tissue, wipes, or other paper attached to the stool cup (the lab will have you repeat if something is attached to the cup).       Miralax Prep    [facility] [address]  Clinic Office [phone]  Endoscopy Lab [phone]    You are scheduled for a Colonoscopy with Dr. Duval on  12/13/23 at [facility] in [place].    Check in at the Hospital -1st floor, Information desk.   Call [phone]  to reschedule.    An adult friend/family member must come with you to drive you home.  You cannot drive, take a taxi, Uber/Lyft or bus to leave the Endoscopy Center alone.  If you do not have someone to drive you home, your test will be cancelled.     Please follow the directions of your doctor if you take any pills that thin your blood. If you take these meds: Aggrenox, Brilinta, Effient, Eliquis, Lovenox, Plavix, Pletal, Pradaxa, Ticilid, Xarelto or Coumadin, let the doctor's office know.    Please hold any GLP-1 medications prior to the procedure: Dulaglutide Trulicity(hold week prior), Exenatide Byetta (hold the morning of procedure), Semaglutide Ozempic (hold week prior), Liraglutide Victoza, Saxenda(hold week prior), Lixisenatide Adlyxin (hold the  morning of procedure), Semaglutide Rybelsus (hold the morning of procedure), Tirzepatide Mounjaro (hold week prior)     DON'T: On the morning of the test do not take insulin or pills for diabetes.     DO: On the morning of the test, do take any pills for blood pressure, heart, anti-rejection and or seizures with a small sip of water. Bring any inhalers with you.    To have a good prep, you must follow these instructions - please do not use the directions from the pharmacy.    You need to buy medicine from the drugstore. You do not need a prescription from the doctor. Generic medicine is ok.  4 Dulcolax pills - 5mg  Gas X (simethicone) 125mg capsules or tablets   1 Bottle of Miralax - 238gram bottle  128 ounces of Gatorade (yellow or green)      The Day Before the test:    You can only drink CLEAR LIQUIDS the whole day before your test.  You can't eat any food for the whole day.    You CAN have:  Water, Coffee or decaf coffee (no milk or cream)  Tea  Soft drinks - regular and sugar free  Jello (green or yellow)  Apple Juice, grape juice, white cranberry juice  Gatorade, Power Aid, Crystal Light, Tyson Aid  Lemonade and Limeade  Bouillon, clear soup  Snowball, popsicles  YOU CAN'T DRINK ANYTHING RED, Purple Orange or BLUE  YOU CAN'T DRINK ALCOHOL  ONLY DRINK WHAT IS ON THE LIST    At 4:00 PM  Take 4 pills of Dulcolax.  Take 1 dose of simethicone (Gas-X) tablets or capsules. Use as Directed.       At 6:00 PM,   Drink 1 glass (8 ounces) every 10 minutes until 64 ounces of Gatorade is finished. (Keep it cold and in refrigerator as much as you can while drinking it).   Add 1 capful of Miralax to each 8 ounces of Gatorade = 4 capfuls in 32-ounce bottle = 8 capfuls in 64-ounce bottle.    Make it in the morning. Put it in the refrigerator AFTER you make it. It tastes better if it is cold. Do NOT put this solution over ice. It is ok to drink with a straw.    The Day of the test - We will call you 2 days before your test to  tell you what time to get there.    5 hours before you come to the hospital (this may be in the middle of the night)  Drink 1 glass (8 ounces) every 10 minutes until 64 ounces of Gatorade is finished. (Keep it cold and in refrigerator as much as you can while drinking it).   Add 1 capful of Miralax to each 8 ounces of Gatorade = 4 capfuls in 32-ounce bottle = 8 capfuls in 64-ounce bottle.    Make it in the morning. Put it in the refrigerator AFTER you make it. It tastes better if it is cold. Do NOT put this solution over ice. It is ok to drink with a straw    YOU CAN'T EAT OR DRINK ANYTHING ELSE ONCE YOU FINISH THE PREP    Leave all valuables and jewelry at home. You will be at the hospital for 2-4 hours.    Call the Endoscopy department at [phone] with any questions about your procedure.

## 2023-11-06 NOTE — PROGRESS NOTES
GASTROENTEROLOGY CLINIC NOTE    Reason for visit: The primary encounter diagnosis was Diarrhea, unspecified type. A diagnosis of Blood in stool was also pertinent to this visit.  Referring provider/PCP: Alea Moses MD    HPI:  Sherlyn Monahan is a 33 y.o. female here today for diarrhea and blood in stool. She reports having diarrhea since , would have intermittent flares where diarrhea worsened. Over the past few weeks began having diarrhea multiple times/day, every time she eats. BM's are loose and liquid in nature, also has bloody mucus in stool. Blood occurs daily. She reports intermittently seeing blood on the tissue paper in the past. She is taking probiotics and benefiber which seems to help somewhat. No known FH of CRC or IBD.     Prior Endoscopy:  EGD:  Colon:    (Portions of this note were dictated using voice recognition software and may contain dictation related errors in spelling/grammar/syntax not found on text review)    Review of Systems   Gastrointestinal:  Positive for blood in stool and diarrhea.       Past Medical History: has a past medical history of Acquired hypothyroidism, Allergy, Anxiety, Depression, Irritable bowel syndrome (IBS), Keloid cicatrix, Obesity (BMI 30.0-34.9), and Urticaria.    Past Surgical History: has a past surgical history that includes breast reduction (2012); Breast surgery;  section; Laparoscopic cholecystectomy (N/A, 2019); Cholecystectomy (2019);  section (N/A, 2020); Ulnar nerve transposition (Right, 2022); and Endoscopic carpal tunnel release (Right, 2022).    Home medications:   Current Outpatient Medications on File Prior to Visit   Medication Sig Dispense Refill    b complex vitamins capsule Take 1 capsule by mouth once daily.      calcium-vitamin D3 (OS-ELISABETH 500 + D3) 500 mg(1,250mg) -200 unit per tablet Take 1 tablet by mouth.      cetirizine (ZYRTEC) 10 MG tablet Take 1 tablet (10 mg total) by  mouth 2 (two) times a day. 60 tablet 12    cholecalciferol, vitamin D3, (VITAMIN D3) 50 mcg (2,000 unit) Tab Take 1 tablet (2,000 Units total) by mouth daily with breakfast. 100 tablet 3    clomiPHENE (CLOMID) 50 mg tablet Take 1 tablet (50 mg total) by mouth once daily. Take cycle day 3-7 5 tablet 2    Lactobacillus rhamnosus GG (CULTURELLE) 10 billion cell capsule Take 1 capsule by mouth once daily.      lansoprazole (PREVACID) 30 MG capsule Take 30 mg by mouth once daily.      magnesium oxide (MAG-OX) 400 mg (241.3 mg magnesium) tablet Take 400 mg by mouth once daily.      mupirocin calcium 2% nasl oint (BACTROBAN) 2 % Oint by Nasal route 2 (two) times daily. 1 Tube 3    prenatal vit37-iron-folic acid 29 mg iron- 1 mg Chew Take by mouth.      sertraline (ZOLOFT) 50 MG tablet TAKE 1 TABLET(50 MG) BY MOUTH EVERY EVENING 90 tablet 3    SYNTHROID 50 mcg tablet TAKE 1 TABLET(50 MCG) BY MOUTH EVERY DAY 90 tablet 3     Current Facility-Administered Medications on File Prior to Visit   Medication Dose Route Frequency Provider Last Rate Last Admin    fentaNYL 50 mcg/mL injection  mcg   mcg Intravenous PRN Yury Ambrosio MD   50 mcg at 06/24/22 1026    LIDOcaine (PF) 10 mg/ml (1%) injection 10 mg  1 mL Intradermal Once Yury Ambrosio MD        midazolam (VERSED) 1 mg/mL injection 0.5-4 mg  0.5-4 mg Intravenous PRN Yury Ambrosio MD   2 mg at 06/24/22 1026       Vital signs:  Wt 92.1 kg (203 lb 0.7 oz)   LMP 10/13/2023 (Exact Date)   BMI 33.79 kg/m²     Physical Exam  Constitutional:       Appearance: Normal appearance. She is obese.   Abdominal:      General: Abdomen is flat. There is no distension.   Neurological:      Mental Status: She is alert.       I have reviewed associated labs, imaging and notes.     Assessment:  1. Diarrhea, unspecified type    2. Blood in stool    Diarrhea, ongoing since 2008   Worsened a few weeks ago   Blood/mucus daily    Plan:  Orders Placed This Encounter     Clostridium difficile EIA    Stool culture    Calprotectin, Stool    Giardia / Cryptosporidum, EIA    IgA    Rotavirus antigen, stool    Stool Exam-Ova,Cysts,Parasites    Tissue Transglutaminase, IgA    WBC, Stool    Case Request Endoscopy: COLONOSCOPY     Collect stool studies and blood work  Schedule colonoscopy   Suprep d/t insurance coverage    Lindsey Ray, NP [facility] [place]

## 2023-11-07 ENCOUNTER — LAB VISIT (OUTPATIENT)
Dept: LAB | Facility: HOSPITAL | Age: 33
End: 2023-11-07
Payer: COMMERCIAL

## 2023-11-07 DIAGNOSIS — R19.7 DIARRHEA, UNSPECIFIED TYPE: ICD-10-CM

## 2023-11-07 LAB
C DIFF GDH STL QL: NEGATIVE
C DIFF TOX A+B STL QL IA: NEGATIVE
IGA SERPL-MCNC: 216 MG/DL (ref 40–350)
WBC #/AREA STL HPF: NORMAL /[HPF]

## 2023-11-07 PROCEDURE — 87427 SHIGA-LIKE TOXIN AG IA: CPT

## 2023-11-07 PROCEDURE — 89055 LEUKOCYTE ASSESSMENT FECAL: CPT

## 2023-11-07 PROCEDURE — 87425 ROTAVIRUS AG IA: CPT

## 2023-11-07 PROCEDURE — 87046 STOOL CULTR AEROBIC BACT EA: CPT

## 2023-11-07 PROCEDURE — 83993 ASSAY FOR CALPROTECTIN FECAL: CPT

## 2023-11-07 PROCEDURE — 87449 NOS EACH ORGANISM AG IA: CPT

## 2023-11-07 PROCEDURE — 87449 NOS EACH ORGANISM AG IA: CPT | Mod: 91

## 2023-11-07 PROCEDURE — 87045 FECES CULTURE AEROBIC BACT: CPT

## 2023-11-08 ENCOUNTER — PATIENT MESSAGE (OUTPATIENT)
Dept: OBSTETRICS AND GYNECOLOGY | Facility: CLINIC | Age: 33
End: 2023-11-08
Payer: COMMERCIAL

## 2023-11-08 LAB — RV AG STL QL IA.RAPID: NEGATIVE

## 2023-11-09 LAB
E COLI SXT1 STL QL IA: NEGATIVE
E COLI SXT2 STL QL IA: NEGATIVE
TTG IGA SER-ACNC: 0.5 U/ML

## 2023-11-10 LAB — BACTERIA STL CULT: NORMAL

## 2023-11-13 ENCOUNTER — CLINICAL SUPPORT (OUTPATIENT)
Dept: REHABILITATION | Facility: HOSPITAL | Age: 33
End: 2023-11-13
Payer: COMMERCIAL

## 2023-11-13 DIAGNOSIS — R29.898 DECREASED STRENGTH OF TRUNK AND BACK: ICD-10-CM

## 2023-11-13 DIAGNOSIS — M53.86 DECREASED ROM OF LUMBAR SPINE: ICD-10-CM

## 2023-11-13 DIAGNOSIS — G89.29 CHRONIC BILATERAL LOW BACK PAIN WITHOUT SCIATICA: Primary | ICD-10-CM

## 2023-11-13 DIAGNOSIS — M54.50 CHRONIC BILATERAL LOW BACK PAIN WITHOUT SCIATICA: Primary | ICD-10-CM

## 2023-11-13 LAB — CALPROTECTIN STL-MCNT: 248.5 MCG/G

## 2023-11-13 PROCEDURE — 97110 THERAPEUTIC EXERCISES: CPT | Mod: PN

## 2023-11-13 PROCEDURE — 97161 PT EVAL LOW COMPLEX 20 MIN: CPT | Mod: PN

## 2023-11-13 NOTE — PLAN OF CARE
[facility]  Physical Therapy Initial Evaluation    Name: Sherlyn Monahan  Clinic Number: [id number]    Therapy Diagnosis:   Encounter Diagnoses   Name Primary?    Chronic bilateral low back pain without sciatica     Decreased ROM of lumbar spine Yes    Decreased strength of trunk and back      Physician: Alea Moses MD    Physician Orders: PT Eval and Treat   Medical Diagnosis from Referral: M54.50,G89.29 (ICD-10-CM) - Chronic bilateral low back pain without sciatica   Evaluation Date: 11/13/2023  Authorization Period Expiration: 12/31/24  Plan of Care Expiration: 1/12/24  Visit # / Visits authorized: 1/ 1  FOTO: 1/10    Time In: 11:05  Time Out: 11:50  Total Billable Time: 45 minutes (low Complexity Evaluation, Therapeutic Exercise - 1,Tate 1)    Precautions: Standard, migraines, depression, anxiety     Subjective   Date of onset: 10+  History of current condition - Sherlyn reports: chronic low back pain which began in college. Pain radiates across beltline sometimes to hips.  She had PT at [employer] and had breast reduction which helped improve symptoms. Pain returned following first baby and she went to chiropractor with benefit. She started going to chiropractor again recently and diagnosed with mild bulging discs. They recommended IDD which was not covered by insurance and she decided to proceed with PT instead. She also has a lot of neck and upper back pain and migraines. Patient denies numbness or tingling, changes in bowel or bladder, or saddle anesthesia.  One episode of radiating pain in college. 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 Difficulty lifting children, playing on floor with kids, bending, standing and occasionally walking. History of some difficulty sleeping aside from pain, pain does not necessarily keep her from sleeping. Neck pain affects sleep more than back.      Medical History:   Past Medical History:   Diagnosis Date    Acquired  "hypothyroidism 2016    Allergy 2009    Seasonal    Anxiety 2019    Depression     Irritable bowel syndrome (IBS)     Keloid cicatrix     Obesity (BMI 30.0-34.9) 2016    loosing weight consistently on [employer] Day [employer] program     Urticaria        Surgical History:   Sherlyn Monahan  has a past surgical history that includes breast reduction (2012); Breast surgery;  section; Laparoscopic cholecystectomy (N/A, 2019); Cholecystectomy (2019);  section (N/A, 2020); Ulnar nerve transposition (Right, 2022); and Endoscopic carpal tunnel release (Right, 2022).    Medications:   hSerlyn has a current medication list which includes the following prescription(s): b complex vitamins, calcium-vitamin d3, cetirizine, cholecalciferol (vitamin d3), clomiphene, lactobacillus rhamnosus gg, lansoprazole, magnesium oxide, mupirocin calcium 2% nasl oint, prenatal vit37-iron-folic acid, sertraline, and synthroid, and the following Facility-Administered Medications: fentanyl, lidocaine (pf) 10 mg/ml (1%), and midazolam.    Allergies:   Review of patient's allergies indicates:   Allergen Reactions    Minocycline Swelling        Imaging, MRI studies: "bulging discs" per chiropractor imaging     Prior Therapy: physical therapy, chiropractor  Social History:  lives with their family, 2 story home  Occupation: therapist, sedentary   Prior Level of Function: independent with ADLs   Current Level of Function: see above    Pain:   Current 3/10, worst 7/10, best 3/10   Location: bilateral back  and buttocks   Description: Aching, Dull, and Tight, stiff   Aggravating Factors: Sitting, Standing, Bending, Night Time, and Morning  Easing Factors: pain medication, ice, and heating pad, stretching     Pts goals: sit comfortably at work and transfer to floor to play with kids, lift kids     Objective     Posture: posterior pelvic tilt, hinge at TL junction, rounded shoulders  Palpation: " tenderness of lumbar paraspinals and bilateral PSIS, greater trochanters  Sensation: normal light touch   DTRs: not tested   Range of Motion/Strength:     Lumbar AROM Pain/Dysfunction with Movement:   Flexion 40* Decreased reversal of lordosis, no change with repeated motion    Extension 10* Pain at L5 region, increased with repeated motions   Right side bending 20    Left side bending 20* PSIS   Right rotation 50%    Left rotation 50%      Hip Right Left Pain/Dysfunction with Movement   AROM/PROM      flexion  110  110    extension  Within normal limits   Within normal limits     Internal rotation  25  25    External rotation  45  45      L/E MMT Right Left Pain/Dysfunction with Movement   Hip Flexion 5/5 5/5    Hip Extension 4+/5 4+/5*     Hip Abduction 4+/5 4+/5*    Hip Adduction 5/5 5/5    Hip IR 4+/5 4+/5    Hip ER 4+/5 4+/5    Knee Flexion 5/5 5/5    Knee Extension 5/5 5/5    Ankle DF 5/5 5/5    Ankle PF 5/5 5/5        Joint Mobility:   - Thoracic: decreased mobility with pain   - Lumbar: decreased mobility, pain with L3-5   - Hip: limited internal rotation mobility    Flexibility: slight decreased left hamstring    Special Tests:  Lumbar Tests:  Slump test = negative  Quadrant test =   SLR Test (L4-S3) = negative    Lumbar distraction = muscle spasm   SL Bridge Test (glut med strength) = pain left       Dharmesh et al SIJ CPR:  Thigh thrust test:   positive right                      SI compression:    negative                   SI distraction:        negative       Gaelslen's test:        negative                 Sacral thrust:  negative                           (or FABERs) -negative                  Excluding centralization --> Sn 91%, Sp 87%  LR+ 6.97, LR- 0.11     Gait Analysis:Without AD Assistance ind Deviations: non-antalgic    Single Leg Stance: R 30 seconds, L 30 seconds slight pelvic drop       CMS Impairment/Limitation/Restriction for FOTO lumbar Survey    Therapist reviewed FOTO scores for  Sherlyn Monahan on 11/13/2023.   FOTO documents entered into [employer] - see Media section.    Limitation Score: 31%  Category: Mobility         TREATMENT   Treatment Time In: 11:40  Treatment Time Out: 11:50  Total Treatment time separate from Evaluation: 10 minutes    Sherlyn received therapeutic exercises to develop strength, endurance, ROM, flexibility, posture, and core stabilization for 10 minutes including:  Lower trunk rotation  Open book   Pelvic tilt transverse abdominus activation   Hip abduction isometric green theraband  Scap retraction   Pain science, pain monitoring model, work modifications     Home Exercises Provided and Patient Education Provided     Education provided:   Anatomy and Pathology.  Symptom management and plan of care progressions.  Home Exercise Program.    Written Home Exercises Provided: yes.  Exercises were reviewed and Sherlyn was able to demonstrate them prior to the end of the session.  Sherlyn demonstrated good  understanding of the education provided.     See EMR under Patient Instructions for exercises provided 11/13/2023.      Assessment   Sherlyn is a 33 y.o. female referred to outpatient Physical Therapy with a medical diagnosis of Chronic bilateral low back pain without sciatica . Pt presents with chronic low back pain with mobility deficits with decreased lumbar range of motion, decreased hip and core strength, impaired balance and gait, decreased lumbar joint mobility, and decreased functional mobility. No improvement in symptoms with repeated motions, though worse with extension. These impairments impact their ability to sit for prolonged periods at work, stand, lift, carry, play with children and perform floor transfers.  Education on pain monitoring model and work and home related modifications.       Pt prognosis is Good.   Pt will benefit from skilled outpatient Physical Therapy to address the deficits stated above and in the chart below, provide pt/family  education, and to maximize pt's level of independence.     Plan of care discussed with patient: Yes  Pt's spiritual, cultural and educational needs considered and patient is agreeable to the plan of care and goals as stated below:     Anticipated Barriers for therapy: chronicity    Medical Necessity is demonstrated by the following  History  Co-morbidities and personal factors that may impact the plan of care [x] LOW: no personal factors / co-morbidities  [] MODERATE: 1-2 personal factors / co-morbidities  [] HIGH: 3+ personal factors / co-morbidities    Moderate / High Support Documentation:   Co-morbidities affecting plan of care: migraines, depression, anxiety     Personal Factors:   lifestyle     Examination  Body Structures and Functions, activity limitations and participation restrictions that may impact the plan of care [x] LOW: addressing 1-2 elements  [] MODERATE: 3+ elements  [] HIGH: 4+ elements (please support below)    Moderate / High Support Documentation: range of motion, balance, gait, strength, motor control, posture, transitions and transfers     Clinical Presentation [x] LOW: stable  [] MODERATE: Evolving  [] HIGH: Unstable     Decision Making/ Complexity Score: low       Goals   Short Term Goals (4 Weeks):  1. Pt will be compliant with HEP to supplement PT in decreasing pain with functional mobility.  2. Pt will perform floor to stand transfer with good control to demonstrate improved functional strength to play with children.  3. Pt will report 25% improvement in thoracolumbar ROM to promote functional mobility.  4. Pt will improve impaired LE MMTs to >/=4/5 to improve strength for functional tasks.  Long Term Goals (8 Weeks):   1. Pt will improve FOTO score to </= 29% limited to decrease perceived limitation with maintaining/changing body position.   2. Pt will perform 30 lb floor to waist lift with good control to demonstrate improved core strength for lifting children.  3. Pt will improve  impaired LE MMTs to >/=5/5 to improve strength for functional tasks.  4. Pt will report 3/10 pain or less while sitting to promote tolerance for job duties.       Plan   Plan of care Certification: 11/13/2023 to 1/12/24.    Outpatient Physical Therapy 2 times weekly for 8 weeks to include the following interventions: Cervical/Lumbar Traction, Electrical Stimulation, Gait Training, Manual Therapy, Moist Heat/ Ice, Neuromuscular Re-ed, Patient Education, Therapeutic Activities, and Therapeutic Exercise, ASTYM, Kinesiotaping PRN, Functional Dry Needling    YESSY MILLIGAN, PT, DPT, Cert.DN

## 2023-11-14 NOTE — TELEPHONE ENCOUNTER
Order and demographics sheet was faxed over on 11/09/2023, I'll refax using the new fax number provided.

## 2023-11-15 ENCOUNTER — HOSPITAL ENCOUNTER (OUTPATIENT)
Dept: SLEEP MEDICINE | Facility: HOSPITAL | Age: 33
Discharge: HOME | End: 2023-11-15
Attending: PHYSICIAN ASSISTANT
Payer: COMMERCIAL

## 2023-11-15 ENCOUNTER — CLINICAL SUPPORT (OUTPATIENT)
Dept: REHABILITATION | Facility: HOSPITAL | Age: 33
End: 2023-11-15
Payer: COMMERCIAL

## 2023-11-15 DIAGNOSIS — G47.10 HYPERSOMNOLENCE: ICD-10-CM

## 2023-11-15 DIAGNOSIS — M53.86 DECREASED ROM OF LUMBAR SPINE: Primary | ICD-10-CM

## 2023-11-15 DIAGNOSIS — G47.19 EXCESSIVE DAYTIME SLEEPINESS: ICD-10-CM

## 2023-11-15 DIAGNOSIS — F51.09 OTHER INSOMNIA NOT DUE TO A SUBSTANCE OR KNOWN PHYSIOLOGICAL CONDITION: ICD-10-CM

## 2023-11-15 DIAGNOSIS — R06.83 SNORING: ICD-10-CM

## 2023-11-15 DIAGNOSIS — R29.898 DECREASED STRENGTH OF TRUNK AND BACK: ICD-10-CM

## 2023-11-15 PROCEDURE — 97110 THERAPEUTIC EXERCISES: CPT | Mod: PN

## 2023-11-15 PROCEDURE — 97140 MANUAL THERAPY 1/> REGIONS: CPT | Mod: PN

## 2023-11-15 PROCEDURE — 95800 SLP STDY UNATTENDED: CPT

## 2023-11-15 PROCEDURE — 97112 NEUROMUSCULAR REEDUCATION: CPT | Mod: PN

## 2023-11-15 NOTE — PROGRESS NOTES
"[facility]   Physical Therapy Treatment Note      Name: Sherlyn Monahan  Clinic Number: [id number]    Therapy Diagnosis:   Encounter Diagnoses   Name Primary?    Decreased ROM of lumbar spine Yes    Decreased strength of trunk and back      Physician: Alea Moses MD    Visit Date: 11/15/2023  Physician Orders: PT Eval and Treat   Medical Diagnosis from Referral: M54.50,G89.29 (ICD-10-CM) - Chronic bilateral low back pain without sciatica   Evaluation Date: 11/13/2023  Authorization Period Expiration: 12/31/24  Plan of Care Expiration: 1/12/24  Visit # / Visits authorized: 1/ 16  FOTO: 2/10  PTA Visit #: 0/5      Time In: 11:00  Time Out: 11:54  Total Billable Time: 54 minutes (Therapeutic Exercise - 1,2 NM, 1 MT )     Precautions: Standard, migraines, depression, anxiety       Subjective     Patient reports: feeling stiff in lower back . Hasn't been as busy at work lately so it is a little better.   She was compliant with home exercise program.  Response to previous treatment: eval  Functional change: ongoing    Pain: 4/10  Location: bilateral back  and buttocks      Objective      Objective Measures updated at progress report unless specified.     Treatment     Sherlyn received the treatments listed below:      therapeutic exercises to develop strength, endurance, ROM, flexibility, posture, and core stabilization for 21 minutes including:  Lower trunk rotation 20x (at start and following manual)  Open book 15x  Pelvic tilt transverse abdominus activation 20x5"  Scap retraction 20x5"  Nustep 6' L3  Clamshell red theraband 20x   Bridge 2x10 red theraband   Seated ball roll 3'   Seated thoracic extension 20x   Cat/cow 10x       neuromuscular re-education activities to improve: Balance, Coordination, Kinesthetic, Sense, Proprioception, and Posture for 23 minutes. The following activities were included:  Transverse abdominus activation with ball squeeze 2'  Transverse abdominus " activation with hip abduction green theraband 2'   Transverse abdominus activation with march 2x10   Quadruped hip ext 2x10     manual therapy techniques: Joint mobilizations and Soft tissue Mobilization were applied to the: lumbar spine for 8 minutes, including:  Lumbar gapping bilateral       Patient Education and Home Exercises       Education provided:   - continue home exercise program     Written Home Exercises Provided: Patient instructed to cont prior HEP. Exercises were reviewed and Sherlyn was able to demonstrate them prior to the end of the session.  Sherlyn demonstrated good  understanding of the education provided. See Electronic Medical Record under Patient Instructions for exercises provided during therapy sessions    Assessment     Sherlyn is a 33 y.o. female referred to outpatient Physical Therapy with a medical diagnosis of Chronic bilateral low back pain without sciatica. Patient with complaints ob back stiffness today left > right. Treatment focused on gentle spinal mobility and core activation series. Good tolerance for all exercises. No increase pain reported post treatment.     Sherlyn Is progressing well towards her goals.   Patient prognosis is Good.     Patient will continue to benefit from skilled outpatient physical therapy to address the deficits listed in the problem list box on initial evaluation, provide pt/family education and to maximize pt's level of independence in the home and community environment.     Patient's spiritual, cultural and educational needs considered and pt agreeable to plan of care and goals.     Anticipated barriers to physical therapy: chronicity    Goals: Short Term Goals (4 Weeks):  1. Pt will be compliant with HEP to supplement PT in decreasing pain with functional mobility. Progressing, not met  2. Pt will perform floor to stand transfer with good control to demonstrate improved functional strength to play with children.  Progressing, not met  3. Pt will  report 25% improvement in thoracolumbar ROM to promote functional mobility. Progressing, not met  4. Pt will improve impaired LE MMTs to >/=4/5 to improve strength for functional tasks. Progressing, not met  Long Term Goals (8 Weeks):   1. Pt will improve FOTO score to </= 29% limited to decrease perceived limitation with maintaining/changing body position. Progressing, not met  2. Pt will perform 30 lb floor to waist lift with good control to demonstrate improved core strength for lifting children.Progressing, not met  3. Pt will improve impaired LE MMTs to >/=5/5 to improve strength for functional tasks.Progressing, not met  4. Pt will report 3/10 pain or less while sitting to promote tolerance for job duties. Progressing, not met       Plan   Plan of care Certification: 11/13/2023 to 1/12/24.       YESSY MILLIGAN, PT

## 2023-11-18 PROBLEM — G47.19 EXCESSIVE DAYTIME SLEEPINESS: Status: ACTIVE | Noted: 2023-11-18

## 2023-11-18 PROCEDURE — 95806 SLEEP STUDY UNATT&RESP EFFT: CPT | Mod: 26,,, | Performed by: INTERNAL MEDICINE

## 2023-11-18 PROCEDURE — 95806 PR SLEEP STUDY, UNATTENDED, SIMUL RECORD HR/O2 SAT/RESP FLOW/RESP EFFT: ICD-10-PCS | Mod: 26,,, | Performed by: INTERNAL MEDICINE

## 2023-11-20 ENCOUNTER — PATIENT MESSAGE (OUTPATIENT)
Dept: SLEEP MEDICINE | Facility: CLINIC | Age: 33
End: 2023-11-20
Payer: COMMERCIAL

## 2023-11-20 ENCOUNTER — CLINICAL SUPPORT (OUTPATIENT)
Dept: REHABILITATION | Facility: HOSPITAL | Age: 33
End: 2023-11-20
Payer: COMMERCIAL

## 2023-11-20 DIAGNOSIS — R29.898 DECREASED STRENGTH OF TRUNK AND BACK: ICD-10-CM

## 2023-11-20 DIAGNOSIS — M53.86 DECREASED ROM OF LUMBAR SPINE: Primary | ICD-10-CM

## 2023-11-20 DIAGNOSIS — G47.33 OSA (OBSTRUCTIVE SLEEP APNEA): Primary | ICD-10-CM

## 2023-11-20 PROCEDURE — 97110 THERAPEUTIC EXERCISES: CPT | Mod: PN

## 2023-11-20 PROCEDURE — 97112 NEUROMUSCULAR REEDUCATION: CPT | Mod: PN

## 2023-11-20 PROCEDURE — 97140 MANUAL THERAPY 1/> REGIONS: CPT | Mod: PN

## 2023-11-20 NOTE — PROGRESS NOTES
"[facility]   Physical Therapy Treatment Note      Name: Sherlyn Monahan  Clinic Number: [id number]    Therapy Diagnosis:   Encounter Diagnoses   Name Primary?    Decreased ROM of lumbar spine Yes    Decreased strength of trunk and back      Physician: Alea Moses MD    Visit Date: 11/20/2023  Physician Orders: PT Eval and Treat   Medical Diagnosis from Referral: M54.50,G89.29 (ICD-10-CM) - Chronic bilateral low back pain without sciatica   Evaluation Date: 11/13/2023  Authorization Period Expiration: 12/31/24  Plan of Care Expiration: 1/12/24  Visit # / Visits authorized: 2/ 16  FOTO: 2/10  PTA Visit #: 0/5      Time In: 11:00  Time Out: 12:00  Total Billable Time: 60 minutes (Therapeutic Exercise - 1 MT, 2 TE , 2 NM )     Precautions: Standard, migraines, depression, anxiety     Subjective     Patient reports:over the weekend, pain was worst.  She was compliant with home exercise program.  Response to previous treatment: eval  Functional change: ongoing    Pain: 3-4/10  Location: bilateral back  and buttocks      Objective      Objective Measures updated at progress report unless specified.     Treatment     Sherlyn received the treatments listed below:      Bold= progressed today  (+)= added today     therapeutic exercises to develop strength, endurance, ROM, flexibility, posture, and core stabilization for 27minutes including:  Lower trunk rotation 20x (at start and following manual)  Open book 15x  Pelvic tilt transverse abdominus activation 20x5"  Clamshell red theraband 20x   Bridge 2x10 green theraband   Seated ball roll 3'  (forward, right/left)  Seated thoracic extension 20x   Cat/cow 15x   +Quadriped thoracic rotation x 10 reps  +Chin tuck, 5'' hold x 10 reps  +Chin tuck with dowel flexion (middle back stretch) x 10 reps, 5'' hold  Nustep 6' L3- next    Went over ergonomics at a desk setting. Handout attached to after-visit summary.    neuromuscular re-education " activities to improve: Balance, Coordination, Kinesthetic, Sense, Proprioception, and Posture for 25 minutes. The following activities were included:  Transverse abdominus activation with march 2x10  (hook-lying)  Transverse abdominus activation with ball squeeze seated x 2 min  Transverse abdominus activation with hip abduction green theraband sit<>stand, slightly elevated mat 2x10 reps   Quadruped hip ext 2x10 (cues to maintain neutral spine)  +Rows, blue band 2x10 reps    manual therapy techniques: Joint mobilizations and Soft tissue Mobilization were applied to the: lumbar spine for 8 minutes, including:  Lumbar gapping bilateral       Patient Education and Home Exercises       Education provided:   - continue home exercise program   - ergonomics  - core stability, deep neck flexor endurance    Written Home Exercises Provided: Patient instructed to cont prior HEP. Exercises were reviewed and Sherlyn was able to demonstrate them prior to the end of the session.  Sherlyn demonstrated good  understanding of the education provided. See Electronic Medical Record under Patient Instructions for exercises provided during therapy sessions    Assessment     Sherlyn is a 33 y.o. female referred to outpatient Physical Therapy with a medical diagnosis of Chronic bilateral low back pain without sciatica. Pt reports increased pain over the weekend, mild to moderate pain today straight cross the lower back. Demonstrates functional weakness with sit<>stand with core engagement and hip abduction isometric. Added deep neck flexor strengthening to decrease incidence of migraines. Reported a good stretch with chin tucks. Continue therapy that focuses on alleviating pain in low back during daily activity to allow pt to get on the floor with her children, lift children and sit comfortably at work with minimal pain.    Sherlyn Is progressing well towards her goals.   Patient prognosis is Good.     Patient will continue to benefit  from skilled outpatient physical therapy to address the deficits listed in the problem list box on initial evaluation, provide pt/family education and to maximize pt's level of independence in the home and community environment.     Patient's spiritual, cultural and educational needs considered and pt agreeable to plan of care and goals.     Anticipated barriers to physical therapy: chronicity    Goals: Short Term Goals (4 Weeks):  1. Pt will be compliant with HEP to supplement PT in decreasing pain with functional mobility. Progressing, not met  2. Pt will perform floor to stand transfer with good control to demonstrate improved functional strength to play with children.  Progressing, not met  3. Pt will report 25% improvement in thoracolumbar ROM to promote functional mobility. Progressing, not met  4. Pt will improve impaired LE MMTs to >/=4/5 to improve strength for functional tasks. Progressing, not met  Long Term Goals (8 Weeks):   1. Pt will improve FOTO score to </= 29% limited to decrease perceived limitation with maintaining/changing body position. Progressing, not met  2. Pt will perform 30 lb floor to waist lift with good control to demonstrate improved core strength for lifting children.Progressing, not met  3. Pt will improve impaired LE MMTs to >/=5/5 to improve strength for functional tasks.Progressing, not met  4. Pt will report 3/10 pain or less while sitting to promote tolerance for job duties. Progressing, not met       Plan   Plan of care Certification: 11/13/2023 to 1/12/24.       Grace Ha, PT , DPT

## 2023-11-20 NOTE — PATIENT INSTRUCTIONS
Ergonomic Workstations    Having an ergonomic workstation means that your desk and the things on it are arranged in such a way, that they prevent injury and are well within reach and use. An ergonomic workstation also promotes good posture. Posture is the position in which you hold your body upright against gravity while standing, sitting, or lying down. An ergonomically designed workstation promotes good posture and helps to:    Keep bones and joints in the correct alignment so that muscles are being used properly.  Help decrease the abnormal wearing of joint surfaces that could result in arthritis.  Decrease the stress on the ligaments holding the joints of the spine together.  Prevent the spine from becoming fixed in abnormal positions.  Counter fatigue because muscles are being used more efficiently, allowing the body to use less energy.  Prevent strain or overuse problems.  Avert backache and muscular pain.    Proper ergonomics plays an instrumental role in how effectively you accomplish work and will help prevent suffering from work-related injuries due to strain and overuse. In the diagram below you will find both sitting and standing workstation recommendations to achieve a proper ergonomic workstation.        SITTING: Body position guidelines  Lower back supported by a lumbar curve  Bottom & Thighs distributed pressure  ARMS minimal bend at the wrist  The area behind the knee not touching the seat  Feet flat on the floor or on a footrest  Wrists and hands do not rest on sharp or hard edges  The telephone should be used with your head upright (not bent) and your shoulders relaxed (not elevated)        STANDING: Working Guidelines  Precision Work - above elbow height  Light Work - just below elbow height  Heavy Work - 4-6 inches below elbow height    [email or web address]

## 2023-11-20 NOTE — PROGRESS NOTES
"[facility]   Physical Therapy Treatment Note      Name: Sherlyn Monahan  Clinic Number: [id number]    Therapy Diagnosis:   Encounter Diagnoses   Name Primary?    Decreased ROM of lumbar spine Yes    Decreased strength of trunk and back        Physician: Alea Moses MD    Visit Date: 11/20/2023  Physician Orders: PT Eval and Treat   Medical Diagnosis from Referral: M54.50,G89.29 (ICD-10-CM) - Chronic bilateral low back pain without sciatica   Evaluation Date: 11/13/2023  Authorization Period Expiration: 12/31/24  Plan of Care Expiration: 1/12/24  Visit # / Visits authorized: 2/ 16  FOTO: 3/10  PTA Visit #: 0/5      Time In: 11:05  Time Out: 11:20  Total Billable Time: 15 minutes (Therapeutic Exercise - 1,2 NM, 1 MT )     Precautions: Standard, migraines, depression, anxiety       Subjective     Patient reports: low back stiffness over the weekend. Sleeping on stomach may have aggravated back.    She was compliant with home exercise program.  Response to previous treatment: eval  Functional change: ongoing    Pain: 3/10  Location: bilateral back  and buttocks      Objective      Objective Measures updated at progress report unless specified.     Treatment     Sherlyn received the treatments listed below:      therapeutic exercises to develop strength, endurance, ROM, flexibility, posture, and core stabilization for 5 minutes including:  Lower trunk rotation 20x (at start and following manual)  Transverse abdominus activation with hip abduction green theraband 2'   Transverse abdominus activation with march 2x10     See remaining treatment by Grace Ha, PT, DPT   Open book 15x  Pelvic tilt transverse abdominus activation 20x5"  Scap retraction 20x5"  Nustep 6' L3  Clamshell red theraband 20x   Bridge 2x10 red theraband   Seated ball roll 3'   Seated thoracic extension 20x   Cat/cow 10x       neuromuscular re-education activities to improve: Balance, Coordination, " Kinesthetic, Sense, Proprioception, and Posture for 23 minutes. The following activities were included:  Transverse abdominus activation with ball squeeze 2'  Quadruped hip ext 2x10     manual therapy techniques: Joint mobilizations and Soft tissue Mobilization were applied to the: lumbar spine for 10 minutes, including:  Lumbar gapping bilateral       Patient Education and Home Exercises       Education provided:   - continue home exercise program     Written Home Exercises Provided: Patient instructed to cont prior HEP. Exercises were reviewed and Sherlyn was able to demonstrate them prior to the end of the session.  Sherlyn demonstrated good  understanding of the education provided. See Electronic Medical Record under Patient Instructions for exercises provided during therapy sessions    Assessment     Sherlyn is a 33 y.o. female referred to outpatient Physical Therapy with a medical diagnosis of Chronic bilateral low back pain without sciatica. Patient with complaints of back stiffness today right  > left. Continued with lumbar gapping and spinal mobility. Remaining treatment under Xiomara Ha PT, DPT, see note.     Sherlyn Is progressing well towards her goals.   Patient prognosis is Good.     Patient will continue to benefit from skilled outpatient physical therapy to address the deficits listed in the problem list box on initial evaluation, provide pt/family education and to maximize pt's level of independence in the home and community environment.     Patient's spiritual, cultural and educational needs considered and pt agreeable to plan of care and goals.     Anticipated barriers to physical therapy: chronicity    Goals: Short Term Goals (4 Weeks):  1. Pt will be compliant with HEP to supplement PT in decreasing pain with functional mobility. Progressing, not met  2. Pt will perform floor to stand transfer with good control to demonstrate improved functional strength to play with children.   Progressing, not met  3. Pt will report 25% improvement in thoracolumbar ROM to promote functional mobility. Progressing, not met  4. Pt will improve impaired LE MMTs to >/=4/5 to improve strength for functional tasks. Progressing, not met  Long Term Goals (8 Weeks):   1. Pt will improve FOTO score to </= 29% limited to decrease perceived limitation with maintaining/changing body position. Progressing, not met  2. Pt will perform 30 lb floor to waist lift with good control to demonstrate improved core strength for lifting children.Progressing, not met  3. Pt will improve impaired LE MMTs to >/=5/5 to improve strength for functional tasks.Progressing, not met  4. Pt will report 3/10 pain or less while sitting to promote tolerance for job duties. Progressing, not met       Plan   Plan of care Certification: 11/13/2023 to 1/12/24.       YESSY MILLIGAN, PT

## 2023-11-29 ENCOUNTER — PATIENT MESSAGE (OUTPATIENT)
Dept: GASTROENTEROLOGY | Facility: CLINIC | Age: 33
End: 2023-11-29
Payer: COMMERCIAL

## 2023-12-01 ENCOUNTER — PATIENT MESSAGE (OUTPATIENT)
Dept: GASTROENTEROLOGY | Facility: CLINIC | Age: 33
End: 2023-12-01
Payer: COMMERCIAL

## 2023-12-04 ENCOUNTER — CLINICAL SUPPORT (OUTPATIENT)
Dept: REHABILITATION | Facility: HOSPITAL | Age: 33
End: 2023-12-04
Payer: COMMERCIAL

## 2023-12-04 DIAGNOSIS — R29.898 DECREASED STRENGTH OF TRUNK AND BACK: ICD-10-CM

## 2023-12-04 DIAGNOSIS — M53.86 DECREASED ROM OF LUMBAR SPINE: Primary | ICD-10-CM

## 2023-12-04 PROCEDURE — 97530 THERAPEUTIC ACTIVITIES: CPT | Mod: PN

## 2023-12-04 PROCEDURE — 97112 NEUROMUSCULAR REEDUCATION: CPT | Mod: PN

## 2023-12-04 PROCEDURE — 97110 THERAPEUTIC EXERCISES: CPT | Mod: PN

## 2023-12-04 NOTE — PROGRESS NOTES
"[facility]   Physical Therapy Treatment Note      Name: Sherlyn Monahan  Clinic Number: [id number]    Therapy Diagnosis:   Encounter Diagnoses   Name Primary?    Decreased ROM of lumbar spine Yes    Decreased strength of trunk and back        Physician: Alea Moses MD    Visit Date: 12/4/2023  Physician Orders: PT Eval and Treat   Medical Diagnosis from Referral: M54.50,G89.29 (ICD-10-CM) - Chronic bilateral low back pain without sciatica   Evaluation Date: 11/13/2023  Authorization Period Expiration: 12/31/24  Plan of Care Expiration: 1/12/24  Visit # / Visits authorized: 3/ 16  FOTO: 2/10  PTA Visit #: 0/5      Time In: 11:00  Time Out: 11:55  Total Billable Time: 55 minutes (Therapeutic Exercise - 2, 1 TA, 1 NM )     Precautions: Standard, migraines, depression, anxiety     Subjective     Patient reports:was sick last week with the flu. Back pain not too bad today. Has been getting migraines.   She was compliant with home exercise program.  Response to previous treatment: eval  Functional change: ongoing    Pain: 3/10  Location: bilateral back  and buttocks      Objective      Objective Measures updated at progress report unless specified.     Treatment     Sherlyn received the treatments listed below:      Bold= progressed today  (+)= added today     therapeutic exercises to develop strength, endurance, ROM, flexibility, posture, and core stabilization for 24 minutes including:  Lower trunk rotation 20x (at start and following manual)  Open book 15x  Pelvic tilt transverse abdominus activation 20x5" NP  Clamshell green theraband 20x   Bridge 2x10 green theraband   Seated ball roll 3'  (forward, right/left)  Seated thoracic extension 20x   Cat/cow 15x   Quadruped thoracic rotation x 10 reps  Chin tuck, 5'' hold x 20 reps  Chin tuck with dowel flexion (middle back stretch) x 10 reps, 5'' hold 4#    Nustep 6' L3 NP    Went over ergonomics at a desk setting. Handout attached to " after-visit summary.    neuromuscular re-education activities to improve: Balance, Coordination, Kinesthetic, Sense, Proprioception, and Posture for 16 minutes. The following activities were included:  Transverse abdominus activation with march 2x10  (hook-lying)  Transverse abdominus activation with ball squeeze hooklyingx 2 min   Quadruped hip ext 2x10 (cues to maintain neutral spine)  Deadbug 2x10     Sherlyn participated in dynamic functional therapeutic activities to improve functional performance for 15  minutes, including:  Deadlift to stool 12# KB 2x10  Rows, blue band 2x10 reps  Shoulder extensions blue theraband 2x10  Transverse abdominus activation with hip abduction green theraband sit<>stand, chair 2x10 reps    manual therapy techniques: Joint mobilizations and Soft tissue Mobilization were applied to the: lumbar spine for 0 minutes, including:  Lumbar gapping bilateral       Patient Education and Home Exercises       Education provided:   - continue home exercise program   - ergonomics  - core stability, deep neck flexor endurance    Written Home Exercises Provided: Patient instructed to cont prior HEP. Exercises were reviewed and Sherlyn was able to demonstrate them prior to the end of the session.  Sherlyn demonstrated good  understanding of the education provided. See Electronic Medical Record under Patient Instructions for exercises provided during therapy sessions    Assessment     Sherlyn is a 33 y.o. female referred to outpatient Physical Therapy with a medical diagnosis of Chronic bilateral low back pain without sciatica. Patient with mild low back pain but recent increase in migraines. Continued to progress functional strengthening this visit with introduction of hip hinging technique. Continued postural strengthening including thoracic mobility and deep neck strengthening to improve ergonomics at work. Continue therapy that focuses on alleviating pain in low back during daily activity to  allow pt to get on the floor with her children, lift children and sit comfortably at work with minimal pain.    Sherlyn Is progressing well towards her goals.   Patient prognosis is Good.     Patient will continue to benefit from skilled outpatient physical therapy to address the deficits listed in the problem list box on initial evaluation, provide pt/family education and to maximize pt's level of independence in the home and community environment.     Patient's spiritual, cultural and educational needs considered and pt agreeable to plan of care and goals.     Anticipated barriers to physical therapy: chronicity    Goals: Short Term Goals (4 Weeks):  1. Pt will be compliant with HEP to supplement PT in decreasing pain with functional mobility. met  2. Pt will perform floor to stand transfer with good control to demonstrate improved functional strength to play with children.  Progressing, not met  3. Pt will report 25% improvement in thoracolumbar ROM to promote functional mobility. Progressing, not met  4. Pt will improve impaired LE MMTs to >/=4/5 to improve strength for functional tasks. Progressing, not met  Long Term Goals (8 Weeks):   1. Pt will improve FOTO score to </= 29% limited to decrease perceived limitation with maintaining/changing body position. Progressing, not met  2. Pt will perform 30 lb floor to waist lift with good control to demonstrate improved core strength for lifting children.Progressing, not met  3. Pt will improve impaired LE MMTs to >/=5/5 to improve strength for functional tasks.Progressing, not met  4. Pt will report 3/10 pain or less while sitting to promote tolerance for job duties. Progressing, not met       Plan   Plan of care Certification: 11/13/2023 to 1/12/24.       YESSY MILLIGAN, PT , DPT

## 2023-12-06 ENCOUNTER — CLINICAL SUPPORT (OUTPATIENT)
Dept: REHABILITATION | Facility: HOSPITAL | Age: 33
End: 2023-12-06
Payer: COMMERCIAL

## 2023-12-06 DIAGNOSIS — M53.86 DECREASED ROM OF LUMBAR SPINE: Primary | ICD-10-CM

## 2023-12-06 DIAGNOSIS — R29.898 DECREASED STRENGTH OF TRUNK AND BACK: ICD-10-CM

## 2023-12-06 PROCEDURE — 97530 THERAPEUTIC ACTIVITIES: CPT | Mod: PN,CQ

## 2023-12-06 PROCEDURE — 97110 THERAPEUTIC EXERCISES: CPT | Mod: PN,CQ

## 2023-12-06 PROCEDURE — 97112 NEUROMUSCULAR REEDUCATION: CPT | Mod: PN,CQ

## 2023-12-06 NOTE — PROGRESS NOTES
"[facility]   Physical Therapy Treatment Note      Name: Sherlyn Monahan  Clinic Number: [id number]    Therapy Diagnosis:   Encounter Diagnoses   Name Primary?    Decreased ROM of lumbar spine Yes    Decreased strength of trunk and back        Physician: Alea Moses MD    Visit Date: 12/6/2023  Physician Orders: PT Eval and Treat   Medical Diagnosis from Referral: M54.50,G89.29 (ICD-10-CM) - Chronic bilateral low back pain without sciatica   Evaluation Date: 11/13/2023  Authorization Period Expiration: 12/31/24  Plan of Care Expiration: 1/12/24  Visit # / Visits authorized: 4/ 16  FOTO: 5/10  PTA Visit #: 1/5      Time In: 11:00  Time Out: 12:00  Total Billable Time: 60 minutes (Therapeutic Exercise - 2, 1 TA, 2 NM )     Precautions: Standard, migraines, depression, anxiety     Subjective     Patient reports: Increased right lower back pain that radiated into right leg. Mild pain at work but thinks when she was picked up toys off floor is when it exacerbated her pain. (1) migraine since last treatment session that lasted 6 hours.   She was compliant with home exercise program.  Response to previous treatment: eval  Functional change: ongoing    Pain: 0/10  Location: bilateral back  and buttocks      Objective      Objective Measures updated at progress report unless specified.     Treatment     Sherlyn received the treatments listed below:      Bold= progressed today  (+)= added today     therapeutic exercises to develop strength, endurance, ROM, flexibility, posture, and core stabilization for 25 minutes including:  Lower trunk rotation 20x (at start and following manual)  Open book 15x  Pelvic tilt transverse abdominus activation 20x5" NP  Clamshell green theraband 20x 3"  Bridge 2x10 green theraband >> 2 x 6 sL bridge  Seated ball roll 3'  (forward, right/left)  Seated thoracic extension 20x   Cat/cow 15x   Quadruped thoracic rotation x 10 reps  Chin tuck, 5'' hold x 20 " reps  Chin tuck with dowel flexion (middle back stretch) x 10 reps, 5'' hold 4#    Nustep 6' L3 NP    neuromuscular re-education activities to improve: Balance, Coordination, Kinesthetic, Sense, Proprioception, and Posture for 25 minutes. The following activities were included:  +Serratus punch with rolled towel along spine 2 x 10 4#  Transverse abdominus activation with reverse march 2x10 (table top position)   Transverse abdominus activation with ball squeeze hooklyingx 2 min   Quadruped hip ext 2x10 (cues to maintain neutral spine)  Deadbug 2x10     Sherlyn participated in dynamic functional therapeutic activities to improve functional performance for 10  minutes, including:  Deadlift to stool 12# KB 2x10  Rows, blue band 2x10 reps  Shoulder extensions blue theraband 2x10  Transverse abdominus activation with hip abduction green theraband sit<>stand, chair 2x10 reps    manual therapy techniques: Joint mobilizations and Soft tissue Mobilization were applied to the: lumbar spine for 0 minutes, including:  Lumbar gapping bilateral       Patient Education and Home Exercises       Education provided:   - continue home exercise program   - ergonomics  - core stability, deep neck flexor endurance    Written Home Exercises Provided: Patient instructed to cont prior HEP. Exercises were reviewed and Sherlyn was able to demonstrate them prior to the end of the session.  Sherlyn demonstrated good  understanding of the education provided. See Electronic Medical Record under Patient Instructions for exercises provided during therapy sessions    Assessment     Sherlyn is a 33 y.o. female referred to outpatient Physical Therapy with a medical diagnosis of Chronic bilateral low back pain without sciatica. Increased lower back pain yesterday that radiated down right leg. (1) migraine since last session that lasted 6 hours. No current headache or lower back pain. Continued to progress program with good tolerance and appropriate  fatigue. Continue therapy that focuses on alleviating pain in low back during daily activity to allow pt to get on the floor with her children, lift children and sit comfortably at work with minimal pain.    Sherlyn Is progressing well towards her goals.   Patient prognosis is Good.     Patient will continue to benefit from skilled outpatient physical therapy to address the deficits listed in the problem list box on initial evaluation, provide pt/family education and to maximize pt's level of independence in the home and community environment.     Patient's spiritual, cultural and educational needs considered and pt agreeable to plan of care and goals.     Anticipated barriers to physical therapy: chronicity    Goals: Short Term Goals (4 Weeks):  1. Pt will be compliant with HEP to supplement PT in decreasing pain with functional mobility. met  2. Pt will perform floor to stand transfer with good control to demonstrate improved functional strength to play with children.  Progressing, not met  3. Pt will report 25% improvement in thoracolumbar ROM to promote functional mobility. Progressing, not met  4. Pt will improve impaired LE MMTs to >/=4/5 to improve strength for functional tasks. Progressing, not met  Long Term Goals (8 Weeks):   1. Pt will improve FOTO score to </= 29% limited to decrease perceived limitation with maintaining/changing body position. Progressing, not met  2. Pt will perform 30 lb floor to waist lift with good control to demonstrate improved core strength for lifting children.Progressing, not met  3. Pt will improve impaired LE MMTs to >/=5/5 to improve strength for functional tasks.Progressing, not met  4. Pt will report 3/10 pain or less while sitting to promote tolerance for job duties. Progressing, not met       Plan   Plan of care Certification: 11/13/2023 to 1/12/24.       Ely Shaffer, PTA

## 2023-12-11 ENCOUNTER — CLINICAL SUPPORT (OUTPATIENT)
Dept: REHABILITATION | Facility: HOSPITAL | Age: 33
End: 2023-12-11
Payer: COMMERCIAL

## 2023-12-11 DIAGNOSIS — M53.86 DECREASED ROM OF LUMBAR SPINE: Primary | ICD-10-CM

## 2023-12-11 DIAGNOSIS — R29.898 DECREASED STRENGTH OF TRUNK AND BACK: ICD-10-CM

## 2023-12-11 PROCEDURE — 97110 THERAPEUTIC EXERCISES: CPT | Mod: PN

## 2023-12-11 PROCEDURE — 97530 THERAPEUTIC ACTIVITIES: CPT | Mod: PN

## 2023-12-11 PROCEDURE — 97112 NEUROMUSCULAR REEDUCATION: CPT | Mod: PN

## 2023-12-11 NOTE — PROGRESS NOTES
"[facility]   Physical Therapy Treatment Note      Name: Sherlyn Monahan  Clinic Number: [id number]    Therapy Diagnosis:   Encounter Diagnoses   Name Primary?    Decreased ROM of lumbar spine Yes    Decreased strength of trunk and back        Physician: Alea Moses MD    Visit Date: 12/11/2023  Physician Orders: PT Eval and Treat   Medical Diagnosis from Referral: M54.50,G89.29 (ICD-10-CM) - Chronic bilateral low back pain without sciatica   Evaluation Date: 11/13/2023  Authorization Period Expiration: 12/31/24  Plan of Care Expiration: 1/12/24  Visit # / Visits authorized:5/ 16  FOTO: 6/10- next  PTA Visit #: 1/5      Time In: 11:00  Time Out: 12:00  Total Billable Time: 60 minutes (Therapeutic Exercise - 2, 1 TA, 2 NM )     Precautions: Standard, migraines, depression, anxiety     Subjective     Patient reports: slight headache and dizziness today. Back spasms after cleaning house yesterday.  On feet for a while.   She was compliant with home exercise program.  Response to previous treatment: no soreness  Functional change: ongoing    Pain: 2/10  Location: bilateral back  and buttocks      Objective      Objective Measures updated at progress report unless specified.     Treatment     Sherlyn received the treatments listed below:      Bold= progressed today  (+)= added today     therapeutic exercises to develop strength, endurance, ROM, flexibility, posture, and core stabilization for 25 minutes including:  Lower trunk rotation 20x   Open book 15x  Clamshell blue theraband 20x 3"  Bridge 2x10 blue theraband  (2 x 6 sL bridge NP)  Seated ball roll 3'  (forward, right/left)  Seated thoracic extension 20x   Cat/cow 15x   Quadruped thoracic rotation x 10 reps  Chin tuck, 5'' hold x 20 reps  Chin tuck with dowel flexion (middle back stretch) x 10 reps, 5'' hold 4#    Nustep 6' L3 NP    neuromuscular re-education activities to improve: Balance, Coordination, Kinesthetic, Sense, " Proprioception, and Posture for 25 minutes. The following activities were included:  Serratus punch with rolled towel along spine 2 x 15 4#  Transverse abdominus activation with reverse march 2x10 (table top position)   Transverse abdominus activation with ball squeeze hooklyingx 2 min   Quadruped hip ext 2x10 (cues to maintain neutral spine)  Deadbug 2x10     Sherlyn participated in dynamic functional therapeutic activities to improve functional performance for 10  minutes, including:  Deadlift to stool 12# KB 2x10  Rows, blue band 2x10 reps  Shoulder extensions blue theraband 2x10  Pallof press blue TB 2x10  Transverse abdominus activation March 2x10 blue theraband   Transverse abdominus activation with hip abduction green theraband sit<>stand, chair 2x10 reps    manual therapy techniques: Joint mobilizations and Soft tissue Mobilization were applied to the: lumbar spine for 0 minutes, including:  Lumbar gapping bilateral       Patient Education and Home Exercises       Education provided:   - continue home exercise program   - ergonomics  - core stability, deep neck flexor endurance    Written Home Exercises Provided: Patient instructed to cont prior HEP. Exercises were reviewed and Sherlyn was able to demonstrate them prior to the end of the session.  Sherlyn demonstrated good  understanding of the education provided. See Electronic Medical Record under Patient Instructions for exercises provided during therapy sessions    Assessment     Sherlyn is a 33 y.o. female referred to outpatient Physical Therapy with a medical diagnosis of Chronic bilateral low back pain without sciatica. Increased lower back pain yesterday that radiated down right leg. Patient continues to have complaints of headaches. Increased household chores and standing yesterday leading to some muscle spasms. Overall good tolerance for today's activities. No complaints of pain with today's progressions. Continued to perform postural mobility  and strengthening.     Sherlyn Is progressing well towards her goals.   Patient prognosis is Good.     Patient will continue to benefit from skilled outpatient physical therapy to address the deficits listed in the problem list box on initial evaluation, provide pt/family education and to maximize pt's level of independence in the home and community environment.     Patient's spiritual, cultural and educational needs considered and pt agreeable to plan of care and goals.     Anticipated barriers to physical therapy: chronicity    Goals: Short Term Goals (4 Weeks):  1. Pt will be compliant with HEP to supplement PT in decreasing pain with functional mobility. met  2. Pt will perform floor to stand transfer with good control to demonstrate improved functional strength to play with children.  Progressing, not met  3. Pt will report 25% improvement in thoracolumbar ROM to promote functional mobility. Progressing, not met  4. Pt will improve impaired LE MMTs to >/=4/5 to improve strength for functional tasks. Progressing, not met  Long Term Goals (8 Weeks):   1. Pt will improve FOTO score to </= 29% limited to decrease perceived limitation with maintaining/changing body position. Progressing, not met  2. Pt will perform 30 lb floor to waist lift with good control to demonstrate improved core strength for lifting children.Progressing, not met  3. Pt will improve impaired LE MMTs to >/=5/5 to improve strength for functional tasks.Progressing, not met  4. Pt will report 3/10 pain or less while sitting to promote tolerance for job duties. Progressing, not met       Plan   Plan of care Certification: 11/13/2023 to 1/12/24.       YESSY MILLIGAN, PT

## 2023-12-13 ENCOUNTER — CLINICAL SUPPORT (OUTPATIENT)
Dept: REHABILITATION | Facility: HOSPITAL | Age: 33
End: 2023-12-13
Payer: COMMERCIAL

## 2023-12-13 DIAGNOSIS — R29.898 DECREASED STRENGTH OF TRUNK AND BACK: ICD-10-CM

## 2023-12-13 DIAGNOSIS — M53.86 DECREASED ROM OF LUMBAR SPINE: Primary | ICD-10-CM

## 2023-12-13 PROCEDURE — 97112 NEUROMUSCULAR REEDUCATION: CPT | Mod: PN,CQ

## 2023-12-13 PROCEDURE — 97110 THERAPEUTIC EXERCISES: CPT | Mod: PN,CQ

## 2023-12-13 PROCEDURE — 97530 THERAPEUTIC ACTIVITIES: CPT | Mod: PN,CQ

## 2023-12-13 NOTE — PROGRESS NOTES
"[facility]   Physical Therapy Treatment Note      Name: Sherlny Monahan  Clinic Number: [id number]    Therapy Diagnosis:   Encounter Diagnoses   Name Primary?    Decreased ROM of lumbar spine Yes    Decreased strength of trunk and back        Physician: Alea Moses MD    Visit Date: 12/13/2023  Physician Orders: PT Eval and Treat   Medical Diagnosis from Referral: M54.50,G89.29 (ICD-10-CM) - Chronic bilateral low back pain without sciatica   Evaluation Date: 11/13/2023  Authorization Period Expiration: 12/31/24  Plan of Care Expiration: 1/12/24  Visit # / Visits authorized:5/ 16  FOTO: 6/10- next  PTA Visit #: 1/5      Time In: 11:00  Time Out: 12:00  Total Billable Time: 60 minutes (Therapeutic Exercise - 2, 2 TA, 1 NM )     Precautions: Standard, migraines, depression, anxiety     Subjective     Patient reports: More stiffness today than pain.   She was compliant with home exercise program.  Response to previous treatment: no soreness  Functional change: ongoing    Pain: 2/10  Location: bilateral back  and buttocks      Objective      Objective Measures updated at progress report unless specified.     Treatment     Sherlyn received the treatments listed below:      Bold= progressed today  (+)= added today     therapeutic exercises to develop strength, endurance, ROM, flexibility, posture, and core stabilization for 25 minutes including:  Lower trunk rotation 20x   Open book 15x  Clamshell blue theraband 20x 3"  Bridge 2x10 blue theraband   Seated ball roll 3'  (forward, right/left)  Seated thoracic extension 20x   Cat/cow 15x   Quadruped thoracic rotation x 10 reps NP  Chin tuck, 5'' hold x 20 reps  Chin tuck with dowel flexion (middle back stretch) x 10 reps, 5'' hold 4#  Standing thread the needle 5" x 15 ea    Nustep 6' L3 NP    neuromuscular re-education activities to improve: Balance, Coordination, Kinesthetic, Sense, Proprioception, and Posture for 10 minutes. The " following activities were included:  Serratus punch with rolled towel along spine 2 x 15 4#  Transverse abdominus activation with reverse march 2x10 (table top position)   Transverse abdominus activation with ball squeeze hooklyingx 2 min   Quadruped hip ext 2x10 (cues to maintain neutral spine)  Deadbug 2x10     Sherlyn participated in dynamic functional therapeutic activities to improve functional performance for 25 minutes, including:  Deadlift to stool 12# KB 2x10  Rows, blue band 2x10 reps  Shoulder extensions blue theraband 2x10  Pallof press blue TB 2x10  Transverse abdominus activation March 2x10 blue theraband   Transverse abdominus activation with hip abduction green theraband sit<>stand, chair 2x10 reps    manual therapy techniques: Joint mobilizations and Soft tissue Mobilization were applied to the: lumbar spine for 0 minutes, including:  Lumbar gapping bilateral       Patient Education and Home Exercises       Education provided:   - continue home exercise program   - ergonomics  - core stability, deep neck flexor endurance    Written Home Exercises Provided: Patient instructed to cont prior HEP. Exercises were reviewed and Sherlyn was able to demonstrate them prior to the end of the session.  Sherlyn demonstrated good  understanding of the education provided. See Electronic Medical Record under Patient Instructions for exercises provided during therapy sessions    Assessment     Sherlyn is a 33 y.o. female referred to outpatient Physical Therapy with a medical diagnosis of Chronic bilateral low back pain without sciatica. No current headache and minimal to no lower back pain. Primary complaint of stiffness in lower and middle back today. Overall good tolerance for today's activities. Focused majority of session on lumbar and thoracic mobility exercises. No complaints of pain with today's progressions.     Sherlyn Is progressing well towards her goals.   Patient prognosis is Good.     Patient will  continue to benefit from skilled outpatient physical therapy to address the deficits listed in the problem list box on initial evaluation, provide pt/family education and to maximize pt's level of independence in the home and community environment.     Patient's spiritual, cultural and educational needs considered and pt agreeable to plan of care and goals.     Anticipated barriers to physical therapy: chronicity    Goals: Short Term Goals (4 Weeks):  1. Pt will be compliant with HEP to supplement PT in decreasing pain with functional mobility. met  2. Pt will perform floor to stand transfer with good control to demonstrate improved functional strength to play with children.  Progressing, not met  3. Pt will report 25% improvement in thoracolumbar ROM to promote functional mobility. Progressing, not met  4. Pt will improve impaired LE MMTs to >/=4/5 to improve strength for functional tasks. Progressing, not met  Long Term Goals (8 Weeks):   1. Pt will improve FOTO score to </= 29% limited to decrease perceived limitation with maintaining/changing body position. Progressing, not met  2. Pt will perform 30 lb floor to waist lift with good control to demonstrate improved core strength for lifting children.Progressing, not met  3. Pt will improve impaired LE MMTs to >/=5/5 to improve strength for functional tasks.Progressing, not met  4. Pt will report 3/10 pain or less while sitting to promote tolerance for job duties. Progressing, not met       Plan   Plan of care Certification: 11/13/2023 to 1/12/24.       Ely Shaffer, PTA

## 2023-12-15 ENCOUNTER — TELEPHONE (OUTPATIENT)
Dept: ENDOSCOPY | Facility: HOSPITAL | Age: 33
End: 2023-12-15
Payer: COMMERCIAL

## 2023-12-15 NOTE — TELEPHONE ENCOUNTER
Spoke with patient about arrival time @ 0815.   Colon/Miralax    Prep instructions reviewed: the day before the procedure, follow a clear liquid diet all day, then start the first 1/2 of prep at 5pm and take 2nd 1/2 of prep @ 0300.  Pt must be completely NPO when prep completed @ 0500.              Medications: Do not take Insulin or oral diabetic medications the day of the procedure.  Take as prescribed: heart, seizure and blood pressure medication in the morning with a sip of water (less than an ounce).  Take any breathing medications and bring inhalers to hospital with you Leave all valuables and jewelry at home.     Wear comfortable clothes to procedure to change into hospital gown You cannot drive for 24 hours after your procedure because you will receive sedation for your procedure to make you comfortable.  A ride must be provided at discharge.

## 2023-12-18 ENCOUNTER — CLINICAL SUPPORT (OUTPATIENT)
Dept: REHABILITATION | Facility: HOSPITAL | Age: 33
End: 2023-12-18
Payer: COMMERCIAL

## 2023-12-18 DIAGNOSIS — M53.86 DECREASED ROM OF LUMBAR SPINE: Primary | ICD-10-CM

## 2023-12-18 DIAGNOSIS — R29.898 DECREASED STRENGTH OF TRUNK AND BACK: ICD-10-CM

## 2023-12-18 PROCEDURE — 97530 THERAPEUTIC ACTIVITIES: CPT | Mod: PN

## 2023-12-18 PROCEDURE — 97110 THERAPEUTIC EXERCISES: CPT | Mod: PN

## 2023-12-18 PROCEDURE — 97112 NEUROMUSCULAR REEDUCATION: CPT | Mod: PN

## 2023-12-18 NOTE — PROGRESS NOTES
"[facility]   Physical Therapy Treatment Note      Name: Sherlyn Monahan  Clinic Number: [id number]    Therapy Diagnosis:   Encounter Diagnoses   Name Primary?    Decreased ROM of lumbar spine Yes    Decreased strength of trunk and back          Physician: Alea Moses MD    Visit Date: 12/18/2023  Physician Orders: PT Eval and Treat   Medical Diagnosis from Referral: M54.50,G89.29 (ICD-10-CM) - Chronic bilateral low back pain without sciatica   Evaluation Date: 11/13/2023  Authorization Period Expiration: 12/31/24  Plan of Care Expiration: 1/12/24  Visit # / Visits authorized:7/ 16  FOTO: 8/10- next  PTA Visit #: 1/5      Time In: 10:00  Time Out: 11:00  Total Billable Time: 60 minutes (Therapeutic Exercise - 2, 1 TA, 2 NM )     Precautions: Standard, migraines, depression, anxiety     Subjective     Patient reports: doing pretty good. A little stiff but not too bad. Headaches not as bad.    She was compliant with home exercise program.  Response to previous treatment: no soreness  Functional change: ongoing    Pain: 2/10  Location: bilateral back  and buttocks      Objective      Objective Measures updated at progress report unless specified.     Treatment     Sherlyn received the treatments listed below:      Bold= progressed today  (+)= added today     therapeutic exercises to develop strength, endurance, ROM, flexibility, posture, and core stabilization for 25 minutes including:  Lower trunk rotation 20x   Open book 15x  Clamshell blue theraband 20x 3"  Bridge 2x10 blue theraband   Seated ball roll 3'  (forward, right/left)  Seated thoracic extension 20x   Cat/cow 15x   Quadruped thoracic rotation x 10 reps NP  Chin tuck, 5'' hold x 20 reps  Chin tuck with dowel flexion (middle back stretch) x 10 reps, 5'' hold 4#  Standing thread the needle 5" x 15 ea  Nustep 6' L3 NP    neuromuscular re-education activities to improve: Balance, Coordination, Kinesthetic, Sense, " Proprioception, and Posture for 25  minutes. The following activities were included:  Serratus punch with rolled towel along spine 2 x 15 4#  Transverse abdominus activation with reverse march 2x10 (table top position)   1/2 foam core series   Transverse abdominus activation with ball squeeze hooklying x20   BKFO blue theraband 20x   Horizontal abduction red theraband 20x   Zombies 20x  Quadruped hip ext 2x10 (cues to maintain neutral spine)  Deadbug 2x10 NP  Transverse abdominus activation March 2x10 blue theraband NP  Transverse abdominus activation with hip abduction green NP    Sherlyn participated in dynamic functional therapeutic activities to improve functional performance for 10 minutes, including:  Deadlift to stool 12# KB 2x10 NP  Rows, blue band 3x10 reps  Shoulder extensions step up  blue theraband 2x10  Pallof press blue TB 2x10 NP  theraband sit<>stand, chair 2x10 reps    manual therapy techniques: Joint mobilizations and Soft tissue Mobilization were applied to the: lumbar spine for 0 minutes, including:  Lumbar gapping bilateral       Patient Education and Home Exercises       Education provided:   - continue home exercise program   - ergonomics  - core stability, deep neck flexor endurance    Written Home Exercises Provided: Patient instructed to cont prior HEP. Exercises were reviewed and Sherlyn was able to demonstrate them prior to the end of the session.  Sherlyn demonstrated good  understanding of the education provided. See Electronic Medical Record under Patient Instructions for exercises provided during therapy sessions    Assessment     Sherlyn is a 33 y.o. female referred to outpatient Physical Therapy with a medical diagnosis of Chronic bilateral low back pain without sciatica. Patient presents with mild back stiffness. Good tolerance for today's progressions of core activations. Continued to address posture, motor control, and functional mobility. No increased pain post treatment.      Sherlyn Is progressing well towards her goals.   Patient prognosis is Good.     Patient will continue to benefit from skilled outpatient physical therapy to address the deficits listed in the problem list box on initial evaluation, provide pt/family education and to maximize pt's level of independence in the home and community environment.     Patient's spiritual, cultural and educational needs considered and pt agreeable to plan of care and goals.     Anticipated barriers to physical therapy: chronicity    Goals: Short Term Goals (4 Weeks):  1. Pt will be compliant with HEP to supplement PT in decreasing pain with functional mobility. met  2. Pt will perform floor to stand transfer with good control to demonstrate improved functional strength to play with children.  Progressing, not met  3. Pt will report 25% improvement in thoracolumbar ROM to promote functional mobility. Progressing, not met  4. Pt will improve impaired LE MMTs to >/=4/5 to improve strength for functional tasks. Progressing, not met  Long Term Goals (8 Weeks):   1. Pt will improve FOTO score to </= 29% limited to decrease perceived limitation with maintaining/changing body position. Progressing, not met  2. Pt will perform 30 lb floor to waist lift with good control to demonstrate improved core strength for lifting children.Progressing, not met  3. Pt will improve impaired LE MMTs to >/=5/5 to improve strength for functional tasks.Progressing, not met  4. Pt will report 3/10 pain or less while sitting to promote tolerance for job duties. Progressing, not met       Plan   Plan of care Certification: 11/13/2023 to 1/12/24.       YESSY MILLIGAN, PT

## 2023-12-19 ENCOUNTER — HOSPITAL ENCOUNTER (OUTPATIENT)
Facility: HOSPITAL | Age: 33
Discharge: HOME | End: 2023-12-19
Attending: INTERNAL MEDICINE | Admitting: INTERNAL MEDICINE
Payer: COMMERCIAL

## 2023-12-19 ENCOUNTER — ANESTHESIA EVENT (OUTPATIENT)
Dept: ENDOSCOPY | Facility: HOSPITAL | Age: 33
End: 2023-12-19
Payer: COMMERCIAL

## 2023-12-19 ENCOUNTER — ANESTHESIA (OUTPATIENT)
Dept: ENDOSCOPY | Facility: HOSPITAL | Age: 33
End: 2023-12-19
Payer: COMMERCIAL

## 2023-12-19 VITALS
TEMPERATURE: 98 F | SYSTOLIC BLOOD PRESSURE: 105 MMHG | OXYGEN SATURATION: 98 % | BODY MASS INDEX: 33.82 KG/M2 | HEART RATE: 75 BPM | HEIGHT: 65 IN | DIASTOLIC BLOOD PRESSURE: 64 MMHG | WEIGHT: 203 LBS | RESPIRATION RATE: 16 BRPM

## 2023-12-19 DIAGNOSIS — R19.7 DIARRHEA: ICD-10-CM

## 2023-12-19 LAB
B-HCG UR QL: NEGATIVE
CTP QC/QA: YES

## 2023-12-19 PROCEDURE — 25000003 PHARM REV CODE 250: Performed by: INTERNAL MEDICINE

## 2023-12-19 PROCEDURE — 45385 COLONOSCOPY W/LESION REMOVAL: CPT | Performed by: INTERNAL MEDICINE

## 2023-12-19 PROCEDURE — D9220A PRA ANESTHESIA: Mod: ANES,,, | Performed by: ANESTHESIOLOGY

## 2023-12-19 PROCEDURE — D9220A PRA ANESTHESIA: Mod: CRNA,,, | Performed by: NURSE ANESTHETIST, CERTIFIED REGISTERED

## 2023-12-19 PROCEDURE — 88305 TISSUE EXAM BY PATHOLOGIST: CPT | Mod: 59 | Performed by: PATHOLOGY

## 2023-12-19 PROCEDURE — 88305 TISSUE EXAM BY PATHOLOGIST: ICD-10-PCS | Mod: 26,,, | Performed by: PATHOLOGY

## 2023-12-19 PROCEDURE — 37000008 HC ANESTHESIA 1ST 15 MINUTES: Performed by: INTERNAL MEDICINE

## 2023-12-19 PROCEDURE — 45380 COLONOSCOPY AND BIOPSY: CPT | Mod: 59 | Performed by: INTERNAL MEDICINE

## 2023-12-19 PROCEDURE — 37000009 HC ANESTHESIA EA ADD 15 MINS: Performed by: INTERNAL MEDICINE

## 2023-12-19 PROCEDURE — 88305 TISSUE EXAM BY PATHOLOGIST: CPT | Mod: 26,,, | Performed by: PATHOLOGY

## 2023-12-19 PROCEDURE — D9220A PRA ANESTHESIA: ICD-10-PCS | Mod: CRNA,,, | Performed by: NURSE ANESTHETIST, CERTIFIED REGISTERED

## 2023-12-19 PROCEDURE — 45380 PR COLONOSCOPY,BIOPSY: ICD-10-PCS | Mod: 59,,, | Performed by: INTERNAL MEDICINE

## 2023-12-19 PROCEDURE — D9220A PRA ANESTHESIA: ICD-10-PCS | Mod: ANES,,, | Performed by: ANESTHESIOLOGY

## 2023-12-19 PROCEDURE — 63600175 PHARM REV CODE 636 W HCPCS: Performed by: NURSE ANESTHETIST, CERTIFIED REGISTERED

## 2023-12-19 PROCEDURE — 25000003 PHARM REV CODE 250: Performed by: NURSE ANESTHETIST, CERTIFIED REGISTERED

## 2023-12-19 PROCEDURE — 27201012 HC FORCEPS, HOT/COLD, DISP: Performed by: INTERNAL MEDICINE

## 2023-12-19 PROCEDURE — 45385 COLONOSCOPY W/LESION REMOVAL: CPT | Mod: ,,, | Performed by: INTERNAL MEDICINE

## 2023-12-19 PROCEDURE — 45385 PR COLONOSCOPY,REMV LESN,SNARE: ICD-10-PCS | Mod: ,,, | Performed by: INTERNAL MEDICINE

## 2023-12-19 PROCEDURE — 27201089 HC SNARE, DISP (ANY): Performed by: INTERNAL MEDICINE

## 2023-12-19 PROCEDURE — 45380 COLONOSCOPY AND BIOPSY: CPT | Mod: 59,,, | Performed by: INTERNAL MEDICINE

## 2023-12-19 RX ORDER — PROPOFOL 10 MG/ML
VIAL (ML) INTRAVENOUS
Status: DISCONTINUED | OUTPATIENT
Start: 2023-12-19 | End: 2023-12-19

## 2023-12-19 RX ORDER — DEXAMETHASONE SODIUM PHOSPHATE 4 MG/ML
INJECTION, SOLUTION INTRA-ARTICULAR; INTRALESIONAL; INTRAMUSCULAR; INTRAVENOUS; SOFT TISSUE
Status: DISCONTINUED | OUTPATIENT
Start: 2023-12-19 | End: 2023-12-19

## 2023-12-19 RX ORDER — SODIUM CHLORIDE 9 MG/ML
INJECTION, SOLUTION INTRAVENOUS CONTINUOUS
Status: DISCONTINUED | OUTPATIENT
Start: 2023-12-19 | End: 2023-12-19 | Stop reason: HOSPADM

## 2023-12-19 RX ORDER — LIDOCAINE HYDROCHLORIDE 20 MG/ML
INJECTION INTRAVENOUS
Status: DISCONTINUED | OUTPATIENT
Start: 2023-12-19 | End: 2023-12-19

## 2023-12-19 RX ORDER — ONDANSETRON 2 MG/ML
INJECTION INTRAMUSCULAR; INTRAVENOUS
Status: DISCONTINUED | OUTPATIENT
Start: 2023-12-19 | End: 2023-12-19

## 2023-12-19 RX ORDER — PHENYLEPHRINE HYDROCHLORIDE 10 MG/ML
INJECTION INTRAVENOUS
Status: DISCONTINUED | OUTPATIENT
Start: 2023-12-19 | End: 2023-12-19

## 2023-12-19 RX ORDER — PROPOFOL 10 MG/ML
VIAL (ML) INTRAVENOUS CONTINUOUS PRN
Status: DISCONTINUED | OUTPATIENT
Start: 2023-12-19 | End: 2023-12-19

## 2023-12-19 RX ADMIN — DEXAMETHASONE SODIUM PHOSPHATE 4 MG: 4 INJECTION, SOLUTION INTRAMUSCULAR; INTRAVENOUS at 10:12

## 2023-12-19 RX ADMIN — PHENYLEPHRINE HYDROCHLORIDE 100 MCG: 10 INJECTION INTRAVENOUS at 10:12

## 2023-12-19 RX ADMIN — LIDOCAINE HYDROCHLORIDE 100 MG: 20 INJECTION, SOLUTION INTRAVENOUS at 09:12

## 2023-12-19 RX ADMIN — PROPOFOL 100 MG: 10 INJECTION, EMULSION INTRAVENOUS at 09:12

## 2023-12-19 RX ADMIN — SODIUM CHLORIDE: 9 INJECTION, SOLUTION INTRAVENOUS at 09:12

## 2023-12-19 RX ADMIN — PROPOFOL 150 MCG/KG/MIN: 10 INJECTION, EMULSION INTRAVENOUS at 09:12

## 2023-12-19 RX ADMIN — ONDANSETRON 4 MG: 2 INJECTION, SOLUTION INTRAMUSCULAR; INTRAVENOUS at 10:12

## 2023-12-19 RX ADMIN — PHENYLEPHRINE HYDROCHLORIDE 100 MCG: 10 INJECTION INTRAVENOUS at 09:12

## 2023-12-19 NOTE — ANESTHESIA PREPROCEDURE EVALUATION
12/19/2023  Sherlyn Monahan is a 33 y.o., female.      Pre-op Assessment    I have reviewed the Patient Summary Reports.     I have reviewed the Nursing Notes. I have reviewed the NPO Status.   I have reviewed the Medications.     Review of Systems  Anesthesia Hx:             Denies Family Hx of Anesthesia complications.    Denies Personal Hx of Anesthesia complications.                        Physical Exam    Airway:  Mallampati: II         Anesthesia Plan  Type of Anesthesia, risks & benefits discussed:    Anesthesia Type: Gen Natural Airway  Intra-op Monitoring Plan: Standard ASA Monitors  Post Op Pain Control Plan: multimodal analgesia  Induction:  IV  Informed Consent: Informed consent signed with the Patient and all parties understand the risks and agree with anesthesia plan.  All questions answered.   ASA Score: 1  Day of Surgery Review of History & Physical: H&P Update referred to the surgeon/provider.    Ready For Surgery From Anesthesia Perspective.     .

## 2023-12-19 NOTE — H&P
Short Stay Endoscopy History and Physical    PCP - Alea Moses MD    Procedure - Colonoscopy  ASA - II  Mallampati - per anesthesia  History of Anesthesia problems - no  Family history Anesthesia problems - no     HPI:  This is a 33 y.o. female here for evaluation of : diarrhea. Blood in stool    ROS:  Constitutional: No fevers, chills, No weight loss  ENT: No allergies  CV: No chest pain  Pulm: No shortness of breath  GI: see HPI  Derm: No rash    Medical History:  has a past medical history of Acquired hypothyroidism (2016), Allergy (2009), Anxiety (), Depression (), Irritable bowel syndrome (IBS), Keloid cicatrix (), Obesity (BMI 30.0-34.9) (2016), and Urticaria.    Surgical History:  has a past surgical history that includes breast reduction (2012); Breast surgery;  section; Laparoscopic cholecystectomy (N/A, 2019); Cholecystectomy (2019);  section (N/A, 2020); Ulnar nerve transposition (Right, 2022); and Endoscopic carpal tunnel release (Right, 2022).    Family History: family history includes Allergic rhinitis in her mother; Breast cancer (age of onset: 45) in her mother; Cancer (age of onset: 45) in her mother; Depression in her brother, father, and mother; Diabetes in her father, maternal grandfather, and paternal grandfather; Heart disease in her maternal grandfather; Hypertension in her mother.. Otherwise no colon cancer, inflammatory bowel disease, or GI malignancies.    Social History:  reports that she has quit smoking. Her smoking use included cigarettes. She has a 0.8 pack-year smoking history. She has never used smokeless tobacco. She reports current alcohol use of about 1.0 standard drink of alcohol per week. She reports that she does not use drugs.    Review of patient's allergies indicates:   Allergen Reactions    Minocycline Swelling       Medications:   Medications Prior to Admission   Medication Sig Dispense  Refill Last Dose    b complex vitamins capsule Take 1 capsule by mouth once daily.       calcium-vitamin D3 (OS-ELISABETH 500 + D3) 500 mg(1,250mg) -200 unit per tablet Take 1 tablet by mouth.       cetirizine (ZYRTEC) 10 MG tablet Take 1 tablet (10 mg total) by mouth 2 (two) times a day. 60 tablet 12     cholecalciferol, vitamin D3, (VITAMIN D3) 50 mcg (2,000 unit) Tab Take 1 tablet (2,000 Units total) by mouth daily with breakfast. 100 tablet 3     clomiPHENE (CLOMID) 50 mg tablet Take 1 tablet (50 mg total) by mouth once daily. Take cycle day 3-7 5 tablet 2     Lactobacillus rhamnosus GG (CULTURELLE) 10 billion cell capsule Take 1 capsule by mouth once daily.       lansoprazole (PREVACID) 30 MG capsule Take 30 mg by mouth once daily.       magnesium oxide (MAG-OX) 400 mg (241.3 mg magnesium) tablet Take 400 mg by mouth once daily.       mupirocin calcium 2% nasl oint (BACTROBAN) 2 % Oint by Nasal route 2 (two) times daily. 1 Tube 3     prenatal vit37-iron-folic acid 29 mg iron- 1 mg Chew Take by mouth.       sertraline (ZOLOFT) 50 MG tablet TAKE 1 TABLET(50 MG) BY MOUTH EVERY EVENING 90 tablet 3     SYNTHROID 50 mcg tablet TAKE 1 TABLET(50 MCG) BY MOUTH EVERY DAY 90 tablet 3          Objective Findings:    Vital Signs: see nursing notes  Physical Exam:  General Appearance: Well appearing in no acute distress  Eyes:    No scleral icterus  ENT: Neck supple  Lungs: CTA anteriorly  Heart:  S1, S2 normal, no murmurs heard  Abdomen: Soft, non tender, non distended with positive bowel sounds. No hepatosplenomegaly, ascites, or mass  Extremities: no edema  Skin: No rash      Labs:  Lab Results   Component Value Date    WBC 8.29 10/04/2023    HGB 13.8 10/04/2023    HCT 41.5 10/04/2023     10/04/2023    CHOL 184 10/04/2023    TRIG 178 (H) 10/04/2023    HDL 44 10/04/2023    ALT 8 (L) 10/04/2023    AST 14 10/04/2023     10/04/2023    K 4.4 10/04/2023     10/04/2023    CREATININE 0.8 10/04/2023    BUN 11  10/04/2023    CO2 22 (L) 10/04/2023    TSH 1.657 10/04/2023    HGBA1C 5.0 10/04/2023       I have explained the risks and benefits of endoscopy procedures to the patient including but not limited to bleeding, perforation, infection, and death.    Tom Palmer MD

## 2023-12-19 NOTE — TRANSFER OF CARE
"Anesthesia Transfer of Care Note    Patient: Sherlyn Monahan    Procedure(s) Performed: Procedure(s) (LRB):  COLONOSCOPY (N/A)    Patient location: GI    Anesthesia Type: general    Transport from OR: Transported from OR on room air with adequate spontaneous ventilation    Post pain: adequate analgesia    Post assessment: no apparent anesthetic complications and tolerated procedure well    Post vital signs: stable    Level of consciousness: awake and alert    Nausea/Vomiting: no nausea/vomiting    Complications: none    Transfer of care protocol was followed      Last vitals: Visit Vitals  /84   Pulse 75   Temp 36.9 °C (98.4 °F)   Resp 16   Ht 5' 5" (1.651 m)   Wt 92.1 kg (203 lb)   LMP 12/01/2023   SpO2 99%   Breastfeeding No   BMI 33.78 kg/m²     "

## 2023-12-19 NOTE — ANESTHESIA POSTPROCEDURE EVALUATION
Anesthesia Post Evaluation    Patient: Sherlyn Monahan    Procedure(s) Performed: Procedure(s) (LRB):  COLONOSCOPY (N/A)    Final Anesthesia Type: general      Patient location during evaluation: PACU  Patient participation: Yes- Able to Participate  Level of consciousness: awake and alert  Post-procedure vital signs: reviewed and stable  Pain management: adequate  Airway patency: patent    PONV status at discharge: No PONV  Anesthetic complications: no      Cardiovascular status: stable  Respiratory status: spontaneous ventilation  Hydration status: euvolemic  Follow-up not needed.              Vitals Value Taken Time   /64 12/19/23 1045   Temp 36.7 °C (98.1 °F) 12/19/23 1015   Pulse 75 12/19/23 1045   Resp 16 12/19/23 1045   SpO2 98 % 12/19/23 1045         Event Time   Out of Recovery 10:53:58         Pain/Nahun Score: No data recorded

## 2023-12-19 NOTE — [employer] PATIENT INSTRUCTIONS
Discharge Summary/Instructions after an Endoscopic Procedure  Patient Name: Sherlyn Monahan  Patient MRN: [id number]  Patient YOB: 1990  Tuesday, December 19, 2023  Tom Palmer MD  Dear patient,  As a result of recent federal legislation (The Federal Cures Act), you may   receive lab or pathology results from your procedure in your [facility]   account before your physician is able to contact you. Your physician or   their representative will relay the results to you with their   recommendations at their soonest availability.  Thank you,  Your health is very important to us during the Covid Crisis. Following your   procedure today, you will receive a daily text for 2 weeks asking about   signs or symptoms of Covid 19.  Please respond to this text when you   receive it so we can follow up and keep you as safe as possible.   RESTRICTIONS:  During your procedure today, you received medications for sedation.  These   medications may affect your judgment, balance and coordination.  Therefore,   for 24 hours, you have the following restrictions:   - DO NOT drive a car, operate machinery, make legal/financial decisions,   sign important papers or drink alcohol.    ACTIVITY:  Today: no heavy lifting, straining or running due to procedural   sedation/anesthesia.  The following day: return to full activity including work.  DIET:  Eat and drink normally unless instructed otherwise.     TREATMENT FOR COMMON SIDE EFFECTS:  - Mild abdominal pain, nausea, belching, bloating or excessive gas:  rest,   eat lightly and use a heating pad.  - Sore Throat: treat with throat lozenges and/or gargle with warm salt   water.  - Because air was used during the procedure, expelling large amounts of air   from your rectum or belching is normal.  - If a bowel prep was taken, you may not have a bowel movement for 1-3 days.    This is normal.  SYMPTOMS TO WATCH FOR AND REPORT TO YOUR PHYSICIAN:  1. Abdominal pain or bloating,  other than gas cramps.  2. Chest pain.  3. Back pain.  4. Signs of infection such as: chills or fever occurring within 24 hours   after the procedure.  5. Rectal bleeding, which would show as bright red, maroon, or black stools.   (A tablespoon of blood from the rectum is not serious, especially if   hemorrhoids are present.)  6. Vomiting.  7. Weakness or dizziness.  GO DIRECTLY TO THE NEAREST EMERGENCY ROOM IF YOU HAVE ANY OF THE FOLLOWING:      Difficulty breathing              Chills and/or fever over 101 F   Persistent vomiting and/or vomiting blood   Severe abdominal pain   Severe chest pain   Black, tarry stools   Bleeding- more than one tablespoon   Any other symptom or condition that you feel may need urgent attention  Your doctor recommends these additional instructions:  If any biopsies were taken, your doctors clinic will contact you in 1 to 2   weeks with any results.  - Discharge patient to home.   - Resume previous diet.   - Continue present medications.   - Await pathology results.   - Repeat colonoscopy in 5 years for surveillance.   - Patient has a contact number available for emergencies.  The signs and   symptoms of potential delayed complications were discussed with the   patient.  Return to normal activities tomorrow.  Written discharge   instructions were provided to the patient.  For questions, problems or results please call your physician - Tom Palmer MD.  EMERGENCY PHONE NUMBER: [phone],  LAB RESULTS: [phone]  IF A COMPLICATION OR EMERGENCY SITUATION ARISES AND YOU ARE UNABLE TO REACH   YOUR PHYSICIAN - GO DIRECTLY TO THE EMERGENCY ROOM.  Tom Palmer MD  12/19/2023 10:13:56 AM  This report has been verified and signed electronically.  Dear patient,  As a result of recent federal legislation (The Federal Cures Act), you may   receive lab or pathology results from your procedure in your [facility]   account before your physician is able to contact  you. Your physician or   their representative will relay the results to you with their   recommendations at their soonest availability.  Thank you,  [employer]

## 2023-12-20 LAB
FINAL PATHOLOGIC DIAGNOSIS: NORMAL
GROSS: NORMAL
Lab: NORMAL

## 2023-12-27 ENCOUNTER — CLINICAL SUPPORT (OUTPATIENT)
Dept: REHABILITATION | Facility: HOSPITAL | Age: 33
End: 2023-12-27
Payer: COMMERCIAL

## 2023-12-27 DIAGNOSIS — M53.86 DECREASED ROM OF LUMBAR SPINE: Primary | ICD-10-CM

## 2023-12-27 DIAGNOSIS — R29.898 DECREASED STRENGTH OF TRUNK AND BACK: ICD-10-CM

## 2023-12-27 PROCEDURE — 97110 THERAPEUTIC EXERCISES: CPT | Mod: PN

## 2023-12-27 PROCEDURE — 97112 NEUROMUSCULAR REEDUCATION: CPT | Mod: PN

## 2023-12-27 PROCEDURE — 97530 THERAPEUTIC ACTIVITIES: CPT | Mod: PN

## 2023-12-27 NOTE — PROGRESS NOTES
"[facility]   Physical Therapy Treatment Note      Name: Sherlyn Monahan  Clinic Number: [id number]    Therapy Diagnosis:   Encounter Diagnoses   Name Primary?    Decreased ROM of lumbar spine Yes    Decreased strength of trunk and back            Physician: Alea Moses MD    Visit Date: 12/27/2023  Physician Orders: PT Eval and Treat   Medical Diagnosis from Referral: M54.50,G89.29 (ICD-10-CM) - Chronic bilateral low back pain without sciatica   Evaluation Date: 11/13/2023  Authorization Period Expiration: 12/31/23  Plan of Care Expiration: 1/12/24  Visit # / Visits authorized:8/ 16  FOTO:2/3 completed 12/27/23  PTA Visit #: 1/5      Time In: 11:00  Time Out: 12:00  Total Billable Time: 60 minutes (Therapeutic Exercise - 2, 1 TA, 2 NM )     Precautions: Standard, migraines, depression, anxiety     Subjective     Patient reports:mild stiffness over the last few days but not too bad.   She was compliant with home exercise program.  Response to previous treatment: no soreness  Functional change: ongoing    Pain: 2/10  Location: bilateral back  and buttocks      Objective      Objective Measures updated at progress report unless specified.   FOTO: 25% limitation   Treatment     Sherlyn received the treatments listed below:      Bold= progressed today  (+)= added today     therapeutic exercises to develop strength, endurance, ROM, flexibility, posture, and core stabilization for 25 minutes including:  Lower trunk rotation 20x   Open book 15x  Clamshell blue theraband 20x 3"  Bridge 2x10 blue theraband   Seated ball roll 3'  (forward, right/left)  Seated thoracic extension 20x   Cat/cow 15x   Quadruped thoracic rotation x 10 reps NP  Chin tuck, 5'' hold x 20 reps  Chin tuck with dowel flexion (middle back stretch) x 10 reps, 5'' hold 4#  Standing thread the needle 5" x 15 ea  Nustep 6' L3 NP    neuromuscular re-education activities to improve: Balance, Coordination, Kinesthetic, " Sense, Proprioception, and Posture for 25  minutes. The following activities were included:  Serratus punch with rolled towel along spine 2 x 15 4#  Transverse abdominus activation with reverse march 2x10 (table top position)   1/2 foam core series   Transverse abdominus activation with ball squeeze hooklying x20   BKFO blue theraband 20x   Horizontal abduction red theraband 20x   Zombies 20x  Quadruped hip ext 2x10 (cues to maintain neutral spine)  Deadbug 2x10 NP  Transverse abdominus activation March 2x10 blue theraband NP    Sherlyn participated in dynamic functional therapeutic activities to improve functional performance for 10 minutes, including:  Deadlift to stool 20# KB 2x10   Rows, blue band 3x10 reps NP  Shoulder extensions step up  blue theraband 2x10 OOT  Pallof press blue TB 15x   theraband sit<>stand, chair 2x10 reps NP  Lunges 2x10 7# DB with upper extremity support    manual therapy techniques: Joint mobilizations and Soft tissue Mobilization were applied to the: lumbar spine for 0 minutes, including:  Lumbar gapping bilateral       Patient Education and Home Exercises       Education provided:   - continue home exercise program   - ergonomics  - core stability, deep neck flexor endurance    Written Home Exercises Provided: Patient instructed to cont prior HEP. Exercises were reviewed and Sherlyn was able to demonstrate them prior to the end of the session.  Sherlyn demonstrated good  understanding of the education provided. See Electronic Medical Record under Patient Instructions for exercises provided during therapy sessions    Assessment     Sherlyn is a 33 y.o. female referred to outpatient Physical Therapy with a medical diagnosis of Chronic bilateral low back pain without sciatica. Patient with mild low back stiffness. Continued to progress core and functional strengthening with good tolerance this visit without exacerbation. Min cuing for form but overall improving motor control. No  increased pain post treatment.     Sherlyn Is progressing well towards her goals.   Patient prognosis is Good.     Patient will continue to benefit from skilled outpatient physical therapy to address the deficits listed in the problem list box on initial evaluation, provide pt/family education and to maximize pt's level of independence in the home and community environment.     Patient's spiritual, cultural and educational needs considered and pt agreeable to plan of care and goals.     Anticipated barriers to physical therapy: chronicity    Goals: Short Term Goals (4 Weeks):  1. Pt will be compliant with HEP to supplement PT in decreasing pain with functional mobility. met  2. Pt will perform floor to stand transfer with good control to demonstrate improved functional strength to play with children.  Progressing, not met  3. Pt will report 25% improvement in thoracolumbar ROM to promote functional mobility. Progressing, not met  4. Pt will improve impaired LE MMTs to >/=4/5 to improve strength for functional tasks. Progressing, not met  Long Term Goals (8 Weeks):   1. Pt will improve FOTO score to </= 29% limited to decrease perceived limitation with maintaining/changing body position. Met  2. Pt will perform 30 lb floor to waist lift with good control to demonstrate improved core strength for lifting children. Progressing, not met  3. Pt will improve impaired LE MMTs to >/=5/5 to improve strength for functional tasks.Progressing, not met  4. Pt will report 3/10 pain or less while sitting to promote tolerance for job duties. Progressing, not met       Plan   Plan of care Certification: 11/13/2023 to 1/12/24.       YESSY MILLIGAN, PT

## 2024-01-02 ENCOUNTER — DOCUMENTATION ONLY (OUTPATIENT)
Dept: REHABILITATION | Facility: HOSPITAL | Age: 34
End: 2024-01-02
Payer: COMMERCIAL

## 2024-01-02 NOTE — PROGRESS NOTES
Face to face meeting completed with Airam Lechuga PT regarding current status and progress of   Sherlyn Monahan .      Ely Shaffer, PTA

## 2024-01-03 ENCOUNTER — CLINICAL SUPPORT (OUTPATIENT)
Dept: REHABILITATION | Facility: HOSPITAL | Age: 34
End: 2024-01-03
Payer: COMMERCIAL

## 2024-01-03 ENCOUNTER — PATIENT MESSAGE (OUTPATIENT)
Dept: GASTROENTEROLOGY | Facility: CLINIC | Age: 34
End: 2024-01-03
Payer: COMMERCIAL

## 2024-01-03 DIAGNOSIS — M53.86 DECREASED ROM OF LUMBAR SPINE: Primary | ICD-10-CM

## 2024-01-03 DIAGNOSIS — R29.898 DECREASED STRENGTH OF TRUNK AND BACK: ICD-10-CM

## 2024-01-03 PROCEDURE — 97112 NEUROMUSCULAR REEDUCATION: CPT | Mod: PN

## 2024-01-03 PROCEDURE — 97110 THERAPEUTIC EXERCISES: CPT | Mod: PN

## 2024-01-03 PROCEDURE — 97530 THERAPEUTIC ACTIVITIES: CPT | Mod: PN

## 2024-01-03 NOTE — PROGRESS NOTES
"[facility]   Physical Therapy Treatment Note      Name: Sherlyn Monahan  Clinic Number: [id number]    Therapy Diagnosis:   Encounter Diagnoses   Name Primary?    Decreased ROM of lumbar spine Yes    Decreased strength of trunk and back      Physician: Alea Moses MD    Visit Date: 1/3/2024  Physician Orders: PT Eval and Treat   Medical Diagnosis from Referral: M54.50,G89.29 (ICD-10-CM) - Chronic bilateral low back pain without sciatica   Evaluation Date: 11/13/2023  Authorization Period Expiration: 12/31/23  Plan of Care Expiration: 1/12/24  Visit # / Visits authorized: 9/ 16  FOTO:2/3 completed 12/27/23  PTA Visit #: 1/5      Time In: 11:00  Time Out: 12:00  Total Billable Time: 60 minutes (Therapeutic Exercise - 2, 1 TA, 2 NM )     Precautions: Standard, migraines, depression, anxiety     Subjective     Patient reports:mild ongoing stiffness.    She was compliant with home exercise program.  Response to previous treatment: no soreness  Functional change: ongoing    Pain: 2/10  Location: bilateral back  and buttocks      Objective      Objective Measures updated at progress report unless specified.   FOTO: 25% limitation   Treatment     Sherlyn received the treatments listed below:      Bold= progressed today  (+)= added today     therapeutic exercises to develop strength, endurance, ROM, flexibility, posture, and core stabilization for 25 minutes including:  Lower trunk rotation 20x   Open book 15x  Clamshell blue theraband 20x 3"  Bridge 2x10 blue theraband   Seated ball roll 3'  (forward, right/left)  Seated thoracic extension 20x   Cat/cow 15x   Quadruped thoracic rotation x 10 reps NP  Chin tuck, 5'' hold x 20 reps  Chin tuck with dowel flexion (middle back stretch) x 10 reps, 5'' hold 4#  Standing thread the needle 5" x 15 ea  Nustep 6' L3 NP    neuromuscular re-education activities to improve: Balance, Coordination, Kinesthetic, Sense, Proprioception, and Posture for 25  " minutes. The following activities were included:  Serratus punch  2 x 15 4#  Transverse abdominus activation with reverse march 2x10 (table top position)   1/2 foam core series   Transverse abdominus activation with ball squeeze hooklying x20   BKFO blue theraband 20x   march blue theraband    Horizontal abduction red theraband 20x   Zombies 20x  Bird dog 2x10   Deadbug 2x10 NP    Sherlyn participated in dynamic functional therapeutic activities to improve functional performance for 10 minutes, including:  Deadlift to stool 20# KB 2x10   Rows,7# cables 2x10   Shoulder extensions step up  blue theraband 2x10 OOT  Pallof press + rotation  7# 10x   theraband sit<>stand, chair 2x10 reps NP  Lunges 2x10 7# DB with upper extremity support      manual therapy techniques: Joint mobilizations and Soft tissue Mobilization were applied to the: lumbar spine for 0 minutes, including:  Lumbar gapping bilateral       Patient Education and Home Exercises       Education provided:   - continue home exercise program   - ergonomics  - core stability, deep neck flexor endurance    Written Home Exercises Provided: Patient instructed to cont prior HEP. Exercises were reviewed and Sherlyn was able to demonstrate them prior to the end of the session.  Sherlyn demonstrated good  understanding of the education provided. See Electronic Medical Record under Patient Instructions for exercises provided during therapy sessions    Assessment     Sherlyn is a 33 y.o. female referred to outpatient Physical Therapy with a medical diagnosis of Chronic bilateral low back pain without sciatica. Patient with ongoing complaints of low grade low back stiffness. Improving motor control and core endurance. No increased pain post treatment.     Sherlyn Is progressing well towards her goals.   Patient prognosis is Good.     Patient will continue to benefit from skilled outpatient physical therapy to address the deficits listed in the problem list box on  initial evaluation, provide pt/family education and to maximize pt's level of independence in the home and community environment.     Patient's spiritual, cultural and educational needs considered and pt agreeable to plan of care and goals.     Anticipated barriers to physical therapy: chronicity    Goals: Short Term Goals (4 Weeks):  1. Pt will be compliant with HEP to supplement PT in decreasing pain with functional mobility. met  2. Pt will perform floor to stand transfer with good control to demonstrate improved functional strength to play with children.  Progressing, not met  3. Pt will report 25% improvement in thoracolumbar ROM to promote functional mobility. Progressing, not met  4. Pt will improve impaired LE MMTs to >/=4/5 to improve strength for functional tasks. Progressing, not met    Long Term Goals (8 Weeks):   1. Pt will improve FOTO score to </= 29% limited to decrease perceived limitation with maintaining/changing body position. Met  2. Pt will perform 30 lb floor to waist lift with good control to demonstrate improved core strength for lifting children. Progressing, not met  3. Pt will improve impaired LE MMTs to >/=5/5 to improve strength for functional tasks.Progressing, not met  4. Pt will report 3/10 pain or less while sitting to promote tolerance for job duties. Progressing, not met       Plan   Plan of care Certification: 11/13/2023 to 1/12/24.       YESSY MILLIGAN, PT

## 2024-01-04 ENCOUNTER — PATIENT MESSAGE (OUTPATIENT)
Dept: GASTROENTEROLOGY | Facility: CLINIC | Age: 34
End: 2024-01-04
Payer: COMMERCIAL

## 2024-01-04 RX ORDER — MESALAMINE 1000 MG/1
1000 SUPPOSITORY RECTAL NIGHTLY
Qty: 30 SUPPOSITORY | Refills: 11 | Status: SHIPPED | OUTPATIENT
Start: 2024-01-04 | End: 2025-01-03

## 2024-01-04 NOTE — PROGRESS NOTES
Colon polyp benign. Rectal biopsies suggestive of proctitis. Will prescribe canasa. Schedule f/u with me

## 2024-01-08 ENCOUNTER — CLINICAL SUPPORT (OUTPATIENT)
Dept: REHABILITATION | Facility: HOSPITAL | Age: 34
End: 2024-01-08
Payer: COMMERCIAL

## 2024-01-08 DIAGNOSIS — M53.86 DECREASED ROM OF LUMBAR SPINE: Primary | ICD-10-CM

## 2024-01-08 DIAGNOSIS — R29.898 DECREASED STRENGTH OF TRUNK AND BACK: ICD-10-CM

## 2024-01-08 PROCEDURE — 97110 THERAPEUTIC EXERCISES: CPT | Mod: PN

## 2024-01-08 PROCEDURE — 97530 THERAPEUTIC ACTIVITIES: CPT | Mod: PN

## 2024-01-08 PROCEDURE — 97112 NEUROMUSCULAR REEDUCATION: CPT | Mod: PN

## 2024-01-08 PROCEDURE — 97140 MANUAL THERAPY 1/> REGIONS: CPT | Mod: PN

## 2024-01-08 NOTE — PROGRESS NOTES
"[facility]   Physical Therapy Treatment Note      Name: Sherlyn Monahan  Clinic Number: [id number]    Therapy Diagnosis:   Encounter Diagnoses   Name Primary?    Decreased ROM of lumbar spine Yes    Decreased strength of trunk and back      Physician: Alea Moses MD    Visit Date: 1/8/2024  Physician Orders: PT Eval and Treat   Medical Diagnosis from Referral: M54.50,G89.29 (ICD-10-CM) - Chronic bilateral low back pain without sciatica   Evaluation Date: 11/13/2023  Authorization Period Expiration: 12/31/23  Plan of Care Expiration: 1/12/24  Visit # / Visits authorized: 10/ 16  FOTO:2/3 completed 12/27/23  PTA Visit #: 1/5      Time In: 10:00  Time Out: 10:58  Total Billable Time: 58 minutes (Therapeutic Exercise - 2, 1 TA, 1 NM, 1 MT )     Precautions: Standard, migraines, depression, anxiety     Subjective     Patient reports:mild stiffness this morning and mid back spasms yesterday. Kids slept in bed with her which may have contributed.    She was compliant with home exercise program.  Response to previous treatment: no soreness  Functional change: ongoing    Pain: 2/10  Location: bilateral back  and buttocks      Objective      Objective Measures updated at progress report unless specified.   FOTO: 25% limitation   Treatment     Sherlyn received the treatments listed below:      Bold= progressed today  (+)= added today     therapeutic exercises to develop strength, endurance, ROM, flexibility, posture, and core stabilization for 23 minutes including:  Lower trunk rotation 20x NP  Open book 15x  Clamshell blue theraband 20x 3"  Bridge 3x10 blue theraband   Seated ball roll 3'  (forward, right/left) NP  Seated thoracic extension 20x NP  Cat/cow 15x NP  Quadruped thoracic rotation x 10 reps NP  Quadruped rows with leg extension 10# db 2x10  Chin tuck, 5'' hold x 20 reps NP  Chin tuck with dowel flexion (middle back stretch) x 10 reps, 5'' hold 4#   Standing thread the needle 5" " x 15 ea NP  Nustep 6' L3 NP    neuromuscular re-education activities to improve: Balance, Coordination, Kinesthetic, Sense, Proprioception, and Posture for 10  minutes. The following activities were included:  Serratus punch  2 x 15 4# NP  Transverse abdominus activation with reverse march 2x10 (table top position)   1/2 foam core series-- NP    Transverse abdominus activation with ball squeeze hooklying x20   BKFO blue theraband 20x   march blue theraband    Horizontal abduction red theraband 20x   Zombies 20x  Bird dog 2x10   Deadbug 2x10     Sherlyn participated in dynamic functional therapeutic activities to improve functional performance for 17 minutes, including:  Deadlift to stool 20# KB 2x10   Rows,7# cables 2x10   Shoulder extensions step up  blue theraband 2x10  Pallof press + rotation  7# 10x   theraband sit<>stand, chair 2x10 reps NP  Lunges 2x10 7# DB with upper extremity support    Next: floor/stand transfers      manual therapy techniques: Joint mobilizations and Soft tissue Mobilization were applied to the: lumbar spine for 8 minutes, including:  Lumbar gapping bilateral NP  Thoracic p/a grade III-V         Patient Education and Home Exercises       Education provided:   - continue home exercise program   - ergonomics  - core stability, deep neck flexor endurance    Written Home Exercises Provided: Patient instructed to cont prior HEP. Exercises were reviewed and Sherlyn was able to demonstrate them prior to the end of the session.  Sherlyn demonstrated good  understanding of the education provided. See Electronic Medical Record under Patient Instructions for exercises provided during therapy sessions    Assessment     Sherlyn is a 33 y.o. female referred to outpatient Physical Therapy with a medical diagnosis of Chronic bilateral low back pain without sciatica. Patient with ongoing complaints of low grade low back stiffness. Recent upper back muscle spasms. Incorporated thoracic mobilization  manual therapy and active thoracic mobility along with postural strengthening. Good tolerance for progressions. No increased pain post treatment. Overall Improving motor control and core endurance.   Sherlyn Is progressing well towards her goals.   Patient prognosis is Good.     Patient will continue to benefit from skilled outpatient physical therapy to address the deficits listed in the problem list box on initial evaluation, provide pt/family education and to maximize pt's level of independence in the home and community environment.     Patient's spiritual, cultural and educational needs considered and pt agreeable to plan of care and goals.     Anticipated barriers to physical therapy: chronicity    Goals: Short Term Goals (4 Weeks):  1. Pt will be compliant with HEP to supplement PT in decreasing pain with functional mobility. met  2. Pt will perform floor to stand transfer with good control to demonstrate improved functional strength to play with children.  Progressing, not met  3. Pt will report 25% improvement in thoracolumbar ROM to promote functional mobility. Progressing, not met  4. Pt will improve impaired LE MMTs to >/=4/5 to improve strength for functional tasks. Progressing, not met    Long Term Goals (8 Weeks):   1. Pt will improve FOTO score to </= 29% limited to decrease perceived limitation with maintaining/changing body position. Met  2. Pt will perform 30 lb floor to waist lift with good control to demonstrate improved core strength for lifting children. Progressing, not met  3. Pt will improve impaired LE MMTs to >/=5/5 to improve strength for functional tasks.Progressing, not met  4. Pt will report 3/10 pain or less while sitting to promote tolerance for job duties. Progressing, not met       Plan   Plan of care Certification: 11/13/2023 to 1/12/24.       YESSY MILLIGAN, PT

## 2024-01-10 ENCOUNTER — CLINICAL SUPPORT (OUTPATIENT)
Dept: REHABILITATION | Facility: HOSPITAL | Age: 34
End: 2024-01-10
Payer: COMMERCIAL

## 2024-01-10 DIAGNOSIS — R29.898 DECREASED STRENGTH OF TRUNK AND BACK: ICD-10-CM

## 2024-01-10 DIAGNOSIS — M53.86 DECREASED ROM OF LUMBAR SPINE: Primary | ICD-10-CM

## 2024-01-10 PROCEDURE — 97112 NEUROMUSCULAR REEDUCATION: CPT | Mod: PN

## 2024-01-10 PROCEDURE — 97110 THERAPEUTIC EXERCISES: CPT | Mod: PN

## 2024-01-10 PROCEDURE — 97530 THERAPEUTIC ACTIVITIES: CPT | Mod: PN

## 2024-01-10 NOTE — PLAN OF CARE
[facility]   Physical Therapy discharge summary     Name: Sherlyn Monahan  Clinic Number: [id number]    Therapy Diagnosis:   Encounter Diagnoses   Name Primary?    Decreased ROM of lumbar spine Yes    Decreased strength of trunk and back        Physician: Alea Moses MD    Visit Date: 1/10/2024  Physician Orders: PT Eval and Treat   Medical Diagnosis from Referral: M54.50,G89.29 (ICD-10-CM) - Chronic bilateral low back pain without sciatica   Evaluation Date: 11/13/2023  Authorization Period Expiration: 12/31/23  Plan of Care Expiration: 1/12/24  Visit # / Visits authorized: 3/20 (12 total)  FOTO:3/3  PTA Visit #: 1/5      Time In: 10:00  Time Out: 10:53  Total Billable Time: 53 minutes (Therapeutic Exercise - 2, 1 TA, 1 NM )     Precautions: Standard, migraines, depression, anxiety     Subjective     Patient reports:not having back spasm. No stiffness today .  Some pain with standing and sitting on floor long periods of time but doesn't do very often.    She was compliant with home exercise program.  Response to previous treatment: no soreness  Functional change: ongoing    Pain: 1/10  Location: bilateral back  and buttocks      Objective      Objective Measures updated at progress report unless specified.   FOTO: 17% limitation     Posture: posterior pelvic tilt, hinge at TL junction, rounded shoulders  Palpation: tenderness of lumbar paraspinals and bilateral PSIS, greater trochanters  Sensation: normal light touch   DTRs: not tested   Range of Motion/Strength:      Lumbar AROM Pain/Dysfunction with Movement:   Flexion 65    Extension 12    Right side bending 20     Left side bending 20    Right rotation 90%     Left rotation 90%        Hip Right Left Pain/Dysfunction with Movement   AROM/PROM         flexion  110  120     extension  Within normal limits   Within normal limits      Internal rotation  30  40     External rotation  50  50        L/E MMT Right Left  Pain/Dysfunction with Movement   Hip Flexion 5/5 5/5     Hip Extension 5/5 5/5      Hip Abduction 5/5 5/5     Hip Adduction 5/5 5/5     Hip IR 5/5 5/5     Hip ER 4+/5 4+/5     Knee Flexion 5/5 5/5     Knee Extension 5/5 5/5     Ankle DF 5/5 5/5     Ankle PF 5/5 5/5           Joint Mobility:   - Thoracic: decreased mobility with pain   - Lumbar: slight decreased mobility, pain with L3-5   - Hip: limited internal rotation mobility     Flexibility: slight decreased left hamstring   SL Bridge Test (glut med strength) = negative       Dharmesh et al SIJ CPR:  Thigh thrust test:   negative                     SI compression:    negative                   SI distraction:        negative       Gaelslen's test:        negative                 Sacral thrust:  negative                           (or FABERs) -negative                  Excluding centralization --> Sn 91%, Sp 87%  LR+ 6.97, LR- 0.11      Gait Analysis:Without AD Assistance ind Deviations: non-antalgic     Single Leg Stance: R 30 seconds, L 30 seconds slight pelvic drop         Treatment     Sherlyn received the treatments listed in daily note.       Patient Education and Home Exercises       Education provided:   - continue home exercise program   - ergonomics  - core stability, deep neck flexor endurance    Written Home Exercises Provided: Patient instructed to cont prior HEP. Exercises were reviewed and Sherlyn was able to demonstrate them prior to the end of the session.  Sherlyn demonstrated good  understanding of the education provided. See Electronic Medical Record under Patient Instructions for exercises provided during therapy sessions    Assessment     Sherlyn is a 33 y.o. female referred to outpatient Physical Therapy with a medical diagnosis of Chronic bilateral low back pain without sciatica. Patient with improvement in morning stiffness and improved tolerance for functional activities such as floor transfers, lifting kids, and household chores.  Patient demonstrates good improvements in lower extremity strength, range of motion, biomechanics, and motor control since beginning therapy. No increased pain with today's progressions with education on home exercise program update. Patient is appropriate for discharge at this time. She has met or near met all goals.     Sherlyn Is progressing well towards her goals.     Patient prognosis is Good.   Patient's spiritual, cultural and educational needs considered and pt agreeable to plan of care and goals.     Anticipated barriers to physical therapy: chronicity    Goals: Short Term Goals (4 Weeks):  1. Pt will be compliant with HEP to supplement PT in decreasing pain with functional mobility. met  2. Pt will perform floor to stand transfer with good control to demonstrate improved functional strength to play with children.  Met   3. Pt will report 25% improvement in thoracolumbar ROM to promote functional mobility. Met  4. Pt will improve impaired LE MMTs to >/=4/5 to improve strength for functional tasks. met    Long Term Goals (8 Weeks):   1. Pt will improve FOTO score to </= 29% limited to decrease perceived limitation with maintaining/changing body position. Met  2. Pt will perform 30 lb floor to waist lift with good control to demonstrate improved core strength for lifting children. met  3. Pt will improve impaired LE MMTs to >/=5/5 to improve strength for functional tasks.near met  4. Pt will report 3/10 pain or less while sitting to promote tolerance for job duties. met       Plan   Discharge to home exercise program     YESSY MILLIGAN, PT

## 2024-01-10 NOTE — PROGRESS NOTES
[facility]   Physical Therapy Treatment Note/ discharge summary     Name: Sherlyn Monahan  Clinic Number: [id number]    Therapy Diagnosis:   Encounter Diagnoses   Name Primary?    Decreased ROM of lumbar spine Yes    Decreased strength of trunk and back        Physician: Alea Moses MD    Visit Date: 1/10/2024  Physician Orders: PT Eval and Treat   Medical Diagnosis from Referral: M54.50,G89.29 (ICD-10-CM) - Chronic bilateral low back pain without sciatica   Evaluation Date: 11/13/2023  Authorization Period Expiration: 12/31/23  Plan of Care Expiration: 1/12/24  Visit # / Visits authorized: 3/20 (12 total)  FOTO:3/3  PTA Visit #: 1/5      Time In: 10:00  Time Out: 10:53  Total Billable Time: 53 minutes (Therapeutic Exercise - 2, 1 TA, 1 NM )     Precautions: Standard, migraines, depression, anxiety     Subjective     Patient reports:not having back spasm. No stiffness today .  Some pain with standing and sitting on floor long periods of time but doesn't do very often.    She was compliant with home exercise program.  Response to previous treatment: no soreness  Functional change: ongoing    Pain: 1/10  Location: bilateral back  and buttocks      Objective      Objective Measures updated at progress report unless specified.   FOTO: 17% limitation     Posture: posterior pelvic tilt, hinge at TL junction, rounded shoulders  Palpation: tenderness of lumbar paraspinals and bilateral PSIS, greater trochanters  Sensation: normal light touch   DTRs: not tested   Range of Motion/Strength:      Lumbar AROM Pain/Dysfunction with Movement:   Flexion 65    Extension 12    Right side bending 20     Left side bending 20    Right rotation 90%     Left rotation 90%        Hip Right Left Pain/Dysfunction with Movement   AROM/PROM         flexion  110  120     extension  Within normal limits   Within normal limits      Internal rotation  30  40     External rotation  50  50        L/E MMT Right  "Left Pain/Dysfunction with Movement   Hip Flexion 5/5 5/5     Hip Extension 5/5 5/5      Hip Abduction 5/5 5/5     Hip Adduction 5/5 5/5     Hip IR 5/5 5/5     Hip ER 4+/5 4+/5     Knee Flexion 5/5 5/5     Knee Extension 5/5 5/5     Ankle DF 5/5 5/5     Ankle PF 5/5 5/5           Joint Mobility:   - Thoracic: decreased mobility with pain   - Lumbar: slight decreased mobility, pain with L3-5   - Hip: limited internal rotation mobility     Flexibility: slight decreased left hamstring   SL Bridge Test (glut med strength) = negative       Hawa SIJ CPR:  Thigh thrust test:   negative                     SI compression:    negative                   SI distraction:        negative       Gaelslen's test:        negative                 Sacral thrust:  negative                           (or FABERs) -negative                  Excluding centralization --> Sn 91%, Sp 87%  LR+ 6.97, LR- 0.11      Gait Analysis:Without AD Assistance ind Deviations: non-antalgic     Single Leg Stance: R 30 seconds, L 30 seconds slight pelvic drop         Treatment     Sherlyn received the treatments listed below:      Bold= progressed today  (+)= added today     therapeutic exercises to develop strength, endurance, ROM, flexibility, posture, and core stabilization for 23 minutes including:  Lower trunk rotation 20x NP  Open book 15x  Clamshell blue theraband 20x 3"  Bridge 3x10 blue theraband   Seated ball roll 3'  (forward, right/left) NP  Seated thoracic extension 20x NP  Cat/cow 15x NP  Quadruped thoracic rotation x 10 reps NP  Quadruped rows with leg extension 10# db 2x10  Chin tuck, 5'' hold x 20 reps NP  Chin tuck with dowel flexion (middle back stretch) x 10 reps, 5'' hold 4#   Standing thread the needle 5" x 15 ea NP  Nustep 6' L3 NP    neuromuscular re-education activities to improve: Balance, Coordination, Kinesthetic, Sense, Proprioception, and Posture for 15  minutes. The following activities were included:  Serratus punch  " 2 x 15 4# NP  Transverse abdominus activation with reverse march 2x10 (table top position)   1/2 foam core series-- NP    Transverse abdominus activation with ball squeeze hooklying x20   BKFO blue theraband 20x   march blue theraband    Horizontal abduction red theraband 20x   Zombies 20x  Bird dog 2x10   Deadbug 2x10     Sherlyn participated in dynamic functional therapeutic activities to improve functional performance for 15 minutes, including:  Deadlift to stool 20# KB 2x10   Rows,7# cables 2x10   Shoulder extensions step up  blue theraband 2x10  Pallof press + rotation  7# 10x   theraband sit<>stand, chair 2x10 reps NP  Lunges 2x10 7# DB with upper extremity support  Squat 30# KB 2x10   Carry march 50 ft 20# 1x each hand   Squat to kneel 3x each side       manual therapy techniques: Joint mobilizations and Soft tissue Mobilization were applied to the: lumbar spine for  minutes, including:  Lumbar gapping bilateral NP  Thoracic p/a grade III-V         Patient Education and Home Exercises       Education provided:   - continue home exercise program   - ergonomics  - core stability, deep neck flexor endurance    Written Home Exercises Provided: Patient instructed to cont prior HEP. Exercises were reviewed and Sherlyn was able to demonstrate them prior to the end of the session.  Sherlyn demonstrated good  understanding of the education provided. See Electronic Medical Record under Patient Instructions for exercises provided during therapy sessions    Assessment     Sherlyn is a 33 y.o. female referred to outpatient Physical Therapy with a medical diagnosis of Chronic bilateral low back pain without sciatica. Patient with improvement in morning stiffness and improved tolerance for functional activities such as floor transfers, lifting kids, and household chores. Patient demonstrates good improvements in lower extremity strength, range of motion, biomechanics, and motor control since beginning therapy. No  increased pain with today's progressions with education on home exercise program update. Patient is appropriate for discharge at this time. She has met or near met all goals.        Sherlyn Is progressing well towards her goals.     Patient prognosis is Good.   Patient's spiritual, cultural and educational needs considered and pt agreeable to plan of care and goals.     Anticipated barriers to physical therapy: chronicity    Goals: Short Term Goals (4 Weeks):  1. Pt will be compliant with HEP to supplement PT in decreasing pain with functional mobility. met  2. Pt will perform floor to stand transfer with good control to demonstrate improved functional strength to play with children.  Met   3. Pt will report 25% improvement in thoracolumbar ROM to promote functional mobility. Met  4. Pt will improve impaired LE MMTs to >/=4/5 to improve strength for functional tasks. met    Long Term Goals (8 Weeks):   1. Pt will improve FOTO score to </= 29% limited to decrease perceived limitation with maintaining/changing body position. Met  2. Pt will perform 30 lb floor to waist lift with good control to demonstrate improved core strength for lifting children. met  3. Pt will improve impaired LE MMTs to >/=5/5 to improve strength for functional tasks.near met  4. Pt will report 3/10 pain or less while sitting to promote tolerance for job duties. met       Plan   Discharge to home exercise program       YESSY MILLIGAN, PT

## 2024-01-22 ENCOUNTER — OFFICE VISIT (OUTPATIENT)
Dept: SLEEP MEDICINE | Facility: CLINIC | Age: 34
End: 2024-01-22
Payer: COMMERCIAL

## 2024-01-22 DIAGNOSIS — K21.9 GASTROESOPHAGEAL REFLUX DISEASE, UNSPECIFIED WHETHER ESOPHAGITIS PRESENT: ICD-10-CM

## 2024-01-22 DIAGNOSIS — G47.33 OSA (OBSTRUCTIVE SLEEP APNEA): Primary | ICD-10-CM

## 2024-01-22 PROCEDURE — 99214 OFFICE O/P EST MOD 30 MIN: CPT | Mod: S$GLB,,, | Performed by: PHYSICIAN ASSISTANT

## 2024-01-22 PROCEDURE — 99999 PR PBB SHADOW E&M-EST. PATIENT-LVL III: CPT | Mod: PBBFAC,,, | Performed by: PHYSICIAN ASSISTANT

## 2024-01-22 PROCEDURE — 1159F MED LIST DOCD IN RCRD: CPT | Mod: CPTII,S$GLB,, | Performed by: PHYSICIAN ASSISTANT

## 2024-01-22 PROCEDURE — 1160F RVW MEDS BY RX/DR IN RCRD: CPT | Mod: CPTII,S$GLB,, | Performed by: PHYSICIAN ASSISTANT

## 2024-01-22 NOTE — PROGRESS NOTES
Referred by No ref. provider found     ESTABLISHED PATIENT VISIT    Sherlyn Monahan  is a pleasant 33 y.o. female  with PMH significant for migraines, depression, anxiety, hypothyroidism, IBS, GERD, fatty liver, vit D def, BMI 33+, LV         Here today for: CPAP follow-up     PLAN last visit 10/25/23:   -recommend sleep testing   -HST ordered  -discussed trial therapy if LV present and the patient is open to a trial of CPAP therapy  -discussed LV and CPAP with patient in detail, including possible complications of untreated LV like heart attack/stroke  -advised on strict driving precautions; advised never to drive drowsy      Since last visit:   Using CPAP nightly with good control of sx. Reports mask interface and pressure settings comfortable. Reports having more what she believes to be esophageal spasms since starting CPAP. Has hx of GERD, not currently taking any daily medications. Recommended retrial of daily Pepcid to see if this improves sx. Denies significant bloating or discomfort.       PAP history   Problems    Mask Nasal mask   Pressure 6-12cwp   DME DME   Machine age AirSense 10 12/18/23   Download 1/22/24: 27/30 x 6hrs 30mins, 6-12cwp (10.5/11.9/12), leak (0/5.8/34.7), AHI 1         SLEEP SCHEDULE   Environment     Bed Time 9:30-10PM   Sleep Latency Up to 60mins   Arousals 0-2   Nocturia 0   Back to sleep Not long typically   Wake time 6:30-7AM   Naps Naps when able   Work          Past Medical History:   Diagnosis Date    Acquired hypothyroidism 01/11/2016    Allergy 8/2009    Seasonal    Anxiety 2019    Depression 2019    Irritable bowel syndrome (IBS)     Keloid cicatrix 2012    Obesity (BMI 30.0-34.9) 01/11/2016    loosing weight consistently on 21 Day Fix program     Urticaria      Patient Active Problem List   Diagnosis    Acquired hypothyroidism    Dysmenorrhea    Chronic migraine without aura without status migrainosus, not intractable    Family history of breast cancer in mother     Irregular menstrual bleeding    S/P     Fatty liver    Status post cholecystectomy    Delivery of pregnancy by  section    Carpal tunnel syndrome of right wrist    Adverse effect of COVID-19 vaccine    Cubital tunnel syndrome of both upper extremities    Depression with anxiety    Low vitamin D level    Decreased range of motion of right elbow    Decreased range of motion of right wrist    Pain in right elbow    Pain in right wrist    Impaired dexterity    Difficulty with activities of daily living    Swelling of right wrist    Swelling of right elbow    Irritable bowel syndrome with diarrhea    Decreased ROM of lumbar spine    Decreased strength of trunk and back    Chronic bilateral low back pain without sciatica    Excessive daytime sleepiness       Current Outpatient Medications:     b complex vitamins capsule, Take 1 capsule by mouth once daily., Disp: , Rfl:     calcium-vitamin D3 (OS-ELISABETH 500 + D3) 500 mg(1,250mg) -200 unit per tablet, Take 1 tablet by mouth., Disp: , Rfl:     cetirizine (ZYRTEC) 10 MG tablet, Take 1 tablet (10 mg total) by mouth 2 (two) times a day., Disp: 60 tablet, Rfl: 12    cholecalciferol, vitamin D3, (VITAMIN D3) 50 mcg (2,000 unit) Tab, Take 1 tablet (2,000 Units total) by mouth daily with breakfast., Disp: 100 tablet, Rfl: 3    clomiPHENE (CLOMID) 50 mg tablet, Take 1 tablet (50 mg total) by mouth once daily. Take cycle day 3-7, Disp: 5 tablet, Rfl: 2    Lactobacillus rhamnosus GG (CULTURELLE) 10 billion cell capsule, Take 1 capsule by mouth once daily., Disp: , Rfl:     lansoprazole (PREVACID) 30 MG capsule, Take 30 mg by mouth once daily., Disp: , Rfl:     magnesium oxide (MAG-OX) 400 mg (241.3 mg magnesium) tablet, Take 400 mg by mouth once daily., Disp: , Rfl:     mesalamine (CANASA) 1000 MG Supp, Place 1 suppository (1,000 mg total) rectally nightly., Disp: 30 suppository, Rfl: 11    mupirocin calcium 2% nasl oint (BACTROBAN) 2 % Oint, by Nasal route 2 (two)  times daily., Disp: 1 Tube, Rfl: 3    prenatal vit37-iron-folic acid 29 mg iron- 1 mg Chew, Take by mouth., Disp: , Rfl:     sertraline (ZOLOFT) 50 MG tablet, TAKE 1 TABLET(50 MG) BY MOUTH EVERY EVENING, Disp: 90 tablet, Rfl: 3    SYNTHROID 50 mcg tablet, TAKE 1 TABLET(50 MCG) BY MOUTH EVERY DAY, Disp: 90 tablet, Rfl: 3  No current facility-administered medications for this visit.    Facility-Administered Medications Ordered in Other Visits:     fentaNYL 50 mcg/mL injection  mcg,  mcg, Intravenous, PRN, Yury Ambrosio MD, 50 mcg at 06/24/22 1026    LIDOcaine (PF) 10 mg/ml (1%) injection 10 mg, 1 mL, Intradermal, Once, Yury Ambrosio MD    midazolam (VERSED) 1 mg/mL injection 0.5-4 mg, 0.5-4 mg, Intravenous, PRN, Yury Ambrosio MD, 2 mg at 06/24/22 1026     There were no vitals filed for this visit.    Physical Exam:    GEN:   Well-appearing  Psych:  Appropriate affect, demonstrates insight  SKIN:  No rash on the face or bridge of the nose      LABS:   Lab Results   Component Value Date    HGB 13.8 10/04/2023    CO2 22 (L) 10/04/2023         RECORDS REVIEWED:    HST 11.15.23: AHI 6, RDI 18    Previous sleep note; 10/25/23    CPAP interrogation 1/22/24: 27/30 x 6hrs 30mins, 6-12cwp (10.5/11.9/12), leak (0/5.8/34.7), AHI 1    ASSESSMENT        1/22/2024     2:06 PM   EPWORTH SLEEPINESS SCALE   Sitting and reading 2   Watching TV 2   Sitting, inactive in a public place (e.g. a theatre or a meeting) 0   As a passenger in a car for an hour without a break 2   Lying down to rest in the afternoon when circumstances permit 2   Sitting and talking to someone 0   Sitting quietly after a lunch without alcohol 1   In a car, while stopped for a few minutes in traffic 0   Total score 9       PROBLEM DESCRIPTION/ Sx on Presentation Interval Hx  STATUS   sx LV    + snoring, + rare choking arousals, no witnessed apneas  + wakes feeling un-refreshed Good usage and efficiency  controlled   Daytime Sx     + sleepiness when inactive   ESS 14/24 on intake (reviewed from 10/25/23) Less sleepy on CPAP improved   Insomnia    Trouble falling asleep: sometimes difficult  Maintenance:         wakes occassionally, not typically difficult to return to sleep  Prior hypnotics:        Current hypnotics:     Waking less frequently  Slightly improved   Other issues:       PLAN     -using and benefiting from CPAP therapy  -continue CPAP nightly  -adjusted pressures 8-15cwp  -CPAP supplies ordered  -discussed LV and CPAP with patient in detail, including possible complications of untreated LV like heart attack/stroke  -advised on strict driving precautions; advised never to drive drowsy  -pt reports flare of GERD sx since starting on PAP, recommended to resume daily Pepcid and follow up with PCP    Advised on plan of care. Answered all patient questions. Patient verbalized understanding and voiced agreement with plan of care.       RTC 12 months or as needed     The patient was given open opportunity to ask questions and/or express concerns about treatment plan. All questions/concerns were discussed.     Two patient identifiers used prior to evaluation.

## 2024-01-23 ENCOUNTER — PATIENT MESSAGE (OUTPATIENT)
Dept: GASTROENTEROLOGY | Facility: CLINIC | Age: 34
End: 2024-01-23

## 2024-01-23 ENCOUNTER — OFFICE VISIT (OUTPATIENT)
Dept: GASTROENTEROLOGY | Facility: CLINIC | Age: 34
End: 2024-01-23
Payer: COMMERCIAL

## 2024-01-23 DIAGNOSIS — R19.7 DIARRHEA, UNSPECIFIED TYPE: Primary | ICD-10-CM

## 2024-01-23 DIAGNOSIS — K51.211 ULCERATIVE PROCTITIS WITH RECTAL BLEEDING: ICD-10-CM

## 2024-01-23 PROCEDURE — 99214 OFFICE O/P EST MOD 30 MIN: CPT | Mod: 95,,, | Performed by: INTERNAL MEDICINE

## 2024-01-23 RX ORDER — MESALAMINE 1.2 G/1
2.4 TABLET, DELAYED RELEASE ORAL
Qty: 60 TABLET | Refills: 3 | Status: SHIPPED | OUTPATIENT
Start: 2024-01-23 | End: 2024-05-20 | Stop reason: SDUPTHER

## 2024-01-23 NOTE — PROGRESS NOTES
Subjective:       Patient ID: Sherlyn Monahan is a 33 y.o. female.    Chief Complaint:  Ulcerative proctitis    Telemedicine encounter today to follow-up with aforementioned complaints.  She was previously seen by NP Prasanna in November with complaints of diarrhea.  She was sent for colonoscopy which was performed by me in December    Started on Canasa.  Better. Bleeding has stopped. Continues to have diarrhea 1-2x/day. Cycle between diarrhea for few days then no bm for few days then large bm, followed by diarrhea    Review of Systems   Gastrointestinal:  Positive for diarrhea. Negative for blood in stool.         The following portions of the patient's history were reviewed and updated as appropriate: allergies, current medications, past family history, past medical history, past social history, past surgical history and problem list.    Objective:      Physical Exam  Constitutional:       Appearance: She is well-developed.   HENT:      Head: Normocephalic and atraumatic.   Eyes:      Conjunctiva/sclera: Conjunctivae normal.   Pulmonary:      Effort: Pulmonary effort is normal. No respiratory distress.   Musculoskeletal:      Cervical back: Normal range of motion.   Neurological:      Mental Status: She is alert and oriented to person, place, and time.   Psychiatric:         Behavior: Behavior normal.         Thought Content: Thought content normal.         Judgment: Judgment normal.         Pertinent labs and imaging studies reviewed    Assessment:       1. Diarrhea, unspecified type    2. Ulcerative proctitis with rectal bleeding        Plan:       Start p.o. mesalamine in addition a WI  Repeat calprotectin in 6 weeks.  Will check last Ace at that time given reported oily stools  Follow-up thereafter  Discussed pregnancy and IBD with patient    The patient location is: home  The chief complaint leading to consultation is: UP    Visit type: audiovisual       30 minutes of total time spent on the encounter, which  includes face to face time and non-face to face time preparing to see the patient (eg, review of tests), Obtaining and/or reviewing separately obtained history, Documenting clinical information in the electronic or other health record, Independently interpreting results (not separately reported) and communicating results to the patient/family/caregiver, or Care coordination (not separately reported).         Each patient to whom he or she provides medical services by telemedicine is:  (1) informed of the relationship between the physician and patient and the respective role of any other health care provider with respect to management of the patient; and (2) notified that he or she may decline to receive medical services by telemedicine and may withdraw from such care at any time.    Notes:       (Portions of this note were dictated using voice recognition software and may contain dictation related errors in spelling/grammar/syntax not found on text review)

## 2024-02-16 ENCOUNTER — PATIENT MESSAGE (OUTPATIENT)
Dept: GASTROENTEROLOGY | Facility: CLINIC | Age: 34
End: 2024-02-16
Payer: COMMERCIAL

## 2024-02-19 ENCOUNTER — OFFICE VISIT (OUTPATIENT)
Dept: OBSTETRICS AND GYNECOLOGY | Facility: CLINIC | Age: 34
End: 2024-02-19
Payer: COMMERCIAL

## 2024-02-19 VITALS
WEIGHT: 211.31 LBS | DIASTOLIC BLOOD PRESSURE: 78 MMHG | BODY MASS INDEX: 35.16 KG/M2 | SYSTOLIC BLOOD PRESSURE: 118 MMHG

## 2024-02-19 DIAGNOSIS — Z01.419 WELL WOMAN EXAM WITH ROUTINE GYNECOLOGICAL EXAM: Primary | ICD-10-CM

## 2024-02-19 DIAGNOSIS — Z12.4 ROUTINE CERVICAL SMEAR: ICD-10-CM

## 2024-02-19 PROCEDURE — 3078F DIAST BP <80 MM HG: CPT | Mod: CPTII,S$GLB,, | Performed by: OBSTETRICS & GYNECOLOGY

## 2024-02-19 PROCEDURE — 3074F SYST BP LT 130 MM HG: CPT | Mod: CPTII,S$GLB,, | Performed by: OBSTETRICS & GYNECOLOGY

## 2024-02-19 PROCEDURE — 87624 HPV HI-RISK TYP POOLED RSLT: CPT | Performed by: OBSTETRICS & GYNECOLOGY

## 2024-02-19 PROCEDURE — 99999 PR PBB SHADOW E&M-EST. PATIENT-LVL III: CPT | Mod: PBBFAC,,, | Performed by: OBSTETRICS & GYNECOLOGY

## 2024-02-19 PROCEDURE — 3008F BODY MASS INDEX DOCD: CPT | Mod: CPTII,S$GLB,, | Performed by: OBSTETRICS & GYNECOLOGY

## 2024-02-19 PROCEDURE — 88175 CYTOPATH C/V AUTO FLUID REDO: CPT | Performed by: OBSTETRICS & GYNECOLOGY

## 2024-02-19 PROCEDURE — 1159F MED LIST DOCD IN RCRD: CPT | Mod: CPTII,S$GLB,, | Performed by: OBSTETRICS & GYNECOLOGY

## 2024-02-19 PROCEDURE — 99395 PREV VISIT EST AGE 18-39: CPT | Mod: S$GLB,,, | Performed by: OBSTETRICS & GYNECOLOGY

## 2024-02-19 PROCEDURE — 1160F RVW MEDS BY RX/DR IN RCRD: CPT | Mod: CPTII,S$GLB,, | Performed by: OBSTETRICS & GYNECOLOGY

## 2024-02-19 RX ORDER — MUPIROCIN 20 MG/G
OINTMENT TOPICAL 2 TIMES DAILY
COMMUNITY
Start: 2023-08-29

## 2024-02-19 RX ORDER — PROGESTERONE 200 MG/1
200 CAPSULE ORAL NIGHTLY
Qty: 10 CAPSULE | Refills: 11 | Status: SHIPPED | OUTPATIENT
Start: 2024-02-19 | End: 2024-04-15 | Stop reason: ALTCHOICE

## 2024-02-19 NOTE — PROGRESS NOTES
OBSTETRIC HISTORY:   OB History          2    Para   2    Term   2       0    AB   0    Living   2         SAB   0    IAB   0    Ectopic   0    Multiple   0    Live Births   2               COMPREHENSIVE GYN HISTORY:  PAP History: Denies abnormal Paps.  Infection History: Reports STDs: Chlamydia. Denies PID.  Benign History: Denies uterine fibroids. Denies ovarian cysts. Denies endometriosis.   Cancer History: Denies cervical cancer. Denies uterine cancer or hyperplasia. Denies ovarian cancer. Denies vulvar cancer or pre-cancer. Denies vaginal cancer or pre-cancer. Denies breast cancer. Denies colon cancer.  Sexual Activity History:   reports that she currently engages in sexual activity and has had male partners. She reports using the following methods of birth control/protection: Condom.   Menstrual History:  Every 30 days, flows for 4 days. Moderate flow.  Dysmenorrhea History: Reports occ. dysmenorrhea.  Contraception: Condom        HPI:   33 y.o.  Patient's last menstrual period was 2024.   Patient is  here for her annual gynecologic exam.  She has no GYN complaints. She denies bladder, breast and bowel complaints.    ROS:  GENERAL: No weight gain or weight loss.   CHEST: No shortness of breath.   ABDOMEN: No abdominal pain, constipation, diarrhea, nausea, vomiting or rectal bleeding.   URINARY: No frequency, dysuria, hematuria, or burning on urination.  REPRODUCTIVE: See HPI.   BREASTS: No breast pain, lumps, or nipple discharge.   PSYCHIATRIC: No depression or anxiety.    PE:   /78   Wt 95.8 kg (211 lb 5 oz)   LMP 2024   BMI 35.16 kg/m²   APPEARANCE: Well nourished, well developed, in no acute distress.  NECK: Neck symmetric without  thyromegaly.  NODES: No inguinal, cervical lymph node enlargement.  CHEST: Lungs clear to auscultation.  HEART: Regular rate and rhythm, no murmurs, rubs or gallops.  ABDOMEN: Soft. No tenderness or masses. No hernias.  BREASTS: Symmetrical, no  skin changes or visible lesions. No palpable masses, nipple discharge or adenopathy bilaterally.  PELVIC:   VULVA: No lesions. Normal female genitalia.  URETHRAL MEATUS: Normal size and location, no lesions, no prolapse.  URETHRA: No masses, tenderness, prolapse or scarring.  VAGINA: Moist and well rugated, no discharge, no significant cystocele or rectocele.  CERVIX: No lesions and discharge.  UTERUS: Normal size, regular shape, mobile, non-tender, bladder base nontender.  ADNEXA: No masses or tenderness.    PROCEDURES:  Pap smear  HRHPV    Assessment:  Normal Gynecologic Exam  Got diagnosed with UC--can try Prometrium prior to menses to see if it helps    Plan:  Mammogram and Colonoscopy if indicated by current recommendations.  Return to clinic in one year or for any problems or complaints.    Counseling:  Patient was counseled today on A.C.S. Pap guidelines and recommendations for yearly pelvic exams and monthly self breast exams; to see her PCP for other health maintenance. Regular exercise and healthy diet.     As of April 1, 2021, the Cures Act has been passed nationally. This new law requires that all doctors progress notes, lab results, pathology reports and radiology reports be released IMMEDIATELY to the patient in the patient portal. That means that the results are released to you at the EXACT same time they are released to me. Therefore, with all of the patients that I have I am not able to reply to each patient exactly when the results come in. So there will be a delay from when you see the results to when I see them and have time to come up with a response to send you. Also I only see these results when I am on the computer at work. So if the results come in over the weekend or after 5 pm of a work day, I will not see them until the next business day. As you can tell, this is a challenge as a physician to give every patient the quick response they hope for and deserve. So please be patient!     Thanks  for understanding,

## 2024-02-22 ENCOUNTER — TELEPHONE (OUTPATIENT)
Dept: OBSTETRICS AND GYNECOLOGY | Facility: CLINIC | Age: 34
End: 2024-02-22
Payer: COMMERCIAL

## 2024-02-22 NOTE — TELEPHONE ENCOUNTER
----- Message from Tatiana Arzate MA sent at 2/22/2024  9:15 AM CST -----    ----- Message -----  From: Vi Burton  Sent: 2/22/2024   9:15 AM CST  To: [facility] Staff    Type:  Pharmacy Calling to Clarify an RX    Name of Caller:pharmacist   [facility] Name[facility]  Prescription Name:progesterone (PROMETRIUM) 200 MG capsule  What do they need to clarify?:they are out of stock of the 200 MG can she take 100 MG  2 caps  once a day   Best Call Back Number:[phone]  Additional Information:

## 2024-02-23 LAB
FINAL PATHOLOGIC DIAGNOSIS: NORMAL
Lab: NORMAL

## 2024-02-26 ENCOUNTER — LAB VISIT (OUTPATIENT)
Dept: LAB | Facility: HOSPITAL | Age: 34
End: 2024-02-26
Attending: FAMILY MEDICINE
Payer: COMMERCIAL

## 2024-02-26 DIAGNOSIS — K51.211 ULCERATIVE PROCTITIS WITH RECTAL BLEEDING: ICD-10-CM

## 2024-02-26 DIAGNOSIS — R19.7 DIARRHEA, UNSPECIFIED TYPE: ICD-10-CM

## 2024-02-26 PROCEDURE — 82653 EL-1 FECAL QUANTITATIVE: CPT | Performed by: INTERNAL MEDICINE

## 2024-02-26 PROCEDURE — 83993 ASSAY FOR CALPROTECTIN FECAL: CPT | Performed by: INTERNAL MEDICINE

## 2024-02-29 LAB
CALPROTECTIN STL-MCNT: 14.9 MCG/G
ELASTASE 1, FECAL: 349 MCG/G

## 2024-03-05 NOTE — PROGRESS NOTES
Calprotectin better. No significant pancreatic insufficiency noted. Continue current mesalamine regimen. Maintain f/u as scheduled.

## 2024-03-06 ENCOUNTER — PATIENT MESSAGE (OUTPATIENT)
Dept: OBSTETRICS AND GYNECOLOGY | Facility: CLINIC | Age: 34
End: 2024-03-06
Payer: COMMERCIAL

## 2024-03-06 ENCOUNTER — PATIENT MESSAGE (OUTPATIENT)
Dept: GASTROENTEROLOGY | Facility: CLINIC | Age: 34
End: 2024-03-06
Payer: COMMERCIAL

## 2024-03-06 DIAGNOSIS — N92.0 MENORRHAGIA WITH REGULAR CYCLE: Primary | ICD-10-CM

## 2024-03-11 ENCOUNTER — OFFICE VISIT (OUTPATIENT)
Dept: GASTROENTEROLOGY | Facility: CLINIC | Age: 34
End: 2024-03-11
Payer: COMMERCIAL

## 2024-03-11 VITALS — BODY MASS INDEX: 35.01 KG/M2 | HEIGHT: 65 IN | WEIGHT: 210.13 LBS

## 2024-03-11 DIAGNOSIS — K51.211 ULCERATIVE PROCTITIS WITH RECTAL BLEEDING: Primary | ICD-10-CM

## 2024-03-11 PROCEDURE — 99999 PR PBB SHADOW E&M-EST. PATIENT-LVL IV: CPT | Mod: PBBFAC,,, | Performed by: INTERNAL MEDICINE

## 2024-03-11 PROCEDURE — 1159F MED LIST DOCD IN RCRD: CPT | Mod: CPTII,S$GLB,, | Performed by: INTERNAL MEDICINE

## 2024-03-11 PROCEDURE — 99214 OFFICE O/P EST MOD 30 MIN: CPT | Mod: S$GLB,,, | Performed by: INTERNAL MEDICINE

## 2024-03-11 PROCEDURE — 3008F BODY MASS INDEX DOCD: CPT | Mod: CPTII,S$GLB,, | Performed by: INTERNAL MEDICINE

## 2024-03-11 NOTE — PROGRESS NOTES
Subjective:       Patient ID: Sherlyn Monahan is a 33 y.o. female.    Chief Complaint: Follow-up (Bleeding in the stool stopped sat.) and Diarrhea     Patient here today to follow-up with aforementioned complaints.  She was previously seen by me in January in follow-up of of ulcerative proctitis.  At that time she was instructed to start p.o. in addition MO mesalamine and repeat calprotectin thereafter.  Since patient has had intermittent episodes bouts of bleeding/gas/cramping. No diarrhea. Lasted a few days. Since has resolved.     Currently on po/pr.  Doing well    Review of Systems   Gastrointestinal:  Negative for abdominal pain, blood in stool and diarrhea.       The following portions of the patient's history were reviewed and updated as appropriate: allergies, current medications, past family history, past medical history, past social history, past surgical history and problem list.    Objective:      Physical Exam  Constitutional:       Appearance: She is well-developed.   HENT:      Head: Normocephalic and atraumatic.   Eyes:      Conjunctiva/sclera: Conjunctivae normal.   Pulmonary:      Effort: Pulmonary effort is normal. No respiratory distress.   Musculoskeletal:      Cervical back: Normal range of motion.   Neurological:      Mental Status: She is alert and oriented to person, place, and time.   Psychiatric:         Behavior: Behavior normal.         Thought Content: Thought content normal.         Judgment: Judgment normal.           Pertinent labs and imaging studies reviewed    Assessment:       1. Ulcerative proctitis with rectal bleeding        Plan:       Subjectively quiescent  Check flexible sigmoidoscopy to document mucosal healing   Consider weaning MO mesalamine    (Portions of this note were dictated using voice recognition software and may contain dictation related errors in spelling/grammar/syntax not found on text review)

## 2024-03-11 NOTE — H&P (VIEW-ONLY)
Subjective:       Patient ID: Sherlyn Monahan is a 33 y.o. female.    Chief Complaint: Follow-up (Bleeding in the stool stopped sat.) and Diarrhea     Patient here today to follow-up with aforementioned complaints.  She was previously seen by me in January in follow-up of of ulcerative proctitis.  At that time she was instructed to start p.o. in addition ID mesalamine and repeat calprotectin thereafter.  Since patient has had intermittent episodes bouts of bleeding/gas/cramping. No diarrhea. Lasted a few days. Since has resolved.     Currently on po/pr.  Doing well    Review of Systems   Gastrointestinal:  Negative for abdominal pain, blood in stool and diarrhea.       The following portions of the patient's history were reviewed and updated as appropriate: allergies, current medications, past family history, past medical history, past social history, past surgical history and problem list.    Objective:      Physical Exam  Constitutional:       Appearance: She is well-developed.   HENT:      Head: Normocephalic and atraumatic.   Eyes:      Conjunctiva/sclera: Conjunctivae normal.   Pulmonary:      Effort: Pulmonary effort is normal. No respiratory distress.   Musculoskeletal:      Cervical back: Normal range of motion.   Neurological:      Mental Status: She is alert and oriented to person, place, and time.   Psychiatric:         Behavior: Behavior normal.         Thought Content: Thought content normal.         Judgment: Judgment normal.           Pertinent labs and imaging studies reviewed    Assessment:       1. Ulcerative proctitis with rectal bleeding        Plan:       Subjectively quiescent  Check flexible sigmoidoscopy to document mucosal healing   Consider weaning ID mesalamine    (Portions of this note were dictated using voice recognition software and may contain dictation related errors in spelling/grammar/syntax not found on text review)

## 2024-03-11 NOTE — PATIENT INSTRUCTIONS
Your Procedure is scheduled for _______________ at [facility] [address] [place] [address].  Check in at the Hospital Admit Desk on the First Floor (Building on the Left).    To ensure that your test is accurate and complete you must follow these instructions.  If you have questions, please call the office at [phone].      Please hold any GLP-1 medications prior to the procedure: Dulaglutide Trulicity(hold week prior), Exenatide Byetta (hold the morning of procedure), Semaglutide Ozempic (hold week prior), Liraglutide Victoza, Saxenda(hold week prior), Lixisenatide Adlyxin (hold the morning of procedure), Semaglutide Rybelsus (hold the morning of procedure), Tirzepatide Mounjaro (hold week prior)     **YOU WILL NEED SOMEONE TO DRIVE YOU HOME AFTER PROCEDURE DUE TO RECEIVING SEDATION    **PURCHASE TWO  FLEET ENEMAS FROM YOUR LOCAL PHARMACY (IN THE LAXATIVE SECTION).  FOLLOW INSTRUCTIONS BELOW.      THE DAY BEFORE THE TEST    Clear liquid diet after 7pm the evening before the procedure.     Clear Liquids are Water, Yellow and Green Popsicles, Juice or Gatorade or Powerade, Chicken or Beef Broth or Bouillon (no noodles or  Vegetables), Coffee or tea without milk or cream, and Sprite/ 7up and lemonade.  Avoid all items with red, orange, purple or blue.  No milk products or smoothies.        THE DAY OF YOUR TEST    Use the first Fleet enema TWO HOURS before leaving home.    Use the second Fleet Enema ONE HOUR before leaving home.     Nothing to eat or drink after midnight.  Brushing your teeth is allowed.    However, if your test is scheduled after 1200 noon you may have clear liquids up until SIX hours before exam.    Take heart, blood pressure or seizure medication the morning of the procedure.  Take none of your other medications until after procedure is complete.  If you use inhalers bring them with you.

## 2024-03-14 NOTE — ANESTHESIA PROCEDURE NOTES
Supraclavicular Brachial Plexus Single Injection Block    Patient location during procedure: pre-op   Block not for primary anesthetic.  Reason for block: at surgeon's request and post-op pain management   Post-op Pain Location: right wrist   Start time: 6/24/2022 10:27 AM  Timeout: 6/24/2022 10:26 AM   End time: 6/24/2022 10:31 AM    Staffing  Authorizing Provider: Haider Ravi MD  Performing Provider: Yury Ambrosio MD    Preanesthetic Checklist  Completed: patient identified, IV checked, site marked, risks and benefits discussed, surgical consent, monitors and equipment checked, pre-op evaluation and timeout performed  Peripheral Block  Patient position: supine  Prep: ChloraPrep  Patient monitoring: heart rate, cardiac monitor, continuous pulse ox, continuous capnometry and frequent blood pressure checks  Block type: supraclavicular  Laterality: right  Injection technique: single shot  Needle  Needle type: Stimuplex   Needle gauge: 22 G  Needle length: 2 in  Needle localization: anatomical landmarks and ultrasound guidance   -ultrasound image captured on disc.  Assessment  Injection assessment: negative aspiration, negative parasthesia and local visualized surrounding nerve  Paresthesia pain: none  Heart rate change: no  Slow fractionated injection: yes  Pain Tolerance: comfortable throughout block and no complaints  Medications:    Medications: mepivacaine (CARBOCAINE) injection 15 mg/mL (1.5%) - Perineural   30 mL - 6/24/2022 10:31:00 AM    Additional Notes  VSS.  [facility] RN monitoring vitals throughout procedure.  Patient tolerated procedure well.                No

## 2024-03-15 ENCOUNTER — HOSPITAL ENCOUNTER (OUTPATIENT)
Dept: RADIOLOGY | Facility: HOSPITAL | Age: 34
Discharge: HOME | End: 2024-03-15
Attending: OBSTETRICS & GYNECOLOGY
Payer: COMMERCIAL

## 2024-03-15 DIAGNOSIS — N92.0 MENORRHAGIA WITH REGULAR CYCLE: ICD-10-CM

## 2024-03-15 PROCEDURE — 76830 TRANSVAGINAL US NON-OB: CPT | Mod: TC

## 2024-03-15 PROCEDURE — 76856 US EXAM PELVIC COMPLETE: CPT | Mod: 26,,, | Performed by: RADIOLOGY

## 2024-03-15 PROCEDURE — 76830 TRANSVAGINAL US NON-OB: CPT | Mod: 26,,, | Performed by: RADIOLOGY

## 2024-03-19 ENCOUNTER — TELEPHONE (OUTPATIENT)
Dept: OBSTETRICS AND GYNECOLOGY | Facility: CLINIC | Age: 34
End: 2024-03-19
Payer: COMMERCIAL

## 2024-03-19 NOTE — TELEPHONE ENCOUNTER
----- Message from Emelyn Lee sent at 3/18/2024  4:29 PM CDT -----  Type:  Patient Returning Call    Who Called:pt   Who Left Message for Patient:Office   Does the patient know what this is regarding?:yes   Would the patient rather a call back or a response via [facility]? Call   Best Call Back Number: [phone]  Additional Information:

## 2024-03-19 NOTE — ADDENDUM NOTE
Addended by: CONNIE QUAN on: 3/15/2024 11:57 AM     Modules accepted: Orders    
Addended by: CONNIE QUAN on: 3/19/2024 12:05 PM     Modules accepted: Orders    
No

## 2024-04-03 ENCOUNTER — TELEPHONE (OUTPATIENT)
Dept: ENDOSCOPY | Facility: HOSPITAL | Age: 34
End: 2024-04-03
Payer: COMMERCIAL

## 2024-04-03 NOTE — TELEPHONE ENCOUNTER
Spoke with patient about arrival time @. 1144  Covid test =     Prep instructions reviewed: the day before the procedure, follow a clear liquid diet all day,. Enemas as direced           Medications: Do not take Insulin or oral diabetic medications the day of the procedure.  Take as prescribed: heart, seizure and blood pressure medication in the morning with a sip of water (less than an ounce).  Take any breathing medications and bring inhalers to hospital with you Leave all valuables and jewelry at home.     Wear comfortable clothes to procedure to change into hospital gown You cannot drive for 24 hours after your procedure because you will receive sedation for your procedure to make you comfortable.  A ride must be provided at discharge.

## 2024-04-04 ENCOUNTER — LAB VISIT (OUTPATIENT)
Dept: LAB | Facility: HOSPITAL | Age: 34
End: 2024-04-04
Attending: FAMILY MEDICINE
Payer: COMMERCIAL

## 2024-04-04 ENCOUNTER — ANESTHESIA EVENT (OUTPATIENT)
Dept: ENDOSCOPY | Facility: HOSPITAL | Age: 34
End: 2024-04-04
Payer: COMMERCIAL

## 2024-04-04 DIAGNOSIS — Z00.00 LABORATORY EXAMINATION ORDERED AS PART OF A ROUTINE GENERAL MEDICAL EXAMINATION: ICD-10-CM

## 2024-04-04 LAB
25(OH)D3+25(OH)D2 SERPL-MCNC: 37 NG/ML (ref 30–96)
ALBUMIN SERPL BCP-MCNC: 4.4 G/DL (ref 3.5–5.2)
ALP SERPL-CCNC: 60 U/L (ref 55–135)
ALT SERPL W/O P-5'-P-CCNC: 13 U/L (ref 10–44)
ANION GAP SERPL CALC-SCNC: 13 MMOL/L (ref 8–16)
AST SERPL-CCNC: 19 U/L (ref 10–40)
BASOPHILS # BLD AUTO: 0.02 K/UL (ref 0–0.2)
BASOPHILS NFR BLD: 0.2 % (ref 0–1.9)
BILIRUB SERPL-MCNC: 0.4 MG/DL (ref 0.1–1)
BUN SERPL-MCNC: 12 MG/DL (ref 6–20)
CALCIUM SERPL-MCNC: 10 MG/DL (ref 8.7–10.5)
CHLORIDE SERPL-SCNC: 104 MMOL/L (ref 95–110)
CHOLEST SERPL-MCNC: 216 MG/DL (ref 120–199)
CHOLEST/HDLC SERPL: 4.3 {RATIO} (ref 2–5)
CO2 SERPL-SCNC: 21 MMOL/L (ref 23–29)
CREAT SERPL-MCNC: 0.8 MG/DL (ref 0.5–1.4)
DIFFERENTIAL METHOD BLD: ABNORMAL
EOSINOPHIL # BLD AUTO: 0 K/UL (ref 0–0.5)
EOSINOPHIL NFR BLD: 0.3 % (ref 0–8)
ERYTHROCYTE [DISTWIDTH] IN BLOOD BY AUTOMATED COUNT: 12.2 % (ref 11.5–14.5)
EST. GFR  (NO RACE VARIABLE): >60 ML/MIN/1.73 M^2
ESTIMATED AVG GLUCOSE: 91 MG/DL (ref 68–131)
GLUCOSE SERPL-MCNC: 77 MG/DL (ref 70–110)
HBA1C MFR BLD: 4.8 % (ref 4–5.6)
HCT VFR BLD AUTO: 42.2 % (ref 37–48.5)
HDLC SERPL-MCNC: 50 MG/DL (ref 40–75)
HDLC SERPL: 23.1 % (ref 20–50)
HGB BLD-MCNC: 14.3 G/DL (ref 12–16)
IMM GRANULOCYTES # BLD AUTO: 0.02 K/UL (ref 0–0.04)
IMM GRANULOCYTES NFR BLD AUTO: 0.2 % (ref 0–0.5)
LDLC SERPL CALC-MCNC: 121.4 MG/DL (ref 63–159)
LYMPHOCYTES # BLD AUTO: 2.9 K/UL (ref 1–4.8)
LYMPHOCYTES NFR BLD: 34 % (ref 18–48)
MCH RBC QN AUTO: 31.8 PG (ref 27–31)
MCHC RBC AUTO-ENTMCNC: 33.9 G/DL (ref 32–36)
MCV RBC AUTO: 94 FL (ref 82–98)
MONOCYTES # BLD AUTO: 0.6 K/UL (ref 0.3–1)
MONOCYTES NFR BLD: 6.5 % (ref 4–15)
NEUTROPHILS # BLD AUTO: 5.1 K/UL (ref 1.8–7.7)
NEUTROPHILS NFR BLD: 58.8 % (ref 38–73)
NONHDLC SERPL-MCNC: 166 MG/DL
NRBC BLD-RTO: 0 /100 WBC
PLATELET # BLD AUTO: 196 K/UL (ref 150–450)
PMV BLD AUTO: 11.3 FL (ref 9.2–12.9)
POTASSIUM SERPL-SCNC: 4.4 MMOL/L (ref 3.5–5.1)
PROT SERPL-MCNC: 7.8 G/DL (ref 6–8.4)
RBC # BLD AUTO: 4.49 M/UL (ref 4–5.4)
SODIUM SERPL-SCNC: 138 MMOL/L (ref 136–145)
TRIGL SERPL-MCNC: 223 MG/DL (ref 30–150)
TSH SERPL DL<=0.005 MIU/L-ACNC: 1.15 UIU/ML (ref 0.4–4)
WBC # BLD AUTO: 8.6 K/UL (ref 3.9–12.7)

## 2024-04-04 PROCEDURE — 84443 ASSAY THYROID STIM HORMONE: CPT | Performed by: FAMILY MEDICINE

## 2024-04-04 PROCEDURE — 85025 COMPLETE CBC W/AUTO DIFF WBC: CPT | Performed by: FAMILY MEDICINE

## 2024-04-04 PROCEDURE — 83036 HEMOGLOBIN GLYCOSYLATED A1C: CPT | Performed by: FAMILY MEDICINE

## 2024-04-04 PROCEDURE — 80061 LIPID PANEL: CPT | Performed by: FAMILY MEDICINE

## 2024-04-04 PROCEDURE — 36415 COLL VENOUS BLD VENIPUNCTURE: CPT | Performed by: FAMILY MEDICINE

## 2024-04-04 PROCEDURE — 80053 COMPREHEN METABOLIC PANEL: CPT | Performed by: FAMILY MEDICINE

## 2024-04-04 PROCEDURE — 82306 VITAMIN D 25 HYDROXY: CPT | Performed by: FAMILY MEDICINE

## 2024-04-05 ENCOUNTER — ANESTHESIA (OUTPATIENT)
Dept: ENDOSCOPY | Facility: HOSPITAL | Age: 34
End: 2024-04-05
Payer: COMMERCIAL

## 2024-04-05 ENCOUNTER — HOSPITAL ENCOUNTER (OUTPATIENT)
Facility: HOSPITAL | Age: 34
Discharge: HOME | End: 2024-04-05
Attending: INTERNAL MEDICINE | Admitting: INTERNAL MEDICINE
Payer: COMMERCIAL

## 2024-04-05 VITALS
HEART RATE: 69 BPM | SYSTOLIC BLOOD PRESSURE: 98 MMHG | WEIGHT: 210 LBS | RESPIRATION RATE: 17 BRPM | TEMPERATURE: 98 F | DIASTOLIC BLOOD PRESSURE: 57 MMHG | HEIGHT: 65 IN | OXYGEN SATURATION: 96 % | BODY MASS INDEX: 34.99 KG/M2

## 2024-04-05 DIAGNOSIS — R19.7 DIARRHEA: ICD-10-CM

## 2024-04-05 LAB
B-HCG UR QL: NEGATIVE
CTP QC/QA: YES

## 2024-04-05 PROCEDURE — D9220A PRA ANESTHESIA: Mod: CRNA,,, | Performed by: NURSE ANESTHETIST, CERTIFIED REGISTERED

## 2024-04-05 PROCEDURE — 37000008 HC ANESTHESIA 1ST 15 MINUTES: Performed by: INTERNAL MEDICINE

## 2024-04-05 PROCEDURE — 45331 SIGMOIDOSCOPY AND BIOPSY: CPT | Mod: ,,, | Performed by: INTERNAL MEDICINE

## 2024-04-05 PROCEDURE — 25000003 PHARM REV CODE 250: Performed by: INTERNAL MEDICINE

## 2024-04-05 PROCEDURE — 25000003 PHARM REV CODE 250: Performed by: NURSE ANESTHETIST, CERTIFIED REGISTERED

## 2024-04-05 PROCEDURE — D9220A PRA ANESTHESIA: Mod: ANES,,, | Performed by: STUDENT IN AN ORGANIZED HEALTH CARE EDUCATION/TRAINING PROGRAM

## 2024-04-05 PROCEDURE — 81025 URINE PREGNANCY TEST: CPT | Performed by: INTERNAL MEDICINE

## 2024-04-05 PROCEDURE — 63600175 PHARM REV CODE 636 W HCPCS: Performed by: NURSE ANESTHETIST, CERTIFIED REGISTERED

## 2024-04-05 PROCEDURE — 27201012 HC FORCEPS, HOT/COLD, DISP: Performed by: INTERNAL MEDICINE

## 2024-04-05 PROCEDURE — 88305 TISSUE EXAM BY PATHOLOGIST: CPT | Performed by: STUDENT IN AN ORGANIZED HEALTH CARE EDUCATION/TRAINING PROGRAM

## 2024-04-05 PROCEDURE — 45331 SIGMOIDOSCOPY AND BIOPSY: CPT | Performed by: INTERNAL MEDICINE

## 2024-04-05 PROCEDURE — 88305 TISSUE EXAM BY PATHOLOGIST: CPT | Mod: 26,,, | Performed by: STUDENT IN AN ORGANIZED HEALTH CARE EDUCATION/TRAINING PROGRAM

## 2024-04-05 RX ORDER — PROPOFOL 10 MG/ML
VIAL (ML) INTRAVENOUS
Status: DISCONTINUED | OUTPATIENT
Start: 2024-04-05 | End: 2024-04-05

## 2024-04-05 RX ORDER — SODIUM CHLORIDE 9 MG/ML
INJECTION, SOLUTION INTRAVENOUS CONTINUOUS
Status: DISCONTINUED | OUTPATIENT
Start: 2024-04-05 | End: 2024-04-05 | Stop reason: HOSPADM

## 2024-04-05 RX ORDER — PROPOFOL 10 MG/ML
VIAL (ML) INTRAVENOUS CONTINUOUS PRN
Status: DISCONTINUED | OUTPATIENT
Start: 2024-04-05 | End: 2024-04-05

## 2024-04-05 RX ORDER — LIDOCAINE HYDROCHLORIDE 20 MG/ML
INJECTION INTRAVENOUS
Status: DISCONTINUED | OUTPATIENT
Start: 2024-04-05 | End: 2024-04-05

## 2024-04-05 RX ADMIN — PROPOFOL 150 MCG/KG/MIN: 10 INJECTION, EMULSION INTRAVENOUS at 01:04

## 2024-04-05 RX ADMIN — Medication 80 MG: at 01:04

## 2024-04-05 RX ADMIN — SODIUM CHLORIDE: 9 INJECTION, SOLUTION INTRAVENOUS at 12:04

## 2024-04-05 RX ADMIN — LIDOCAINE HYDROCHLORIDE 60 MG: 20 INJECTION, SOLUTION INTRAVENOUS at 01:04

## 2024-04-05 RX ADMIN — Medication 20 MG: at 01:04

## 2024-04-05 RX ADMIN — LIDOCAINE HYDROCHLORIDE 40 MG: 20 INJECTION, SOLUTION INTRAVENOUS at 01:04

## 2024-04-05 NOTE — PROVATION PATIENT INSTRUCTIONS
Discharge Summary/Instructions after an Endoscopic Procedure  Patient Name: Sherlyn Monahan  Patient MRN: [id number]  Patient YOB: 1990  Friday, April 5, 2024  Tom Palmer MD  Dear patient,  As a result of recent federal legislation (The Federal Cures Act), you may   receive lab or pathology results from your procedure in your [facility]   account before your physician is able to contact you. Your physician or   their representative will relay the results to you with their   recommendations at their soonest availability.  Thank you,  Your health is very important to us during the Covid Crisis. Following your   procedure today, you will receive a daily text for 2 weeks asking about   signs or symptoms of Covid 19.  Please respond to this text when you   receive it so we can follow up and keep you as safe as possible.   RESTRICTIONS:  During your procedure today, you received medications for sedation.  These   medications may affect your judgment, balance and coordination.  Therefore,   for 24 hours, you have the following restrictions:   - DO NOT drive a car, operate machinery, make legal/financial decisions,   sign important papers or drink alcohol.    ACTIVITY:  Today: no heavy lifting, straining or running due to procedural   sedation/anesthesia.  The following day: return to full activity including work.  DIET:  Eat and drink normally unless instructed otherwise.     TREATMENT FOR COMMON SIDE EFFECTS:  - Mild abdominal pain, nausea, belching, bloating or excessive gas:  rest,   eat lightly and use a heating pad.  - Sore Throat: treat with throat lozenges and/or gargle with warm salt   water.  - Because air was used during the procedure, expelling large amounts of air   from your rectum or belching is normal.  - If a bowel prep was taken, you may not have a bowel movement for 1-3 days.    This is normal.  SYMPTOMS TO WATCH FOR AND REPORT TO YOUR PHYSICIAN:  1. Abdominal pain or bloating,  other than gas cramps.  2. Chest pain.  3. Back pain.  4. Signs of infection such as: chills or fever occurring within 24 hours   after the procedure.  5. Rectal bleeding, which would show as bright red, maroon, or black stools.   (A tablespoon of blood from the rectum is not serious, especially if   hemorrhoids are present.)  6. Vomiting.  7. Weakness or dizziness.  GO DIRECTLY TO THE NEAREST EMERGENCY ROOM IF YOU HAVE ANY OF THE FOLLOWING:      Difficulty breathing              Chills and/or fever over 101 F   Persistent vomiting and/or vomiting blood   Severe abdominal pain   Severe chest pain   Black, tarry stools   Bleeding- more than one tablespoon   Any other symptom or condition that you feel may need urgent attention  Your doctor recommends these additional instructions:  If any biopsies were taken, your doctors clinic will contact you in 1 to 2   weeks with any results.  - Discharge patient to home.   - Resume previous diet.   - Continue present medications.   - Await pathology results.   - Repeat flexible sigmoidoscopy PRN for surveillance.  For questions, problems or results please call your physician - Tom Palmer MD.  EMERGENCY PHONE NUMBER: [phone],  LAB RESULTS: [phone]  IF A COMPLICATION OR EMERGENCY SITUATION ARISES AND YOU ARE UNABLE TO REACH   YOUR PHYSICIAN - GO DIRECTLY TO THE EMERGENCY ROOM.  Tom Palmer MD  4/5/2024 1:31:05 PM  This report has been verified and signed electronically.  Dear patient,  As a result of recent federal legislation (The Federal Cures Act), you may   receive lab or pathology results from your procedure in your [facility]   account before your physician is able to contact you. Your physician or   their representative will relay the results to you with their   recommendations at their soonest availability.  Thank you,  PROVATION

## 2024-04-05 NOTE — ANESTHESIA PREPROCEDURE EVALUATION
[facility]  Anesthesia Pre-Operative Evaluation         Patient Name: Sherlyn Monahan  YOB: 1990  MRN: [id number]    SUBJECTIVE:     2024    Procedure(s) (LRB):  SIGMOIDOSCOPY, FLEXIBLE (N/A)    Sherlyn Monahan is a 33 y.o. female here for Procedure(s) (LRB):  SIGMOIDOSCOPY, FLEXIBLE (N/A)    Drips:    sodium chloride 0.9% 20 mL/hr at 24 1220       Patient Active Problem List   Diagnosis    Acquired hypothyroidism    Dysmenorrhea    Chronic migraine without aura without status migrainosus, not intractable    Family history of breast cancer in mother    Irregular menstrual bleeding    S/P     Fatty liver    Status post cholecystectomy    Delivery of pregnancy by  section    Carpal tunnel syndrome of right wrist    Adverse effect of COVID-19 vaccine    Cubital tunnel syndrome of both upper extremities    Depression with anxiety    Low vitamin D level    Decreased range of motion of right elbow    Decreased range of motion of right wrist    Pain in right elbow    Pain in right wrist    Impaired dexterity    Difficulty with activities of daily living    Swelling of right wrist    Swelling of right elbow    Irritable bowel syndrome with diarrhea    Decreased ROM of lumbar spine    Decreased strength of trunk and back    Chronic bilateral low back pain without sciatica    Excessive daytime sleepiness       Review of patient's allergies indicates:   Allergen Reactions    Minocycline Swelling    Nsaids (non-steroidal anti-inflammatory drug) Other (See Comments)       Current Facility-Administered Medications on File Prior to Encounter   Medication Dose Route Frequency Provider Last Rate Last Admin    fentaNYL 50 mcg/mL injection  mcg   mcg Intravenous PRN Yury Ambrosio MD   50 mcg at 22 1026    LIDOcaine (PF) 10 mg/ml (1%) injection 10 mg  1 mL Intradermal Once Yury Ambrosio MD        midazolam (VERSED) 1 mg/mL injection 0.5-4 mg   0.5-4 mg Intravenous PRN Yury Ambrosio MD   2 mg at 06/24/22 1026     Current Outpatient Medications on File Prior to Encounter   Medication Sig Dispense Refill    b complex vitamins capsule Take 1 capsule by mouth once daily.      calcium-vitamin D3 (OS-ELISABETH 500 + D3) 500 mg(1,250mg) -200 unit per tablet Take 1 tablet by mouth.      cetirizine (ZYRTEC) 10 MG tablet Take 1 tablet (10 mg total) by mouth 2 (two) times a day. 60 tablet 12    cholecalciferol, vitamin D3, (VITAMIN D3) 50 mcg (2,000 unit) Tab Take 1 tablet (2,000 Units total) by mouth daily with breakfast. 100 tablet 3    lansoprazole (PREVACID) 30 MG capsule Take 30 mg by mouth once daily.      magnesium oxide (MAG-OX) 400 mg (241.3 mg magnesium) tablet Take 400 mg by mouth once daily.      mesalamine (LIALDA) 1.2 gram TbEC Take 2 tablets (2.4 g total) by mouth daily with breakfast. 60 tablet 3    prenatal vit37-iron-folic acid 29 mg iron- 1 mg Chew Take by mouth.      sertraline (ZOLOFT) 50 MG tablet TAKE 1 TABLET(50 MG) BY MOUTH EVERY EVENING 90 tablet 3    SYNTHROID 50 mcg tablet TAKE 1 TABLET(50 MCG) BY MOUTH EVERY DAY 90 tablet 3    clomiPHENE (CLOMID) 50 mg tablet Take 1 tablet (50 mg total) by mouth once daily. Take cycle day 3-7 (Patient not taking: Reported on 2/19/2024) 5 tablet 2    Lactobacillus rhamnosus GG (CULTURELLE) 10 billion cell capsule Take 1 capsule by mouth once daily.      mesalamine (CANASA) 1000 MG Supp Place 1 suppository (1,000 mg total) rectally nightly. 30 suppository 11    mupirocin (BACTROBAN) 2 % ointment Apply topically 2 (two) times daily.      mupirocin calcium 2% nasl oint (BACTROBAN) 2 % Oint by Nasal route 2 (two) times daily. 1 Tube 3    progesterone (PROMETRIUM) 200 MG capsule Take 1 capsule (200 mg total) by mouth every evening. Take one nightly cycle day 18-27 10 capsule 11       Past Surgical History:   Procedure Laterality Date    breast reduction  06/2012    BREAST SURGERY      breast reduction      SECTION       SECTION N/A 2020    Procedure:  SECTION;  Surgeon: Chiara Smith MD;  Location: [facility] L&D;  Service: OB/GYN;  Laterality: N/A;    CHOLECYSTECTOMY  2019    COLONOSCOPY N/A 2023    Procedure: COLONOSCOPY;  Surgeon: Tom Palmer MD;  Location: [facility] ENDO;  Service: Endoscopy;  Laterality: N/A;    ENDOSCOPIC CARPAL TUNNEL RELEASE Right 2022    Procedure: RELEASE, CARPAL TUNNEL, ENDOSCOPIC;  Surgeon: Phoenix Rojas MD;  Location: [facility] OR;  Service: Orthopedics;  Laterality: Right;    LAPAROSCOPIC CHOLECYSTECTOMY N/A 2019    Procedure: CHOLECYSTECTOMY, LAPAROSCOPIC;  Surgeon: Missael Tolentino MD;  Location: [facility] OR;  Service: General;  Laterality: N/A;  video    ULNAR NERVE TRANSPOSITION Right 2022    Procedure: TRANSPOSITION, NERVE, ULNAR;  Surgeon: Phoenix Rojas MD;  Location: [facility] OR;  Service: Orthopedics;  Laterality: Right;       Social History     Socioeconomic History    Marital status:    Tobacco Use    Smoking status: Former     Current packs/day: 0.25     Average packs/day: 0.3 packs/day for 3.0 years (0.8 ttl pk-yrs)     Types: Cigarettes    Smokeless tobacco: Never   Substance and Sexual Activity    Alcohol use: Yes     Alcohol/week: 1.0 standard drink of alcohol     Types: 1 Glasses of wine per week     Comment: very rare    Drug use: No    Sexual activity: Yes     Partners: Male     Birth control/protection: Condom, None   Other Topics Concern    Are you pregnant or think you may be? No    Breast-feeding No     Social Determinants of Health     Financial Resource Strain: Low Risk  (10/12/2023)    Overall Financial Resource Strain (CARDIA)     Difficulty of Paying Living Expenses: Not hard at all   Food Insecurity: No Food Insecurity (10/12/2023)    Hunger Vital Sign     Worried About Running Out of Food in the Last Year: Never true     Ran Out of Food in the Last Year: Never true   Transportation Needs: No  Transportation Needs (10/12/2023)    PRAPARE - Transportation     Lack of Transportation (Medical): No     Lack of Transportation (Non-Medical): No   Physical Activity: Insufficiently Active (10/12/2023)    Exercise Vital Sign     Days of Exercise per Week: 2 days     Minutes of Exercise per Session: 40 min   Stress: No Stress Concern Present (10/12/2023)    [employer] - Occupational Stress Questionnaire     Feeling of Stress : Only a little   Social Connections: Unknown (10/12/2023)    Social Connection and Isolation Panel [NHANES]     Frequency of Communication with Friends and Family: More than three times a week     Frequency of Social Gatherings with Friends and Family: Once a week     Active Member of Clubs or Organizations: Yes     Attends Club or Organization Meetings: More than 4 times per year     Marital Status:    Housing Stability: Low Risk  (10/12/2023)    Housing Stability Vital Sign     Unable to Pay for Housing in the Last Year: No     Number of Places Lived in the Last Year: 1     Unstable Housing in the Last Year: No         OBJECTIVE:     Vital Signs Range (Last 24H):  Temp:  [37.1 °C (98.8 °F)] 37.1 °C (98.8 °F)  Pulse:  [83] 83  Resp:  [18] 18  SpO2:  [100 %] 100 %  BP: (116)/(71) 116/71    Significant Labs:  Lab Results   Component Value Date    WBC 8.60 04/04/2024    HGB 14.3 04/04/2024    HCT 42.2 04/04/2024     04/04/2024    CHOL 216 (H) 04/04/2024    TRIG 223 (H) 04/04/2024    HDL 50 04/04/2024    ALT 13 04/04/2024    AST 19 04/04/2024     04/04/2024    K 4.4 04/04/2024     04/04/2024    CREATININE 0.8 04/04/2024    BUN 12 04/04/2024    CO2 21 (L) 04/04/2024    TSH 1.154 04/04/2024    HGBA1C 4.8 04/04/2024       Diagnostic Studies:    EKG:   Results for orders placed or performed in visit on 10/20/22   EKG 12-lead    Collection Time: 10/20/22  2:17 PM    Narrative    Test Reason : R00.2,    Vent. Rate : 067 BPM     Atrial Rate : 067  BPM     P-R Int : 136 ms          QRS Dur : 082 ms      QT Int : 392 ms       P-R-T Axes : 040 075 053 degrees     QTc Int : 414 ms    Normal sinus rhythm  Normal ECG  When compared with ECG of 06-JUN-2019 15:22,  No significant change was found  Confirmed by Chrissy Hicks MD ([id number]) on 10/21/2022 9:29:01 AM    Referred By: ANDREAS COLEMAN           Confirmed By:Chrissy Hicks MD           Pre-op Assessment    I have reviewed the Patient Summary Reports.     I have reviewed the Nursing Notes. I have reviewed the NPO Status.   I have reviewed the Medications.     Review of Systems  Anesthesia Hx:    Nausea after C section, no problems after colonoscopy 12/2023 History of prior surgery of interest to airway management or planning:           Personal Hx of Anesthesia complications, Post-Operative Nausea/Vomiting                    Social:  Former Smoker, No Alcohol Use       Hepatic/GI:        Ulcerative colitis           Neurological:      Headaches                                 Endocrine:   Hypothyroidism          Psych:  Psychiatric History                  Physical Exam  General: Well nourished, Cooperative, Alert and Oriented    Airway:  Mallampati: II   Mouth Opening: Normal  TM Distance: Normal  Tongue: Normal  Neck ROM: Normal ROM        Anesthesia Plan  Type of Anesthesia, risks & benefits discussed:    Anesthesia Type: Gen Natural Airway  Intra-op Monitoring Plan: Standard ASA Monitors  Post Op Pain Control Plan: multimodal analgesia  Induction:  IV  Informed Consent: Informed consent signed with the Patient and all parties understand the risks and agree with anesthesia plan.  All questions answered.   ASA Score: 2  Day of Surgery Review of History & Physical: H&P Update referred to the surgeon/provider.    Ready For Surgery From Anesthesia Perspective.     .

## 2024-04-05 NOTE — TRANSFER OF CARE
"Anesthesia Transfer of Care Note    Patient: Sherlyn Monahan    Procedure(s) Performed: Procedure(s) (LRB):  SIGMOIDOSCOPY, FLEXIBLE (N/A)    Patient location: GI    Anesthesia Type: general    Transport from OR: Transported from OR on room air with adequate spontaneous ventilation    Post pain: adequate analgesia    Post assessment: no apparent anesthetic complications    Post vital signs: stable    Level of consciousness: awake    Nausea/Vomiting: no nausea/vomiting    Complications: none    Transfer of care protocol was followed      Last vitals: Visit Vitals  /71 (BP Location: Left arm, Patient Position: Lying)   Pulse 83   Temp 37.1 °C (98.8 °F) (Temporal)   Resp 18   Ht 5' 5" (1.651 m)   Wt 95.3 kg (210 lb)   LMP 03/09/2024 (Approximate)   SpO2 100%   Breastfeeding No   BMI 34.95 kg/m²     "

## 2024-04-05 NOTE — ANESTHESIA POSTPROCEDURE EVALUATION
Anesthesia Post Evaluation    Patient: Sherlyn Monahan    Procedure(s) Performed: Procedure(s) (LRB):  SIGMOIDOSCOPY, FLEXIBLE (N/A)    Final Anesthesia Type: general      Patient location during evaluation: PACU  Patient participation: Yes- Able to Participate  Level of consciousness: awake and alert  Post-procedure vital signs: reviewed and stable  Pain management: adequate  Airway patency: patent    PONV status at discharge: No PONV  Anesthetic complications: no      Cardiovascular status: blood pressure returned to baseline  Respiratory status: unassisted  Hydration status: euvolemic                Vitals Value Taken Time   BP 98/57 04/05/24 1339   Temp 36.7 °C (98.1 °F) 04/05/24 1339   Pulse 69 04/05/24 1339   Resp 17 04/05/24 1339   SpO2 96 % 04/05/24 1339         Event Time   Out of Recovery 13:58:00         Pain/Nahun Score: Nahun Score: 9 (4/5/2024  1:28 PM)

## 2024-04-06 PROBLEM — K51.219 ULCERATIVE PROCTITIS WITH COMPLICATION: Status: ACTIVE | Noted: 2024-04-06

## 2024-04-10 LAB
FINAL PATHOLOGIC DIAGNOSIS: NORMAL
GROSS: NORMAL
Lab: NORMAL

## 2024-04-15 ENCOUNTER — TELEPHONE (OUTPATIENT)
Dept: FAMILY MEDICINE | Facility: CLINIC | Age: 34
End: 2024-04-15

## 2024-04-15 ENCOUNTER — OFFICE VISIT (OUTPATIENT)
Dept: FAMILY MEDICINE | Facility: CLINIC | Age: 34
End: 2024-04-15
Payer: COMMERCIAL

## 2024-04-15 ENCOUNTER — LAB VISIT (OUTPATIENT)
Dept: LAB | Facility: HOSPITAL | Age: 34
End: 2024-04-15
Payer: COMMERCIAL

## 2024-04-15 VITALS
HEIGHT: 65 IN | WEIGHT: 211 LBS | DIASTOLIC BLOOD PRESSURE: 64 MMHG | HEART RATE: 74 BPM | SYSTOLIC BLOOD PRESSURE: 106 MMHG | OXYGEN SATURATION: 98 % | TEMPERATURE: 98 F | BODY MASS INDEX: 35.16 KG/M2

## 2024-04-15 DIAGNOSIS — G47.33 OSA (OBSTRUCTIVE SLEEP APNEA): ICD-10-CM

## 2024-04-15 DIAGNOSIS — Z00.00 ROUTINE GENERAL MEDICAL EXAMINATION AT A HEALTH CARE FACILITY: Primary | ICD-10-CM

## 2024-04-15 DIAGNOSIS — K51.20 ULCERATIVE PROCTITIS WITHOUT COMPLICATION: ICD-10-CM

## 2024-04-15 DIAGNOSIS — R53.83 FATIGUE, UNSPECIFIED TYPE: ICD-10-CM

## 2024-04-15 DIAGNOSIS — N92.6 IRREGULAR MENSTRUAL BLEEDING: ICD-10-CM

## 2024-04-15 DIAGNOSIS — R79.89 LOW VITAMIN D LEVEL: ICD-10-CM

## 2024-04-15 DIAGNOSIS — Z79.899 MEDICATION MANAGEMENT: ICD-10-CM

## 2024-04-15 DIAGNOSIS — K76.0 FATTY LIVER: ICD-10-CM

## 2024-04-15 DIAGNOSIS — G43.709 CHRONIC MIGRAINE WITHOUT AURA WITHOUT STATUS MIGRAINOSUS, NOT INTRACTABLE: ICD-10-CM

## 2024-04-15 DIAGNOSIS — E03.9 ACQUIRED HYPOTHYROIDISM: ICD-10-CM

## 2024-04-15 DIAGNOSIS — E66.01 SEVERE OBESITY (BMI 35.0-39.9) WITH COMORBIDITY: ICD-10-CM

## 2024-04-15 DIAGNOSIS — F41.8 DEPRESSION WITH ANXIETY: ICD-10-CM

## 2024-04-15 DIAGNOSIS — E78.1 HYPERTRIGLYCERIDEMIA: ICD-10-CM

## 2024-04-15 LAB
CRP SERPL-MCNC: 5.5 MG/L (ref 0–8.2)
FERRITIN SERPL-MCNC: 168 NG/ML (ref 20–300)

## 2024-04-15 PROCEDURE — 36415 COLL VENOUS BLD VENIPUNCTURE: CPT | Performed by: FAMILY MEDICINE

## 2024-04-15 PROCEDURE — 86140 C-REACTIVE PROTEIN: CPT | Performed by: FAMILY MEDICINE

## 2024-04-15 PROCEDURE — 99395 PREV VISIT EST AGE 18-39: CPT | Mod: S$GLB,,, | Performed by: FAMILY MEDICINE

## 2024-04-15 PROCEDURE — 1159F MED LIST DOCD IN RCRD: CPT | Mod: CPTII,S$GLB,, | Performed by: FAMILY MEDICINE

## 2024-04-15 PROCEDURE — 3074F SYST BP LT 130 MM HG: CPT | Mod: CPTII,S$GLB,, | Performed by: FAMILY MEDICINE

## 2024-04-15 PROCEDURE — 82728 ASSAY OF FERRITIN: CPT | Performed by: FAMILY MEDICINE

## 2024-04-15 PROCEDURE — 3078F DIAST BP <80 MM HG: CPT | Mod: CPTII,S$GLB,, | Performed by: FAMILY MEDICINE

## 2024-04-15 PROCEDURE — 3044F HG A1C LEVEL LT 7.0%: CPT | Mod: CPTII,S$GLB,, | Performed by: FAMILY MEDICINE

## 2024-04-15 PROCEDURE — 3008F BODY MASS INDEX DOCD: CPT | Mod: CPTII,S$GLB,, | Performed by: FAMILY MEDICINE

## 2024-04-15 PROCEDURE — 99999 PR PBB SHADOW E&M-EST. PATIENT-LVL IV: CPT | Mod: PBBFAC,,, | Performed by: FAMILY MEDICINE

## 2024-04-15 NOTE — TELEPHONE ENCOUNTER
----- Message from Alea Moses MD sent at 4/15/2024  3:28 PM CDT -----  Sherlyn- the follow-up testing for your iron level and C-reactive protein for inflammation are normal.  I will José Miguel contact you to schedule routine labs prior to a six-month follow-up-I hope you continue to note some improvement with the GI issues, Dr. Moses

## 2024-04-15 NOTE — PROGRESS NOTES
OFFICE VISIT    Last Office Visit: 10/13/2023  Last Annual Physical: 2023    SUBJECTIVE:   Sherlyn Monahan is a 33 year old male who presents today for annual wellness exam. PMH includes hypothyroidism, obesity,  section deliveries on 2018 and 2020 per Dr. Smith, lap mikal 19 per Dr Tolentino, h/o heart palpitations s/p normal EKG 19, h/o urticaria s/p allergy eval and treatment with extended course of antihistamine (hives not though secondary to amoxicillin), with repeat extended course of hives following the Moderna COVID-19 booster.     She was having a lot of diarrhea with rectal bleeding starting in late October---was evaluated by GI. Recently diagnosed with ulcerative colitis, now followed by Dr. Palmer. Recent sigmoidoscopy with improvement in inflammation. Previously on mesalamine suppositories has now transitioned to PO mesalamine. She reports that her symptoms have improved--still having some bowel irregularity but reports no further bleeding.    Today she is feeling well. She continues to endorse persistent fatigue---has improved slightly with CPAP. She is currently taking a Calcium/Vitamin D supplement, and her Vitamin D levels have improved.     Labs drawn prior to visit:   CBC & CMP unremarkable   TSH 1.154  Lipid panel (Total 216/Triglycerides 223/HD: 50/.4)  A1C 4.8%  Vitamin D 37    She is of childbearing age.  She has a gynecologist-- Dr Smith--  and is up to date with pap.   Prior pelvic ultrasound has shown cysts.  Having monthly periods that are about 10 days long.   Endocrine confirmed PCOS and suspected low egg count.     General Lifestyle Habits:   Diet: not following any specific diet currently, has been eating more bland foods recently due to UC flare  Exercise: walking 3-4x weekly  Sleep: Good, improved with CPAP  Weight: BMI 35.11, overall stable     Immunizations:   TDap 3/19/18  Annual flu shot 10/13/2023  COVID-19 completed with  Moderna booster 12/18/21-- FURTHER COVID BOOSTERS DEFERRED AT THIS TIME GIVEN HER HISTORY OF ADVERSE REACTION (HIVES)       Screening Tests:   PAP 1/19/23 WNL/HPV (-) per GYN Dr Smith  STD screening during pregnancy (-), Labs UTD  Colonoscopy 12/19/2023 (Repeat in 5 years), Sigmoidoscopy 4/5/2024 (evaluation of UC, showed improvement in inflammation)      Eye/Dental: Eye UTD,  Dental UTD        PAST MEDICAL HISTORY, SURGICAL/SOCIAL/FAMILY HISTORY REVIEWED AS PER CHART, WITH PERTINENT FINDINGS INCLUDED IN HISTORY SECTION OF NOTE.       Past Medical History:   Diagnosis Date    Acquired hypothyroidism 01/11/2016    Allergy 8/2009    Seasonal    Anxiety 2019    Depression 2019    Irritable bowel syndrome (IBS)     Keloid cicatrix 2012    Obesity (BMI 30.0-34.9) 01/11/2016    loosing weight consistently on 21 Day Fix program     Ulcerative colitis     Urticaria           Review of Systems   Constitutional:  Positive for fatigue. Negative for fever and unexpected weight change.   HENT:  Positive for nasal congestion. Negative for trouble swallowing and goiter.    Eyes:  Negative for visual disturbance.   Respiratory:  Negative for chest tightness, shortness of breath and wheezing.    Cardiovascular:  Negative for chest pain, palpitations and leg swelling.   Gastrointestinal:  Positive for abdominal pain (intermittent cramping, improved) and diarrhea. Negative for blood in stool.   Endocrine: Negative for polydipsia and polyuria.   Genitourinary:  Positive for menstrual irregularity. Negative for difficulty urinating and hematuria.   Musculoskeletal:  Negative for arthralgias and myalgias.   Integumentary:  Negative for rash and mole/lesion.   Allergic/Immunologic: Positive for environmental allergies. Negative for immunocompromised state.   Neurological:  Negative for dizziness, syncope, weakness and light-headedness.   Hematological:  Negative for adenopathy. Does not bruise/bleed easily.   Psychiatric/Behavioral:   "Negative for dysphoric mood and sleep disturbance. The patient is not nervous/anxious.         OBJECTIVE:     Current Outpatient Medications   Medication Instructions    b complex vitamins capsule 1 capsule, Daily    calcium-vitamin D3 (OS-ELISABETH 500 + D3) 500 mg(1,250mg) -200 unit per tablet 1 tablet, Oral    cetirizine (ZYRTEC) 10 mg, Oral, 2 times daily    cholecalciferol (vitamin D3) (VITAMIN D3) 2,000 Units, Oral, With breakfast    clomiPHENE (CLOMID) 50 mg, Oral, Daily, Take cycle day 3-7    Lactobacillus rhamnosus GG (CULTURELLE) 10 billion cell capsule 1 capsule, Oral, Daily    lansoprazole (PREVACID) 30 mg, Oral, Daily    magnesium oxide (MAG-OX) 400 mg, Oral, Daily    mesalamine (CANASA) 1,000 mg, Rectal, Nightly    mesalamine (LIALDA) 2.4 g, Oral, With breakfast    mupirocin (BACTROBAN) 2 % ointment Topical (Top), 2 times daily    mupirocin calcium 2% nasl oint (BACTROBAN) 2 % Oint Nasal, 2 times daily    prenatal vit37-iron-folic acid 29 mg iron- 1 mg Chew Oral    progesterone (PROMETRIUM) 200 mg, Oral, Nightly, Take one nightly cycle day 18-27    sertraline (ZOLOFT) 50 MG tablet TAKE 1 TABLET(50 MG) BY MOUTH EVERY EVENING    SYNTHROID 50 mcg tablet TAKE 1 TABLET(50 MCG) BY MOUTH EVERY DAY        /64 (BP Location: Right arm, Patient Position: Sitting, BP Method: Large (Manual))   Pulse 74   Temp 98.3 °F (36.8 °C)   Ht 5' 5" (1.651 m)   Wt 95.7 kg (210 lb 15.7 oz)   LMP 04/13/2024 (Exact Date)   SpO2 98%   BMI 35.11 kg/m²      Physical Exam  Vitals reviewed.   Constitutional:       Appearance: Normal appearance.   HENT:      Head: Normocephalic and atraumatic.      Right Ear: Tympanic membrane, ear canal and external ear normal.      Left Ear: Tympanic membrane, ear canal and external ear normal.      Nose: Nose normal. No rhinorrhea.      Mouth/Throat:      Mouth: Mucous membranes are moist.      Pharynx: No posterior oropharyngeal erythema.   Eyes:      Extraocular Movements: Extraocular " movements intact.      Pupils: Pupils are equal, round, and reactive to light.   Neck:      Vascular: No carotid bruit.   Cardiovascular:      Rate and Rhythm: Normal rate and regular rhythm.      Pulses: Normal pulses.      Heart sounds: Normal heart sounds. No murmur heard.  Pulmonary:      Effort: Pulmonary effort is normal. No respiratory distress.      Breath sounds: Normal breath sounds.   Abdominal:      General: Bowel sounds are normal. There is no distension.      Palpations: Abdomen is soft.   Musculoskeletal:         General: Normal range of motion.      Cervical back: Normal range of motion.      Right lower leg: No edema.      Left lower leg: No edema.   Skin:     General: Skin is warm.      Findings: No lesion or rash.   Neurological:      General: No focal deficit present.      Mental Status: She is alert.   Psychiatric:         Mood and Affect: Mood normal.         Behavior: Behavior normal.        ASSESSMENT & PLAN:  Diagnoses and all orders for this visit:    Orders Placed This Encounter    Ferritin    C-Reactive Protein        Routine general medical examination at a health care facility  - Discussed healthy diet, regular exercise, necessary labs, age appropriate screenings, and routine vaccinations.   - Pap smear UTD per OB/GYN  - Immunizations UTD    Class 1 obesity due to excess calories without serious comorbidity with body mass index (BMI) of 35.0 to 39.0 in adult  - BMI 35.11  - Discussed lifestyle changes to diet and exercise to aid in the medical management of this problem.    - Information provided regarding compounding GLP-1 medications    Fatigue, unspecified type   - Discussed multifactorial nature of fatigue  - labs reviewed, thyroid level and blood counts in optimal ranges.  - Continue to optimize Vitamin levels   - Continue CPAP nightly  - Check Ferritin levels given GI bleeding with recent UC flare  - Check CRP to evaluate for systemic inflammation with UC which may also be a  contributing factor to her fatigue    LV  - Started wearing CPAP nightly in December, feels that fatigue has improved    Hypertriglyceridemia  Fatty liver  - Triglycerides increased on most recent labs  178--->223  - Discussed lifestyle changes to diet and exercise to aid in the medical management of this problem.    - The patient is advised to begin progressive daily aerobic exercise program and follow high-protein low carb diet.    Irregular menstrual bleeding  - Following with OB/GYN and endocrine---suspected PCOS  - She has made lifestyle changes and attempted to lose weight to help with the management of this problem.  - No longer taking Clomid due to UC flare    Acquired hypothyroidism  - TSH 1.154  - The current medical regimen is effective. Continue current plan and medications: Synthroid 50 mcg daily    Depression with anxiety  - Symptoms are well-controlled on Zoloft 50 mg daily    Chronic migraine without aura without status migrainosus, not intractable  - Currently manageable oral analgesics such as Aleve PRN  - Taking magnesium oxide 400 mg daily    Low vitamin D level  - Vitamin D improved to 37   - Continue Calcium/Vitamin D supplement, with additional Vitamin D3 2,000 IU daily   - Advised to increase intake of Vitamin D rich foods     Ulcerative proctitis without complication   - Recently diagnosed with ulcerative colitis, now followed by GI  - Currently on mesalamine PO, symptoms improved  - No recent bleeding episodes noted  - Recent sigmoidoscopy with improvement in inflammation        Labs and follow-up in 6 months    Assessment and plan of care discussed with Alea Moses MD. Attestation to follow.   Olya Husain NP Student     I hereby acknowledge that I am relying upon documentation authored by a nurse practitioner student working under my supervision and further I hereby attest that I have verified the student documentation or findings by personally re-performing the physical exam  and medical decision making activities of the Evaluation and Management service to be billed.  Alea Moses

## 2024-04-21 ENCOUNTER — PATIENT MESSAGE (OUTPATIENT)
Dept: GASTROENTEROLOGY | Facility: CLINIC | Age: 34
End: 2024-04-21
Payer: COMMERCIAL

## 2024-04-22 NOTE — PROGRESS NOTES
Reviewed. No evidence of significant inflammation noted. Continue current medication. F/u in 6 months

## 2024-04-29 ENCOUNTER — HOSPITAL ENCOUNTER (EMERGENCY)
Facility: HOSPITAL | Age: 34
Discharge: HOME | End: 2024-04-30
Attending: EMERGENCY MEDICINE
Payer: COMMERCIAL

## 2024-04-29 DIAGNOSIS — R10.31 ABDOMINAL PAIN, RLQ: Primary | ICD-10-CM

## 2024-04-29 LAB
ANION GAP SERPL CALC-SCNC: 11 MMOL/L (ref 8–16)
B-HCG UR QL: NEGATIVE
BACTERIA #/AREA URNS HPF: ABNORMAL /HPF
BASOPHILS # BLD AUTO: 0.01 K/UL (ref 0–0.2)
BASOPHILS NFR BLD: 0.1 % (ref 0–1.9)
BILIRUB UR QL STRIP: NEGATIVE
BUN SERPL-MCNC: 15 MG/DL (ref 6–20)
CALCIUM SERPL-MCNC: 9.9 MG/DL (ref 8.7–10.5)
CHLORIDE SERPL-SCNC: 107 MMOL/L (ref 95–110)
CLARITY UR: CLEAR
CO2 SERPL-SCNC: 24 MMOL/L (ref 23–29)
COLOR UR: YELLOW
CREAT SERPL-MCNC: 0.8 MG/DL (ref 0.5–1.4)
CTP QC/QA: YES
DIFFERENTIAL METHOD BLD: ABNORMAL
EOSINOPHIL # BLD AUTO: 0.1 K/UL (ref 0–0.5)
EOSINOPHIL NFR BLD: 0.5 % (ref 0–8)
ERYTHROCYTE [DISTWIDTH] IN BLOOD BY AUTOMATED COUNT: 12 % (ref 11.5–14.5)
EST. GFR  (NO RACE VARIABLE): >60 ML/MIN/1.73 M^2
GLUCOSE SERPL-MCNC: 100 MG/DL (ref 70–110)
GLUCOSE UR QL STRIP: NEGATIVE
HCT VFR BLD AUTO: 41.9 % (ref 37–48.5)
HGB BLD-MCNC: 14.3 G/DL (ref 12–16)
HGB UR QL STRIP: NEGATIVE
IMM GRANULOCYTES # BLD AUTO: 0.05 K/UL (ref 0–0.04)
IMM GRANULOCYTES NFR BLD AUTO: 0.4 % (ref 0–0.5)
KETONES UR QL STRIP: NEGATIVE
LEUKOCYTE ESTERASE UR QL STRIP: ABNORMAL
LYMPHOCYTES # BLD AUTO: 3.8 K/UL (ref 1–4.8)
LYMPHOCYTES NFR BLD: 32.9 % (ref 18–48)
MCH RBC QN AUTO: 32.3 PG (ref 27–31)
MCHC RBC AUTO-ENTMCNC: 34.1 G/DL (ref 32–36)
MCV RBC AUTO: 95 FL (ref 82–98)
MICROSCOPIC COMMENT: ABNORMAL
MONOCYTES # BLD AUTO: 0.9 K/UL (ref 0.3–1)
MONOCYTES NFR BLD: 7.7 % (ref 4–15)
NEUTROPHILS # BLD AUTO: 6.8 K/UL (ref 1.8–7.7)
NEUTROPHILS NFR BLD: 58.4 % (ref 38–73)
NITRITE UR QL STRIP: NEGATIVE
NRBC BLD-RTO: 0 /100 WBC
PH UR STRIP: 7 [PH] (ref 5–8)
PLATELET # BLD AUTO: 212 K/UL (ref 150–450)
PMV BLD AUTO: 10.9 FL (ref 9.2–12.9)
POTASSIUM SERPL-SCNC: 4.1 MMOL/L (ref 3.5–5.1)
PROT UR QL STRIP: NEGATIVE
RBC # BLD AUTO: 4.43 M/UL (ref 4–5.4)
RBC #/AREA URNS HPF: 2 /HPF (ref 0–4)
SODIUM SERPL-SCNC: 142 MMOL/L (ref 136–145)
SP GR UR STRIP: 1.02 (ref 1–1.03)
SQUAMOUS #/AREA URNS HPF: 3 /HPF
URN SPEC COLLECT METH UR: ABNORMAL
UROBILINOGEN UR STRIP-ACNC: NEGATIVE EU/DL
WBC # BLD AUTO: 11.65 K/UL (ref 3.9–12.7)
WBC #/AREA URNS HPF: 6 /HPF (ref 0–5)

## 2024-04-29 PROCEDURE — 81025 URINE PREGNANCY TEST: CPT

## 2024-04-29 PROCEDURE — 80048 BASIC METABOLIC PNL TOTAL CA: CPT | Performed by: EMERGENCY MEDICINE

## 2024-04-29 PROCEDURE — 81000 URINALYSIS NONAUTO W/SCOPE: CPT

## 2024-04-29 PROCEDURE — 99285 EMERGENCY DEPT VISIT HI MDM: CPT | Mod: 25

## 2024-04-29 PROCEDURE — 85025 COMPLETE CBC W/AUTO DIFF WBC: CPT | Performed by: EMERGENCY MEDICINE

## 2024-04-29 RX ADMIN — IOHEXOL 100 ML: 350 INJECTION, SOLUTION INTRAVENOUS at 11:04

## 2024-04-30 ENCOUNTER — PATIENT MESSAGE (OUTPATIENT)
Dept: OBSTETRICS AND GYNECOLOGY | Facility: CLINIC | Age: 34
End: 2024-04-30
Payer: COMMERCIAL

## 2024-04-30 VITALS
BODY MASS INDEX: 34.99 KG/M2 | HEART RATE: 79 BPM | TEMPERATURE: 98 F | DIASTOLIC BLOOD PRESSURE: 64 MMHG | OXYGEN SATURATION: 100 % | HEIGHT: 65 IN | SYSTOLIC BLOOD PRESSURE: 124 MMHG | RESPIRATION RATE: 16 BRPM | WEIGHT: 210 LBS

## 2024-04-30 PROCEDURE — 25500020 PHARM REV CODE 255: Performed by: EMERGENCY MEDICINE

## 2024-04-30 NOTE — ED PROVIDER NOTES
Chief Complaint:  Right lower quadrant pain    History of Present Illness:    Sherlyn Monahan 33 y.o. with a  has a past medical history of Acquired hypothyroidism (2016), Allergy (2009), Anxiety (), Depression (), Irritable bowel syndrome (IBS), Keloid cicatrix (), Obesity (BMI 30.0-34.9) (2016), Ulcerative colitis, and Urticaria. who presents to the emergency department today with a complaint of right lower quadrant pain that started tonight.  She reports that it was sharp.  He was in the right lower quadrant.  There has no radiation.  She denies any vomiting, diarrhea.  Reports no change in urination.  She reports she has had a right ovarian cysts before.  She has an ultrasound scheduled next week to see if it has cleared.        ROS  Otherwise remaining ROS negative     The history is provided by the patient      Reviewed and verified by myself:   PMH/PSH/SOC/FH REVIEWED :    Past Medical History:   Diagnosis Date    Acquired hypothyroidism 2016    Allergy 2009    Seasonal    Anxiety 2019    Depression     Irritable bowel syndrome (IBS)     Keloid cicatrix     Obesity (BMI 30.0-34.9) 2016    loosing weight consistently on 21 Day Fix program     Ulcerative colitis     Urticaria        Past Surgical History:   Procedure Laterality Date    breast reduction  2012    BREAST SURGERY      breast reduction     SECTION       SECTION N/A 2020    Procedure:  SECTION;  Surgeon: Chiara Smith MD;  Location: [facility] L&D;  Service: OB/GYN;  Laterality: N/A;    CHOLECYSTECTOMY  2019    COLONOSCOPY N/A 2023    Procedure: COLONOSCOPY;  Surgeon: Tom Palmer MD;  Location: [facility] ENDO;  Service: Endoscopy;  Laterality: N/A;    ENDOSCOPIC CARPAL TUNNEL RELEASE Right 2022    Procedure: RELEASE, CARPAL TUNNEL, ENDOSCOPIC;  Surgeon: Phoenix Rojas MD;  Location: [facility] OR;  Service: Orthopedics;  Laterality: Right;     FLEXIBLE SIGMOIDOSCOPY N/A 4/5/2024    Procedure: SIGMOIDOSCOPY, FLEXIBLE;  Surgeon: Tom Palmer MD;  Location: [facility] ENDO;  Service: Endoscopy;  Laterality: N/A;    LAPAROSCOPIC CHOLECYSTECTOMY N/A 06/11/2019    Procedure: CHOLECYSTECTOMY, LAPAROSCOPIC;  Surgeon: Missael Tolentino MD;  Location: [facility] OR;  Service: General;  Laterality: N/A;  video    ULNAR NERVE TRANSPOSITION Right 06/24/2022    Procedure: TRANSPOSITION, NERVE, ULNAR;  Surgeon: Phoenix Rojas MD;  Location: [facility] OR;  Service: Orthopedics;  Laterality: Right;       Social History     Socioeconomic History    Marital status:    Tobacco Use    Smoking status: Former     Current packs/day: 0.25     Average packs/day: 0.3 packs/day for 3.0 years (0.8 ttl pk-yrs)     Types: Cigarettes    Smokeless tobacco: Never   Substance and Sexual Activity    Alcohol use: Yes     Alcohol/week: 1.0 standard drink of alcohol     Types: 1 Glasses of wine per week     Comment: very rare    Drug use: No    Sexual activity: Yes     Partners: Male     Birth control/protection: Condom, None   Other Topics Concern    Are you pregnant or think you may be? No    Breast-feeding No     Social Determinants of Health     Financial Resource Strain: Low Risk  (4/15/2024)    Overall Financial Resource Strain (CARDIA)     Difficulty of Paying Living Expenses: Not very hard   Food Insecurity: No Food Insecurity (4/15/2024)    Hunger Vital Sign     Worried About Running Out of Food in the Last Year: Never true     Ran Out of Food in the Last Year: Never true   Transportation Needs: No Transportation Needs (4/15/2024)    PRAPARE - Transportation     Lack of Transportation (Medical): No     Lack of Transportation (Non-Medical): No   Physical Activity: Insufficiently Active (4/15/2024)    Exercise Vital Sign     Days of Exercise per Week: 3 days     Minutes of Exercise per Session: 20 min   Stress: No Stress Concern Present (4/15/2024)    [employer]  [employer] - Occupational Stress Questionnaire     Feeling of Stress : Only a little   Social Connections: Unknown (4/15/2024)    Social Connection and Isolation Panel [NHANES]     Frequency of Communication with Friends and Family: More than three times a week     Frequency of Social Gatherings with Friends and Family: Once a week     Active Member of Clubs or Organizations: Yes     Attends Club or Organization Meetings: More than 4 times per year     Marital Status:    Housing Stability: Low Risk  (10/12/2023)    Housing Stability Vital Sign     Unable to Pay for Housing in the Last Year: No     Number of Places Lived in the Last Year: 1     Unstable Housing in the Last Year: No       Family History   Problem Relation Name Age of Onset    Breast cancer Mother Refugio 45        remission for 10 years    Cancer Mother Refugio 45        breast     Hypertension Mother Refugio     Allergic rhinitis Mother Refugio     Depression Mother Refugio     Diabetes Father Gustavo     Depression Father Gustavo     Diabetes Maternal Grandfather      Heart disease Maternal Grandfather      Diabetes Paternal Grandfather      Depression Brother Jose L     Colon cancer Neg Hx      Ovarian cancer Neg Hx      Allergies Neg Hx      Angioedema Neg Hx      Asthma Neg Hx      Atopy Neg Hx      Eczema Neg Hx      Immunodeficiency Neg Hx      Rhinitis Neg Hx      Urticaria Neg Hx           ALLERGIES REVIEWED  Review of patient's allergies indicates:   Allergen Reactions    Minocycline Swelling    Nsaids (non-steroidal anti-inflammatory drug) Other (See Comments)       MEDICATIONS REVIEWED  Medication List with Changes/Refills   Current Medications    CALCIUM-VITAMIN D3 (OS-ELISABETH 500 + D3) 500 MG(1,250MG) -200 UNIT PER TABLET    Take 1 tablet by mouth.    CETIRIZINE (ZYRTEC) 10 MG TABLET    Take 1 tablet (10 mg total) by mouth 2 (two) times a day.    CHOLECALCIFEROL, VITAMIN D3, (VITAMIN D3) 50 MCG (2,000 UNIT) TAB    Take 1 tablet  (2,000 Units total) by mouth daily with breakfast.    LACTOBACILLUS RHAMNOSUS GG (CULTURELLE) 10 BILLION CELL CAPSULE    Take 1 capsule by mouth once daily.    LANSOPRAZOLE (PREVACID) 30 MG CAPSULE    Take 30 mg by mouth once daily.    MAGNESIUM OXIDE (MAG-OX) 400 MG (241.3 MG MAGNESIUM) TABLET    Take 400 mg by mouth once daily.    MESALAMINE (LIALDA) 1.2 GRAM TBEC    Take 2 tablets (2.4 g total) by mouth daily with breakfast.    MUPIROCIN (BACTROBAN) 2 % OINTMENT    Apply topically 2 (two) times daily.    MUPIROCIN CALCIUM 2% NASL OINT (BACTROBAN) 2 % OINT    by Nasal route 2 (two) times daily.    PRENATAL VIT37-IRON-FOLIC ACID 29 MG IRON- 1 MG CHEW    Take by mouth.    SERTRALINE (ZOLOFT) 50 MG TABLET    TAKE 1 TABLET(50 MG) BY MOUTH EVERY EVENING    SYNTHROID 50 MCG TABLET    TAKE 1 TABLET(50 MCG) BY MOUTH EVERY DAY           VS reviewed    Nursing/Ancillary staff note reviewed.       Physical Exam     ED Triage Vitals [04/29/24 1906]   /74   Pulse 78   Resp 16   Temp 98.7 °F (37.1 °C)   SpO2 99 %       Physical Exam    Constitutional: The patient is alert, has no immediate need for airway protection and no signs of toxicity. No acute distress. Lying in bed but able to sit up without difficulty.   ENT: Mucous membranes are moist. Oropharynx clear.   Neck: Neck is supple non-tender. No lymphadenopathy. No stridor.   Respiratory: There are no retractions, lungs are clear to auscultation. No wheezing, no crackles.   Cardiovascular: Regular rate and rhythm. No murmurs, rubs or gallops.  Gastrointestinal:  Abdomen is soft, she has some tenderness to palpation in the right lower quadrant at McBurney's point, no masses, bowel sounds normal. No guarding, no rebound.  No pulsatile mass.   Neurological: Alert and oriented x 4. CN II-XII grossly intact. No focal weakness. Strength intact 5/5 bilaterally in upper and lower extremities.   Skin: Warm and dry, no rashes.  Musculoskeletal: Extremities are non-tender,  non-swollen and have full range of motion. Back NTTP along the midline.             ED Course         ED Course as of 04/30/24 0238   Mon Apr 29, 2024   2200 Patient reports that her pain is improved from what it was earlier tonight.  She does however have pain in the right lower quadrant, will need to rule out appendicitis. [JA]   Tue Apr 30, 2024   0013 Anna Adorno, UA: 3 [JA]      ED Course User Index  [JA] Mario Contreras MD                  Medical Decision Making  Amount and/or Complexity of Data Reviewed  External Data Reviewed: notes.     Details: Patient was seen 04/15/2024 by her primary care physician for her annual exam.  Patient was recently diagnosed with ulcerative colitis with GI.  Labs: ordered. Decision-making details documented in ED Course.  Radiology: ordered and independent interpretation performed.     Details: Ultrasound of the pelvis shows no ovarian cysts    Risk  Prescription drug management.          Social determinants of health taken into consideration during development of our treatment plan include   Body mass index is 34.95 kg/m². - Cumulative social disadvantage, denoted by higher SDOH burden, was associated with increased odds of obesity, independent of clinical and demographic factors. PMID: 38221947 DOI: 10.1002/alban.92365      Pt received the following in the ED:   Medications   iohexoL (OMNIPAQUE 350) injection 100 mL (100 mLs Intravenous Given 4/29/24 2333)       ED Management:  Differential diagnosis included but not limited to : Pregnancy, ectopic pregnancy, Gastritis, gastroenteritis, cholecystitis, cholelithiasis, pancreatitis, small bowel obstruction, ileus, appendicitis, urinary tract infection, ovarian cyst, PID      Orders I ordered to further evaluate included:    Orders Placed This Encounter   Procedures    US Pelvis Comp with Transvag NON-OB (xpd)    CT Abdomen Pelvis With IV Contrast NO Oral Contrast    Urinalysis, Reflex to Urine Culture Urine, Clean Catch     Urinalysis Microscopic    CBC auto differential    Basic metabolic panel    POCT urine pregnancy    Insert Saline lock IV         Comorbidity impacting this encounter includes:  has a past medical history of Acquired hypothyroidism (01/11/2016), Allergy (8/2009), Anxiety (2019), Depression (2019), Irritable bowel syndrome (IBS), Keloid cicatrix (2012), Obesity (BMI 30.0-34.9) (01/11/2016), Ulcerative colitis, and Urticaria.      MDM continued:     Sherlyn Monahan  presents to the emergency Department today with an acute, complicated problem with systemic symptoms of right lower quadrant pain  Workup today is reassuring.  There is no sign of any ovarian cyst on ultrasound.  There is no sign of torsion.  CT scan of the abdomen does not show any sign of appendicitis.  The patient's pain has resolved.  Perhaps it was her cyst that had ruptured.  I discussed this with her.  She will follow up with her OBGYN for continued care and management.  She was appropriate for discharge home.           The pt is comfortable with this plan and comfortable going home at this time. After taking into careful account the historical factors and physical exam findings of the patient's presentation today, in conjunction with the empirical and objective data obtained on ED workup, no acute emergent medical condition requiring admission has been identified. The patient appears to be low risk for an emergent medical condition and I feel it is safe and appropriate at this time for the patient to be discharged to follow-up as detailed in their discharge instructions for reevaluation and possible continued outpatient workup and management. Regardless, an unremarkable evaluation in the ED does not preclude the development or presence of a serious or life threatening condition. As such, patient was instructed to return immediately for any worsening or change in current symptoms. Precautions for return discussed at length.  Discharge and  follow-up instructions discussed with the patient who expressed understanding and willingness to comply with my recommendations.    Voice recognition software utilized in this note.           Impression      The encounter diagnosis was Abdominal pain, RLQ.        Discharge Medication List as of 4/30/2024 12:10 AM           Follow-up Information       Schedule an appointment as soon as possible for a visit  with Alea Moses MD.    Specialty: Family Medicine  Contact information:  [address] [address]  [facility] [place] [address] [phone]               Schedule an appointment as soon as possible for a visit  with Your OB/GYN.                                           Mario Contreras MD  04/30/24 0238

## 2024-04-30 NOTE — DISCHARGE INSTRUCTIONS
Additional instructions  Followup with your primary care physician.  Follow up with your OB/GYN.  Return to the emergency department if you have increasing pain, chest pain, difficulty breathing,  nonstop vomiting, or any other concerns. Be sure to drink plenty of fluids to stay hydrated. Get plenty of rest. Please refer to additional educational material for further instructions.

## 2024-04-30 NOTE — ED NOTES
Pt resting in bed with pain 2-3/10.  Awaiting CT, family at bedside.  Blankets given.  No needs voiced

## 2024-05-21 RX ORDER — MESALAMINE 1.2 G/1
2.4 TABLET, DELAYED RELEASE ORAL
Qty: 60 TABLET | Refills: 3 | Status: SHIPPED | OUTPATIENT
Start: 2024-05-21 | End: 2024-05-29 | Stop reason: SDUPTHER

## 2024-05-29 RX ORDER — MESALAMINE 1.2 G/1
2.4 TABLET, DELAYED RELEASE ORAL
Qty: 60 TABLET | Refills: 3 | Status: SHIPPED | OUTPATIENT
Start: 2024-05-29

## 2024-06-09 ENCOUNTER — PATIENT MESSAGE (OUTPATIENT)
Dept: GASTROENTEROLOGY | Facility: CLINIC | Age: 34
End: 2024-06-09
Payer: COMMERCIAL

## 2024-07-03 ENCOUNTER — PATIENT MESSAGE (OUTPATIENT)
Dept: GASTROENTEROLOGY | Facility: CLINIC | Age: 34
End: 2024-07-03
Payer: COMMERCIAL

## 2024-07-09 ENCOUNTER — OFFICE VISIT (OUTPATIENT)
Dept: ENDOCRINOLOGY | Facility: CLINIC | Age: 34
End: 2024-07-09
Payer: COMMERCIAL

## 2024-07-09 ENCOUNTER — OFFICE VISIT (OUTPATIENT)
Dept: NEUROLOGY | Facility: CLINIC | Age: 34
End: 2024-07-09
Payer: COMMERCIAL

## 2024-07-09 ENCOUNTER — PATIENT MESSAGE (OUTPATIENT)
Dept: NEUROLOGY | Facility: CLINIC | Age: 34
End: 2024-07-09

## 2024-07-09 DIAGNOSIS — F34.89 OTHER SPECIFIED PERSISTENT MOOD DISORDERS: ICD-10-CM

## 2024-07-09 DIAGNOSIS — K64.9 HEMORRHOIDS, UNSPECIFIED HEMORRHOID TYPE: Primary | ICD-10-CM

## 2024-07-09 DIAGNOSIS — G43.009 MIGRAINE WITHOUT AURA AND WITHOUT STATUS MIGRAINOSUS, NOT INTRACTABLE: Primary | ICD-10-CM

## 2024-07-09 DIAGNOSIS — E03.9 ACQUIRED HYPOTHYROIDISM: Primary | ICD-10-CM

## 2024-07-09 DIAGNOSIS — F41.8 DEPRESSION WITH ANXIETY: ICD-10-CM

## 2024-07-09 DIAGNOSIS — L68.0 HIRSUTISM: ICD-10-CM

## 2024-07-09 DIAGNOSIS — R79.89 LOW VITAMIN D LEVEL: ICD-10-CM

## 2024-07-09 PROCEDURE — 3044F HG A1C LEVEL LT 7.0%: CPT | Mod: CPTII,95,, | Performed by: NURSE PRACTITIONER

## 2024-07-09 PROCEDURE — 3044F HG A1C LEVEL LT 7.0%: CPT | Mod: CPTII,95,, | Performed by: INTERNAL MEDICINE

## 2024-07-09 PROCEDURE — G2211 COMPLEX E/M VISIT ADD ON: HCPCS | Mod: 95,,, | Performed by: NURSE PRACTITIONER

## 2024-07-09 PROCEDURE — 1159F MED LIST DOCD IN RCRD: CPT | Mod: CPTII,95,, | Performed by: NURSE PRACTITIONER

## 2024-07-09 PROCEDURE — 99204 OFFICE O/P NEW MOD 45 MIN: CPT | Mod: 95,,, | Performed by: NURSE PRACTITIONER

## 2024-07-09 PROCEDURE — 99214 OFFICE O/P EST MOD 30 MIN: CPT | Mod: 95,,, | Performed by: INTERNAL MEDICINE

## 2024-07-09 PROCEDURE — 1160F RVW MEDS BY RX/DR IN RCRD: CPT | Mod: CPTII,95,, | Performed by: NURSE PRACTITIONER

## 2024-07-09 RX ORDER — RIZATRIPTAN BENZOATE 10 MG/1
10 TABLET, ORALLY DISINTEGRATING ORAL
Qty: 12 TABLET | Refills: 5 | Status: SHIPPED | OUTPATIENT
Start: 2024-07-09

## 2024-07-09 NOTE — PROGRESS NOTES
"NEW PATIENT CONSULT  SUBJECTIVE:  Patient ID: Sherlyn Monahan   MRN: [id number]  Referred By: No ref. provider found  Chief Complaint: Consult    The patient location is: in [place]   The chief complaint leading to consultation is: Consult  Visit type: Virtual visit with synchronous audio and video  Total time spent with patient: 18 min  Each patient to whom he or she provides medical services by telemedicine is:  (1) informed of the relationship between the physician and patient and the respective role of any other health care provider with respect to management of the patient; and (2) notified that he or she may decline to receive medical services by telemedicine and may withdraw from such care at any time.    History of Present Illness:   34 y.o. female with hypothyroidism, migraines, anxiety/depression, who presents for evaluation of headaches via virtual visit.  Migraines began around age 14-15 yo and have been ongoing.  Migraines initially began shortly after she suffered a concussion.  Over the last 6 months, migraines have started to become more frequent.  Began taking "more magnesium" in January which has helped, now taking 1700 mg twice daily.  Tracked 5 migraines in June, but also had 7 headache days.  Her typical month is 2-3 migraines per month.  Migraines typically last about 10 hours in duration.  She has been treating acute migraines with tylenol and ice packs which are only somewhat effective.  She has taken sumatriptan 50 mg in the past, which she does recall please.  Associated symptoms include photophobia, phonophobia, nausea, scalp sensitivity, shooting pains in the occipitalis, dizziness, fatigue, neck pain, visual disturbance.    She is not currently pregnant, but she and her  are actively trying to conceive.      Treatments Tried and Response  Sumatriptan   Fioricet   Sertraline   Magnesium   Vit b2 400 mg - recommended today   Coq10 - recommended today   Maxalt 10 mg mlt - given " today     Current Medications:    calcium-vitamin D3 (OS-ELISABETH 500 + D3) 500 mg(1,250mg) -200 unit per tablet, Take 1 tablet by mouth., Disp: , Rfl:     cetirizine (ZYRTEC) 10 MG tablet, Take 1 tablet (10 mg total) by mouth 2 (two) times a day., Disp: 60 tablet, Rfl: 12    cholecalciferol, vitamin D3, (VITAMIN D3) 50 mcg (2,000 unit) Tab, Take 1 tablet (2,000 Units total) by mouth daily with breakfast. (Patient not taking: Reported on 4/15/2024), Disp: 100 tablet, Rfl: 3    Lactobacillus rhamnosus GG (CULTURELLE) 10 billion cell capsule, Take 1 capsule by mouth once daily., Disp: , Rfl:     magnesium oxide (MAG-OX) 400 mg (241.3 mg magnesium) tablet, Take 400 mg by mouth once daily., Disp: , Rfl:     mesalamine (LIALDA) 1.2 gram TbEC, Take 2 tablets (2.4 g total) by mouth daily with breakfast., Disp: 60 tablet, Rfl: 3    mupirocin (BACTROBAN) 2 % ointment, Apply topically 2 (two) times daily., Disp: , Rfl:     mupirocin calcium 2% nasl oint (BACTROBAN) 2 % Oint, by Nasal route 2 (two) times daily. (Patient not taking: Reported on 4/15/2024), Disp: 1 Tube, Rfl: 3    prenatal vit37-iron-folic acid 29 mg iron- 1 mg Chew, Take by mouth., Disp: , Rfl:     progesterone (PROMETRIUM) 200 MG capsule, Take 2 capsules (400 mg total) by mouth every evening. Take until 12 weeks of pregnancy, Disp: 60 capsule, Rfl: 1    rizatriptan (MAXALT-MLT) 10 MG disintegrating tablet, Take 1 tablet (10 mg total) by mouth every 2 (two) hours as needed for Migraine. Max 30 mg/day., Disp: 12 tablet, Rfl: 5    sertraline (ZOLOFT) 50 MG tablet, TAKE 1 TABLET(50 MG) BY MOUTH EVERY EVENING, Disp: 90 tablet, Rfl: 3    SYNTHROID 50 mcg tablet, TAKE 1 TABLET(50 MCG) BY MOUTH EVERY DAY, Disp: 90 tablet, Rfl: 3  No current facility-administered medications for this visit.    Facility-Administered Medications Ordered in Other Visits:     fentaNYL 50 mcg/mL injection  mcg,  mcg, Intravenous, PRN, Yury Ambrosio MD, 50 mcg at 06/24/22  1026    LIDOcaine (PF) 10 mg/ml (1%) injection 10 mg, 1 mL, Intradermal, Once, Yury Ambrosio MD    midazolam (VERSED) 1 mg/mL injection 0.5-4 mg, 0.5-4 mg, Intravenous, PRN, Yury Ambrosio MD, 2 mg at 06/24/22 1026    Review of Systems - A review of 10+ systems was conducted with pertinent positive and negative findings documented in HPI with all other systems reviewed and negative.    PFSH: Past medical, family, and social history reviewed as documented in chart with pertinent positive medical, family, and social history detailed in HPI.    OBJECTIVE:  Vitals:  There were no vitals taken for this visit.     Physical Exam:  Constitutional: she appears well-developed and well-nourished. she is well groomed. NAD    Review of Data:   Notes from endocrinology, GYN reviewed   Labs:  Admission on 04/29/2024, Discharged on 04/30/2024   Component Date Value Ref Range Status    Specimen UA 04/29/2024 Urine, Clean Catch   Final    Color, UA 04/29/2024 Yellow  Yellow, Straw, Dalia Final    Appearance, UA 04/29/2024 Clear  Clear Final    pH, UA 04/29/2024 7.0  5.0 - 8.0 Final    Specific Gravity, UA 04/29/2024 1.020  1.005 - 1.030 Final    Protein, UA 04/29/2024 Negative  Negative Final    Glucose, UA 04/29/2024 Negative  Negative Final    Ketones, UA 04/29/2024 Negative  Negative Final    Bilirubin (UA) 04/29/2024 Negative  Negative Final    Occult Blood UA 04/29/2024 Negative  Negative Final    Nitrite, UA 04/29/2024 Negative  Negative Final    Urobilinogen, UA 04/29/2024 Negative  <2.0 EU/dL Final    Leukocytes, UA 04/29/2024 Trace (A)  Negative Final    POC Preg Test, Ur 04/29/2024 Negative  Negative Final     Acceptable 04/29/2024 Yes   Final    RBC, UA 04/29/2024 2  0 - 4 /hpf Final    WBC, UA 04/29/2024 6 (H)  0 - 5 /hpf Final    Bacteria 04/29/2024 Rare  None-Occ /hpf Final    Squam Epithel, UA 04/29/2024 3  /hpf Final    Microscopic Comment 04/29/2024 SEE COMMENT   Final    WBC 04/29/2024  11.65  3.90 - 12.70 K/uL Final    RBC 04/29/2024 4.43  4.00 - 5.40 M/uL Final    Hemoglobin 04/29/2024 14.3  12.0 - 16.0 g/dL Final    Hematocrit 04/29/2024 41.9  37.0 - 48.5 % Final    MCV 04/29/2024 95  82 - 98 fL Final    MCH 04/29/2024 32.3 (H)  27.0 - 31.0 pg Final    MCHC 04/29/2024 34.1  32.0 - 36.0 g/dL Final    RDW 04/29/2024 12.0  11.5 - 14.5 % Final    Platelets 04/29/2024 212  150 - 450 K/uL Final    MPV 04/29/2024 10.9  9.2 - 12.9 fL Final    Immature Granulocytes 04/29/2024 0.4  0.0 - 0.5 % Final    Gran # (ANC) 04/29/2024 6.8  1.8 - 7.7 K/uL Final    Immature Grans (Abs) 04/29/2024 0.05 (H)  0.00 - 0.04 K/uL Final    Lymph # 04/29/2024 3.8  1.0 - 4.8 K/uL Final    Mono # 04/29/2024 0.9  0.3 - 1.0 K/uL Final    Eos # 04/29/2024 0.1  0.0 - 0.5 K/uL Final    Baso # 04/29/2024 0.01  0.00 - 0.20 K/uL Final    nRBC 04/29/2024 0  0 /100 WBC Final    Gran % 04/29/2024 58.4  38.0 - 73.0 % Final    Lymph % 04/29/2024 32.9  18.0 - 48.0 % Final    Mono % 04/29/2024 7.7  4.0 - 15.0 % Final    Eosinophil % 04/29/2024 0.5  0.0 - 8.0 % Final    Basophil % 04/29/2024 0.1  0.0 - 1.9 % Final    Differential Method 04/29/2024 Automated   Final    Sodium 04/29/2024 142  136 - 145 mmol/L Final    Potassium 04/29/2024 4.1  3.5 - 5.1 mmol/L Final    Chloride 04/29/2024 107  95 - 110 mmol/L Final    CO2 04/29/2024 24  23 - 29 mmol/L Final    Glucose 04/29/2024 100  70 - 110 mg/dL Final    BUN 04/29/2024 15  6 - 20 mg/dL Final    Creatinine 04/29/2024 0.8  0.5 - 1.4 mg/dL Final    Calcium 04/29/2024 9.9  8.7 - 10.5 mg/dL Final    Anion Gap 04/29/2024 11  8 - 16 mmol/L Final    eGFR 04/29/2024 >60  >60 mL/min/1.73 m^2 Final   Lab Visit on 04/15/2024   Component Date Value Ref Range Status    Ferritin 04/15/2024 168  20.0 - 300.0 ng/mL Final    CRP 04/15/2024 5.5  0.0 - 8.2 mg/L Final   Admission on 04/05/2024, Discharged on 04/05/2024   Component Date Value Ref Range Status    POC Preg Test, Ur 04/05/2024 Negative  Negative  Final     Acceptable 04/05/2024 Yes   Final    Final Pathologic Diagnosis 04/05/2024    Final                    Value:1. Colon, rectum, erythema, biopsy:     - Colonic mucosa with lymphoid aggregate.     - Negative for active colitis, dysplasia or malignancy.      Gross 04/05/2024    Final                    Value:Patient ID/MRN: [id number]  Pathology ID/MRN: [id number]    Received in formalin labeled with Taniya, Sherlyn&quot; and &quot;rectal erythema bx&quot; are multiple tan tissue fragments (0.9 x 0.3 x 0.1 cm in aggregate) which are entirely submitted in MTJ--1-A.    Osmar SORIANO (A.S.C.P.)      Disclaimer 04/05/2024 Unless the case is a 'gross only' or additional testing only, the final diagnosis for each specimen is based on a microscopic examination of appropriate tissue sections.   Final   Lab Visit on 04/04/2024   Component Date Value Ref Range Status    Hemoglobin A1C 04/04/2024 4.8  4.0 - 5.6 % Final    Estimated Avg Glucose 04/04/2024 91  68 - 131 mg/dL Final    Sodium 04/04/2024 138  136 - 145 mmol/L Final    Potassium 04/04/2024 4.4  3.5 - 5.1 mmol/L Final    Chloride 04/04/2024 104  95 - 110 mmol/L Final    CO2 04/04/2024 21 (L)  23 - 29 mmol/L Final    Glucose 04/04/2024 77  70 - 110 mg/dL Final    BUN 04/04/2024 12  6 - 20 mg/dL Final    Creatinine 04/04/2024 0.8  0.5 - 1.4 mg/dL Final    Calcium 04/04/2024 10.0  8.7 - 10.5 mg/dL Final    Total Protein 04/04/2024 7.8  6.0 - 8.4 g/dL Final    Albumin 04/04/2024 4.4  3.5 - 5.2 g/dL Final    Total Bilirubin 04/04/2024 0.4  0.1 - 1.0 mg/dL Final    Alkaline Phosphatase 04/04/2024 60  55 - 135 U/L Final    AST 04/04/2024 19  10 - 40 U/L Final    ALT 04/04/2024 13  10 - 44 U/L Final    eGFR 04/04/2024 >60  >60 mL/min/1.73 m^2 Final    Anion Gap 04/04/2024 13  8 - 16 mmol/L Final    Cholesterol 04/04/2024 216 (H)  120 - 199 mg/dL Final    Triglycerides 04/04/2024 223 (H)  30 - 150 mg/dL Final    HDL 04/04/2024 50  40 - 75 mg/dL Final     LDL Cholesterol 04/04/2024 121.4  63.0 - 159.0 mg/dL Final    HDL/Cholesterol Ratio 04/04/2024 23.1  20.0 - 50.0 % Final    Total Cholesterol/HDL Ratio 04/04/2024 4.3  2.0 - 5.0 Final    Non-HDL Cholesterol 04/04/2024 166  mg/dL Final    WBC 04/04/2024 8.60  3.90 - 12.70 K/uL Final    RBC 04/04/2024 4.49  4.00 - 5.40 M/uL Final    Hemoglobin 04/04/2024 14.3  12.0 - 16.0 g/dL Final    Hematocrit 04/04/2024 42.2  37.0 - 48.5 % Final    MCV 04/04/2024 94  82 - 98 fL Final    MCH 04/04/2024 31.8 (H)  27.0 - 31.0 pg Final    MCHC 04/04/2024 33.9  32.0 - 36.0 g/dL Final    RDW 04/04/2024 12.2  11.5 - 14.5 % Final    Platelets 04/04/2024 196  150 - 450 K/uL Final    MPV 04/04/2024 11.3  9.2 - 12.9 fL Final    Immature Granulocytes 04/04/2024 0.2  0.0 - 0.5 % Final    Gran # (ANC) 04/04/2024 5.1  1.8 - 7.7 K/uL Final    Immature Grans (Abs) 04/04/2024 0.02  0.00 - 0.04 K/uL Final    Lymph # 04/04/2024 2.9  1.0 - 4.8 K/uL Final    Mono # 04/04/2024 0.6  0.3 - 1.0 K/uL Final    Eos # 04/04/2024 0.0  0.0 - 0.5 K/uL Final    Baso # 04/04/2024 0.02  0.00 - 0.20 K/uL Final    nRBC 04/04/2024 0  0 /100 WBC Final    Gran % 04/04/2024 58.8  38.0 - 73.0 % Final    Lymph % 04/04/2024 34.0  18.0 - 48.0 % Final    Mono % 04/04/2024 6.5  4.0 - 15.0 % Final    Eosinophil % 04/04/2024 0.3  0.0 - 8.0 % Final    Basophil % 04/04/2024 0.2  0.0 - 1.9 % Final    Differential Method 04/04/2024 Automated   Final    TSH 04/04/2024 1.154  0.400 - 4.000 uIU/mL Final    Vit D, 25-Hydroxy 04/04/2024 37  30 - 96 ng/mL Final   Lab Visit on 03/15/2024   Component Date Value Ref Range Status    WBC 03/15/2024 10.10  3.90 - 12.70 K/uL Final    RBC 03/15/2024 4.28  4.00 - 5.40 M/uL Final    Hemoglobin 03/15/2024 13.6  12.0 - 16.0 g/dL Final    Hematocrit 03/15/2024 40.6  37.0 - 48.5 % Final    MCV 03/15/2024 95  82 - 98 fL Final    MCH 03/15/2024 31.8 (H)  27.0 - 31.0 pg Final    MCHC 03/15/2024 33.5  32.0 - 36.0 g/dL Final    RDW 03/15/2024 12.0  11.5 -  14.5 % Final    Platelets 03/15/2024 235  150 - 450 K/uL Final    MPV 03/15/2024 10.7  9.2 - 12.9 fL Final    Immature Granulocytes 03/15/2024 0.3  0.0 - 0.5 % Final    Gran # (ANC) 03/15/2024 5.8  1.8 - 7.7 K/uL Final    Immature Grans (Abs) 03/15/2024 0.03  0.00 - 0.04 K/uL Final    Lymph # 03/15/2024 3.4  1.0 - 4.8 K/uL Final    Mono # 03/15/2024 0.8  0.3 - 1.0 K/uL Final    Eos # 03/15/2024 0.1  0.0 - 0.5 K/uL Final    Baso # 03/15/2024 0.02  0.00 - 0.20 K/uL Final    nRBC 03/15/2024 0  0 /100 WBC Final    Gran % 03/15/2024 57.3  38.0 - 73.0 % Final    Lymph % 03/15/2024 33.8  18.0 - 48.0 % Final    Mono % 03/15/2024 7.6  4.0 - 15.0 % Final    Eosinophil % 03/15/2024 0.8  0.0 - 8.0 % Final    Basophil % 03/15/2024 0.2  0.0 - 1.9 % Final    Differential Method 03/15/2024 Automated   Final   Lab Visit on 02/26/2024   Component Date Value Ref Range Status    Calprotectin 02/26/2024 14.9  <50 mcg/g Final    Elastase 1, Fecal 02/26/2024 349  >200 (Normal) mcg/g Final   Office Visit on 02/19/2024   Component Date Value Ref Range Status    Final Pathologic Diagnosis 02/19/2024    Final                    Value:Specimen Adequacy  Satisfactory for interpretation. Endocervical component is present.    [place] Category  Negative for intraepithelial lesion or malignancy.  Inflammation present.      Disclaimer 02/19/2024    Final                    Value:The Pap smear is a screening test that aids in the detection of cervical cancer and cancer precursors. Both false positive and false negative results can occur. The test should be used at regular intervals, and positive results should be confirmed before   definitive therapy.  This liquid based specimen is processed using the  or  Thin PrepPAP System. This specimen has been analyzed by the ThinPrep Imaging System (CSID), an automated imaging and review system which assists the laboratory in evaluating   cells on ThinPrep PAP tests. Following  automated imaging, selected fields from every slide are reviewed by a cytotechnologist and/or pathologist.     Screening was performed at [facility], [address] [place] [address].      HPV other High Risk types, PCR 02/19/2024 Negative  Negative Final    HPV High Risk type 16, PCR 02/19/2024 Negative  Negative Final    HPV High Risk type 18, PCR 02/19/2024 Negative  Negative Final     Imaging:  No results found for this or any previous visit.  Note: I have independently reviewed any/all imaging/labs/tests and agree with the report (s) as documented.  Any discrepancies will be as noted/demarcated by free text.  ANNAMARIE HINKLE 7/9/2024    ASSESSMENT:  1. Migraine without aura and without status migrainosus, not intractable    2. Other specified persistent mood disorders    3. Depression with anxiety      PLAN:  - Discussed symptoms appear to be consistent with migraine without aura complicated by anxiety/depression, actively trying to conceive, discussed treatment options and patient agreed with the following plan:  - Treatment options limited given she is actively trying to become pregnant - will avoid use of cgrp antagonists, topiramate/zonisamide, hesitant to start propranolol given consistent low-normal heart rate readings.    - Start vitamin b2 400 mg and coq10 200 mg daily for migraine prevention   - Continue magnesium oxide 400 mg daily   - For acute migraines - given maxalt 10 mg mlt   - She is to stop maxalt and send message via patient portal should she become pregnant prior to her next appt    - Anxiety/Depression - chronic and stable, continue sertraline 50 mg daily, management per PCP - will avoid use of anti-depressants for migraine prevention   - RTC in 2-3 months or sooner if needed     Orders Placed This Encounter    rizatriptan (MAXALT-MLT) 10 MG disintegrating tablet     Questions and concerns were sought and answered to the patient's stated  verbal satisfaction.  The patient verbalizes understanding and agreement with the above stated treatment plan.     Visit today included increased complexity associated with the care of the episodic problem migraine without aura addressed and managing the longitudinal care of the patient due to the serious and/or complex managed problem(s).  Plan for patient to return to clinic in 3 months for follow-up visit.     CC: Alea Moses MD Elizabeth Vulevich, FNP-C  [facility]  [phone]    Dr. Pate was available during today's encounter.

## 2024-07-09 NOTE — PROGRESS NOTES
The patient location is:  [place]  The chief complaint leading to consultation is:  Hypothyroidism    Visit type: audiovisual    Face to Face time with patient:  10 minutes  25 minutes of total time spent on the encounter, which includes face to face time and non-face to face time preparing to see the patient (eg, review of tests), Obtaining and/or reviewing separately obtained history, Documenting clinical information in the electronic or other health record, Independently interpreting results (not separately reported) and communicating results to the patient/family/caregiver, or Care coordination (not separately reported).       Each patient to whom he or she provides medical services by telemedicine is:  (1) informed of the relationship between the physician and patient and the respective role of any other health care provider with respect to management of the patient; and (2) notified that he or she may decline to receive medical services by telemedicine and may withdraw from such care at any time.    Notes:      Subjective:      Patient ID: Sherlyn Monahan is a 34 y.o.    Chief Complaint:  Hypothyroidism     History of Present Illness  33 YO Female w/ PMH as above that presents for follow-up hypothyroidism.  Patient was seen by Dr. Jackeline Avery on 12/19/2022.    With regards to hypothyroidism    Type: Unspecified     TPO Ab: Negative    Dx at 17 YO on blood work   Mother has Hypothyroidism     Currently on Synthroid 50 mcg daily   Taking it on empty stomach and waits for 30-60 minutes.      Lab Results   Component Value Date    TSH 1.154 04/04/2024        Lab Results   Component Value Date    TSH 1.154 04/04/2024    L0EMAZA 8.2 12/29/2004    FREET4 0.89 03/13/2021        Symptoms: Chronic fatigue debi after in the afternoon, weight and appetite stable, has irregular periods - currently every 23-28 days.      Patient is planning on pregnancy.  On clomid x 3 cycles, follows up with Obgyn - planned for HSG  TSH  is at goal of < 2.5  .     History of ovarian cysts and rupture.    Menstrual cycles: Since having second child then cycles were more heavier and painful.     Has 2 children       Denies compressive neck symptoms.   H/o LV on CPAP    NO DM       Lab Results   Component Value Date    THYROPEROXID <6.0 01/12/2023          Vitamin D insufficiency    Prenatal vitamins  Takes Vitamin D, magnesium and probiotics.  Will start B2 supplements    Lab Results   Component Value Date    OQKPNIWO20OV 37 04/04/2024    TBNAXDWI50LQ 24 (L) 10/04/2023          H/o Hirsutism on her chin and chest,  plucking weekly,    Abdominal hair +      Acne: around cycles  Scalp hair loss:  Denies    Androgen Labs:     Lab Results   Component Value Date    TESTOSTERONE 28 01/12/2023    TESTOSTERONE 4.8 01/12/2023     Lab Results   Component Value Date    DHEASO4 199.1 01/12/2023       Lab Results   Component Value Date    PROLACTIN 3.4 (L) 10/20/2022    17HYDROXYPRO 84 01/12/2023    LABLH 1.8 01/12/2023    FSH 4.89 01/12/2023     Lab Results   Component Value Date    TSH 1.154 04/04/2024         Weight:   Weight trend: Stable  Dietary interventions:  Has history of ulcerative colitis dx in 1/2024. Trying to watch her diet.   Dietician visit was not covered.   Physical activity: 2-3 times a week, home videos - dance, low impact cardio.     Lipid panel  Lab Results   Component Value Date    CHOL 216 (H) 04/04/2024    TRIG 223 (H) 04/04/2024    HDL 50 04/04/2024    LDLCALC 121.4 04/04/2024    CHOLHDL 23.1 04/04/2024        Imaging:   Ultrasound pelvis: She was told she had ovarian cysts.         Family Hx:   Hx of PCOS: Mother  Hx of infertility: Mother had trouble conceiving  Diabetes: Father, PGM and MGF  CAD: Father had MI, MGF      Pre diabetes    Lab Results   Component Value Date    HGBA1C 4.8 04/04/2024    HGBA1C 5.0 10/04/2023    HGBA1C 4.8 01/12/2023           ROS:   As above    Objective:     There were no vitals taken for this  visit.  There is no height or weight on file to calculate BMI.    Physical Exam  Constitutional:       Appearance: Normal appearance.   Eyes:      Conjunctiva/sclera: Conjunctivae normal.   Pulmonary:      Effort: Pulmonary effort is normal.   Neurological:      Mental Status: She is alert and oriented to person, place, and time.           Lab Review:   Lab Results   Component Value Date    HGBA1C 4.8 04/04/2024     Lab Results   Component Value Date    CHOL 216 (H) 04/04/2024    HDL 50 04/04/2024    LDLCALC 121.4 04/04/2024    TRIG 223 (H) 04/04/2024    CHOLHDL 23.1 04/04/2024     Lab Results   Component Value Date     04/29/2024    K 4.1 04/29/2024     04/29/2024    CO2 24 04/29/2024     04/29/2024    BUN 15 04/29/2024    CREATININE 0.8 04/29/2024    CALCIUM 9.9 04/29/2024    PROT 7.8 04/04/2024    ALBUMIN 4.4 04/04/2024    BILITOT 0.4 04/04/2024    ALKPHOS 60 04/04/2024    AST 19 04/04/2024    ALT 13 04/04/2024    ANIONGAP 11 04/29/2024    ESTGFRAFRICA >60 04/06/2022    EGFRNONAA >60 04/06/2022    TSH 1.154 04/04/2024     Vit D, 25-Hydroxy   Date Value Ref Range Status   04/04/2024 37 30 - 96 ng/mL Final     Comment:     Vitamin D deficiency.........<10 ng/mL                              Vitamin D insufficiency......10-29 ng/mL       Vitamin D sufficiency........> or equal to 30 ng/mL  Vitamin D toxicity............>100 ng/mL         Assessment and Plan     Problem List Items Addressed This Visit          Derm    Hirsutism       Stable.  Prior evaluation were normal.  No treatment as she is planning on pregnancy.                Endocrine    Acquired hypothyroidism - Primary       Currently on Synthroid 50 mcg daily.  Reports compliance and takes as instructed.  Has chronic fatigue.  Recent TSH was at goal [ <2.5 - patient is planning on pregnancy]  Discussed increase thyroid hormone requirement during pregnancy and close monitoring every 4-8 weeks if patient is pregnant.    Repeat thyroid  function tests.           Low vitamin D level       Continue vitamin-D supplements.

## 2024-07-12 ENCOUNTER — PATIENT MESSAGE (OUTPATIENT)
Dept: OBSTETRICS AND GYNECOLOGY | Facility: CLINIC | Age: 34
End: 2024-07-12
Payer: COMMERCIAL

## 2024-07-12 DIAGNOSIS — O36.80X0 PREGNANCY OF UNKNOWN ANATOMIC LOCATION: Primary | ICD-10-CM

## 2024-07-12 RX ORDER — PROGESTERONE 200 MG/1
400 CAPSULE ORAL NIGHTLY
Qty: 60 CAPSULE | Refills: 1 | Status: SHIPPED | OUTPATIENT
Start: 2024-07-12 | End: 2024-09-10

## 2024-07-12 NOTE — TELEPHONE ENCOUNTER
I talked to pt and scheduled her pregnancy confirmation the 1st week of Aug with . Pt was wanting to know if she needs to continue taking the progesterone please advise.

## 2024-07-22 ENCOUNTER — OFFICE VISIT (OUTPATIENT)
Dept: SURGERY | Facility: CLINIC | Age: 34
End: 2024-07-22
Payer: COMMERCIAL

## 2024-07-22 VITALS
HEART RATE: 98 BPM | SYSTOLIC BLOOD PRESSURE: 137 MMHG | DIASTOLIC BLOOD PRESSURE: 84 MMHG | HEIGHT: 65 IN | WEIGHT: 213.38 LBS | BODY MASS INDEX: 35.55 KG/M2

## 2024-07-22 DIAGNOSIS — K64.9 HEMORRHOIDS, UNSPECIFIED HEMORRHOID TYPE: ICD-10-CM

## 2024-07-22 PROBLEM — L68.0 HIRSUTISM: Status: ACTIVE | Noted: 2024-07-22

## 2024-07-22 PROCEDURE — 3044F HG A1C LEVEL LT 7.0%: CPT | Mod: CPTII,S$GLB,, | Performed by: NURSE PRACTITIONER

## 2024-07-22 PROCEDURE — 1160F RVW MEDS BY RX/DR IN RCRD: CPT | Mod: CPTII,S$GLB,, | Performed by: NURSE PRACTITIONER

## 2024-07-22 PROCEDURE — 99999 PR PBB SHADOW E&M-EST. PATIENT-LVL IV: CPT | Mod: PBBFAC,,, | Performed by: NURSE PRACTITIONER

## 2024-07-22 PROCEDURE — 1159F MED LIST DOCD IN RCRD: CPT | Mod: CPTII,S$GLB,, | Performed by: NURSE PRACTITIONER

## 2024-07-22 PROCEDURE — 99203 OFFICE O/P NEW LOW 30 MIN: CPT | Mod: S$GLB,,, | Performed by: NURSE PRACTITIONER

## 2024-07-22 PROCEDURE — 3079F DIAST BP 80-89 MM HG: CPT | Mod: CPTII,S$GLB,, | Performed by: NURSE PRACTITIONER

## 2024-07-22 PROCEDURE — 3075F SYST BP GE 130 - 139MM HG: CPT | Mod: CPTII,S$GLB,, | Performed by: NURSE PRACTITIONER

## 2024-07-22 PROCEDURE — 3008F BODY MASS INDEX DOCD: CPT | Mod: CPTII,S$GLB,, | Performed by: NURSE PRACTITIONER

## 2024-07-22 RX ORDER — HYDROCORTISONE 25 MG/G
CREAM TOPICAL 2 TIMES DAILY
Qty: 28 G | Refills: 3 | Status: SHIPPED | OUTPATIENT
Start: 2024-07-22

## 2024-07-22 NOTE — PROGRESS NOTES
CRS Office Visit History and Physical    Referring Md:   Tom Palmer Md  [address]  [address]  [place] [address]    SUBJECTIVE:     Chief Complaint: hemorrhoid or fissure    History of Present Illness:  The patient is new patient to this practice.   Course is as follows:  Patient is a 34 y.o. 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  Started after proctitis flare with frequent loose stools.   6 weeks ago noted burning pain and external lump to anus  Pain would linger approx 1 hr after bm  Symptoms have been present for 6 weeks.  Has tried prep h with improvement.     Flex-sig 24:  - Mild (Silvestre Score 1) proctitis ulcerative colitis, improved since the last examination. Biopsied.     Last Colonoscopy completed on 2023  - The examined portion of the ileum was normal.   - One 5 mm polyp in the cecum, removed with a cold snare. Resected and retrieved.   - Congested, erythematous and eroded mucosa in the rectum. Biopsied.   - Biopsies were taken with a cold forceps from the ascending colon, transverse colon, descending colon and sigmoid colon for evaluation of microscopic colitis.       Review of patient's allergies indicates:   Allergen Reactions    Minocycline Swelling    Nsaids (non-steroidal anti-inflammatory drug) Other (See Comments)       Past Medical History:   Diagnosis Date    Acquired hypothyroidism 2016    Allergy 2009    Seasonal    Anxiety 2019    Depression     Irritable bowel syndrome (IBS)     Keloid cicatrix     Obesity (BMI 30.0-34.9) 2016    loosing weight consistently on 21 Day Fix program     Ulcerative colitis     Urticaria      Past Surgical History:   Procedure Laterality Date    breast reduction  2012    BREAST SURGERY      breast reduction     SECTION       SECTION N/A 2020    Procedure:  SECTION;   Surgeon: Chiara Smith MD;  Location: [facility] L&D;  Service: OB/GYN;  Laterality: N/A;    CHOLECYSTECTOMY  06/11/2019    COLONOSCOPY N/A 12/19/2023    Procedure: COLONOSCOPY;  Surgeon: Tom Palmer MD;  Location: [facility] ENDO;  Service: Endoscopy;  Laterality: N/A;    ENDOSCOPIC CARPAL TUNNEL RELEASE Right 06/24/2022    Procedure: RELEASE, CARPAL TUNNEL, ENDOSCOPIC;  Surgeon: Phoenix Rojas MD;  Location: [facility] OR;  Service: Orthopedics;  Laterality: Right;    FLEXIBLE SIGMOIDOSCOPY N/A 4/5/2024    Procedure: SIGMOIDOSCOPY, FLEXIBLE;  Surgeon: Tom Palmer MD;  Location: [facility] ENDO;  Service: Endoscopy;  Laterality: N/A;    LAPAROSCOPIC CHOLECYSTECTOMY N/A 06/11/2019    Procedure: CHOLECYSTECTOMY, LAPAROSCOPIC;  Surgeon: Missael Tolentino MD;  Location: [facility] OR;  Service: General;  Laterality: N/A;  video    ULNAR NERVE TRANSPOSITION Right 06/24/2022    Procedure: TRANSPOSITION, NERVE, ULNAR;  Surgeon: Phoenix Rojas MD;  Location: [facility] OR;  Service: Orthopedics;  Laterality: Right;     Family History   Problem Relation Name Age of Onset    Breast cancer Mother Refugio 45        remission for 10 years    Cancer Mother Refugio 45        breast     Hypertension Mother Refugio     Allergic rhinitis Mother Refugio     Depression Mother Refugio     Diabetes Father Gustavo     Depression Father Gustavo     Diabetes Maternal Grandfather      Heart disease Maternal Grandfather      Diabetes Paternal Grandfather      Depression Brother Jose L     Colon cancer Neg Hx      Ovarian cancer Neg Hx      Allergies Neg Hx      Angioedema Neg Hx      Asthma Neg Hx      Atopy Neg Hx      Eczema Neg Hx      Immunodeficiency Neg Hx      Rhinitis Neg Hx      Urticaria Neg Hx       Social History     Tobacco Use    Smoking status: Former     Current packs/day: 0.25     Average packs/day: 0.3 packs/day for 3.0 years (0.8 total pack years)     Types: Cigarettes    Smokeless tobacco: Never   Substance Use Topics    Alcohol  "use: Yes     Alcohol/week: 1.0 standard drink of alcohol     Types: 1 Glasses of wine per week     Comment: very rare    Drug use: No        Review of Systems:  Review of Systems   Gastrointestinal:  Positive for diarrhea. Negative for blood in stool.        Anal pain  Lump to anus       OBJECTIVE:     Vital Signs (Most Recent)  Blood Pressure 137/84 (BP Location: Right arm, Patient Position: Sitting, BP Method: Medium (Automatic))   Pulse 98   Height 5' 5" (1.651 m)   Weight 96.8 kg (213 lb 6.5 oz)   Body Mass Index 35.51 kg/m²     Physical Exam:  General: White female in no distress   Neuro: Alert and oriented to person, place, and time.  Moves all extremities.     HEENT: No icterus.  Trachea midline  Respiratory: Respirations are even and unlabored, no cough or audible wheezing  Skin: Warm dry and intact, No visible rashes, no jaundice    Labs reviewed today:  Lab Results   Component Value Date    WBC 11.65 04/29/2024    HGB 14.3 04/29/2024    HCT 41.9 04/29/2024     04/29/2024    CHOL 216 (H) 04/04/2024    TRIG 223 (H) 04/04/2024    HDL 50 04/04/2024    ALT 13 04/04/2024    AST 19 04/04/2024     04/29/2024    K 4.1 04/29/2024     04/29/2024    CREATININE 0.8 04/29/2024    BUN 15 04/29/2024    CO2 24 04/29/2024    TSH 1.154 04/04/2024    HGBA1C 4.8 04/04/2024       Imaging reviewed today:  4/29/24 ct abdomen pelvis  - Interval cholecystectomy.  - No evidence of biliary ductal dilatation, inflammation or drainable fluid collection.  - Multiple follicles or cyst of the left ovary with no pelvic inflammatory changes.  - No evidence of bowel obstruction.    Endoscopy reviewed today:  Flex-sig 4/5/24:  - Mild (Silvestre Score 1) proctitis ulcerative colitis, improved since the last examination. Biopsied.     Last Colonoscopy completed on 12/19/2023  - The examined portion of the ileum was normal.   - One 5 mm polyp in the cecum, removed with a cold snare. Resected and retrieved.   - Congested, " erythematous and eroded mucosa in the rectum. Biopsied.   - Biopsies were taken with a cold forceps from the ascending colon, transverse colon, descending colon and sigmoid colon for evaluation of microscopic colitis.     Anorectal Exam:    Anal Skin: External hemorrhoids, RAL soft, non ttp, non thrombosed hemorrhoid  - reduced ~50% with clayton    Digital Rectal Exam:  Resting Tone normal  Mass none  Tenderness  absent    Anoscopy:  Deferred      ASSESSMENT/PLAN:     Diagnoses and all orders for this visit:    Hemorrhoids, unspecified hemorrhoid type  -     Ambulatory referral/consult to Colorectal Surgery  -     hydrocortisone (ANUSOL-HC) 2.5 % rectal cream; Place rectally 2 (two) times daily.        The patient was instructed to:  Anusol bid for 2 weeks  Increase water intake to at least 8-10 glasses of water per day.  Take a daily fiber supplement (Konsyl, Benefiber, Metamucil) and increase dietary intake to 20-30 grams/day.  Avoid straining or spending >5min/event with bowel movements.  Follow-up in clinic prn if rbl desired after delivery      ANNAMARIE Oquendo  Colon and Rectal Surgery

## 2024-07-22 NOTE — ASSESSMENT & PLAN NOTE
Currently on Synthroid 50 mcg daily.  Reports compliance and takes as instructed.  Has chronic fatigue.  Recent TSH was at goal [ <2.5 - patient is planning on pregnancy]  Discussed increase thyroid hormone requirement during pregnancy and close monitoring every 4-8 weeks if patient is pregnant.    Repeat thyroid function tests.

## 2024-07-22 NOTE — PATIENT INSTRUCTIONS
Anusol cream internally/externally 2x/day for 2 weeks.  Take a break for at least 2 weeks before using again if needed.  Follow up after delivery for rubber band ligation if you'd like.

## 2024-07-28 ENCOUNTER — NURSE TRIAGE (OUTPATIENT)
Dept: ADMINISTRATIVE | Facility: CLINIC | Age: 34
End: 2024-07-28
Payer: COMMERCIAL

## 2024-07-28 NOTE — TELEPHONE ENCOUNTER
Pt calling in, currently 6.5 weeks pregnant, woke up with cramps this morning, had cramps yesterday as well, having light vaginal bleeding every time she goes to the restroom and wipes there is reddish pink blood. Pain in right side. Rates pain 3-4/10. Denies constant pain or pain with urination. Dispo is see provider within 24 hours. Unable to schedule specialty appts. Advised will route encounter to OB office for them to reach out for appt. Pt verbalized understanding. Advised to call back with further concerns.   Reason for Disposition   [1] Intermittent lower abdominal pain (e.g., cramping) AND [2] present > 24 hours   [1] Intermittent lower abdominal pain (e.g., cramping) AND [2] present > 24 hours    Additional Information   Negative: Shock suspected (e.g., cold/pale/clammy skin, too weak to stand, low BP, rapid pulse)   Negative: Difficult to awaken or acting confused (e.g., disoriented, slurred speech)   Negative: Passed out (e.g., fainted, lost consciousness, blacked out and was not responding)   Negative: Sounds like a life-threatening emergency to the triager   Negative: SEVERE abdominal pain   Negative: SEVERE vaginal bleeding (e.g., soaking 2 pads per hour or large blood clots and present 2 or more hours)   Negative: SEVERE dizziness (e.g., unable to stand, requires support to walk, feels like passing out)   Negative: MODERATE vaginal bleeding (e.g., soaking 1 pad or tampon per hour and present > 6 hours; 1 menstrual cup every 6 hours)   Negative: [1] MODERATE vaginal bleeding (e.g., soaking 1 pad or tampon per hour and present > 6 hours; 1 menstrual cup every 6 hours) AND [2] pregnant > 12 weeks   Negative: Passed tissue (e.g., gray-white)   Negative: Shoulder pain   Negative: Pale skin (pallor) of new-onset or getting worse   Negative: Patient sounds very sick or weak to the triager   Negative: [1] Constant abdominal pain AND [2] present > 2 hours   Negative: Fever 100.4 F (38.0 C) or higher    "Negative: Passed out (e.g., fainted, lost consciousness, blacked out and was not responding)   Negative: Shock suspected (e.g., cold/pale/clammy skin, too weak to stand, low BP, rapid pulse)   Negative: Difficult to awaken or acting confused (e.g., disoriented, slurred speech)   Negative: Sounds like a life-threatening emergency to the triager   Negative: MODERATE-SEVERE abdominal pain (e.g., interferes with normal activities, awakens from sleep)   Negative: SEVERE vaginal bleeding (e.g., soaking 2 pads per hour or large blood clots and present 2 or more hours)   Negative: MODERATE vaginal bleeding (e.g., soaking 1 pad or tampon per hour and present > 6 hours; 1 menstrual cup every 6 hours)   Negative: [1] MODERATE vaginal bleeding (e.g., soaking 1 pad or tampon per hour and present > 6 hours; 1 menstrual cup every 6 hours) AND [2] pregnant > 12 weeks   Negative: Passed tissue (e.g., gray-white)   Negative: [1] Vomiting AND [2] contains red blood or black ("coffee ground") material  (Exception: Few red streaks in vomit that only happened once.)   Negative: Shoulder pain   Negative: Feeling weak or lightheaded (e.g., woozy, feeling like they might faint)   Negative: Patient sounds very sick or weak to the triager   Negative: [1] Constant abdominal pain AND [2] present > 2 hours   Negative: Blood in urine (red, pink, or tea-colored)   Negative: Fever 100.4 F (38.0 C) or higher   Negative: White of the eyes have turned yellow (i.e., jaundice)    Protocols used: Pregnancy - Vaginal Bleeding Less Than 20 Weeks EGA-A-AH, Pregnancy - Abdominal Pain Less Than 20 Weeks EGA-A-AH    "

## 2024-07-30 ENCOUNTER — PATIENT MESSAGE (OUTPATIENT)
Dept: OBSTETRICS AND GYNECOLOGY | Facility: CLINIC | Age: 34
End: 2024-07-30
Payer: COMMERCIAL

## 2024-07-30 ENCOUNTER — HOSPITAL ENCOUNTER (OUTPATIENT)
Dept: RADIOLOGY | Facility: HOSPITAL | Age: 34
Discharge: HOME | End: 2024-07-30
Attending: OBSTETRICS & GYNECOLOGY
Payer: COMMERCIAL

## 2024-07-30 DIAGNOSIS — O36.80X0 PREGNANCY OF UNKNOWN ANATOMIC LOCATION: ICD-10-CM

## 2024-07-30 DIAGNOSIS — O36.80X0 PREGNANCY WITH UNCERTAIN FETAL VIABILITY, SINGLE OR UNSPECIFIED FETUS: Primary | ICD-10-CM

## 2024-07-30 PROCEDURE — 76801 OB US < 14 WKS SINGLE FETUS: CPT | Mod: 26,,, | Performed by: RADIOLOGY

## 2024-07-30 PROCEDURE — 76817 TRANSVAGINAL US OBSTETRIC: CPT | Mod: 26,,, | Performed by: RADIOLOGY

## 2024-07-30 PROCEDURE — 76817 TRANSVAGINAL US OBSTETRIC: CPT | Mod: TC

## 2024-08-05 ENCOUNTER — OFFICE VISIT (OUTPATIENT)
Dept: OBSTETRICS AND GYNECOLOGY | Facility: CLINIC | Age: 34
End: 2024-08-05
Payer: COMMERCIAL

## 2024-08-05 VITALS
DIASTOLIC BLOOD PRESSURE: 75 MMHG | BODY MASS INDEX: 35.75 KG/M2 | WEIGHT: 214.81 LBS | SYSTOLIC BLOOD PRESSURE: 116 MMHG

## 2024-08-05 DIAGNOSIS — O36.80X0 PREGNANCY WITH UNCERTAIN FETAL VIABILITY, SINGLE OR UNSPECIFIED FETUS: Primary | ICD-10-CM

## 2024-08-05 DIAGNOSIS — Z32.00 ENCOUNTER FOR CONFIRMATION OF PREGNANCY TEST RESULT WITH PHYSICAL EXAMINATION: ICD-10-CM

## 2024-08-05 LAB
B-HCG UR QL: POSITIVE
C TRACH DNA SPEC QL NAA+PROBE: NOT DETECTED
CTP QC/QA: YES
N GONORRHOEA DNA SPEC QL NAA+PROBE: NOT DETECTED

## 2024-08-05 PROCEDURE — 87591 N.GONORRHOEAE DNA AMP PROB: CPT | Performed by: OBSTETRICS & GYNECOLOGY

## 2024-08-05 PROCEDURE — 87491 CHLMYD TRACH DNA AMP PROBE: CPT | Performed by: OBSTETRICS & GYNECOLOGY

## 2024-08-05 PROCEDURE — 99999 PR PBB SHADOW E&M-EST. PATIENT-LVL III: CPT | Mod: PBBFAC,,, | Performed by: OBSTETRICS & GYNECOLOGY

## 2024-08-05 RX ORDER — OMEPRAZOLE 10 MG/1
CAPSULE, DELAYED RELEASE ORAL
COMMUNITY
Start: 2024-02-01

## 2024-08-09 ENCOUNTER — HOSPITAL ENCOUNTER (OUTPATIENT)
Dept: RADIOLOGY | Facility: HOSPITAL | Age: 34
Discharge: HOME | End: 2024-08-09
Attending: OBSTETRICS & GYNECOLOGY
Payer: COMMERCIAL

## 2024-08-09 DIAGNOSIS — O36.80X0 PREGNANCY WITH UNCERTAIN FETAL VIABILITY, SINGLE OR UNSPECIFIED FETUS: ICD-10-CM

## 2024-08-09 PROCEDURE — 76801 OB US < 14 WKS SINGLE FETUS: CPT | Mod: 26,,, | Performed by: RADIOLOGY

## 2024-08-09 PROCEDURE — 76817 TRANSVAGINAL US OBSTETRIC: CPT | Mod: 26,,, | Performed by: RADIOLOGY

## 2024-08-09 PROCEDURE — 76801 OB US < 14 WKS SINGLE FETUS: CPT | Mod: TC

## 2024-08-10 ENCOUNTER — PATIENT MESSAGE (OUTPATIENT)
Dept: OBSTETRICS AND GYNECOLOGY | Facility: CLINIC | Age: 34
End: 2024-08-10
Payer: COMMERCIAL

## 2024-08-10 DIAGNOSIS — Z34.90 PREGNANCY WITH FETUS OF UNKNOWN GESTATIONAL AGE: Primary | ICD-10-CM

## 2024-08-12 ENCOUNTER — LAB VISIT (OUTPATIENT)
Dept: LAB | Facility: HOSPITAL | Age: 34
End: 2024-08-12
Attending: FAMILY MEDICINE
Payer: COMMERCIAL

## 2024-08-12 DIAGNOSIS — E03.9 ACQUIRED HYPOTHYROIDISM: ICD-10-CM

## 2024-08-12 DIAGNOSIS — Z34.90 PREGNANCY WITH FETUS OF UNKNOWN GESTATIONAL AGE: ICD-10-CM

## 2024-08-12 LAB
HCG INTACT+B SERPL-ACNC: NORMAL MIU/ML
PROGEST SERPL-MCNC: 19.2 NG/ML
T4 FREE SERPL-MCNC: 0.93 NG/DL (ref 0.71–1.51)
TSH SERPL DL<=0.005 MIU/L-ACNC: 1.25 UIU/ML (ref 0.4–4)

## 2024-08-12 PROCEDURE — 84443 ASSAY THYROID STIM HORMONE: CPT | Performed by: INTERNAL MEDICINE

## 2024-08-12 PROCEDURE — 36415 COLL VENOUS BLD VENIPUNCTURE: CPT | Performed by: INTERNAL MEDICINE

## 2024-08-12 PROCEDURE — 84702 CHORIONIC GONADOTROPIN TEST: CPT | Performed by: OBSTETRICS & GYNECOLOGY

## 2024-08-12 PROCEDURE — 84439 ASSAY OF FREE THYROXINE: CPT | Performed by: INTERNAL MEDICINE

## 2024-08-12 PROCEDURE — 84144 ASSAY OF PROGESTERONE: CPT | Performed by: OBSTETRICS & GYNECOLOGY

## 2024-08-14 ENCOUNTER — PATIENT MESSAGE (OUTPATIENT)
Dept: OBSTETRICS AND GYNECOLOGY | Facility: HOSPITAL | Age: 34
End: 2024-08-14
Payer: COMMERCIAL

## 2024-08-14 ENCOUNTER — LAB VISIT (OUTPATIENT)
Dept: LAB | Facility: HOSPITAL | Age: 34
End: 2024-08-14
Attending: OBSTETRICS & GYNECOLOGY
Payer: COMMERCIAL

## 2024-08-14 DIAGNOSIS — Z34.90 PREGNANCY WITH FETUS OF UNKNOWN GESTATIONAL AGE: ICD-10-CM

## 2024-08-14 DIAGNOSIS — O36.80X0 PREGNANCY WITH UNCERTAIN FETAL VIABILITY, SINGLE OR UNSPECIFIED FETUS: Primary | ICD-10-CM

## 2024-08-14 LAB — HCG INTACT+B SERPL-ACNC: NORMAL MIU/ML

## 2024-08-14 PROCEDURE — 36415 COLL VENOUS BLD VENIPUNCTURE: CPT | Performed by: OBSTETRICS & GYNECOLOGY

## 2024-08-14 PROCEDURE — 84702 CHORIONIC GONADOTROPIN TEST: CPT | Performed by: OBSTETRICS & GYNECOLOGY

## 2024-08-19 ENCOUNTER — PROCEDURE VISIT (OUTPATIENT)
Dept: MATERNAL FETAL MEDICINE | Facility: CLINIC | Age: 34
End: 2024-08-19
Payer: COMMERCIAL

## 2024-08-19 ENCOUNTER — PATIENT MESSAGE (OUTPATIENT)
Dept: OBSTETRICS AND GYNECOLOGY | Facility: CLINIC | Age: 34
End: 2024-08-19

## 2024-08-19 ENCOUNTER — OFFICE VISIT (OUTPATIENT)
Dept: OBSTETRICS AND GYNECOLOGY | Facility: CLINIC | Age: 34
End: 2024-08-19
Payer: COMMERCIAL

## 2024-08-19 VITALS
BODY MASS INDEX: 35.37 KG/M2 | SYSTOLIC BLOOD PRESSURE: 125 MMHG | DIASTOLIC BLOOD PRESSURE: 77 MMHG | WEIGHT: 212.56 LBS

## 2024-08-19 DIAGNOSIS — O36.80X0 PREGNANCY WITH UNCERTAIN FETAL VIABILITY, SINGLE OR UNSPECIFIED FETUS: ICD-10-CM

## 2024-08-19 DIAGNOSIS — R10.2 ACUTE PAIN IN FEMALE PELVIS: ICD-10-CM

## 2024-08-19 DIAGNOSIS — Z34.90 PREGNANCY WITH FETUS OF UNKNOWN GESTATIONAL AGE: Primary | ICD-10-CM

## 2024-08-19 PROCEDURE — 99213 OFFICE O/P EST LOW 20 MIN: CPT | Mod: S$GLB,,, | Performed by: OBSTETRICS & GYNECOLOGY

## 2024-08-19 PROCEDURE — 3044F HG A1C LEVEL LT 7.0%: CPT | Mod: CPTII,S$GLB,, | Performed by: OBSTETRICS & GYNECOLOGY

## 2024-08-19 PROCEDURE — 99999 PR PBB SHADOW E&M-EST. PATIENT-LVL III: CPT | Mod: PBBFAC,,, | Performed by: OBSTETRICS & GYNECOLOGY

## 2024-08-19 PROCEDURE — 3078F DIAST BP <80 MM HG: CPT | Mod: CPTII,S$GLB,, | Performed by: OBSTETRICS & GYNECOLOGY

## 2024-08-19 PROCEDURE — 3074F SYST BP LT 130 MM HG: CPT | Mod: CPTII,S$GLB,, | Performed by: OBSTETRICS & GYNECOLOGY

## 2024-08-19 PROCEDURE — 76801 OB US < 14 WKS SINGLE FETUS: CPT | Mod: S$GLB,,, | Performed by: OBSTETRICS & GYNECOLOGY

## 2024-08-19 PROCEDURE — 3008F BODY MASS INDEX DOCD: CPT | Mod: CPTII,S$GLB,, | Performed by: OBSTETRICS & GYNECOLOGY

## 2024-08-19 RX ORDER — OXYCODONE AND ACETAMINOPHEN 5; 325 MG/1; MG/1
1 TABLET ORAL EVERY 4 HOURS PRN
Qty: 20 TABLET | Refills: 0 | Status: SHIPPED | OUTPATIENT
Start: 2024-08-19

## 2024-08-19 NOTE — PROGRESS NOTES
OBSTETRIC HISTORY:   OB History          2    Para   2    Term   2       0    AB   0    Living   2         SAB   0    IAB   0    Ectopic   0    Multiple   0    Live Births   2                    HPI:   34 y.o.  Patient's last menstrual period was 2024.   Patient is  here for ultrasound follow up. Third US shows no YS or embryo.        PE:   /77   Wt 96.4 kg (212 lb 9 oz)   LMP 2024   BMI 35.37 kg/m²   APPEARANCE: Well nourished, well developed, in no acute distress.    ASSESSMENT/PLAN:  Missed  or uncertain viability  US  and will see the same day  Stop Prometrium  RTO

## 2024-08-20 ENCOUNTER — ANESTHESIA EVENT (OUTPATIENT)
Dept: SURGERY | Facility: HOSPITAL | Age: 34
End: 2024-08-20
Payer: COMMERCIAL

## 2024-08-20 ENCOUNTER — TELEPHONE (OUTPATIENT)
Dept: OBSTETRICS AND GYNECOLOGY | Facility: CLINIC | Age: 34
End: 2024-08-20
Payer: COMMERCIAL

## 2024-08-20 DIAGNOSIS — O02.1 MISSED ABORTION: ICD-10-CM

## 2024-08-20 DIAGNOSIS — O02.1 MISSED ABORTION: Primary | ICD-10-CM

## 2024-08-20 RX ORDER — SODIUM CHLORIDE, SODIUM LACTATE, POTASSIUM CHLORIDE, CALCIUM CHLORIDE 600; 310; 30; 20 MG/100ML; MG/100ML; MG/100ML; MG/100ML
INJECTION, SOLUTION INTRAVENOUS CONTINUOUS
Status: CANCELLED | OUTPATIENT
Start: 2024-08-20

## 2024-08-20 NOTE — TELEPHONE ENCOUNTER
Called pt and notified that her procedure has been scheduled for tomorrow 8/21 at 7:00 am. The pt was also notified to report to the first floor of hospital admitting NPO. Dr Smith stated that the pt can take her Synthroid the morning of with just a very small sip of water. Pt verbalized understanding.

## 2024-08-20 NOTE — H&P
CC: Pre-op    HPI: 34 y.o. female patient presents for suction D&C  surgery.    MEDICATIONS: MEDICATION LIST    Current Outpatient Medications:     calcium-vitamin D3 (OS-ELISABETH 500 + D3) 500 mg(1,250mg) -200 unit per tablet, Take 1 tablet by mouth., Disp: , Rfl:     cetirizine (ZYRTEC) 10 MG tablet, Take 1 tablet (10 mg total) by mouth 2 (two) times a day., Disp: 60 tablet, Rfl: 12    cholecalciferol, vitamin D3, (VITAMIN D3) 50 mcg (2,000 unit) Tab, Take 1 tablet (2,000 Units total) by mouth daily with breakfast., Disp: 100 tablet, Rfl: 3    hydrocortisone (ANUSOL-HC) 2.5 % rectal cream, Place rectally 2 (two) times daily., Disp: 28 g, Rfl: 3    Lactobacillus rhamnosus GG (CULTURELLE) 10 billion cell capsule, Take 1 capsule by mouth once daily., Disp: , Rfl:     magnesium oxide (MAG-OX) 400 mg (241.3 mg magnesium) tablet, Take 400 mg by mouth once daily., Disp: , Rfl:     mesalamine (LIALDA) 1.2 gram TbEC, Take 2 tablets (2.4 g total) by mouth daily with breakfast., Disp: 60 tablet, Rfl: 3    mupirocin (BACTROBAN) 2 % ointment, Apply topically 2 (two) times daily., Disp: , Rfl:     mupirocin calcium 2% nasl oint (BACTROBAN) 2 % Oint, by Nasal route 2 (two) times daily. (Patient not taking: Reported on 4/15/2024), Disp: 1 Tube, Rfl: 3    omeprazole (PRILOSEC) 10 MG capsule, , Disp: , Rfl:     oxyCODONE-acetaminophen (PERCOCET) 5-325 mg per tablet, Take 1 tablet by mouth every 4 (four) hours as needed for Pain., Disp: 20 tablet, Rfl: 0    prenatal vit37-iron-folic acid 29 mg iron- 1 mg Chew, Take by mouth., Disp: , Rfl:     progesterone (PROMETRIUM) 200 MG capsule, Take 2 capsules (400 mg total) by mouth every evening. Take until 12 weeks of pregnancy, Disp: 60 capsule, Rfl: 1    rizatriptan (MAXALT-MLT) 10 MG disintegrating tablet, Take 1 tablet (10 mg total) by mouth every 2 (two) hours as needed for Migraine. Max 30 mg/day. (Patient not taking: Reported on 7/22/2024), Disp: 12 tablet, Rfl: 5    sertraline (ZOLOFT)  50 MG tablet, TAKE 1 TABLET(50 MG) BY MOUTH EVERY EVENING, Disp: 90 tablet, Rfl: 3    SYNTHROID 50 mcg tablet, TAKE 1 TABLET(50 MCG) BY MOUTH EVERY DAY, Disp: 90 tablet, Rfl: 3  No current facility-administered medications for this visit.    Facility-Administered Medications Ordered in Other Visits:     fentaNYL 50 mcg/mL injection  mcg,  mcg, Intravenous, PRN, Yury Ambrosio MD, 50 mcg at 22 1026    LIDOcaine (PF) 10 mg/ml (1%) injection 10 mg, 1 mL, Intradermal, Once, Yury Ambrosio MD    midazolam (VERSED) 1 mg/mL injection 0.5-4 mg, 0.5-4 mg, Intravenous, PRN, Yury Ambrosio MD, 2 mg at 22 1026     ALLERGIES: ALLERGY/ADVERSE REACTION LIST  Minocycline and Nsaids (non-steroidal anti-inflammatory drug)     HISTORY:     PAST MEDICAL HISTORY:   Active Ambulatory Problems     Diagnosis Date Noted    Acquired hypothyroidism 2016    Dysmenorrhea 2016    Chronic migraine without aura without status migrainosus, not intractable 2016    Family history of breast cancer in mother 2016    Irregular menstrual bleeding 2016    S/P  2018    Fatty liver 2019    Status post cholecystectomy 2019    Delivery of pregnancy by  section 2020    Carpal tunnel syndrome of right wrist 2021    Adverse effect of COVID-19 vaccine 2022    Cubital tunnel syndrome of both upper extremities 2022    Depression with anxiety 2022    Low vitamin D level 2022    Decreased range of motion of right elbow 2022    Decreased range of motion of right wrist 2022    Pain in right elbow 2022    Pain in right wrist 2022    Impaired dexterity 2022    Difficulty with activities of daily living 2022    Swelling of right wrist 2022    Swelling of right elbow 2022    Irritable bowel syndrome with diarrhea 2023    Decreased ROM of lumbar spine 2023    Decreased  strength of trunk and back 2023    Chronic bilateral low back pain without sciatica 2023    Excessive daytime sleepiness 2023    Ulcerative proctitis with complication 2024    Severe obesity (BMI 35.0-39.9) with comorbidity 04/15/2024    Hirsutism 2024     Resolved Ambulatory Problems     Diagnosis Date Noted    Gall bladder stones 2019     Past Medical History:   Diagnosis Date    Allergy 2009    Anxiety     Depression     Irritable bowel syndrome (IBS)     Keloid cicatrix     Obesity (BMI 30.0-34.9) 2016    Ulcerative colitis     Urticaria           PAST SURGICAL HISTORY:   Past Surgical History:   Procedure Laterality Date    breast reduction  2012    BREAST SURGERY      breast reduction     SECTION       SECTION N/A 2020    Procedure:  SECTION;  Surgeon: Chiara Smith MD;  Location: [facility] L&D;  Service: OB/GYN;  Laterality: N/A;    CHOLECYSTECTOMY  2019    COLONOSCOPY N/A 2023    Procedure: COLONOSCOPY;  Surgeon: Tom Pamler MD;  Location: [facility];  Service: Endoscopy;  Laterality: N/A;    ENDOSCOPIC CARPAL TUNNEL RELEASE Right 2022    Procedure: RELEASE, CARPAL TUNNEL, ENDOSCOPIC;  Surgeon: Phoenix Rojas MD;  Location: [facility];  Service: Orthopedics;  Laterality: Right;    FLEXIBLE SIGMOIDOSCOPY N/A 2024    Procedure: SIGMOIDOSCOPY, FLEXIBLE;  Surgeon: Tom Palmer MD;  Location: [facility];  Service: Endoscopy;  Laterality: N/A;    LAPAROSCOPIC CHOLECYSTECTOMY N/A 2019    Procedure: CHOLECYSTECTOMY, LAPAROSCOPIC;  Surgeon: Missael Toelntino MD;  Location: [facility] OR;  Service: General;  Laterality: N/A;  video    ULNAR NERVE TRANSPOSITION Right 2022    Procedure: TRANSPOSITION, NERVE, ULNAR;  Surgeon: Phoenix Rojas MD;  Location: [facility];  Service: Orthopedics;  Laterality: Right;        FAMILY HISTORY: family history includes Allergic rhinitis in her mother;  Breast cancer (age of onset: 45) in her mother; Cancer (age of onset: 45) in her mother; Depression in her brother, father, and mother; Diabetes in her father, maternal grandfather, and paternal grandfather; Heart disease in her maternal grandfather; Hypertension in her mother.     SOCIAL HISTORY:   Social History     Tobacco Use    Smoking status: Former     Current packs/day: 0.25     Average packs/day: 0.3 packs/day for 3.0 years (0.8 ttl pk-yrs)     Types: Cigarettes    Smokeless tobacco: Never   Substance Use Topics    Alcohol use: Yes     Alcohol/week: 1.0 standard drink of alcohol     Types: 1 Glasses of wine per week     Comment: very rare        Data Reviewed:     VITAL SIGNS: There were no vitals filed for this visit.     ROS:      Negative other than HPI       PE:   APPEARANCE: Well nourished, well developed, in no acute distress.  CHEST: Lungs clear to auscultation.  HEART: Regular rate and rhythm, no murmurs, rubs or gallops.  PELVIC: Deferred.      ASSESSMENT:  1.   1. Missed            PLAN FOR SURGERY:  The risks, benefits and alternatives of the various treatment options have been discussed and the patient has decided to proceed with surgical intervention. We discussed the various risk associated with gynecologic surgical procedures, including but not limited to, infection, bleeding, anesthetic complications, venous thromboembolism, and cardiovascular events. We discussed the possible need for blood transfusion and the risk of associated complications, including blood born infections. We discussed the risk of major vessel injury, gastrointestinal, urologic, and neurologic complications. We discussed the risk of bowel injury. The expected post-operative course was discussed and all the patient's questions were answered.   The procedure consent forms and blood transfusion consent forms signed, scanned into chart  Preop labs ordered as appropriate.   Patient counseled NPO after midnight day  prior to surgery and preop will call patient for additional instructions   Surgery scheduled for  8/21/24.

## 2024-08-20 NOTE — H&P (VIEW-ONLY)
CC: Pre-op    HPI: 34 y.o. female patient presents for suction D&C  surgery.    MEDICATIONS: MEDICATION LIST    Current Outpatient Medications:     calcium-vitamin D3 (OS-ELISABETH 500 + D3) 500 mg(1,250mg) -200 unit per tablet, Take 1 tablet by mouth., Disp: , Rfl:     cetirizine (ZYRTEC) 10 MG tablet, Take 1 tablet (10 mg total) by mouth 2 (two) times a day., Disp: 60 tablet, Rfl: 12    cholecalciferol, vitamin D3, (VITAMIN D3) 50 mcg (2,000 unit) Tab, Take 1 tablet (2,000 Units total) by mouth daily with breakfast., Disp: 100 tablet, Rfl: 3    hydrocortisone (ANUSOL-HC) 2.5 % rectal cream, Place rectally 2 (two) times daily., Disp: 28 g, Rfl: 3    Lactobacillus rhamnosus GG (CULTURELLE) 10 billion cell capsule, Take 1 capsule by mouth once daily., Disp: , Rfl:     magnesium oxide (MAG-OX) 400 mg (241.3 mg magnesium) tablet, Take 400 mg by mouth once daily., Disp: , Rfl:     mesalamine (LIALDA) 1.2 gram TbEC, Take 2 tablets (2.4 g total) by mouth daily with breakfast., Disp: 60 tablet, Rfl: 3    mupirocin (BACTROBAN) 2 % ointment, Apply topically 2 (two) times daily., Disp: , Rfl:     mupirocin calcium 2% nasl oint (BACTROBAN) 2 % Oint, by Nasal route 2 (two) times daily. (Patient not taking: Reported on 4/15/2024), Disp: 1 Tube, Rfl: 3    omeprazole (PRILOSEC) 10 MG capsule, , Disp: , Rfl:     oxyCODONE-acetaminophen (PERCOCET) 5-325 mg per tablet, Take 1 tablet by mouth every 4 (four) hours as needed for Pain., Disp: 20 tablet, Rfl: 0    prenatal vit37-iron-folic acid 29 mg iron- 1 mg Chew, Take by mouth., Disp: , Rfl:     progesterone (PROMETRIUM) 200 MG capsule, Take 2 capsules (400 mg total) by mouth every evening. Take until 12 weeks of pregnancy, Disp: 60 capsule, Rfl: 1    rizatriptan (MAXALT-MLT) 10 MG disintegrating tablet, Take 1 tablet (10 mg total) by mouth every 2 (two) hours as needed for Migraine. Max 30 mg/day. (Patient not taking: Reported on 7/22/2024), Disp: 12 tablet, Rfl: 5    sertraline (ZOLOFT)  50 MG tablet, TAKE 1 TABLET(50 MG) BY MOUTH EVERY EVENING, Disp: 90 tablet, Rfl: 3    SYNTHROID 50 mcg tablet, TAKE 1 TABLET(50 MCG) BY MOUTH EVERY DAY, Disp: 90 tablet, Rfl: 3  No current facility-administered medications for this visit.    Facility-Administered Medications Ordered in Other Visits:     fentaNYL 50 mcg/mL injection  mcg,  mcg, Intravenous, PRN, Yury Ambrosio MD, 50 mcg at 22 1026    LIDOcaine (PF) 10 mg/ml (1%) injection 10 mg, 1 mL, Intradermal, Once, Yury Ambrosio MD    midazolam (VERSED) 1 mg/mL injection 0.5-4 mg, 0.5-4 mg, Intravenous, PRN, Yury Ambrosio MD, 2 mg at 22 1026     ALLERGIES: ALLERGY/ADVERSE REACTION LIST  Minocycline and Nsaids (non-steroidal anti-inflammatory drug)     HISTORY:     PAST MEDICAL HISTORY:   Active Ambulatory Problems     Diagnosis Date Noted    Acquired hypothyroidism 2016    Dysmenorrhea 2016    Chronic migraine without aura without status migrainosus, not intractable 2016    Family history of breast cancer in mother 2016    Irregular menstrual bleeding 2016    S/P  2018    Fatty liver 2019    Status post cholecystectomy 2019    Delivery of pregnancy by  section 2020    Carpal tunnel syndrome of right wrist 2021    Adverse effect of COVID-19 vaccine 2022    Cubital tunnel syndrome of both upper extremities 2022    Depression with anxiety 2022    Low vitamin D level 2022    Decreased range of motion of right elbow 2022    Decreased range of motion of right wrist 2022    Pain in right elbow 2022    Pain in right wrist 2022    Impaired dexterity 2022    Difficulty with activities of daily living 2022    Swelling of right wrist 2022    Swelling of right elbow 2022    Irritable bowel syndrome with diarrhea 2023    Decreased ROM of lumbar spine 2023    Decreased  strength of trunk and back 2023    Chronic bilateral low back pain without sciatica 2023    Excessive daytime sleepiness 2023    Ulcerative proctitis with complication 2024    Severe obesity (BMI 35.0-39.9) with comorbidity 04/15/2024    Hirsutism 2024     Resolved Ambulatory Problems     Diagnosis Date Noted    Gall bladder stones 2019     Past Medical History:   Diagnosis Date    Allergy 2009    Anxiety     Depression     Irritable bowel syndrome (IBS)     Keloid cicatrix     Obesity (BMI 30.0-34.9) 2016    Ulcerative colitis     Urticaria           PAST SURGICAL HISTORY:   Past Surgical History:   Procedure Laterality Date    breast reduction  2012    BREAST SURGERY      breast reduction     SECTION       SECTION N/A 2020    Procedure:  SECTION;  Surgeon: Chiara Smith MD;  Location: [facility] L&D;  Service: OB/GYN;  Laterality: N/A;    CHOLECYSTECTOMY  2019    COLONOSCOPY N/A 2023    Procedure: COLONOSCOPY;  Surgeon: Tom Palmer MD;  Location: [facility];  Service: Endoscopy;  Laterality: N/A;    ENDOSCOPIC CARPAL TUNNEL RELEASE Right 2022    Procedure: RELEASE, CARPAL TUNNEL, ENDOSCOPIC;  Surgeon: Phoenix Rojas MD;  Location: [facility];  Service: Orthopedics;  Laterality: Right;    FLEXIBLE SIGMOIDOSCOPY N/A 2024    Procedure: SIGMOIDOSCOPY, FLEXIBLE;  Surgeon: Tom Palmer MD;  Location: [facility];  Service: Endoscopy;  Laterality: N/A;    LAPAROSCOPIC CHOLECYSTECTOMY N/A 2019    Procedure: CHOLECYSTECTOMY, LAPAROSCOPIC;  Surgeon: Missael Tolentino MD;  Location: [facility] OR;  Service: General;  Laterality: N/A;  video    ULNAR NERVE TRANSPOSITION Right 2022    Procedure: TRANSPOSITION, NERVE, ULNAR;  Surgeon: Phoenix Rojas MD;  Location: [facility];  Service: Orthopedics;  Laterality: Right;        FAMILY HISTORY: family history includes Allergic rhinitis in her mother;  Breast cancer (age of onset: 45) in her mother; Cancer (age of onset: 45) in her mother; Depression in her brother, father, and mother; Diabetes in her father, maternal grandfather, and paternal grandfather; Heart disease in her maternal grandfather; Hypertension in her mother.     SOCIAL HISTORY:   Social History     Tobacco Use    Smoking status: Former     Current packs/day: 0.25     Average packs/day: 0.3 packs/day for 3.0 years (0.8 ttl pk-yrs)     Types: Cigarettes    Smokeless tobacco: Never   Substance Use Topics    Alcohol use: Yes     Alcohol/week: 1.0 standard drink of alcohol     Types: 1 Glasses of wine per week     Comment: very rare        Data Reviewed:     VITAL SIGNS: There were no vitals filed for this visit.     ROS:      Negative other than HPI       PE:   APPEARANCE: Well nourished, well developed, in no acute distress.  CHEST: Lungs clear to auscultation.  HEART: Regular rate and rhythm, no murmurs, rubs or gallops.  PELVIC: Deferred.      ASSESSMENT:  1.   1. Missed            PLAN FOR SURGERY:  The risks, benefits and alternatives of the various treatment options have been discussed and the patient has decided to proceed with surgical intervention. We discussed the various risk associated with gynecologic surgical procedures, including but not limited to, infection, bleeding, anesthetic complications, venous thromboembolism, and cardiovascular events. We discussed the possible need for blood transfusion and the risk of associated complications, including blood born infections. We discussed the risk of major vessel injury, gastrointestinal, urologic, and neurologic complications. We discussed the risk of bowel injury. The expected post-operative course was discussed and all the patient's questions were answered.   The procedure consent forms and blood transfusion consent forms signed, scanned into chart  Preop labs ordered as appropriate.   Patient counseled NPO after midnight day  prior to surgery and preop will call patient for additional instructions   Surgery scheduled for  8/21/24.

## 2024-08-20 NOTE — ANESTHESIA PREPROCEDURE EVALUATION
[facility]   Anesthesia Pre-Operative Evaluation        Patient Name: Sherlyn Monahan  YOB: 1990  MRN: [id number]    SUBJECTIVE:     Pre-operative Evaluation for Procedure(s) (LRB):  DILATION AND CURETTAGE, UTERUS, USING SUCTION (N/A)     2024    Sherlyn Monahan is a 34 y.o. female with a PMHx significant for hypothyroidism, obesity, LV on CPAP,  HLD, and ulcerative colitis.     Patient now presents for the above procedure(s)       Previous Airway: None documented but tolerated anesthesia during [id number] for Lap cholecystectomy and  section.    Current Outpatient Medications   Medication Instructions    calcium-vitamin D3 (OS-ELISABETH 500 + D3) 500 mg(1,250mg) -200 unit per tablet 1 tablet, Oral    cetirizine (ZYRTEC) 10 mg, Oral, 2 times daily    cholecalciferol (vitamin D3) (VITAMIN D3) 2,000 Units, Oral, With breakfast    hydrocortisone (ANUSOL-HC) 2.5 % rectal cream Rectal, 2 times daily    Lactobacillus rhamnosus GG (CULTURELLE) 10 billion cell capsule 1 capsule, Oral, Daily    magnesium oxide (MAG-OX) 400 mg, Oral, Daily    mesalamine (LIALDA) 2.4 g, Oral, With breakfast    mupirocin (BACTROBAN) 2 % ointment Topical (Top), 2 times daily    mupirocin calcium 2% nasl oint (BACTROBAN) 2 % Oint Nasal, 2 times daily    omeprazole (PRILOSEC) 10 MG capsule     oxyCODONE-acetaminophen (PERCOCET) 5-325 mg per tablet 1 tablet, Oral, Every 4 hours PRN    prenatal vit37-iron-folic acid 29 mg iron- 1 mg Chew Oral    progesterone (PROMETRIUM) 400 mg, Oral, Nightly, Take until 12 weeks of pregnancy    rizatriptan (MAXALT-MLT) 10 mg, Oral, Every 2 hours PRN, Max 30 mg/day.    sertraline (ZOLOFT) 50 MG tablet TAKE 1 TABLET(50 MG) BY MOUTH EVERY EVENING    SYNTHROID 50 mcg tablet TAKE 1 TABLET(50 MCG) BY MOUTH EVERY DAY       Review of patient's allergies indicates:   Allergen Reactions    Minocycline Swelling    Nsaids (non-steroidal anti-inflammatory drug) Other (See Comments)       Past  Surgical History:   Procedure Laterality Date    breast reduction  2012    BREAST SURGERY      breast reduction     SECTION       SECTION N/A 2020    Procedure:  SECTION;  Surgeon: Chiara Smith MD;  Location: [facility] L&D;  Service: OB/GYN;  Laterality: N/A;    CHOLECYSTECTOMY  2019    COLONOSCOPY N/A 2023    Procedure: COLONOSCOPY;  Surgeon: Tom Palmer MD;  Location: [facility] ENDO;  Service: Endoscopy;  Laterality: N/A;    ENDOSCOPIC CARPAL TUNNEL RELEASE Right 2022    Procedure: RELEASE, CARPAL TUNNEL, ENDOSCOPIC;  Surgeon: Phoenix Rojas MD;  Location: [facility] OR;  Service: Orthopedics;  Laterality: Right;    FLEXIBLE SIGMOIDOSCOPY N/A 2024    Procedure: SIGMOIDOSCOPY, FLEXIBLE;  Surgeon: Tom Palmer MD;  Location: [facility] ENDO;  Service: Endoscopy;  Laterality: N/A;    LAPAROSCOPIC CHOLECYSTECTOMY N/A 2019    Procedure: CHOLECYSTECTOMY, LAPAROSCOPIC;  Surgeon: Missael Tolentino MD;  Location: [facility] OR;  Service: General;  Laterality: N/A;  video    ULNAR NERVE TRANSPOSITION Right 2022    Procedure: TRANSPOSITION, NERVE, ULNAR;  Surgeon: Phoenix Rojas MD;  Location: [facility] OR;  Service: Orthopedics;  Laterality: Right;       Social History     Substance and Sexual Activity   Drug Use No     Alcohol Use: Not At Risk (4/15/2024)    AUDIT-C     Frequency of Alcohol Consumption: Monthly or less     Average Number of Drinks: 1 or 2     Frequency of Binge Drinking: Never     Tobacco Use: Medium Risk (2024)    Patient History     Smoking Tobacco Use: Former     Smokeless Tobacco Use: Never     Passive Exposure: Not on file       OBJECTIVE:     Vital Signs Range (Last 24H):  BP: ()/()   Arterial Line BP: ()/()       Significant Labs  Lab Results   Component Value Date    WBC 11.65 2024    HGB 14.3 2024    HCT 41.9 2024     2024    CHOL 216 (H) 2024    TRIG 223 (H) 2024    HDL 50 2024     ALT 13 04/04/2024    AST 19 04/04/2024     04/29/2024    K 4.1 04/29/2024     04/29/2024    CREATININE 0.8 04/29/2024    BUN 15 04/29/2024    CO2 24 04/29/2024    TSH 1.252 08/12/2024    HGBA1C 4.8 04/04/2024       Cardiac Studies:    EKG:   Results for orders placed or performed in visit on 10/20/22   EKG 12-lead    Collection Time: 10/20/22  2:17 PM    Narrative    Test Reason : R00.2,    Vent. Rate : 067 BPM     Atrial Rate : 067 BPM     P-R Int : 136 ms          QRS Dur : 082 ms      QT Int : 392 ms       P-R-T Axes : 040 075 053 degrees     QTc Int : 414 ms    Normal sinus rhythm  Normal ECG  When compared with ECG of 06-JUN-2019 15:22,  No significant change was found  Confirmed by Chrissy Hicks MD ([id number]) on 10/21/2022 9:29:01 AM    Referred By: ANDREAS COLEMAN           Confirmed By:Chrissy Hicks MD         ASSESSMENT/PLAN AND ANESTHESIA ROS/EXAM:     Sherlyn Monahan is a 34 y.o. female is presenting for Procedure(s) (LRB):  DILATION AND CURETTAGE, UTERUS, USING SUCTION (N/A)      Pre-op Assessment    I have reviewed the Patient Summary Reports.     I have reviewed the Nursing Notes. I have reviewed the NPO Status.   I have reviewed the Medications.     Review of Systems  Anesthesia Hx:  No problems with previous Anesthesia   History of prior surgery of interest to airway management or planning: (Tolerated Colonscopy, C/section, and Cholecystectomy)             Social:  Former Smoker       Hematology/Oncology:  Hematology Normal   Oncology Normal                                   EENT/Dental:  EENT/Dental Normal           Cardiovascular:  Cardiovascular Normal                                            Pulmonary:        Sleep Apnea                Renal/:  Renal/ Normal                 Hepatic/GI:   PUD,   Liver Disease,            Neurological:      Headaches                                 Endocrine:   Hypothyroidism          Psych:  Psychiatric History                  Physical  Exam  General: Well nourished, Cooperative, Alert and Oriented    Airway:  Mallampati: II   Mouth Opening: Normal  TM Distance: Normal  Tongue: Normal  Neck ROM: Normal ROM        Anesthesia Plan  Type of Anesthesia, risks & benefits discussed:    Anesthesia Type: Gen Supraglottic Airway, Gen Natural Airway  Post Op Pain Control Plan: multimodal analgesia and IV/PO Opioids PRN  Induction:  IV  Informed Consent: Informed consent signed with the Patient and all parties understand the risks and agree with anesthesia plan.  All questions answered.   ASA Score: 2  Day of Surgery Review of History & Physical: H&P Update referred to the surgeon/provider.    Ready For Surgery From Anesthesia Perspective.     .

## 2024-08-21 ENCOUNTER — ANESTHESIA (OUTPATIENT)
Dept: SURGERY | Facility: HOSPITAL | Age: 34
End: 2024-08-21
Payer: COMMERCIAL

## 2024-08-21 ENCOUNTER — HOSPITAL ENCOUNTER (OUTPATIENT)
Facility: HOSPITAL | Age: 34
Discharge: HOME | End: 2024-08-21
Attending: OBSTETRICS & GYNECOLOGY | Admitting: OBSTETRICS & GYNECOLOGY
Payer: COMMERCIAL

## 2024-08-21 VITALS
DIASTOLIC BLOOD PRESSURE: 79 MMHG | OXYGEN SATURATION: 98 % | TEMPERATURE: 98 F | SYSTOLIC BLOOD PRESSURE: 120 MMHG | HEIGHT: 65 IN | WEIGHT: 212 LBS | HEART RATE: 75 BPM | BODY MASS INDEX: 35.32 KG/M2 | RESPIRATION RATE: 18 BRPM

## 2024-08-21 DIAGNOSIS — O02.1 MISSED ABORTION: ICD-10-CM

## 2024-08-21 LAB
ABO + RH BLD: NORMAL
BLD GP AB SCN CELLS X3 SERPL QL: NORMAL
SPECIMEN OUTDATE: NORMAL

## 2024-08-21 PROCEDURE — 36000704 HC OR TIME LEV I 1ST 15 MIN: Performed by: OBSTETRICS & GYNECOLOGY

## 2024-08-21 PROCEDURE — 25000003 PHARM REV CODE 250: Performed by: NURSE ANESTHETIST, CERTIFIED REGISTERED

## 2024-08-21 PROCEDURE — 37000009 HC ANESTHESIA EA ADD 15 MINS: Performed by: OBSTETRICS & GYNECOLOGY

## 2024-08-21 PROCEDURE — 63600175 PHARM REV CODE 636 W HCPCS: Performed by: NURSE ANESTHETIST, CERTIFIED REGISTERED

## 2024-08-21 PROCEDURE — 36000705 HC OR TIME LEV I EA ADD 15 MIN: Performed by: OBSTETRICS & GYNECOLOGY

## 2024-08-21 PROCEDURE — 36415 COLL VENOUS BLD VENIPUNCTURE: CPT | Performed by: OBSTETRICS & GYNECOLOGY

## 2024-08-21 PROCEDURE — 86850 RBC ANTIBODY SCREEN: CPT | Performed by: OBSTETRICS & GYNECOLOGY

## 2024-08-21 PROCEDURE — 71000016 HC POSTOP RECOV ADDL HR: Performed by: OBSTETRICS & GYNECOLOGY

## 2024-08-21 PROCEDURE — 86901 BLOOD TYPING SEROLOGIC RH(D): CPT | Performed by: OBSTETRICS & GYNECOLOGY

## 2024-08-21 PROCEDURE — 37000008 HC ANESTHESIA 1ST 15 MINUTES: Performed by: OBSTETRICS & GYNECOLOGY

## 2024-08-21 PROCEDURE — 88305 TISSUE EXAM BY PATHOLOGIST: CPT | Performed by: PATHOLOGY

## 2024-08-21 PROCEDURE — 71000015 HC POSTOP RECOV 1ST HR: Performed by: OBSTETRICS & GYNECOLOGY

## 2024-08-21 PROCEDURE — 86900 BLOOD TYPING SEROLOGIC ABO: CPT | Performed by: OBSTETRICS & GYNECOLOGY

## 2024-08-21 PROCEDURE — 59820 CARE OF MISCARRIAGE: CPT | Mod: ,,, | Performed by: OBSTETRICS & GYNECOLOGY

## 2024-08-21 PROCEDURE — 63600175 PHARM REV CODE 636 W HCPCS: Performed by: OBSTETRICS & GYNECOLOGY

## 2024-08-21 PROCEDURE — 25000003 PHARM REV CODE 250: Performed by: OBSTETRICS & GYNECOLOGY

## 2024-08-21 RX ORDER — SODIUM CHLORIDE, SODIUM LACTATE, POTASSIUM CHLORIDE, CALCIUM CHLORIDE 600; 310; 30; 20 MG/100ML; MG/100ML; MG/100ML; MG/100ML
INJECTION, SOLUTION INTRAVENOUS CONTINUOUS
Status: DISCONTINUED | OUTPATIENT
Start: 2024-08-21 | End: 2024-08-21 | Stop reason: HOSPADM

## 2024-08-21 RX ORDER — ACETAMINOPHEN 10 MG/ML
INJECTION, SOLUTION INTRAVENOUS
Status: DISCONTINUED | OUTPATIENT
Start: 2024-08-21 | End: 2024-08-21

## 2024-08-21 RX ORDER — SODIUM CHLORIDE 0.9 % (FLUSH) 0.9 %
10 SYRINGE (ML) INJECTION
Status: DISCONTINUED | OUTPATIENT
Start: 2024-08-21 | End: 2024-08-21 | Stop reason: HOSPADM

## 2024-08-21 RX ORDER — OXYCODONE HYDROCHLORIDE 5 MG/1
5 TABLET ORAL
Status: DISCONTINUED | OUTPATIENT
Start: 2024-08-21 | End: 2024-08-21 | Stop reason: HOSPADM

## 2024-08-21 RX ORDER — PROPOFOL 10 MG/ML
VIAL (ML) INTRAVENOUS
Status: DISCONTINUED | OUTPATIENT
Start: 2024-08-21 | End: 2024-08-21

## 2024-08-21 RX ORDER — HYDROMORPHONE HYDROCHLORIDE 2 MG/ML
0.2 INJECTION, SOLUTION INTRAMUSCULAR; INTRAVENOUS; SUBCUTANEOUS EVERY 5 MIN PRN
Status: DISCONTINUED | OUTPATIENT
Start: 2024-08-21 | End: 2024-08-21 | Stop reason: HOSPADM

## 2024-08-21 RX ORDER — OXYCODONE HYDROCHLORIDE 5 MG/1
5 TABLET ORAL EVERY 4 HOURS PRN
Status: DISCONTINUED | OUTPATIENT
Start: 2024-08-21 | End: 2024-08-21 | Stop reason: HOSPADM

## 2024-08-21 RX ORDER — KETAMINE HCL IN 0.9 % NACL 50 MG/5 ML
SYRINGE (ML) INTRAVENOUS
Status: DISCONTINUED | OUTPATIENT
Start: 2024-08-21 | End: 2024-08-21

## 2024-08-21 RX ORDER — OXYCODONE HYDROCHLORIDE 5 MG/1
10 TABLET ORAL EVERY 4 HOURS PRN
Status: DISCONTINUED | OUTPATIENT
Start: 2024-08-21 | End: 2024-08-21 | Stop reason: HOSPADM

## 2024-08-21 RX ORDER — PROPOFOL 10 MG/ML
VIAL (ML) INTRAVENOUS CONTINUOUS PRN
Status: DISCONTINUED | OUTPATIENT
Start: 2024-08-21 | End: 2024-08-21

## 2024-08-21 RX ORDER — ONDANSETRON 8 MG/1
8 TABLET, ORALLY DISINTEGRATING ORAL EVERY 8 HOURS PRN
Status: DISCONTINUED | OUTPATIENT
Start: 2024-08-21 | End: 2024-08-21 | Stop reason: HOSPADM

## 2024-08-21 RX ORDER — MIDAZOLAM HYDROCHLORIDE 1 MG/ML
INJECTION INTRAMUSCULAR; INTRAVENOUS
Status: DISCONTINUED | OUTPATIENT
Start: 2024-08-21 | End: 2024-08-21

## 2024-08-21 RX ORDER — ONDANSETRON HYDROCHLORIDE 2 MG/ML
INJECTION, SOLUTION INTRAVENOUS
Status: DISCONTINUED | OUTPATIENT
Start: 2024-08-21 | End: 2024-08-21

## 2024-08-21 RX ORDER — FENTANYL CITRATE 50 UG/ML
INJECTION, SOLUTION INTRAMUSCULAR; INTRAVENOUS
Status: DISCONTINUED | OUTPATIENT
Start: 2024-08-21 | End: 2024-08-21

## 2024-08-21 RX ORDER — DIPHENHYDRAMINE HCL 25 MG
25 CAPSULE ORAL EVERY 4 HOURS PRN
Status: DISCONTINUED | OUTPATIENT
Start: 2024-08-21 | End: 2024-08-21 | Stop reason: HOSPADM

## 2024-08-21 RX ORDER — LIDOCAINE HYDROCHLORIDE 20 MG/ML
INJECTION, SOLUTION EPIDURAL; INFILTRATION; INTRACAUDAL; PERINEURAL
Status: DISCONTINUED | OUTPATIENT
Start: 2024-08-21 | End: 2024-08-21

## 2024-08-21 RX ORDER — HYDROCODONE BITARTRATE AND ACETAMINOPHEN 5; 325 MG/1; MG/1
1 TABLET ORAL EVERY 4 HOURS PRN
Qty: 10 TABLET | Refills: 0 | Status: SHIPPED | OUTPATIENT
Start: 2024-08-21

## 2024-08-21 RX ORDER — GLUCAGON 1 MG
1 KIT INJECTION
Status: DISCONTINUED | OUTPATIENT
Start: 2024-08-21 | End: 2024-08-21 | Stop reason: HOSPADM

## 2024-08-21 RX ADMIN — LIDOCAINE HYDROCHLORIDE 5 ML: 20 INJECTION, SOLUTION EPIDURAL; INFILTRATION; INTRACAUDAL; PERINEURAL at 07:08

## 2024-08-21 RX ADMIN — FENTANYL CITRATE 50 MCG: 50 INJECTION INTRAMUSCULAR; INTRAVENOUS at 07:08

## 2024-08-21 RX ADMIN — MIDAZOLAM HYDROCHLORIDE 2 MG: 1 INJECTION, SOLUTION INTRAMUSCULAR; INTRAVENOUS at 07:08

## 2024-08-21 RX ADMIN — OXYCODONE 5 MG: 5 TABLET ORAL at 09:08

## 2024-08-21 RX ADMIN — PROPOFOL 100 MCG/KG/MIN: 10 INJECTION, EMULSION INTRAVENOUS at 07:08

## 2024-08-21 RX ADMIN — PROPOFOL 80 MG: 10 INJECTION, EMULSION INTRAVENOUS at 07:08

## 2024-08-21 RX ADMIN — Medication 30 MG: at 07:08

## 2024-08-21 RX ADMIN — SODIUM CHLORIDE, SODIUM LACTATE, POTASSIUM CHLORIDE, AND CALCIUM CHLORIDE: .6; .31; .03; .02 INJECTION, SOLUTION INTRAVENOUS at 07:08

## 2024-08-21 RX ADMIN — ONDANSETRON 4 MG: 2 INJECTION, SOLUTION INTRAMUSCULAR; INTRAVENOUS at 07:08

## 2024-08-21 RX ADMIN — ACETAMINOPHEN 1000 MG: 10 INJECTION, SOLUTION INTRAVENOUS at 07:08

## 2024-08-21 RX ADMIN — SODIUM CHLORIDE, SODIUM LACTATE, POTASSIUM CHLORIDE, AND CALCIUM CHLORIDE: .6; .31; .03; .02 INJECTION, SOLUTION INTRAVENOUS at 08:08

## 2024-08-21 RX ADMIN — Medication 20 MG: at 07:08

## 2024-08-21 NOTE — DISCHARGE INSTRUCTIONS
SEE PRESCRIPTION INSERTS FOR INFORMATION ON YOUR MEDICATIONS    ANESTHESIA  -For the first 24 hours after surgery:  Do not drive, use heavy equipment, make important decisions, or drink alcohol  -It is normal to feel sleepy for several hours.  Rest until you are more awake.  -Have someone stay with you, if needed.  They can watch for problems and help keep you safe.  -Some possible post anesthesia side effects include: nausea and vomiting, sore throat and hoarseness, sleepiness, and dizziness.    PAIN  -If you have pain after surgery, pain medicine will help you feel better.  Take it as directed, before pain becomes severe.  Most pain relievers taken by mouth need at least 20-30 minutes to start working.  -Do not drive or drink alcohol while taking pain medicine.  -Pain medication can upset your stomach.  Taking them with a little food may help.  -Other ways to help control pain: elevation, ice, and relaxation  -Call your surgeon if still having unmanageable pain an hour after taking pain medicine.  -Pain medicine can cause constipation.  Taking an over-the counter stool softener while on prescription pain medicine and drinking plenty of fluids can prevent this side effect.  -Call your surgeon if you have severe side effects like: breathing problems, trouble waking up, dizziness, confusion, or severe constipation.    NAUSEA  -Some people have nausea after surgery.  This is often because of anesthesia, pain, pain medicine, or the stress of surgery.  -Do not push yourself to eat.  Start off with clear liquids and soup.  Slowly move to solid foods.  Don't eat fatty, rich, spicy foods at first.  Eat smaller amounts.  -If you develop persistent nausea and vomiting please notify your surgeon immediately.    BLEEDING  -Different types of surgery require different types of care and dressing changes.  It is important to follow all instructions and advice from your surgeon.  Change dressing as directed.  Call your surgeon for  any concerns regarding postop bleeding.    SIGNS OF INFECTION  -Signs of infection include: fever, swelling, drainage, and redness  -Notify your surgeon if you have a fever of 100.4 F (38.0 C) or higher.  -Notify your surgeon if you notice redness, swelling, increased pain, pus, or a foul smell at the incision site.

## 2024-08-21 NOTE — PLAN OF CARE
Patient given all instructions and POC forms, vss, no issues   How Severe Is Your Skin Lesion?: moderate Have Your Skin Lesions Been Treated?: not been treated Is This A New Presentation, Or A Follow-Up?: Skin Lesions

## 2024-08-21 NOTE — OP NOTE
DATE OF PROCEDURE:  24     PREOPERATIVE DIAGNOSIS:  Missed  1st trimester      POSTOPERATIVE DIAGNOSIS:  Missed  1st trimester     SURGERY:  Suction D and C      SURGEON:  Chiara Smith M.D.     ASSISTANT:  GLENDA Wyman     ANESTHESIA:  MAC     ESTIMATED BLOOD LOSS: 60 mL.     FINDINGS:  Uterus sounded to 11 cm. Products of conception.     SPECIMENS:  Products of conception.     COMPLICATIONS:  None.     OPERATIVE REPORT IN DETAIL:  After assuring informed consent, the patient was   taken to the Operating Room where general anesthesia was administered.  She was   then placed in lithotomy position and then her vagina was prepped and draped in   usual sterile fashion.  Her bladder had been emptied out with an in-and-out   catheter while prepping the patient.  A weighted speculum was placed in the   vagina.  The anterior lip of the cervix was grasped with a single-tooth   tenaculum and the uterus was sounded to 11 cm.  The 7mm suction currette was inserted after adequate dilation of the cervix. A sharp curettage was performed in all quadrants to assure adequate evacuation of the tissue. The specimen was sent for final pathology.  All instruments were removed.  The patient tolerated the procedure well.  All counts were correct x2.  The patient was taken to OPS Room in stable condition.

## 2024-08-21 NOTE — TRANSFER OF CARE
"Anesthesia Transfer of Care Note    Patient: Sherlyn Monahan    Procedure(s) Performed: Procedure(s) (LRB):  DILATION AND CURETTAGE, UTERUS, USING SUCTION (N/A)    Patient location: OPS    Anesthesia Type: MAC    Transport from OR: Transported from OR on room air with adequate spontaneous ventilation    Post pain: adequate analgesia    Post assessment: no apparent anesthetic complications and tolerated procedure well    Post vital signs: stable    Level of consciousness: awake and alert    Nausea/Vomiting: no nausea/vomiting    Complications: none    Transfer of care protocol was followed      Last vitals: Visit Vitals  /76 (BP Location: Right arm, Patient Position: Lying)   Pulse 79   Temp 36.6 °C (97.8 °F) (Tympanic)   Resp 16   Ht 5' 5" (1.651 m)   Wt 96.2 kg (212 lb)   LMP 06/11/2024   SpO2 98%   Breastfeeding No   BMI 35.28 kg/m²     "

## 2024-08-21 NOTE — INTERVAL H&P NOTE
The patient has been examined and the H&P has been reviewed. I concur with the findings and no changes have occurred since H&P was written. Surgery/Procedure and Anesthesia risks, benefits and alternative options discussed and understood by patient/family.
No

## 2024-08-21 NOTE — ANESTHESIA POSTPROCEDURE EVALUATION
Anesthesia Post Evaluation    Patient: Sherlyn Monahan    Procedure(s) Performed: Procedure(s) (LRB):  DILATION AND CURETTAGE, UTERUS, USING SUCTION (N/A)    Final Anesthesia Type: general      Patient location during evaluation: [facility]  Patient participation: Yes- Able to Participate  Level of consciousness: awake and alert, oriented and awake  Post-procedure vital signs: reviewed and stable  Pain management: adequate  Airway patency: patent  LV mitigation strategies: Multimodal analgesia  PONV status at discharge: No PONV  Anesthetic complications: no      Cardiovascular status: blood pressure returned to baseline and hemodynamically stable  Respiratory status: unassisted and spontaneous ventilation  Hydration status: euvolemic  Follow-up not needed.              Vitals Value Taken Time   /79 08/21/24 0935   Temp 36.7 °C (98 °F) 08/21/24 0805   Pulse 75 08/21/24 0935   Resp 18 08/21/24 0935   SpO2 98 % 08/21/24 0935         No case tracking events are documented in the log.      Pain/Nahun Score: Pain Rating Prior to Med Admin: 5 (8/21/2024  9:16 AM)

## 2024-08-21 NOTE — DISCHARGE SUMMARY
Admit Date: 24    Discharge Date:Same    Attending Physician: SHAHANA Smith MD    Procedures: Suction D&C    Admit Diagnosis: Missed   Discharge Diagnosis: Same     Hospital course: Afebrile and vital signs stable throughout course. Routine progressive care.  No nausea/vomiting.    Discharged Condition: Improving    Disposition: Discharge to home or self care    Patient Instructions:   Pelvic rest x 4-6 weeks  Follow up if desired  No heavy lifting  Call for excessive vaginal bleeding, temperature greater than 100.6, redness or increasing pain to incision.  Discharge Medications: Given to patient or in chart     Diet: Regular    Chiara Smith MD  Obstetrics & Gynecology  [place] [facility]

## 2024-08-22 ENCOUNTER — PATIENT MESSAGE (OUTPATIENT)
Dept: OBSTETRICS AND GYNECOLOGY | Facility: CLINIC | Age: 34
End: 2024-08-22
Payer: COMMERCIAL

## 2024-08-23 LAB
FINAL PATHOLOGIC DIAGNOSIS: NORMAL
GROSS: NORMAL
Lab: NORMAL

## 2024-08-26 ENCOUNTER — PATIENT MESSAGE (OUTPATIENT)
Dept: ENDOCRINOLOGY | Facility: CLINIC | Age: 34
End: 2024-08-26
Payer: COMMERCIAL

## 2024-08-31 ENCOUNTER — PATIENT MESSAGE (OUTPATIENT)
Dept: NEUROLOGY | Facility: CLINIC | Age: 34
End: 2024-08-31
Payer: COMMERCIAL

## 2024-09-02 ENCOUNTER — PATIENT MESSAGE (OUTPATIENT)
Dept: OBSTETRICS AND GYNECOLOGY | Facility: CLINIC | Age: 34
End: 2024-09-02
Payer: COMMERCIAL

## 2024-09-03 ENCOUNTER — OFFICE VISIT (OUTPATIENT)
Dept: NEUROLOGY | Facility: CLINIC | Age: 34
End: 2024-09-03
Payer: COMMERCIAL

## 2024-09-03 DIAGNOSIS — G43.009 MIGRAINE WITHOUT AURA AND WITHOUT STATUS MIGRAINOSUS, NOT INTRACTABLE: Primary | ICD-10-CM

## 2024-09-03 PROCEDURE — 1160F RVW MEDS BY RX/DR IN RCRD: CPT | Mod: CPTII,95,, | Performed by: NURSE PRACTITIONER

## 2024-09-03 PROCEDURE — 3044F HG A1C LEVEL LT 7.0%: CPT | Mod: CPTII,95,, | Performed by: NURSE PRACTITIONER

## 2024-09-03 PROCEDURE — 99214 OFFICE O/P EST MOD 30 MIN: CPT | Mod: 95,,, | Performed by: NURSE PRACTITIONER

## 2024-09-03 PROCEDURE — 1159F MED LIST DOCD IN RCRD: CPT | Mod: CPTII,95,, | Performed by: NURSE PRACTITIONER

## 2024-09-03 PROCEDURE — G2211 COMPLEX E/M VISIT ADD ON: HCPCS | Mod: 95,,, | Performed by: NURSE PRACTITIONER

## 2024-09-03 RX ORDER — RIZATRIPTAN BENZOATE 10 MG/1
10 TABLET, ORALLY DISINTEGRATING ORAL
Qty: 12 TABLET | Refills: 5 | Status: SHIPPED | OUTPATIENT
Start: 2024-09-03

## 2024-09-03 NOTE — PROGRESS NOTES
Established Patient   SUBJECTIVE:  Patient ID: Sherlyn Monahan   Chief Complaint: Follow-up    History of Present Illness:  Sherlyn Monahan is a 34 y.o. female who presents for follow-up of headaches via virtual visit.     The patient location is: in [place]   The chief complaint leading to consultation is: Follow-up  Visit type: Virtual visit with synchronous audio and video  Total time spent with patient: 4 min  Each patient to whom he or she provides medical services by telemedicine is:  (1) informed of the relationship between the physician and patient and the respective role of any other health care provider with respect to management of the patient; and (2) notified that he or she may decline to receive medical services by telemedicine and may withdraw from such care at any time.    Recommendations made at last Office Visit on 7/9/24:  - Discussed symptoms appear to be consistent with migraine without aura complicated by anxiety/depression, actively trying to conceive, discussed treatment options and patient agreed with the following plan:  - Treatment options limited given she is actively trying to become pregnant - will avoid use of cgrp antagonists, topiramate/zonisamide, hesitant to start propranolol given consistent low-normal heart rate readings.    - Start vitamin b2 400 mg and coq10 200 mg daily for migraine prevention   - Continue magnesium oxide 400 mg daily   - For acute migraines - given maxalt 10 mg mlt   - She is to stop maxalt and send message via patient portal should she become pregnant prior to her next appt    - Anxiety/Depression - chronic and stable, continue sertraline 50 mg daily, management per PCP - will avoid use of anti-depressants for migraine prevention   - RTC in 2-3 months or sooner if needed     09/03/2024 - Interval History:  Found of she was pregnant 3 days after her last visit, unfortunately pregnancy was non-viable, had d & c on 8/21.  Has had an uptick in  "headache/migraine frequency over the last month, however she feels the large majority of these headaches were triggered by stress/hormone changes relating to pregnancy and subsequent miscarriage.  Feels headaches have begun to decrease in frequency over the last week or so.      Otherwise, information below is still accurate and current.     History of Present Illness:   34 y.o. female with hypothyroidism, migraines, anxiety/depression, who presents for evaluation of headaches via virtual visit.  Migraines began around age 14-15 yo and have been ongoing.  Migraines initially began shortly after she suffered a concussion.  Over the last 6 months, migraines have started to become more frequent.  Began taking "more magnesium" in January which has helped, now taking 1700 mg twice daily.  Tracked 5 migraines in June, but also had 7 headache days.  Her typical month is 2-3 migraines per month.  Migraines typically last about 10 hours in duration.  She has been treating acute migraines with tylenol and ice packs which are only somewhat effective.  She has taken sumatriptan 50 mg in the past, which she does recall please.  Associated symptoms include photophobia, phonophobia, nausea, scalp sensitivity, shooting pains in the occipitalis, dizziness, fatigue, neck pain, visual disturbance.    She is not currently pregnant, but she and her  are actively trying to conceive.       Treatments Tried and Response  Sumatriptan   Fioricet   Sertraline   Magnesium   Vit b2 400 mg -    Coq10 -   Maxalt 10 mg mlt -     Current Medications:    calcium-vitamin D3 (OS-ELISABETH 500 + D3) 500 mg(1,250mg) -200 unit per tablet, Take 1 tablet by mouth., Disp: , Rfl:     cetirizine (ZYRTEC) 10 MG tablet, Take 1 tablet (10 mg total) by mouth 2 (two) times a day., Disp: 60 tablet, Rfl: 12    cholecalciferol, vitamin D3, (VITAMIN D3) 50 mcg (2,000 unit) Tab, Take 1 tablet (2,000 Units total) by mouth daily with breakfast., Disp: 100 tablet, Rfl: " 3    HYDROcodone-acetaminophen (NORCO) 5-325 mg per tablet, Take 1 tablet by mouth every 4 (four) hours as needed for Pain., Disp: 10 tablet, Rfl: 0    hydrocortisone (ANUSOL-HC) 2.5 % rectal cream, Place rectally 2 (two) times daily., Disp: 28 g, Rfl: 3    Lactobacillus rhamnosus GG (CULTURELLE) 10 billion cell capsule, Take 1 capsule by mouth once daily., Disp: , Rfl:     magnesium oxide (MAG-OX) 400 mg (241.3 mg magnesium) tablet, Take 400 mg by mouth once daily., Disp: , Rfl:     mesalamine (LIALDA) 1.2 gram TbEC, Take 2 tablets (2.4 g total) by mouth daily with breakfast., Disp: 60 tablet, Rfl: 3    mupirocin (BACTROBAN) 2 % ointment, Apply topically 2 (two) times daily., Disp: , Rfl:     mupirocin calcium 2% nasl oint (BACTROBAN) 2 % Oint, by Nasal route 2 (two) times daily. (Patient not taking: Reported on 4/15/2024), Disp: 1 Tube, Rfl: 3    omeprazole (PRILOSEC) 10 MG capsule, , Disp: , Rfl:     oxyCODONE-acetaminophen (PERCOCET) 5-325 mg per tablet, Take 1 tablet by mouth every 4 (four) hours as needed for Pain., Disp: 20 tablet, Rfl: 0    prenatal vit37-iron-folic acid 29 mg iron- 1 mg Chew, Take by mouth., Disp: , Rfl:     progesterone (PROMETRIUM) 200 MG capsule, Take 2 capsules (400 mg total) by mouth every evening. Take until 12 weeks of pregnancy, Disp: 60 capsule, Rfl: 1    rizatriptan (MAXALT-MLT) 10 MG disintegrating tablet, Take 1 tablet (10 mg total) by mouth every 2 (two) hours as needed for Migraine. Max 30 mg/day., Disp: 12 tablet, Rfl: 5    sertraline (ZOLOFT) 50 MG tablet, TAKE 1 TABLET(50 MG) BY MOUTH EVERY EVENING, Disp: 90 tablet, Rfl: 3    SYNTHROID 50 mcg tablet, TAKE 1 TABLET(50 MCG) BY MOUTH EVERY DAY, Disp: 90 tablet, Rfl: 3  No current facility-administered medications for this visit.    Facility-Administered Medications Ordered in Other Visits:     fentaNYL 50 mcg/mL injection  mcg,  mcg, Intravenous, PRN, Yury Ambrosio MD, 50 mcg at 06/24/22 1026    LIDOcaine  (PF) 10 mg/ml (1%) injection 10 mg, 1 mL, Intradermal, Once, Yury Ambrosio MD    midazolam (VERSED) 1 mg/mL injection 0.5-4 mg, 0.5-4 mg, Intravenous, PRN, Yury Ambrosio MD, 2 mg at 06/24/22 1026    Review of Systems - A review of 10+ systems was conducted with pertinent positive and negative findings documented in HPI with all other systems reviewed and negative.    PFSH: Past medical, family, and social history reviewed as documented in chart with pertinent positive medical, family, and social history detailed in HPI.    OBJECTIVE:  Vitals: LMP 06/11/2024      Physical Exam:  Constitutional: she appears well-developed and well-nourished. she is well groomed. NAD.     Review of Data:   Notes from GYN reviewed   Labs:  Admission on 08/21/2024, Discharged on 08/21/2024   Component Date Value Ref Range Status    Group & Rh 08/21/2024 O POS   Final    Indirect Carla 08/21/2024 NEG   Final    Specimen Outdate 08/21/2024 08/24/2024 23:59   Final    Final Pathologic Diagnosis 08/21/2024 Products of conception, chorionic villi are identified.   Final    Gross 08/21/2024    Final                    Value:One Part Case:    Part 1  Pathology ID# KES-  Pathology MRN# [id number]  Hospital/Clinic label MRN# [id number]    Received in formalin labeled with the patient's name, MRN, and &quot;products of conception&quot; is a 5.5 x 5.0 x 1.5 cm aggregate of pink-red soft tissue fragments admixed with blood clot. Chorionic villi are not identified. There are no fetal or   embryonic parts identified. Representative sections are submitted in cassettes:  SWE--1-A  ADY--1-B  NIN--1-C  ATT--1-D    GARRICK Humphrey, PA ([facility])CM      Disclaimer 08/21/2024 Unless the case is a 'gross only' or additional testing only, the final diagnosis for each specimen is based on a microscopic examination of appropriate tissue sections.   Final   Lab Visit on 08/14/2024   Component Date Value Ref Range Status     HCG Quant 08/14/2024 [id number]  See Text mIU/mL Final   Lab Visit on 08/12/2024   Component Date Value Ref Range Status    TSH 08/12/2024 1.252  0.400 - 4.000 uIU/mL Final    Free T4 08/12/2024 0.93  0.71 - 1.51 ng/dL Final    HCG Quant 08/12/2024 [id number]  See Text mIU/mL Final    Progesterone 08/12/2024 19.2  See Text ng/mL Final   Lab Visit on 08/09/2024   Component Date Value Ref Range Status    HCG Quant 08/09/2024 [id number]  See Text mIU/mL Final   Office Visit on 08/05/2024   Component Date Value Ref Range Status    Chlamydia, Amplified DNA 08/05/2024 Not Detected  Not Detected Final    N gonorrhoeae, amplified DNA 08/05/2024 Not Detected  Not Detected Final    POC Preg Test, Ur 08/05/2024 Positive (A)  Negative Final     Acceptable 08/05/2024 Yes   Final   Admission on 04/29/2024, Discharged on 04/30/2024   Component Date Value Ref Range Status    Specimen UA 04/29/2024 Urine, Clean Catch   Final    Color, UA 04/29/2024 Yellow  Yellow, Straw, Dalia Final    Appearance, UA 04/29/2024 Clear  Clear Final    pH, UA 04/29/2024 7.0  5.0 - 8.0 Final    Specific Gravity, UA 04/29/2024 1.020  1.005 - 1.030 Final    Protein, UA 04/29/2024 Negative  Negative Final    Glucose, UA 04/29/2024 Negative  Negative Final    Ketones, UA 04/29/2024 Negative  Negative Final    Bilirubin (UA) 04/29/2024 Negative  Negative Final    Occult Blood UA 04/29/2024 Negative  Negative Final    Nitrite, UA 04/29/2024 Negative  Negative Final    Urobilinogen, UA 04/29/2024 Negative  <2.0 EU/dL Final    Leukocytes, UA 04/29/2024 Trace (A)  Negative Final    POC Preg Test, Ur 04/29/2024 Negative  Negative Final     Acceptable 04/29/2024 Yes   Final    RBC, UA 04/29/2024 2  0 - 4 /hpf Final    WBC, UA 04/29/2024 6 (H)  0 - 5 /hpf Final    Bacteria 04/29/2024 Rare  None-Occ /hpf Final    Squam Epithel, UA 04/29/2024 3  /hpf Final    Microscopic Comment 04/29/2024 SEE COMMENT   Final    WBC 04/29/2024 11.65  3.90 - 12.70  K/uL Final    RBC 04/29/2024 4.43  4.00 - 5.40 M/uL Final    Hemoglobin 04/29/2024 14.3  12.0 - 16.0 g/dL Final    Hematocrit 04/29/2024 41.9  37.0 - 48.5 % Final    MCV 04/29/2024 95  82 - 98 fL Final    MCH 04/29/2024 32.3 (H)  27.0 - 31.0 pg Final    MCHC 04/29/2024 34.1  32.0 - 36.0 g/dL Final    RDW 04/29/2024 12.0  11.5 - 14.5 % Final    Platelets 04/29/2024 212  150 - 450 K/uL Final    MPV 04/29/2024 10.9  9.2 - 12.9 fL Final    Immature Granulocytes 04/29/2024 0.4  0.0 - 0.5 % Final    Gran # (ANC) 04/29/2024 6.8  1.8 - 7.7 K/uL Final    Immature Grans (Abs) 04/29/2024 0.05 (H)  0.00 - 0.04 K/uL Final    Lymph # 04/29/2024 3.8  1.0 - 4.8 K/uL Final    Mono # 04/29/2024 0.9  0.3 - 1.0 K/uL Final    Eos # 04/29/2024 0.1  0.0 - 0.5 K/uL Final    Baso # 04/29/2024 0.01  0.00 - 0.20 K/uL Final    nRBC 04/29/2024 0  0 /100 WBC Final    Gran % 04/29/2024 58.4  38.0 - 73.0 % Final    Lymph % 04/29/2024 32.9  18.0 - 48.0 % Final    Mono % 04/29/2024 7.7  4.0 - 15.0 % Final    Eosinophil % 04/29/2024 0.5  0.0 - 8.0 % Final    Basophil % 04/29/2024 0.1  0.0 - 1.9 % Final    Differential Method 04/29/2024 Automated   Final    Sodium 04/29/2024 142  136 - 145 mmol/L Final    Potassium 04/29/2024 4.1  3.5 - 5.1 mmol/L Final    Chloride 04/29/2024 107  95 - 110 mmol/L Final    CO2 04/29/2024 24  23 - 29 mmol/L Final    Glucose 04/29/2024 100  70 - 110 mg/dL Final    BUN 04/29/2024 15  6 - 20 mg/dL Final    Creatinine 04/29/2024 0.8  0.5 - 1.4 mg/dL Final    Calcium 04/29/2024 9.9  8.7 - 10.5 mg/dL Final    Anion Gap 04/29/2024 11  8 - 16 mmol/L Final    eGFR 04/29/2024 >60  >60 mL/min/1.73 m^2 Final   Lab Visit on 04/15/2024   Component Date Value Ref Range Status    Ferritin 04/15/2024 168  20.0 - 300.0 ng/mL Final    CRP 04/15/2024 5.5  0.0 - 8.2 mg/L Final   Admission on 04/05/2024, Discharged on 04/05/2024   Component Date Value Ref Range Status    POC Preg Test, Ur 04/05/2024 Negative  Negative Final    Quality  Control Acceptable 04/05/2024 Yes   Final    Final Pathologic Diagnosis 04/05/2024    Final                    Value:1. Colon, rectum, erythema, biopsy:     - Colonic mucosa with lymphoid aggregate.     - Negative for active colitis, dysplasia or malignancy.      Gross 04/05/2024    Final                    Value:Patient ID/MRN: [id number]  Pathology ID/MRN: [id number]    Received in formalin labeled with Taniya, Sherlyn&quot; and &quot;rectal erythema bx&quot; are multiple tan tissue fragments (0.9 x 0.3 x 0.1 cm in aggregate) which are entirely submitted in MCL--1-A.    Osmar SORIANO (A.S.C.P.)      Disclaimer 04/05/2024 Unless the case is a 'gross only' or additional testing only, the final diagnosis for each specimen is based on a microscopic examination of appropriate tissue sections.   Final   Lab Visit on 04/04/2024   Component Date Value Ref Range Status    Hemoglobin A1C 04/04/2024 4.8  4.0 - 5.6 % Final    Estimated Avg Glucose 04/04/2024 91  68 - 131 mg/dL Final    Sodium 04/04/2024 138  136 - 145 mmol/L Final    Potassium 04/04/2024 4.4  3.5 - 5.1 mmol/L Final    Chloride 04/04/2024 104  95 - 110 mmol/L Final    CO2 04/04/2024 21 (L)  23 - 29 mmol/L Final    Glucose 04/04/2024 77  70 - 110 mg/dL Final    BUN 04/04/2024 12  6 - 20 mg/dL Final    Creatinine 04/04/2024 0.8  0.5 - 1.4 mg/dL Final    Calcium 04/04/2024 10.0  8.7 - 10.5 mg/dL Final    Total Protein 04/04/2024 7.8  6.0 - 8.4 g/dL Final    Albumin 04/04/2024 4.4  3.5 - 5.2 g/dL Final    Total Bilirubin 04/04/2024 0.4  0.1 - 1.0 mg/dL Final    Alkaline Phosphatase 04/04/2024 60  55 - 135 U/L Final    AST 04/04/2024 19  10 - 40 U/L Final    ALT 04/04/2024 13  10 - 44 U/L Final    eGFR 04/04/2024 >60  >60 mL/min/1.73 m^2 Final    Anion Gap 04/04/2024 13  8 - 16 mmol/L Final    Cholesterol 04/04/2024 216 (H)  120 - 199 mg/dL Final    Triglycerides 04/04/2024 223 (H)  30 - 150 mg/dL Final    HDL 04/04/2024 50  40 - 75 mg/dL Final    LDL  Cholesterol 04/04/2024 121.4  63.0 - 159.0 mg/dL Final    HDL/Cholesterol Ratio 04/04/2024 23.1  20.0 - 50.0 % Final    Total Cholesterol/HDL Ratio 04/04/2024 4.3  2.0 - 5.0 Final    Non-HDL Cholesterol 04/04/2024 166  mg/dL Final    WBC 04/04/2024 8.60  3.90 - 12.70 K/uL Final    RBC 04/04/2024 4.49  4.00 - 5.40 M/uL Final    Hemoglobin 04/04/2024 14.3  12.0 - 16.0 g/dL Final    Hematocrit 04/04/2024 42.2  37.0 - 48.5 % Final    MCV 04/04/2024 94  82 - 98 fL Final    MCH 04/04/2024 31.8 (H)  27.0 - 31.0 pg Final    MCHC 04/04/2024 33.9  32.0 - 36.0 g/dL Final    RDW 04/04/2024 12.2  11.5 - 14.5 % Final    Platelets 04/04/2024 196  150 - 450 K/uL Final    MPV 04/04/2024 11.3  9.2 - 12.9 fL Final    Immature Granulocytes 04/04/2024 0.2  0.0 - 0.5 % Final    Gran # (ANC) 04/04/2024 5.1  1.8 - 7.7 K/uL Final    Immature Grans (Abs) 04/04/2024 0.02  0.00 - 0.04 K/uL Final    Lymph # 04/04/2024 2.9  1.0 - 4.8 K/uL Final    Mono # 04/04/2024 0.6  0.3 - 1.0 K/uL Final    Eos # 04/04/2024 0.0  0.0 - 0.5 K/uL Final    Baso # 04/04/2024 0.02  0.00 - 0.20 K/uL Final    nRBC 04/04/2024 0  0 /100 WBC Final    Gran % 04/04/2024 58.8  38.0 - 73.0 % Final    Lymph % 04/04/2024 34.0  18.0 - 48.0 % Final    Mono % 04/04/2024 6.5  4.0 - 15.0 % Final    Eosinophil % 04/04/2024 0.3  0.0 - 8.0 % Final    Basophil % 04/04/2024 0.2  0.0 - 1.9 % Final    Differential Method 04/04/2024 Automated   Final    TSH 04/04/2024 1.154  0.400 - 4.000 uIU/mL Final    Vit D, 25-Hydroxy 04/04/2024 37  30 - 96 ng/mL Final   Lab Visit on 03/15/2024   Component Date Value Ref Range Status    WBC 03/15/2024 10.10  3.90 - 12.70 K/uL Final    RBC 03/15/2024 4.28  4.00 - 5.40 M/uL Final    Hemoglobin 03/15/2024 13.6  12.0 - 16.0 g/dL Final    Hematocrit 03/15/2024 40.6  37.0 - 48.5 % Final    MCV 03/15/2024 95  82 - 98 fL Final    MCH 03/15/2024 31.8 (H)  27.0 - 31.0 pg Final    MCHC 03/15/2024 33.5  32.0 - 36.0 g/dL Final    RDW 03/15/2024 12.0  11.5 - 14.5 %  Final    Platelets 03/15/2024 235  150 - 450 K/uL Final    MPV 03/15/2024 10.7  9.2 - 12.9 fL Final    Immature Granulocytes 03/15/2024 0.3  0.0 - 0.5 % Final    Gran # (ANC) 03/15/2024 5.8  1.8 - 7.7 K/uL Final    Immature Grans (Abs) 03/15/2024 0.03  0.00 - 0.04 K/uL Final    Lymph # 03/15/2024 3.4  1.0 - 4.8 K/uL Final    Mono # 03/15/2024 0.8  0.3 - 1.0 K/uL Final    Eos # 03/15/2024 0.1  0.0 - 0.5 K/uL Final    Baso # 03/15/2024 0.02  0.00 - 0.20 K/uL Final    nRBC 03/15/2024 0  0 /100 WBC Final    Gran % 03/15/2024 57.3  38.0 - 73.0 % Final    Lymph % 03/15/2024 33.8  18.0 - 48.0 % Final    Mono % 03/15/2024 7.6  4.0 - 15.0 % Final    Eosinophil % 03/15/2024 0.8  0.0 - 8.0 % Final    Basophil % 03/15/2024 0.2  0.0 - 1.9 % Final    Differential Method 03/15/2024 Automated   Final   There may be more visits with results that are not included.     Imaging:  No results found for this or any previous visit.  Note: I have independently reviewed any/all imaging/labs/tests and agree with the report (s) as documented.  Any discrepancies will be as noted/demarcated by free text.  ANNAMARIE HINKLE 9/3/2024    ASSESSMENT:  1. Migraine without aura and without status migrainosus, not intractable      PLAN:  - Will hold off on starting more aggressive therapy for migraine prevention given recent circumstances likely contributing to uptick in headache/migraine frequency.  Pt reports she has begun to see headaches decrease in both frequency and intensity over the last week.  Will have her continue to track migraines diligently over the next 1-2 months.    - Continue magox 400 mg and vitamin b2 400 mg daily   - For acute migraines - maxalt 10 mg mlt   - refills provided    - Continue tracking headaches   - RTC in 2 months or sooner if needed    Orders Placed This Encounter    rizatriptan (MAXALT-MLT) 10 MG disintegrating tablet     Questions and concerns were sought and answered to the patient's stated verbal satisfaction.  The  patient verbalizes understanding and agreement with the above stated treatment plan.     Visit today included increased complexity associated with the care of the episodic problem migraine without aura addressed and managing the longitudinal care of the patient due to the serious and/or complex managed problem(s).  Plan for patient to return to clinic in 2 months for follow-up visit.     CC: Alea Moses MD Elizabeth C. Vulevich, FNP-C  [facility]   [phone]    Dr. Pate was available during today's encounter.

## 2024-09-08 ENCOUNTER — PATIENT MESSAGE (OUTPATIENT)
Dept: NEUROLOGY | Facility: CLINIC | Age: 34
End: 2024-09-08
Payer: COMMERCIAL

## 2024-09-16 ENCOUNTER — PATIENT OUTREACH (OUTPATIENT)
Dept: ADMINISTRATIVE | Facility: HOSPITAL | Age: 34
End: 2024-09-16
Payer: COMMERCIAL

## 2024-09-16 NOTE — PROGRESS NOTES
09/16/2024  VB chart audit performed. Care Everywhere updates requested and reviewed  Overdue HM topic chart audit and/or requested. LINKS triggered and reconciled. Media reviewed

## 2024-09-19 ENCOUNTER — PATIENT OUTREACH (OUTPATIENT)
Dept: ADMINISTRATIVE | Facility: HOSPITAL | Age: 34
End: 2024-09-19
Payer: COMMERCIAL

## 2024-09-19 NOTE — PROGRESS NOTES
09/19/2024  VB chart audit performed. Care Everywhere updates requested and reviewed  Overdue HM topic chart audit and/or requested. LINKS triggered and reconciled. Media reviewed

## 2024-10-11 ENCOUNTER — LAB VISIT (OUTPATIENT)
Dept: LAB | Facility: HOSPITAL | Age: 34
End: 2024-10-11
Attending: INTERNAL MEDICINE
Payer: COMMERCIAL

## 2024-10-11 ENCOUNTER — OFFICE VISIT (OUTPATIENT)
Dept: PRIMARY CARE CLINIC | Facility: CLINIC | Age: 34
End: 2024-10-11
Payer: COMMERCIAL

## 2024-10-11 VITALS
WEIGHT: 214.31 LBS | HEIGHT: 65 IN | SYSTOLIC BLOOD PRESSURE: 124 MMHG | DIASTOLIC BLOOD PRESSURE: 72 MMHG | HEART RATE: 86 BPM | BODY MASS INDEX: 35.71 KG/M2 | OXYGEN SATURATION: 97 %

## 2024-10-11 DIAGNOSIS — E03.9 ACQUIRED HYPOTHYROIDISM: ICD-10-CM

## 2024-10-11 DIAGNOSIS — E03.9 ACQUIRED HYPOTHYROIDISM: Primary | ICD-10-CM

## 2024-10-11 DIAGNOSIS — N92.0 MENORRHAGIA WITH REGULAR CYCLE: ICD-10-CM

## 2024-10-11 DIAGNOSIS — F41.8 DEPRESSION WITH ANXIETY: ICD-10-CM

## 2024-10-11 LAB
ERYTHROCYTE [DISTWIDTH] IN BLOOD BY AUTOMATED COUNT: 11.9 % (ref 11.5–14.5)
FERRITIN SERPL-MCNC: 238 NG/ML (ref 20–300)
HCT VFR BLD AUTO: 41.7 % (ref 37–48.5)
HGB BLD-MCNC: 14 G/DL (ref 12–16)
IRON SERPL-MCNC: 74 UG/DL (ref 30–160)
MCH RBC QN AUTO: 31.9 PG (ref 27–31)
MCHC RBC AUTO-ENTMCNC: 33.6 G/DL (ref 32–36)
MCV RBC AUTO: 95 FL (ref 82–98)
PLATELET # BLD AUTO: 224 K/UL (ref 150–450)
PMV BLD AUTO: 11.2 FL (ref 9.2–12.9)
RBC # BLD AUTO: 4.39 M/UL (ref 4–5.4)
SATURATED IRON: 22 % (ref 20–50)
T4 FREE SERPL-MCNC: 0.95 NG/DL (ref 0.71–1.51)
THYROPEROXIDASE IGG SERPL-ACNC: <6 IU/ML
TOTAL IRON BINDING CAPACITY: 330 UG/DL (ref 250–450)
TRANSFERRIN SERPL-MCNC: 223 MG/DL (ref 200–375)
TSH SERPL DL<=0.005 MIU/L-ACNC: 1.01 UIU/ML (ref 0.4–4)
WBC # BLD AUTO: 9.68 K/UL (ref 3.9–12.7)

## 2024-10-11 PROCEDURE — 83540 ASSAY OF IRON: CPT | Performed by: INTERNAL MEDICINE

## 2024-10-11 PROCEDURE — 86376 MICROSOMAL ANTIBODY EACH: CPT | Performed by: INTERNAL MEDICINE

## 2024-10-11 PROCEDURE — 99999 PR PBB SHADOW E&M-EST. PATIENT-LVL III: CPT | Mod: PBBFAC,,, | Performed by: INTERNAL MEDICINE

## 2024-10-11 PROCEDURE — 84439 ASSAY OF FREE THYROXINE: CPT | Performed by: INTERNAL MEDICINE

## 2024-10-11 PROCEDURE — 36415 COLL VENOUS BLD VENIPUNCTURE: CPT | Performed by: INTERNAL MEDICINE

## 2024-10-11 PROCEDURE — 85027 COMPLETE CBC AUTOMATED: CPT | Performed by: INTERNAL MEDICINE

## 2024-10-11 PROCEDURE — 82728 ASSAY OF FERRITIN: CPT | Performed by: INTERNAL MEDICINE

## 2024-10-11 PROCEDURE — 84443 ASSAY THYROID STIM HORMONE: CPT | Performed by: INTERNAL MEDICINE

## 2024-10-11 RX ORDER — SERTRALINE HYDROCHLORIDE 50 MG/1
50 TABLET, FILM COATED ORAL NIGHTLY
Qty: 90 TABLET | Refills: 3 | Status: SHIPPED | OUTPATIENT
Start: 2024-10-11

## 2024-10-11 NOTE — PROGRESS NOTES
[facility] Note    Chief Complaint      Chief Complaint   Patient presents with    Establish Care     History of Present Illness      Sherlyn Monahan is a 34 y.o. female who presents today for chief complaint follow up chronic issues .     HPI   LV she has a cpap Carlo Fowler sleep   HoTH- she has not had a TSH since missed AB, feleing tird all the time, had been seeing Endo Rosales. 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[employer], she works near here  ,she does  mental health   Active Problem List with Overview Notes    Diagnosis Date Noted    Hirsutism 2024    Severe obesity (BMI 35.0-39.9) with comorbidity 04/15/2024    Ulcerative proctitis with complication 2024     Followed up on w/ Flex Sig  per Dr Palmer      Excessive daytime sleepiness 2023    Decreased ROM of lumbar spine 2023    Decreased strength of trunk and back 2023    Chronic bilateral low back pain without sciatica 2023    Irritable bowel syndrome with diarrhea 2023    Decreased range of motion of right elbow 2022    Decreased range of motion of right wrist 2022    Pain in right elbow 2022    Pain in right wrist 2022    Impaired dexterity 2022    Difficulty with activities of daily living 2022    Swelling of right wrist 2022    Swelling of right elbow 2022    Low vitamin D level 2022    Adverse effect of COVID-19 vaccine 2022: had 2 doses PLUS Moderna BOOSTER 21- had prolonged hives following booster & additonal dose not adivsed at this time      Cubital tunnel syndrome of both upper extremities 2022    Depression with anxiety 2022    Carpal tunnel syndrome of right wrist  2021    Delivery of pregnancy by  section 2020    Status post cholecystectomy 2019    Fatty liver 2019    S/P  2018    Acquired hypothyroidism 2016    Dysmenorrhea 2016    Chronic migraine without aura without status migrainosus, not intractable 2016    Family history of breast cancer in mother 2016     mom tested negative for BRCA gene, no other family history      Irregular menstrual bleeding 2016     following with OB GYN Dr Smith.  ultrasound pending for polycystic ovary         Health Maintenance   Topic Date Due    TETANUS VACCINE  2030    Hepatitis C Screening  Completed    Lipid Panel  Completed       Past Medical History:   Diagnosis Date    Acquired hypothyroidism 2016    Allergy 2009    Seasonal    Anxiety     Depression     Irritable bowel syndrome (IBS)     Keloid cicatrix     Obesity (BMI 30.0-34.9) 2016    loosing weight consistently on 21 Day Fix program     Ulcerative colitis     Urticaria        Past Surgical History:   Procedure Laterality Date    breast reduction  2012    BREAST SURGERY      breast reduction     SECTION       SECTION N/A 2020    Procedure:  SECTION;  Surgeon: Chiara Smith MD;  Location: [facility] L&D;  Service: OB/GYN;  Laterality: N/A;    CHOLECYSTECTOMY  2019    COLONOSCOPY N/A 2023    Procedure: COLONOSCOPY;  Surgeon: Tom Palmer MD;  Location: [facility] ENDO;  Service: Endoscopy;  Laterality: N/A;    DILATION AND CURETTAGE OF UTERUS USING SUCTION N/A 2024    Procedure: DILATION AND CURETTAGE, UTERUS, USING SUCTION;  Surgeon: Chiara Smith MD;  Location: [facility] OR;  Service: OB/GYN;  Laterality: N/A;    ENDOSCOPIC CARPAL TUNNEL RELEASE Right 2022    Procedure: RELEASE, CARPAL TUNNEL, ENDOSCOPIC;  Surgeon: Phoenix Rojas MD;  Location: [facility] OR;  Service: Orthopedics;  Laterality: Right;    FLEXIBLE  SIGMOIDOSCOPY N/A 4/5/2024    Procedure: SIGMOIDOSCOPY, FLEXIBLE;  Surgeon: Tom Palmer MD;  Location: [facility] ENDO;  Service: Endoscopy;  Laterality: N/A;    LAPAROSCOPIC CHOLECYSTECTOMY N/A 06/11/2019    Procedure: CHOLECYSTECTOMY, LAPAROSCOPIC;  Surgeon: Missael Tolentino MD;  Location: [facility] OR;  Service: General;  Laterality: N/A;  video    ULNAR NERVE TRANSPOSITION Right 06/24/2022    Procedure: TRANSPOSITION, NERVE, ULNAR;  Surgeon: Phoenix Rojas MD;  Location: [facility] OR;  Service: Orthopedics;  Laterality: Right;       family history includes Allergic rhinitis in her mother; Breast cancer (age of onset: 45) in her mother; Cancer in her maternal grandfather and maternal grandmother; Cancer (age of onset: 45) in her mother; Depression in her brother, father, and mother; Diabetes in her father, maternal grandfather, paternal grandfather, and paternal grandmother; Heart disease in her father and maternal grandfather; Hypertension in her maternal grandfather and mother; Mental illness in her mother; Miscarriages / Stillbirths in her mother.    Social History     Tobacco Use    Smoking status: Former     Current packs/day: 0.25     Average packs/day: 0.3 packs/day for 3.0 years (0.8 ttl pk-yrs)     Types: Cigarettes    Smokeless tobacco: Never   Substance Use Topics    Alcohol use: Yes     Alcohol/week: 1.0 standard drink of alcohol     Types: 1 Glasses of wine per week     Comment: very rare    Drug use: No       Review of Systems   Constitutional:  Negative for chills, fever, malaise/fatigue and weight loss.   Respiratory:  Negative for cough, sputum production, shortness of breath and wheezing.    Cardiovascular:  Negative for chest pain, palpitations, orthopnea and leg swelling.   Gastrointestinal:  Negative for constipation, diarrhea, nausea and vomiting.   Genitourinary:  Negative for dysuria, frequency, hematuria and urgency.        Outpatient Encounter Medications as of 10/11/2024   Medication  Sig Note Dispense Refill    calcium-vitamin D3 (OS-ELISABETH 500 + D3) 500 mg(1,250mg) -200 unit per tablet Take 1 tablet by mouth.       cholecalciferol, vitamin D3, (VITAMIN D3) 50 mcg (2,000 unit) Tab Take 1 tablet (2,000 Units total) by mouth daily with breakfast.  100 tablet 3    hydrocortisone (ANUSOL-HC) 2.5 % rectal cream Place rectally 2 (two) times daily.  28 g 3    magnesium oxide (MAG-OX) 400 mg (241.3 mg magnesium) tablet Take 400 mg by mouth once daily.       mesalamine (LIALDA) 1.2 gram TbEC Take 2 tablets (2.4 g total) by mouth daily with breakfast.  60 tablet 3    mupirocin (BACTROBAN) 2 % ointment Apply topically 2 (two) times daily.       mupirocin calcium 2% nasl oint (BACTROBAN) 2 % Oint by Nasal route 2 (two) times daily. 12/30/2021: PRN 1 Tube 3    omeprazole (PRILOSEC) 10 MG capsule        prenatal vit37-iron-folic acid 29 mg iron- 1 mg Chew Take by mouth.       rizatriptan (MAXALT-MLT) 10 MG disintegrating tablet Take 1 tablet (10 mg total) by mouth every 2 (two) hours as needed for Migraine. Max 30 mg/day.  12 tablet 5    SYNTHROID 50 mcg tablet TAKE 1 TABLET(50 MCG) BY MOUTH EVERY DAY  90 tablet 3    [DISCONTINUED] Lactobacillus rhamnosus GG (CULTURELLE) 10 billion cell capsule Take 1 capsule by mouth once daily.       [DISCONTINUED] sertraline (ZOLOFT) 50 MG tablet TAKE 1 TABLET(50 MG) BY MOUTH EVERY EVENING  90 tablet 3    cetirizine (ZYRTEC) 10 MG tablet Take 1 tablet (10 mg total) by mouth 2 (two) times a day.  60 tablet 12    sertraline (ZOLOFT) 50 MG tablet Take 1 tablet (50 mg total) by mouth every evening.  90 tablet 3    [DISCONTINUED] HYDROcodone-acetaminophen (NORCO) 5-325 mg per tablet Take 1 tablet by mouth every 4 (four) hours as needed for Pain.  10 tablet 0    [DISCONTINUED] oxyCODONE-acetaminophen (PERCOCET) 5-325 mg per tablet Take 1 tablet by mouth every 4 (four) hours as needed for Pain.  20 tablet 0    [DISCONTINUED] progesterone (PROMETRIUM) 200 MG capsule Take 2 capsules  "(400 mg total) by mouth every evening. Take until 12 weeks of pregnancy  60 capsule 1     Facility-Administered Encounter Medications as of 10/11/2024   Medication Dose Route Frequency Provider Last Rate Last Admin    fentaNYL 50 mcg/mL injection  mcg   mcg Intravenous PRN Yury Ambrosio MD   50 mcg at 06/24/22 1026    LIDOcaine (PF) 10 mg/ml (1%) injection 10 mg  1 mL Intradermal Once Yury Ambrosio MD        midazolam (VERSED) 1 mg/mL injection 0.5-4 mg  0.5-4 mg Intravenous PRN Yury Ambrosio MD   2 mg at 06/24/22 1026        Review of patient's allergies indicates:   Allergen Reactions    Minocycline Swelling    Nsaids (non-steroidal anti-inflammatory drug) Other (See Comments)           Physical Exam      Vital Signs  Pulse: 86  SpO2: 97 %  BP: 124/72  Height and Weight  Height: 5' 5" (165.1 cm)  Weight: 97.2 kg (214 lb 4.6 oz)  BSA (Calculated - sq m): 2.11 sq meters  BMI (Calculated): 35.7  Weight in (lb) to have BMI = 25: 149.9]    Physical Exam  Vitals reviewed.   Constitutional:       General: She is not in acute distress.     Appearance: She is well-developed. She is not diaphoretic.   HENT:      Head: Normocephalic and atraumatic.      Right Ear: External ear normal.      Left Ear: External ear normal.      Nose: Nose normal.   Eyes:      General:         Right eye: No discharge.         Left eye: No discharge.      Conjunctiva/sclera: Conjunctivae normal.   Cardiovascular:      Rate and Rhythm: Normal rate and regular rhythm.      Heart sounds: Normal heart sounds.   Pulmonary:      Effort: Pulmonary effort is normal. No respiratory distress.   Musculoskeletal:         General: Normal range of motion.      Cervical back: Normal range of motion.   Skin:     Coloration: Skin is not pale.      Findings: No rash.   Neurological:      Mental Status: She is alert and oriented to person, place, and time.   Psychiatric:         Behavior: Behavior normal.         Thought Content: " "Thought content normal.          Laboratory:  CBC:  No results for input(s): "WBC", "RBC", "HGB", "HCT", "PLT", "MCV", "MCH", "MCHC" in the last 2160 hours.  CMP:  No results for input(s): "GLU", "CALCIUM", "ALBUMIN", "PROT", "NA", "K", "CO2", "CL", "BUN", "ALKPHOS", "ALT", "AST", "BILITOT" in the last 2160 hours.    Invalid input(s): "CREATININ"  URINALYSIS:  No results for input(s): "COLORU", "CLARITYU", "SPECGRAV", "PHUR", "PROTEINUA", "GLUCOSEU", "BILIRUBINCON", "BLOODU", "WBCU", "RBCU", "BACTERIA", "MUCUS", "NITRITE", "LEUKOCYTESUR", "UROBILINOGEN", "HYALINECASTS" in the last 2160 hours.   LIPIDS:  Recent Labs   Lab Result Units 08/12/24  0833   TSH uIU/mL 1.252     TSH:  Recent Labs   Lab Result Units 08/12/24  0833   TSH uIU/mL 1.252     A1C:  No results for input(s): "HGBA1C" in the last 2160 hours.    Radiology:      Assessment/Plan     Sherlyn Monahan is a 34 y.o.female with:    1. Acquired hypothyroidism  -     TSH; Future; Expected date: 10/11/2024  -     THYROID PEROXIDASE ANTIBODY; Future; Expected date: 10/11/2024  -     T4, FREE; Future; Expected date: 10/11/2024    2. Menorrhagia with regular cycle  -     CBC Without Differential; Future; Expected date: 10/11/2024  -     Iron and TIBC; Future; Expected date: 10/11/2024  -     Ferritin; Future; Expected date: 10/11/2024    3. Depression with anxiety  -     sertraline (ZOLOFT) 50 MG tablet; Take 1 tablet (50 mg total) by mouth every evening.  Dispense: 90 tablet; Refill: 3         Use of the [facility] Patient Portal discussed and encouraged during today's visit  -Continue current medications and maintain follow up with specialists.  Return to clinic in ref to Dr Pedroza .  Future Appointments   Date Time Provider Department Center   10/16/2024  9:30 AM Alea Moses MD [facility] MED [facility]   11/7/2024  2:00 PM Wendy Arcos MD [facility]ER[facility]   11/12/2024 12:00 PM Britta Sinha FNP [facility] NEURO [facility]   11/14/2024  9:30 " AM Irish Altamirano MD [facility] GASTRO [place]       Jeannine Chase MD  10/11/2024 10:16 AM    [facility]

## 2024-11-05 ENCOUNTER — PATIENT MESSAGE (OUTPATIENT)
Dept: NEUROLOGY | Facility: CLINIC | Age: 34
End: 2024-11-05
Payer: COMMERCIAL

## 2024-11-07 ENCOUNTER — OFFICE VISIT (OUTPATIENT)
Dept: DERMATOLOGY | Facility: CLINIC | Age: 34
End: 2024-11-07
Payer: COMMERCIAL

## 2024-11-07 DIAGNOSIS — Z12.83 SCREENING EXAM FOR SKIN CANCER: ICD-10-CM

## 2024-11-07 DIAGNOSIS — D22.9 MULTIPLE BENIGN MELANOCYTIC NEVI: ICD-10-CM

## 2024-11-07 DIAGNOSIS — L81.4 LENTIGINES: ICD-10-CM

## 2024-11-07 DIAGNOSIS — D48.5 NEOPLASM OF UNCERTAIN BEHAVIOR OF SKIN: Primary | ICD-10-CM

## 2024-11-07 DIAGNOSIS — L82.1 SEBORRHEIC KERATOSIS: ICD-10-CM

## 2024-11-07 PROCEDURE — 3044F HG A1C LEVEL LT 7.0%: CPT | Mod: CPTII,S$GLB,, | Performed by: DERMATOLOGY

## 2024-11-07 PROCEDURE — 99213 OFFICE O/P EST LOW 20 MIN: CPT | Mod: 25,S$GLB,, | Performed by: DERMATOLOGY

## 2024-11-07 PROCEDURE — 1160F RVW MEDS BY RX/DR IN RCRD: CPT | Mod: CPTII,S$GLB,, | Performed by: DERMATOLOGY

## 2024-11-07 PROCEDURE — 1159F MED LIST DOCD IN RCRD: CPT | Mod: CPTII,S$GLB,, | Performed by: DERMATOLOGY

## 2024-11-07 PROCEDURE — 11102 TANGNTL BX SKIN SINGLE LES: CPT | Mod: S$GLB,,, | Performed by: DERMATOLOGY

## 2024-11-07 NOTE — PATIENT INSTRUCTIONS
Biopsy Wound Care Instructions    Leave the bandage on for 24 hours without getting it wet.   Clean the area once a day with a gentle soap and water, then pat dry and apply Vaseline and a bandaid.  The site should be kept moist with Vaseline at all times to improve healing. Reapply a thick coating as needed. Do not let the site air out or form a scab, as this will delay healing and worsen scarring.  If any bleeding or oozing occurs once you return home, apply firm pressure to the area for 30 minutes straight without peeking. If bleeding continues, call the office immediately.  Please message us via [facility], call us at [phone], or return to the office at any sign of increasing redness, swelling, tenderness, pain, heat, yellow drainage/discharge, or continued bleeding.      Receiving Your Pathology Results    Your pathology results will be released to you on [facility] at the same time that Dr. Arcos receives them.   Dr. Arcos will then message you with her interpretation of the results and/or with the plan going forward.  If you do not use [facility] or if your pathology results require more of an explanation, you will receive your results via a phone call.  If 2 weeks go by and you have not received your results, please message us via [facility] or call us at [phone] to inform us.             Hydrocolloid Bandage    These bandages can be found at your local pharmacy in first [employer] or [employer].  Apply the bandage to clean, dry skin. Press and hold top of bandage once it is applied to warm the bandage.  Please note: There area where bandage adheres to wound bed will become white and puffy; this is not infection and a normal part of the healing process.  Do not change bandage until edges roll up.  When bandage is ready to be changed, remove carefully and slowly.  Wash wound with gentle cleanser and fingertips.  Make sure area is completely dry before applying new bandage.  Make sure wound bed  is in the center of the bandage.  Repeat process until fresh pink skin covers wound bed or until bandage no longer become white and puffy.

## 2024-11-07 NOTE — PROGRESS NOTES
"  Patient Information  Name: Sherlyn Monahan  : 1990  MRN: [id number]     Referring Physician:  No ref. provider found   Primary Care Physician:  Alea Moses MD   Date of Visit: 2024      Subjective:     History of Present lllness:    Sherlyn Monahan is a 34 y.o. female who presents with a chief complaint of moles.  Patient is here today for a "mole" check.   Today, patient has no additional complaints. Denies any new, changing, or symptomatic lesions on the skin.    Patient was last seen: 2022.  Prior notes by myself reviewed.   Clinical documentation obtained by nursing staff reviewed.    Review of Systems   Skin:  Positive for daily sunscreen use, activity-related sunscreen use and wears hat. Negative for itching and rash.       Objective:   Physical Exam   Constitutional: She appears well-developed and well-nourished. No distress.   Neurological: She is alert and oriented to person, place, and time. She is not disoriented.   Psychiatric: She has a normal mood and affect.   Skin:   Areas Examined (abnormalities noted in diagram):   Scalp / Hair Palpated and Inspected  Head / Face Inspection Performed  Neck Inspection Performed  Chest / Axilla Inspection Performed  Abdomen Inspection Performed  Genitals / Buttocks / Groin Inspection Performed  Back Inspection Performed  RUE Inspected  LUE Inspection Performed  RLE Inspected  LLE Inspection Performed  Nails and Digits Inspection Performed                 Diagram Legend     Erythematous scaling macule/papule c/w actinic keratosis       Vascular papule c/w angioma      Pigmented verrucoid papule/plaque c/w seborrheic keratosis      Yellow umbilicated papule c/w sebaceous hyperplasia      Irregularly shaped tan macule c/w lentigo     1-2 mm smooth white papules consistent with Milia      Movable subcutaneous cyst with punctum c/w epidermal inclusion cyst      Subcutaneous movable cyst c/w pilar cyst      Firm pink to brown papule c/w " dermatofibroma      Pedunculated fleshy papule(s) c/w skin tag(s)      Evenly pigmented macule c/w junctional nevus     Mildly variegated pigmented, slightly irregular-bordered macule c/w mildly atypical nevus      Flesh colored to evenly pigmented papule c/w intradermal nevus       Pink pearly papule/plaque c/w basal cell carcinoma      Erythematous hyperkeratotic cursted plaque c/w SCC      Surgical scar with no sign of skin cancer recurrence      Open and closed comedones      Inflammatory papules and pustules      Verrucoid papule consistent consistent with wart     Erythematous eczematous patches and plaques     Dystrophic onycholytic nail with subungual debris c/w onychomycosis     Umbilicated papule    Erythematous-base heme-crusted tan verrucoid plaque consistent with inflamed seborrheic keratosis     Erythematous Silvery Scaling Plaque c/w Psoriasis     See annotation          [] Data reviewed  [] Prior external notes reviewed  [] Independent review of test  [] Management discussed with another provider  [] Independent historian    Assessment / Plan:      Pathology Orders:       Normal Orders This Visit    Specimen to Pathology, Dermatology     Questions:    Procedure Type: Dermatology and skin neoplasms    Number of Specimens: 1    ------------------------: -------------------------    Spec 1 Procedure: Biopsy    Spec 1 Clinical Impression: r/o dysplastic nevus    Spec 1 Source: midline upper back    Release to patient:           Neoplasm of uncertain behavior of skin  -     Specimen to Pathology, Dermatology    Shave biopsy procedure note:  Risk, benefits, and alternatives of biopsy are discussed with the patient, including risk of infection, scar, recurrence, and need for additional treatment of site. The patient agrees to the procedure by verbal consent. The area is marked and prepped with alcohol.  Approximately 1 mL of lidocaine 1% with epinephrine is used for local anesthesia. A sharp blade is used  to remove the lesion. The specimen is sent for pathology. Hemostasis is obtained with aluminum chloride and/or monopolar hyfrecation if needed. The area is then dressed and bandaged. The patient tolerated the procedure well without adverse event. Written instructions on wound care were given and were reviewed with the patient, who is to call for any signs of bleeding or infection. The patient will be notified of the pathology results.    Multiple benign melanocytic nevi  Multiple benign-appearing nevi present on exam today. Reassurance provided. Counseled patient to periodically examine moles and return to clinic if any changes in size, shape, or color are noted or if it becomes symptomatic (bleeding, itching, pain, etc).  Recommend using a broad-spectrum, water-resistant sunscreen with SPF of 30 or higher--reapply every 2 hours. Seek shade, wear sun-protective clothing, and perform regular skin self-exams.    Lentigines  These are benign sun spots which should be monitored for changes. Daily sun protection will reduce the number of new lesions.   Recommend using a broad-spectrum, water-resistant sunscreen with SPF of 30 or higher--reapply every 2 hours. Seek shade, wear sun-protective clothing, and perform regular skin self-exams.    Seborrheic keratosis  These are benign, inherited growths without a malignant potential. Reassurance given to patient. No treatment is necessary.    Screening exam for skin cancer  Total body skin examination performed today as noted in physical exam. Suspicious lesion(s) were noted and/or biopsied as above.  Recommend using a broad-spectrum, water-resistant sunscreen with SPF of 30 or higher--reapply every 2 hours. Seek shade, wear sun-protective clothing, and perform regular skin self-exams.         Follow up in about 1 year (around 11/7/2025) for TBSE, or sooner dependent on pathology results.      Wendy Arcos MD, FAAD  [facility]

## 2024-11-09 ENCOUNTER — PATIENT MESSAGE (OUTPATIENT)
Dept: PRIMARY CARE CLINIC | Facility: CLINIC | Age: 34
End: 2024-11-09
Payer: COMMERCIAL

## 2024-11-09 DIAGNOSIS — E03.9 ACQUIRED HYPOTHYROIDISM: ICD-10-CM

## 2024-11-10 RX ORDER — LEVOTHYROXINE SODIUM 50 UG/1
50 TABLET ORAL DAILY
Qty: 90 TABLET | Refills: 3 | Status: SHIPPED | OUTPATIENT
Start: 2024-11-10

## 2024-11-10 NOTE — TELEPHONE ENCOUNTER
Pt requesting med refill, med pended. Last prescribed by previous prescriber     LOV with Dr Chase 10/11/2024

## 2024-11-10 NOTE — TELEPHONE ENCOUNTER
No care due was identified.  [facility] Embedded Care Due Messages. Reference number: [id number].   11/10/2024 11:03:15 AM CST

## 2024-11-12 ENCOUNTER — OFFICE VISIT (OUTPATIENT)
Dept: NEUROLOGY | Facility: CLINIC | Age: 34
End: 2024-11-12
Payer: COMMERCIAL

## 2024-11-12 DIAGNOSIS — G43.009 MIGRAINE WITHOUT AURA AND WITHOUT STATUS MIGRAINOSUS, NOT INTRACTABLE: Primary | ICD-10-CM

## 2024-11-12 PROCEDURE — 99214 OFFICE O/P EST MOD 30 MIN: CPT | Mod: 95,,, | Performed by: NURSE PRACTITIONER

## 2024-11-12 PROCEDURE — 1159F MED LIST DOCD IN RCRD: CPT | Mod: CPTII,95,, | Performed by: NURSE PRACTITIONER

## 2024-11-12 PROCEDURE — G2211 COMPLEX E/M VISIT ADD ON: HCPCS | Mod: 95,,, | Performed by: NURSE PRACTITIONER

## 2024-11-12 PROCEDURE — 3044F HG A1C LEVEL LT 7.0%: CPT | Mod: CPTII,95,, | Performed by: NURSE PRACTITIONER

## 2024-11-12 PROCEDURE — 1160F RVW MEDS BY RX/DR IN RCRD: CPT | Mod: CPTII,95,, | Performed by: NURSE PRACTITIONER

## 2024-11-12 RX ORDER — RIZATRIPTAN BENZOATE 10 MG/1
10 TABLET, ORALLY DISINTEGRATING ORAL
Qty: 12 TABLET | Refills: 5 | Status: SHIPPED | OUTPATIENT
Start: 2024-11-12

## 2024-11-12 NOTE — PROGRESS NOTES
Established Patient   SUBJECTIVE:  Patient ID: Sherlyn Monahan   Chief Complaint: Follow-up    History of Present Illness:  Sherlyn Monahan is a 34 y.o. female who presents for follow-up of headaches via virtual visit.     The patient location is: in [place]   The chief complaint leading to consultation is: Follow-up  Visit type: Virtual visit with synchronous audio and video  Total time spent with patient: 6 min  Each patient to whom he or she provides medical services by telemedicine is:  (1) informed of the relationship between the physician and patient and the respective role of any other health care provider with respect to management of the patient; and (2) notified that he or she may decline to receive medical services by telemedicine and may withdraw from such care at any time.    Recommendations made at last Office Visit on 9/3/2024:  - Will hold off on starting more aggressive therapy for migraine prevention given recent circumstances likely contributing to uptick in headache/migraine frequency.  Pt reports she has begun to see headaches decrease in both frequency and intensity over the last week.  Will have her continue to track migraines diligently over the next 1-2 months.    - Continue magox 400 mg and vitamin b2 400 mg daily   - For acute migraines - maxalt 10 mg mlt   - refills provided    - Continue tracking headaches   - RTC in 2 months or sooner if needed    11/12/2024 - Interval History:  Migraines have been better over the last two months, feels as her hormones continue to level out, migraine frequency has been going down.  Migraines last no longer than 30-60 minutes after treating with maxalt.  Has tracked only 1 migraine so far in November, 5 in October, and 14 in September.  Pleased with the natural gradual decrease in migraine frequency, does not wish to make adjustments in her treatment plan at this time.     Otherwise, information below is still accurate and current.     09/03/2024 -  "Interval History:  Found of she was pregnant 3 days after her last visit, unfortunately pregnancy was non-viable, had d & c on 8/21.  Has had an uptick in headache/migraine frequency over the last month, however she feels the large majority of these headaches were triggered by stress/hormone changes relating to pregnancy and subsequent miscarriage.  Feels headaches have begun to decrease in frequency over the last week or so.       Otherwise, information below is still accurate and current.      History of Present Illness:   34 y.o. female with hypothyroidism, migraines, anxiety/depression, who presents for evaluation of headaches via virtual visit.  Migraines began around age 14-15 yo and have been ongoing.  Migraines initially began shortly after she suffered a concussion.  Over the last 6 months, migraines have started to become more frequent.  Began taking "more magnesium" in January which has helped, now taking 1700 mg twice daily.  Tracked 5 migraines in June, but also had 7 headache days.  Her typical month is 2-3 migraines per month.  Migraines typically last about 10 hours in duration.  She has been treating acute migraines with tylenol and ice packs which are only somewhat effective.  She has taken sumatriptan 50 mg in the past, which she does recall please.  Associated symptoms include photophobia, phonophobia, nausea, scalp sensitivity, shooting pains in the occipitalis, dizziness, fatigue, neck pain, visual disturbance.    She is not currently pregnant, but she and her  are actively trying to conceive.       Treatments Tried and Response  Sumatriptan   Fioricet   Sertraline   Magnesium   Vit b2 400 mg -    Coq10 -   Maxalt 10 mg mlt - helping    Current Medications:    calcium-vitamin D3 (OS-ELISABETH 500 + D3) 500 mg(1,250mg) -200 unit per tablet, Take 1 tablet by mouth., Disp: , Rfl:     cetirizine (ZYRTEC) 10 MG tablet, Take 1 tablet (10 mg total) by mouth 2 (two) times a day., Disp: 60 tablet, Rfl: " 12    cholecalciferol, vitamin D3, (VITAMIN D3) 50 mcg (2,000 unit) Tab, Take 1 tablet (2,000 Units total) by mouth daily with breakfast., Disp: 100 tablet, Rfl: 3    hydrocortisone (ANUSOL-HC) 2.5 % rectal cream, Place rectally 2 (two) times daily., Disp: 28 g, Rfl: 3    magnesium oxide (MAG-OX) 400 mg (241.3 mg magnesium) tablet, Take 400 mg by mouth once daily., Disp: , Rfl:     mesalamine (LIALDA) 1.2 gram TbEC, Take 2 tablets (2.4 g total) by mouth daily with breakfast., Disp: 60 tablet, Rfl: 3    mupirocin (BACTROBAN) 2 % ointment, Apply topically 2 (two) times daily., Disp: , Rfl:     mupirocin calcium 2% nasl oint (BACTROBAN) 2 % Oint, by Nasal route 2 (two) times daily., Disp: 1 Tube, Rfl: 3    omeprazole (PRILOSEC) 10 MG capsule, , Disp: , Rfl:     prenatal vit37-iron-folic acid 29 mg iron- 1 mg Chew, Take by mouth., Disp: , Rfl:     rizatriptan (MAXALT-MLT) 10 MG disintegrating tablet, Take 1 tablet (10 mg total) by mouth every 2 (two) hours as needed for Migraine. Max 30 mg/day., Disp: 12 tablet, Rfl: 5    sertraline (ZOLOFT) 50 MG tablet, Take 1 tablet (50 mg total) by mouth every evening., Disp: 90 tablet, Rfl: 3    SYNTHROID 50 mcg tablet, Take 1 tablet (50 mcg total) by mouth once daily., Disp: 90 tablet, Rfl: 3  No current facility-administered medications for this visit.    Facility-Administered Medications Ordered in Other Visits:     fentaNYL 50 mcg/mL injection  mcg,  mcg, Intravenous, PRN, Yury Ambrosio MD, 50 mcg at 06/24/22 1026    LIDOcaine (PF) 10 mg/ml (1%) injection 10 mg, 1 mL, Intradermal, Once, Yury Ambrosio MD    midazolam (VERSED) 1 mg/mL injection 0.5-4 mg, 0.5-4 mg, Intravenous, PRN, Yury Ambrosio MD, 2 mg at 06/24/22 1026    Review of Systems - A review of 10+ systems was conducted with pertinent positive and negative findings documented in HPI with all other systems reviewed and negative.    PFSH: Past medical, family, and social history  reviewed as documented in chart with pertinent positive medical, family, and social history detailed in HPI.    OBJECTIVE:  Vitals: There were no vitals taken for this visit.     Physical Exam:  Constitutional: she appears well-developed and well-nourished. she is well groomed. NAD.     Review of Data:   Notes from , family medicine reviewed   Labs:  Lab Visit on 10/11/2024   Component Date Value Ref Range Status    WBC 10/11/2024 9.68  3.90 - 12.70 K/uL Final    RBC 10/11/2024 4.39  4.00 - 5.40 M/uL Final    Hemoglobin 10/11/2024 14.0  12.0 - 16.0 g/dL Final    Hematocrit 10/11/2024 41.7  37.0 - 48.5 % Final    MCV 10/11/2024 95  82 - 98 fL Final    MCH 10/11/2024 31.9 (H)  27.0 - 31.0 pg Final    MCHC 10/11/2024 33.6  32.0 - 36.0 g/dL Final    RDW 10/11/2024 11.9  11.5 - 14.5 % Final    Platelets 10/11/2024 224  150 - 450 K/uL Final    MPV 10/11/2024 11.2  9.2 - 12.9 fL Final    Iron 10/11/2024 74  30 - 160 ug/dL Final    Transferrin 10/11/2024 223  200 - 375 mg/dL Final    TIBC 10/11/2024 330  250 - 450 ug/dL Final    Saturated Iron 10/11/2024 22  20 - 50 % Final    Ferritin 10/11/2024 238  20.0 - 300.0 ng/mL Final    TSH 10/11/2024 1.010  0.400 - 4.000 uIU/mL Final    Thyroperoxidase Antibodies 10/11/2024 <6.0  <6.0 IU/mL Final    Free T4 10/11/2024 0.95  0.71 - 1.51 ng/dL Final   Admission on 08/21/2024, Discharged on 08/21/2024   Component Date Value Ref Range Status    Group & Rh 08/21/2024 O POS   Final    Indirect Carla 08/21/2024 NEG   Final    Specimen Outdate 08/21/2024 08/24/2024 23:59   Final    Final Pathologic Diagnosis 08/21/2024 Products of conception, chorionic villi are identified.   Final    Gross 08/21/2024    Final                    Value:One Part Case:    Part 1  Pathology ID# KES-  Pathology MRN# [id number]  Hospital/Clinic label MRN# [id number]    Received in formalin labeled with the patient's name, MRN, and &quot;products of conception&quot; is a 5.5 x 5.0 x 1.5 cm aggregate of  pink-red soft tissue fragments admixed with blood clot. Chorionic villi are not identified. There are no fetal or   embryonic parts identified. Representative sections are submitted in cassettes:  JXM--1-A  XTC--1-B  HNC--1-C  QVL--1-D    GARRICK Humphrey, PA ([facility])CM      Disclaimer 08/21/2024 Unless the case is a 'gross only' or additional testing only, the final diagnosis for each specimen is based on a microscopic examination of appropriate tissue sections.   Final   Lab Visit on 08/14/2024   Component Date Value Ref Range Status    HCG Quant 08/14/2024 [id number]  See Text mIU/mL Final   Lab Visit on 08/12/2024   Component Date Value Ref Range Status    TSH 08/12/2024 1.252  0.400 - 4.000 uIU/mL Final    Free T4 08/12/2024 0.93  0.71 - 1.51 ng/dL Final    HCG Quant 08/12/2024 [id number]  See Text mIU/mL Final    Progesterone 08/12/2024 19.2  See Text ng/mL Final   Lab Visit on 08/09/2024   Component Date Value Ref Range Status    HCG Quant 08/09/2024 [id number]  See Text mIU/mL Final   Office Visit on 08/05/2024   Component Date Value Ref Range Status    Chlamydia, Amplified DNA 08/05/2024 Not Detected  Not Detected Final    N gonorrhoeae, amplified DNA 08/05/2024 Not Detected  Not Detected Final    POC Preg Test, Ur 08/05/2024 Positive (A)  Negative Final     Acceptable 08/05/2024 Yes   Final     Imaging:  No results found for this or any previous visit.  Note: I have independently reviewed any/all imaging/labs/tests and agree with the report (s) as documented.  Any discrepancies will be as noted/demarcated by free text.  ANNAMARIE HINKLE 11/12/2024    ASSESSMENT:  1. Migraine without aura and without status migrainosus, not intractable      PLAN:  - Continue magox 400 mg and vitamin b2 400 mg daily for migraine prevention   - For acute migraine - maxalt 10 mg mlt   - refills provided   - send message via portal should she become pregnant prior to next scheduled appointment   - Continue  tracking headaches   - RTC in 3-6 months or sooner if needed    Orders Placed This Encounter    rizatriptan (MAXALT-MLT) 10 MG disintegrating tablet     Questions and concerns were sought and answered to the patient's stated verbal satisfaction.  The patient verbalizes understanding and agreement with the above stated treatment plan.     Visit today included increased complexity associated with the care of the episodic problem migraine without aura addressed and managing the longitudinal care of the patient due to the serious and/or complex managed problem(s).  Plan for patient to return to clinic in 3 months for follow-up visit.     CC: Alea Moses MD Elizabeth C. Vulevich, FNP-C  [facility]   [phone]    Dr. Pate was available during today's encounter.

## 2024-11-13 NOTE — PROGRESS NOTES
[facility] Telemedicine Consult Note    The patient location is: home   The chief complaint leading to consultation is: UC     Visit type: audiovisual    Face to Face time with patient: 15  30 minutes of total time spent on the encounter, which includes face to face time and non-face to face time preparing to see the patient (eg, review of tests), Obtaining and/or reviewing separately obtained history, Documenting clinical information in the electronic or other health record, Independently interpreting results (not separately reported) and communicating results to the patient/family/caregiver, or Care coordination (not separately reported).       Each patient to whom he or she provides medical services by telemedicine is:  (1) informed of the relationship between the physician and patient and the respective role of any other health care provider with respect to management of the patient; and (2) notified that he or she may decline to receive medical services by telemedicine and may withdraw from such care at any time.       PCP:   Alea Moses       Referring MD:  No referring provider defined for this encounter.    HPI:  This is a 34 y.o. female here to establish care for UC. PMHx of hypothyroidism, Anxiety, Depression. She was diagnosed with UC in 2023. At time of diagnosis, she was having loose stools with blood in them. Also was having urgency. These symptoms had been going on for a few years prior to diagnosis. She underwent cscope in 2023 which showed proctitis with chronic inflammation on biopsies and removed a hyperplastic polyp from cecum. She was put on lialda and a follow up flex sig in 4/2025 showed mild inflammation the rectum endoscopically but biopsies showed no active inflammation. She was feeling better at that time and has been compliant with lialda. For most part, doing well with formed stools and no pain. Occasionally has some cramping with certain foods or coffee.  No nocturnal stools.     CRS visit for hemorrhoids recently and was treated with topical therapy. This has been helpful. Has occasional reflux symptoms and takes omeprazole 20mg daily which she gets over the counter. No dysphagia. No FH of CRC or gastric cancer. Recent CBC with iron panel wnl.        Medical History:  has a past medical history of Acquired hypothyroidism (2016), Allergy (2009), Anxiety (), Depression (), Irritable bowel syndrome (IBS), Keloid cicatrix (), Obesity (BMI 30.0-34.9) (2016), Ulcerative colitis, and Urticaria.    Surgical History:  has a past surgical history that includes breast reduction (2012); Breast surgery;  section; Laparoscopic cholecystectomy (N/A, 2019); Cholecystectomy (2019);  section (N/A, 2020); Ulnar nerve transposition (Right, 2022); Endoscopic carpal tunnel release (Right, 2022); Colonoscopy (N/A, 2023); Flexible sigmoidoscopy (N/A, 2024); and Dilation and curettage of uterus using suction (N/A, 2024).    Family History: family history includes Allergic rhinitis in her mother; Breast cancer (age of onset: 45) in her mother; Cancer in her maternal grandfather and maternal grandmother; Cancer (age of onset: 45) in her mother; Depression in her brother, father, and mother; Diabetes in her father, maternal grandfather, paternal grandfather, and paternal grandmother; Heart disease in her father and maternal grandfather; Hypertension in her maternal grandfather and mother; Mental illness in her mother; Miscarriages / Stillbirths in her mother..     Social History:  reports that she has quit smoking. Her smoking use included cigarettes. She has a 0.8 pack-year smoking history. She has never used smokeless tobacco. She reports current alcohol use of about 1.0 standard drink of alcohol per week. She reports that she does not use drugs.          Imaging:  Reviewed     Endoscopy:      Flex Sig  2024     The perianal and digital rectal examinations were normal.        Inflammation was found in a continuous and circumferential pattern        from the anus to the rectum. This was graded as Silvestre Score 1 (mild,        with erythema, decreased vascular pattern, mild friability), and        when compared to the previous examination, the findings are        improved. Biopsies were taken with a cold forceps for histology.        Verification of patient identification for the specimen was done.        Estimated blood loss was minimal.     1. Colon, rectum, erythema, biopsy:      - Colonic mucosa with lymphoid aggregate.      - Negative for active colitis, dysplasia or malignancy.     Cscope 2023     The perianal and digital rectal examinations were normal.        The terminal ileum appeared normal.        A 5 mm polyp was found in the cecum. The polyp was sessile. The        polyp was removed with a cold snare. Resection and retrieval were        complete. Verification of patient identification for the specimen        was done. Estimated blood loss was minimal.        A segmental area of moderately congested, erythematous and eroded        mucosa was found in the rectum. Biopsies were taken with a cold        forceps for histology. Verification of patient identification for        the specimen was done. Estimated blood loss was minimal.        The exam was otherwise normal throughout the examined colon.        Biopsies for histology were taken with a cold forceps from the        ascending colon, transverse colon, descending colon and sigmoid        colon for evaluation of microscopic colitis. Verification of patient        identification for the specimen was done. Estimated blood loss was        minimal.       1. CECAL POLYP, BIOPSY:  Large hyperplastic polyp.  Negative for dysplasia or malignancy.      2. RANDOM COLON, BIOPSY:  Colonic mucosa without significant pathologic changes.    Negative for colitis,  granulomas, dysplasia or malignancy.      3. RECTAL ERYTHEMA, BIOPSY:  Mildly active and chronic colitis/proctitis.  Negative for granulomas, dysplasia or malignancy.      Comment:  Colonic mucosa shows focal cryptitis and significant distortion of glandular architecture.  Such findings may be seen in the clinical setting of inflammatory bowel disease, among other scenarios including infection, drug effect or segmental  colitis.  Clinical correlation is advised.       Assessment:    UC  - Diagnosed 2023   - Extent: Proctitis   - Extraintestinal manifestations: none   - Surgeries: None   - Prior meds: Mesalamine  - Current meds: mesalamine   - No hospitalizations or steroids in past        Recommendations:    - Calpro, CRP   - Not anemic with normal iron panel on last blood work   - Flex sig with no active inflammation in 2024 although endoscopically was [place] 1   - Continue lialda   - Would have her follow up with IBD clinic in future once able  - Recommend Ca/Vit D supplementation      RTC 6 months       Order summary:  Orders Placed This Encounter    C-reactive protein    Calprotectin, Stool         Thank you so much for allowing me to participate in the care of Sherlyn Altamirano MD

## 2024-11-14 ENCOUNTER — LAB VISIT (OUTPATIENT)
Dept: LAB | Facility: HOSPITAL | Age: 34
End: 2024-11-14
Attending: STUDENT IN AN ORGANIZED HEALTH CARE EDUCATION/TRAINING PROGRAM
Payer: COMMERCIAL

## 2024-11-14 ENCOUNTER — OFFICE VISIT (OUTPATIENT)
Dept: GASTROENTEROLOGY | Facility: CLINIC | Age: 34
End: 2024-11-14
Payer: COMMERCIAL

## 2024-11-14 DIAGNOSIS — K51.20 UC (ULCERATIVE COLITIS CONFINED TO RECTUM): ICD-10-CM

## 2024-11-14 DIAGNOSIS — K51.20 UC (ULCERATIVE COLITIS CONFINED TO RECTUM): Primary | ICD-10-CM

## 2024-11-14 LAB — CRP SERPL-MCNC: 6.7 MG/L (ref 0–8.2)

## 2024-11-14 PROCEDURE — 86140 C-REACTIVE PROTEIN: CPT | Performed by: STUDENT IN AN ORGANIZED HEALTH CARE EDUCATION/TRAINING PROGRAM

## 2024-11-14 PROCEDURE — 36415 COLL VENOUS BLD VENIPUNCTURE: CPT | Performed by: STUDENT IN AN ORGANIZED HEALTH CARE EDUCATION/TRAINING PROGRAM

## 2024-11-17 DIAGNOSIS — R79.89 LOW VITAMIN D LEVEL: ICD-10-CM

## 2024-11-17 NOTE — TELEPHONE ENCOUNTER
No care due was identified.  [facility] Embedded Care Due Messages. Reference number: [id number].   11/17/2024 8:32:16 AM CST

## 2024-11-18 ENCOUNTER — LAB VISIT (OUTPATIENT)
Dept: LAB | Facility: HOSPITAL | Age: 34
End: 2024-11-18
Attending: STUDENT IN AN ORGANIZED HEALTH CARE EDUCATION/TRAINING PROGRAM
Payer: COMMERCIAL

## 2024-11-18 ENCOUNTER — PATIENT MESSAGE (OUTPATIENT)
Dept: GASTROENTEROLOGY | Facility: CLINIC | Age: 34
End: 2024-11-18
Payer: COMMERCIAL

## 2024-11-18 DIAGNOSIS — K51.20 UC (ULCERATIVE COLITIS CONFINED TO RECTUM): ICD-10-CM

## 2024-11-18 PROCEDURE — 83993 ASSAY FOR CALPROTECTIN FECAL: CPT | Performed by: STUDENT IN AN ORGANIZED HEALTH CARE EDUCATION/TRAINING PROGRAM

## 2024-11-18 RX ORDER — CHOLECALCIFEROL (VITAMIN D3) 50 MCG
1 TABLET ORAL
Qty: 100 TABLET | Refills: 3 | Status: SHIPPED | OUTPATIENT
Start: 2024-11-18

## 2024-11-20 ENCOUNTER — PATIENT MESSAGE (OUTPATIENT)
Dept: GASTROENTEROLOGY | Facility: CLINIC | Age: 34
End: 2024-11-20
Payer: COMMERCIAL

## 2024-11-20 DIAGNOSIS — K51.20 UC (ULCERATIVE COLITIS CONFINED TO RECTUM): Primary | ICD-10-CM

## 2024-11-20 LAB — CALPROTECTIN STL-MCNT: 10.1 MCG/G

## 2024-11-21 ENCOUNTER — PATIENT MESSAGE (OUTPATIENT)
Dept: NEUROLOGY | Facility: CLINIC | Age: 34
End: 2024-11-21
Payer: COMMERCIAL

## 2025-01-14 ENCOUNTER — PATIENT MESSAGE (OUTPATIENT)
Dept: NEUROLOGY | Facility: CLINIC | Age: 35
End: 2025-01-14
Payer: COMMERCIAL

## 2025-01-30 ENCOUNTER — PATIENT MESSAGE (OUTPATIENT)
Dept: NEUROLOGY | Facility: CLINIC | Age: 35
End: 2025-01-30
Payer: COMMERCIAL

## 2025-01-30 DIAGNOSIS — G43.009 MIGRAINE WITHOUT AURA AND WITHOUT STATUS MIGRAINOSUS, NOT INTRACTABLE: Primary | ICD-10-CM

## 2025-01-30 RX ORDER — PREDNISONE 20 MG/1
TABLET ORAL
Qty: 9 TABLET | Refills: 0 | Status: SHIPPED | OUTPATIENT
Start: 2025-01-30 | End: 2025-02-19

## 2025-02-04 RX ORDER — GABAPENTIN 100 MG/1
100 CAPSULE ORAL 3 TIMES DAILY
Qty: 90 CAPSULE | Refills: 0 | Status: SHIPPED | OUTPATIENT
Start: 2025-02-04

## 2025-02-11 ENCOUNTER — OFFICE VISIT (OUTPATIENT)
Dept: SLEEP MEDICINE | Facility: CLINIC | Age: 35
End: 2025-02-11
Payer: COMMERCIAL

## 2025-02-11 DIAGNOSIS — G47.19 EXCESSIVE DAYTIME SLEEPINESS: ICD-10-CM

## 2025-02-11 DIAGNOSIS — G47.33 OSA (OBSTRUCTIVE SLEEP APNEA): Primary | ICD-10-CM

## 2025-02-11 PROCEDURE — 98006 SYNCH AUDIO-VIDEO EST MOD 30: CPT | Mod: 95,,, | Performed by: PHYSICIAN ASSISTANT

## 2025-02-11 PROCEDURE — G2211 COMPLEX E/M VISIT ADD ON: HCPCS | Mod: 95,,, | Performed by: PHYSICIAN ASSISTANT

## 2025-02-11 PROCEDURE — 1159F MED LIST DOCD IN RCRD: CPT | Mod: CPTII,95,, | Performed by: PHYSICIAN ASSISTANT

## 2025-02-11 PROCEDURE — 1160F RVW MEDS BY RX/DR IN RCRD: CPT | Mod: CPTII,95,, | Performed by: PHYSICIAN ASSISTANT

## 2025-02-11 NOTE — PROGRESS NOTES
The chief complaint leading to consultation is: LV     Visit type: audiovisual     The patient location is: [place]     12 minutes of total time spent on the encounter, which includes face to face time and non-face to face time preparing to see the patient (eg, review of tests), Obtaining and/or reviewing separately obtained history, Documenting clinical information in the electronic or other health record, Independently interpreting results (not separately reported) and communicating results to the patient/family/caregiver, or Care coordination (not separately reported).      Each patient to whom he or she provides medical services by telemedicine is:  (1) informed of the relationship between the physician and patient and the respective role of any other health care provider with respect to management of the patient; and (2) notified that he or she may decline to receive medical services by telemedicine and may withdraw from such care at any time.      Referred by No ref. provider found     ESTABLISHED PATIENT VISIT    Sherlyn Monahan  is a pleasant 34 y.o. female  with PMH significant for migraines, depression, anxiety, hypothyroidism, IBS, GERD, fatty liver, vit D def, BMI 33+, LV       Here today for: CPAP follow-up     PLAN last visit 1/22/24:   -using and benefiting from CPAP therapy  -continue CPAP nightly  -adjusted pressures 8-15cwp  -CPAP supplies ordered  -discussed LV and CPAP with patient in detail, including possible complications of untreated LV like heart attack/stroke  -advised on strict driving precautions; advised never to drive drowsy  -pt reports flare of GERD sx since starting on PAP, recommended to resume daily Pepcid and follow up with PCP      Since last visit:   Using CPAP nightly since diagnosis with good control of sx. Reports sleep is more consolidated and refreshing. States fatigue is significantly improved on CPAP, but not fully resolved. However, does not feel current level of  fatigue is negatively impacting her quality of life. Reports has done well with pressure adjustment last visit. States mask interface is still comfortable. No complaints at this time. Presents today for annual visit.      PAP history   Problems    Mask Nasal mask   Pressure 6-12cwp   DME DME   Machine age AirSense 10 23   Download 25: 27/30 x 6hrs 4mins, 8-15cwp (10.5/12.7/13.9), leak (0/10.4/37.7), AHI 1.1         SLEEP SCHEDULE   Environment     Bed Time 9:30-10PM   Sleep Latency Up to 60mins   Arousals 0-2   Nocturia 0   Back to sleep Not long typically   Wake time 6:30-7AM   Naps Naps when able   Work          Past Medical History:   Diagnosis Date    Acquired hypothyroidism 2016    Allergy 2009    Seasonal    Anxiety 2019    Depression     Irritable bowel syndrome (IBS)     Keloid cicatrix     Obesity (BMI 30.0-34.9) 2016    loosing weight consistently on 21 Day Fix program     Ulcerative colitis     Urticaria      Patient Active Problem List   Diagnosis    Acquired hypothyroidism    Dysmenorrhea    Chronic migraine without aura without status migrainosus, not intractable    Family history of breast cancer in mother    Irregular menstrual bleeding    S/P     Fatty liver    Status post cholecystectomy    Delivery of pregnancy by  section    Carpal tunnel syndrome of right wrist    Adverse effect of COVID-19 vaccine    Cubital tunnel syndrome of both upper extremities    Depression with anxiety    Low vitamin D level    Decreased range of motion of right elbow    Decreased range of motion of right wrist    Pain in right elbow    Pain in right wrist    Impaired dexterity    Difficulty with activities of daily living    Swelling of right wrist    Swelling of right elbow    Irritable bowel syndrome with diarrhea    Decreased ROM of lumbar spine    Decreased strength of trunk and back    Chronic bilateral low back pain without sciatica    Excessive daytime sleepiness     Ulcerative proctitis with complication    Severe obesity (BMI 35.0-39.9) with comorbidity    Hirsutism       Current Outpatient Medications:     calcium-vitamin D3 (OS-ELISABETH 500 + D3) 500 mg(1,250mg) -200 unit per tablet, Take 1 tablet by mouth., Disp: , Rfl:     cetirizine (ZYRTEC) 10 MG tablet, Take 1 tablet (10 mg total) by mouth 2 (two) times a day., Disp: 60 tablet, Rfl: 12    cholecalciferol, vitamin D3, (VITAMIN D3) 50 mcg (2,000 unit) Tab, Take 1 tablet (2,000 Units total) by mouth daily with breakfast., Disp: 100 tablet, Rfl: 3    gabapentin (NEURONTIN) 100 MG capsule, Take 1 capsule (100 mg total) by mouth 3 (three) times daily., Disp: 90 capsule, Rfl: 0    hydrocortisone (ANUSOL-HC) 2.5 % rectal cream, Place rectally 2 (two) times daily., Disp: 28 g, Rfl: 3    magnesium oxide (MAG-OX) 400 mg (241.3 mg magnesium) tablet, Take 400 mg by mouth once daily., Disp: , Rfl:     mesalamine (LIALDA) 1.2 gram TbEC, Take 2 tablets (2.4 g total) by mouth daily with breakfast., Disp: 60 tablet, Rfl: 3    mupirocin (BACTROBAN) 2 % ointment, Apply topically 2 (two) times daily., Disp: , Rfl:     mupirocin calcium 2% nasl oint (BACTROBAN) 2 % Oint, by Nasal route 2 (two) times daily., Disp: 1 Tube, Rfl: 3    omeprazole (PRILOSEC) 10 MG capsule, , Disp: , Rfl:     predniSONE (DELTASONE) 20 MG tablet, Take 3 tabs in the morning for 3 days. Take with food., Disp: 9 tablet, Rfl: 0    prenatal vit37-iron-folic acid 29 mg iron- 1 mg Chew, Take by mouth., Disp: , Rfl:     rizatriptan (MAXALT-MLT) 10 MG disintegrating tablet, Take 1 tablet (10 mg total) by mouth every 2 (two) hours as needed for Migraine. Max 30 mg/day., Disp: 12 tablet, Rfl: 5    sertraline (ZOLOFT) 50 MG tablet, Take 1 tablet (50 mg total) by mouth every evening., Disp: 90 tablet, Rfl: 3    SYNTHROID 50 mcg tablet, Take 1 tablet (50 mcg total) by mouth once daily., Disp: 90 tablet, Rfl: 3  No current facility-administered medications for this  visit.    Facility-Administered Medications Ordered in Other Visits:     fentaNYL 50 mcg/mL injection  mcg,  mcg, Intravenous, PRN, Yury Ambrosio MD, 50 mcg at 22 1026    LIDOcaine (PF) 10 mg/ml (1%) injection 10 mg, 1 mL, Intradermal, Once, Yury Ambrosio MD    midazolam (VERSED) 1 mg/mL injection 0.5-4 mg, 0.5-4 mg, Intravenous, PRN, Yury Ambrosio MD, 2 mg at 22 1026     There were no vitals filed for this visit.    Physical Exam:    GEN:   Well-appearing  Psych:  Appropriate affect, demonstrates insight  SKIN:  No rash on the face or bridge of the nose      LABS:   Lab Results   Component Value Date    HGB 14.0 10/11/2024         RECORDS REVIEWED:    HST 11/15/23: AHI 6, RDI 18    Previous sleep note; 10/25/23, 24    CPAP interrogation 25: 27/30 x 6hrs 4mins, 8-15cwp (10.5/12.7/13.9), leak (0/10.4/37.7), AHI 1.1    ASSESSMENT    [place] Sleepiness Scale:  Sitting and readin  Watching TV:    2  Passenger in a car x 1 hr:  2  Sitting quietly after lunch:  1  Lying down to rest in PM:  3  Sitting, inactive in public:  0  Sitting+ talking to someone:  0  Stopped in traffic:   0  Total        PROBLEM DESCRIPTION/ Sx on Presentation Interval Hx  STATUS   LV    + snoring, + rare choking arousals, no witnessed apneas  + wakes feeling un-refreshed Good usage and efficiency  controlled   Daytime Sx    + sleepiness when inactive   ESS  on intake (reviewed from 10/25/23) Less sleepy on CPAP improved   Insomnia    Trouble falling asleep: sometimes difficult  Maintenance:         wakes occassionally, not typically difficult to return to sleep  Prior hypnotics:        Current hypnotics:     Waking less frequently  slightly improved   Other issues:       PLAN     -using and benefiting from CPAP therapy  -continue CPAP nightly  -CPAP supplies ordered  -consider trial of wake promoting agents if excessive sleepiness begins to negatively impact quality of  life  -discussed LV and CPAP with patient in detail, including possible complications of untreated LV like heart attack/stroke  -advised on strict driving precautions; advised never to drive drowsy    Advised on plan of care. Answered all patient questions. Patient verbalized understanding and voiced agreement with plan of care.       RTC 12 months or as needed     The patient was given open opportunity to ask questions and/or express concerns about treatment plan. All questions/concerns were discussed.     Two patient identifiers used prior to evaluation.

## 2025-02-18 ENCOUNTER — OFFICE VISIT (OUTPATIENT)
Dept: RHEUMATOLOGY | Facility: CLINIC | Age: 35
End: 2025-02-18
Payer: COMMERCIAL

## 2025-02-18 ENCOUNTER — HOSPITAL ENCOUNTER (OUTPATIENT)
Dept: RADIOLOGY | Facility: HOSPITAL | Age: 35
Discharge: HOME | End: 2025-02-18
Attending: INTERNAL MEDICINE
Payer: COMMERCIAL

## 2025-02-18 ENCOUNTER — RESULTS FOLLOW-UP (OUTPATIENT)
Dept: RHEUMATOLOGY | Facility: CLINIC | Age: 35
End: 2025-02-18

## 2025-02-18 VITALS
HEART RATE: 67 BPM | BODY MASS INDEX: 36.22 KG/M2 | WEIGHT: 217.38 LBS | SYSTOLIC BLOOD PRESSURE: 112 MMHG | DIASTOLIC BLOOD PRESSURE: 77 MMHG | HEIGHT: 65 IN

## 2025-02-18 DIAGNOSIS — G89.29 CHRONIC LOW BACK PAIN, UNSPECIFIED BACK PAIN LATERALITY, UNSPECIFIED WHETHER SCIATICA PRESENT: ICD-10-CM

## 2025-02-18 DIAGNOSIS — M75.81 RIGHT ROTATOR CUFF TENDINITIS: ICD-10-CM

## 2025-02-18 DIAGNOSIS — K51.20 UC (ULCERATIVE COLITIS CONFINED TO RECTUM): ICD-10-CM

## 2025-02-18 DIAGNOSIS — M17.0 OSTEOARTHRITIS OF BOTH KNEES, UNSPECIFIED OSTEOARTHRITIS TYPE: ICD-10-CM

## 2025-02-18 DIAGNOSIS — E66.01 SEVERE OBESITY (BMI 35.0-39.9) WITH COMORBIDITY: ICD-10-CM

## 2025-02-18 DIAGNOSIS — E03.9 HYPOTHYROIDISM, UNSPECIFIED TYPE: Primary | ICD-10-CM

## 2025-02-18 DIAGNOSIS — M54.50 LOW BACK PAIN, UNSPECIFIED BACK PAIN LATERALITY, UNSPECIFIED CHRONICITY, UNSPECIFIED WHETHER SCIATICA PRESENT: ICD-10-CM

## 2025-02-18 DIAGNOSIS — M54.50 CHRONIC LOW BACK PAIN, UNSPECIFIED BACK PAIN LATERALITY, UNSPECIFIED WHETHER SCIATICA PRESENT: ICD-10-CM

## 2025-02-18 PROCEDURE — 73030 X-RAY EXAM OF SHOULDER: CPT | Mod: TC,RT

## 2025-02-18 PROCEDURE — 72170 X-RAY EXAM OF PELVIS: CPT | Mod: TC

## 2025-02-18 PROCEDURE — 73564 X-RAY EXAM KNEE 4 OR MORE: CPT | Mod: TC,50

## 2025-02-18 ASSESSMENT — ROUTINE ASSESSMENT OF PATIENT INDEX DATA (RAPID3)
PAIN SCORE: 2
PATIENT GLOBAL ASSESSMENT SCORE: 3
AM STIFFNESS SCORE: 1, YES
PSYCHOLOGICAL DISTRESS SCORE: 1.1
FATIGUE SCORE: 7
WHEN YOU AWAKENED IN THE MORNING OVER THE LAST WEEK, PLEASE INDICATE THE AMOUNT OF TIME IT TAKES UNTIL YOU ARE AS LIMBER AS YOU WILL BE FOR THE DAY: 1
TOTAL RAPID3 SCORE: 1.78
MDHAQ FUNCTION SCORE: 0.1

## 2025-02-18 NOTE — PATIENT INSTRUCTIONS
CBC, CMP, ESR, CRP TSH  free T4, B27  X-ray right shoulder, ortho knees and ap pelvis after urine pregnancy test  May need MRI SIJ in future depending on labs and x-rays  Ref to PT  Increase aerobic exercise  Kumar Chi, yoga  [id number] Mediterranean diet as much as tolerated with UC  RTC  3months

## 2025-02-18 NOTE — PROGRESS NOTES
2/11/2025    11:11 AM   Rapid3 Question Responses and Scores   MDHAQ Score 0.1   Psychologic Score 1.1   Pain Score 2   When you awakened in the morning OVER THE LAST WEEK, did you feel stiff? Yes   If Yes, please indicate the number of hours until you are as limber as you will be for the day 1   Fatigue Score 7   Global Health Score 3   RAPID3 Score 1.78    Answers submitted by the patient for this visit:  Rheumatology Questionnaire (Submitted on 2/11/2025)  fever: No  eye redness: No  mouth sores: No  headaches: Yes  shortness of breath: No  chest pain: No  trouble swallowing: No  diarrhea: Yes  constipation: No  unexpected weight change: No  genital sore: No  dysuria: No  During the last 3 days, have you had a skin rash?: No  Bruises or bleeds easily: No  cough: No

## 2025-02-18 NOTE — PROGRESS NOTES
"Patient ID:  Sherlyn Monahan    YOB: 1990     MRN:  [id number]     Subjective:     Chief Complaint: joint pains     History of Present Illness:  Pt is a 34 y.o. female with a PMHx as listed below including UC, Anxiety, Depression, PCOS, migraines who presents today for initial evaluation of the above complaint. Referred by Dr. Altamirano (GI). Per patient message, Dr. Herr (retinal specialist) recommended pt see rheum due to joint pains with associated UC flare ups. Father and brother also have autoimmune conditions. She has been seeing Dr. Herr to monitor a macular pucker of right eye that may have been a result of uveitis in the past that is not currently active.    Pt reports her UC has been under control, but during flares she has low back, hip, knee pain without swelling. She has sudden onset right shoulder pain x1 month, no trauma or injuries. She reports it feels like she "slept on it funny but it never resolved." She recently was on a short course of prednisone for migraines that also relieved right shoulder pain but returned after steroid course. Denies pain in hands, elbows. She has h/o right ulnar neuropathy with decompression surgery June 2022, uncomplicated. She reports sometimes when she wakes up in the morning her hands are stiff and swollen, on and off for the last couple of weeks. Denies any swollen joints currently. 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 She takes occasional Tylenol. Does not take NSAIDs due to UC. She sees a chiropractor weekly for back and neck which helps. She reports her anxiety has been well-managed.    Low back pain does not awaken her at night. Worse with prolonged sitting or standing.    She reports a h/o chronic hives that flares after strep throat and COVID booster. She takes Zyrtec daily and has not had a flare in over 2 years.    Diet: lean meat, rice, some fried foods, vegetables sometimes aggravates UC  Exercise: not very " often    Morning stiffness: 30 minutes - 1 hour  H/o migraines on Maxalt PRN and gabapentin with relief. Followed by neurology (Dr. Sinha). Typically occurs 3-5 times per month.    Current medications: Gabapentin 100mg TID (but takes it 1-2 times daily), Maxalt 10mg PRN HA, synthroid 50 mcg daily, Zoloft 50mg, mesalamine 2.4g daily, vitamin D3, Magnesium oxide, prenatal, calcium    Endorses fatigue  Denies hair loss, dry eyes, vision changes, dry mouth, oral/nasal ulcers, trouble swallowing, new rashes.    Review of Systems   Review of Systems   Constitutional:  Negative for fever and unexpected weight change.   HENT:  Negative for mouth sores and trouble swallowing.    Eyes:  Negative for redness.   Respiratory:  Negative for cough and shortness of breath.    Cardiovascular:  Negative for chest pain.   Gastrointestinal:  Positive for diarrhea. Negative for constipation.   Genitourinary:  Negative for dysuria and genital sores.   Skin:  Negative for rash.   Neurological:  Positive for headaches.   Hematological:  Does not bruise/bleed easily.        Past Medical History:  Past Medical History:   Diagnosis Date    Acquired hypothyroidism 2016    Allergy 2009    Seasonal    Anxiety 2019    Depression 2019    Irritable bowel syndrome (IBS)     Keloid cicatrix     Obesity (BMI 30.0-34.9) 2016    loosing weight consistently on 21 Day Fix program     Ulcerative colitis     Urticaria         Past Surgical History:  Past Surgical History:   Procedure Laterality Date    breast reduction  2012    BREAST SURGERY      breast reduction     SECTION       SECTION N/A 2020    Procedure:  SECTION;  Surgeon: Chiara Smith MD;  Location: [facility] L&D;  Service: OB/GYN;  Laterality: N/A;    CHOLECYSTECTOMY  2019    COLONOSCOPY N/A 2023    Procedure: COLONOSCOPY;  Surgeon: Tom Palmer MD;  Location: [facility] ENDO;  Service: Endoscopy;  Laterality: N/A;    DILATION  AND CURETTAGE OF UTERUS USING SUCTION N/A 8/21/2024    Procedure: DILATION AND CURETTAGE, UTERUS, USING SUCTION;  Surgeon: Chiara Smith MD;  Location: [facility] OR;  Service: OB/GYN;  Laterality: N/A;    ENDOSCOPIC CARPAL TUNNEL RELEASE Right 06/24/2022    Procedure: RELEASE, CARPAL TUNNEL, ENDOSCOPIC;  Surgeon: Phoenix Rojas MD;  Location: [facility] OR;  Service: Orthopedics;  Laterality: Right;    FLEXIBLE SIGMOIDOSCOPY N/A 4/5/2024    Procedure: SIGMOIDOSCOPY, FLEXIBLE;  Surgeon: oTm Palmer MD;  Location: [facility] ENDO;  Service: Endoscopy;  Laterality: N/A;    LAPAROSCOPIC CHOLECYSTECTOMY N/A 06/11/2019    Procedure: CHOLECYSTECTOMY, LAPAROSCOPIC;  Surgeon: Missael Tolentino MD;  Location: [facility] OR;  Service: General;  Laterality: N/A;  video    ULNAR NERVE TRANSPOSITION Right 06/24/2022    Procedure: TRANSPOSITION, NERVE, ULNAR;  Surgeon: Phoenix Rojas MD;  Location: [facility] OR;  Service: Orthopedics;  Laterality: Right;         Current Medications:  Current Medications[1]    Objective:     Vitals:    02/18/25 1341   BP: 112/77   Pulse: 67      Body mass index is 36.17 kg/m².     Physical Exam   Constitutional: She is oriented to person, place, and time. She appears obese. No distress.   HENT:   Mouth/Throat: Mucous membranes are moist.   Eyes: Pupils are equal, round, and reactive to light. Conjunctivae are normal.   Cardiovascular: Normal rate, normal heart sounds and normal pulses.   Pulmonary/Chest: Effort normal and breath sounds normal.   Abdominal: Soft.   Musculoskeletal:         General: Normal range of motion.      Right shoulder: Tenderness present.      Left shoulder: Normal.      Right elbow: Normal.      Left elbow: Normal.      Right wrist: Normal.      Left wrist: Normal.      Cervical back: Normal range of motion. No rigidity.      Right knee: Normal.      Left knee: Tenderness present.      Comments: Right shoulder: right rotator cuff tendinitis with full AROM + Neer and +  Mcneill-Hosea  + painful arc on right   Tender 10/18 fibromyalgia TPS     Neurological: She is alert and oriented to person, place, and time.   Skin: Skin is warm and dry. Capillary refill takes less than 2 seconds.   Psychiatric: Her behavior is normal. Mood normal.       Right Side Rheumatological Exam     Examination finds the elbow, wrist, knee, 1st PIP, 1st MCP, 2nd PIP, 2nd MCP, 3rd PIP, 3rd MCP, 4th PIP, 4th MCP, 5th PIP and 5th MCP normal.    The patient is tender to palpation of the shoulder    Muscle Strength (0-5 scale):  Deltoid:  5  Biceps: 5/5   Triceps:  5  : 5/5   Iliopsoas: 4.8  Quadriceps:  5   Distal Lower Extremity: 5    Left Side Rheumatological Exam     Examination finds the shoulder, elbow, wrist, 1st PIP, 1st MCP, 2nd PIP, 2nd MCP, 3rd PIP, 3rd MCP, 4th PIP, 4th MCP, 5th PIP and 5th MCP normal.    The patient is tender to palpation of the knee.    Muscle Strength (0-5 scale):  Deltoid:  5  Biceps: 5/5   Triceps:  5  :  5/5   Iliopsoas: 4.8  Quadriceps:  5   Distal Lower Extremity: 5         Schober test: 10-13.5cm  Chest expansion: 9cm     No data to display     There is currently no information documented on the homunculus. Go to the Rheumatology activity and complete the homunculus joint exam.      Assessment:     1. Hypothyroidism, unspecified type    2. UC (ulcerative colitis confined to rectum)    3. Low back pain, unspecified back pain laterality, unspecified chronicity, unspecified whether sciatica present       H/o rectal UC on mesalamine 2.4 g with breakfast in remission  Mixed inflammatory and nonspecific LBP evaluate for asSpA  H/o possible old uveitis based on evaluation by   Retinal Specialist  Right rotator cuff tendinitis  OA knees   Class 2 obesity Body mass index is 36.17 kg/m².  Migraine headaches  Depression/anxiety  Hypothyroidism  PCOS     Plan:      Problem List Items Addressed This Visit    None  Visit Diagnoses         Hypothyroidism, unspecified type     -  Primary    Relevant Orders    TSH    T4, FREE    Pregnancy, urine rapid      UC (ulcerative colitis confined to rectum)        Relevant Orders    Sedimentation rate    C-Reactive Protein    CBC Auto Differential    Comprehensive Metabolic Panel    HLA B27 Antigen    X-Ray Pelvis Routine AP    Pregnancy, urine rapid      Low back pain, unspecified back pain laterality, unspecified chronicity, unspecified whether sciatica present        Relevant Orders    HLA B27 Antigen    X-Ray Pelvis Routine AP    Pregnancy, urine rapid              CBC, CMP, ESR, CRP TSH  free T4, B27  X-ray right shoulder, ortho knees and ap pelvis after urine pregnancy test  Ref to PT for right shoulder  Increase aerobic exercise  Kumar Chi, yoga  [id number] Mediterranean diet as much as tolerated with UC     Gisela Bernal, DO  PGY-1  [employer] [facility]           [1]   Current Outpatient Medications:     calcium-vitamin D3 (OS-ELISABETH 500 + D3) 500 mg(1,250mg) -200 unit per tablet, Take 1 tablet by mouth., Disp: , Rfl:     cetirizine (ZYRTEC) 10 MG tablet, Take 1 tablet (10 mg total) by mouth 2 (two) times a day., Disp: 60 tablet, Rfl: 12    cholecalciferol, vitamin D3, (VITAMIN D3) 50 mcg (2,000 unit) Tab, Take 1 tablet (2,000 Units total) by mouth daily with breakfast., Disp: 100 tablet, Rfl: 3    gabapentin (NEURONTIN) 100 MG capsule, Take 1 capsule (100 mg total) by mouth 3 (three) times daily., Disp: 90 capsule, Rfl: 0    hydrocortisone (ANUSOL-HC) 2.5 % rectal cream, Place rectally 2 (two) times daily., Disp: 28 g, Rfl: 3    magnesium oxide (MAG-OX) 400 mg (241.3 mg magnesium) tablet, Take 400 mg by mouth once daily., Disp: , Rfl:     mesalamine (LIALDA) 1.2 gram TbEC, Take 2 tablets (2.4 g total) by mouth daily with breakfast., Disp: 60 tablet, Rfl: 3    mupirocin (BACTROBAN) 2 % ointment, Apply topically 2 (two) times daily., Disp: , Rfl:     mupirocin calcium 2% nasl oint (BACTROBAN) 2 % Oint, by Nasal route 2 (two) times daily., Disp: 1 Tube, Rfl:  3    omeprazole (PRILOSEC) 10 MG capsule, , Disp: , Rfl:     prenatal vit37-iron-folic acid 29 mg iron- 1 mg Chew, Take by mouth., Disp: , Rfl:     rizatriptan (MAXALT-MLT) 10 MG disintegrating tablet, Take 1 tablet (10 mg total) by mouth every 2 (two) hours as needed for Migraine. Max 30 mg/day., Disp: 12 tablet, Rfl: 5    sertraline (ZOLOFT) 50 MG tablet, Take 1 tablet (50 mg total) by mouth every evening., Disp: 90 tablet, Rfl: 3    SYNTHROID 50 mcg tablet, Take 1 tablet (50 mcg total) by mouth once daily., Disp: 90 tablet, Rfl: 3    predniSONE (DELTASONE) 20 MG tablet, Take 3 tabs in the morning for 3 days. Take with food., Disp: 9 tablet, Rfl: 0  No current facility-administered medications for this visit.    Facility-Administered Medications Ordered in Other Visits:     fentaNYL 50 mcg/mL injection  mcg,  mcg, Intravenous, PRN, Yury Ambrosio MD, 50 mcg at 06/24/22 1026    LIDOcaine (PF) 10 mg/ml (1%) injection 10 mg, 1 mL, Intradermal, Once, Yury Ambrosio MD    midazolam (VERSED) 1 mg/mL injection 0.5-4 mg, 0.5-4 mg, Intravenous, PRN, Yury Ambrosio MD, 2 mg at 06/24/22 1026

## 2025-02-18 NOTE — PROGRESS NOTES
I have personally taken the history and examined the patient and agree with the resident,s note as stated above      Pain right shoulder  Pain left knee  Longstanding nearly daily LBP, doesn't awaken, intermittent am stiffness, not necessarily better with activity, when previously able to take NSAIDs significant relief, typically worse end of day after on feet a long time  No h/o AAU but possiblly residual noted on retinal exam  No psoriasis    Exam:  right rotator cuff tendinitis with full AROM + Neer and + Mcneill-Hosea  + painful arc on right  pain with resisted abduction  Tender 10/18 fibromyalgia TPS  PF crepitus both knees  Schober's 10-13.5 cm  Nl lateral flexion and extension  Chest expansion 9 cm  Neck rom nl      Latest Reference Range & Units 10/11/24 15:53   WBC 3.90 - 12.70 K/uL 9.68   RBC 4.00 - 5.40 M/uL 4.39   Hemoglobin 12.0 - 16.0 g/dL 14.0   Hematocrit 37.0 - 48.5 % 41.7   MCV 82 - 98 fL 95   MCH 27.0 - 31.0 pg 31.9 (H)   MCHC 32.0 - 36.0 g/dL 33.6   RDW 11.5 - 14.5 % 11.9   Platelet Count 150 - 450 K/uL 224   MPV 9.2 - 12.9 fL 11.2   Iron 30 - 160 ug/dL 74   TIBC 250 - 450 ug/dL 330   Saturated Iron 20 - 50 % 22   Transferrin 200 - 375 mg/dL 223   Ferritin 20.0 - 300.0 ng/mL 238   TSH 0.400 - 4.000 uIU/mL 1.010   Free T4 0.71 - 1.51 ng/dL 0.95   Thyroperoxidase Antibodies <6.0 IU/mL <6.0   (H): Data is abnormally high     Latest Reference Range & Units 11/14/24 11:55   CRP 0.0 - 8.2 mg/L 6.7      Latest Reference Range & Units 04/04/24 10:36   Sodium 136 - 145 mmol/L 138   Potassium 3.5 - 5.1 mmol/L 4.4   Chloride 95 - 110 mmol/L 104   CO2 23 - 29 mmol/L 21 (L)   Anion Gap 8 - 16 mmol/L 13   BUN 6 - 20 mg/dL 12   Creatinine 0.5 - 1.4 mg/dL 0.8   eGFR >60 mL/min/1.73 m^2 >60   Glucose 70 - 110 mg/dL 77   Calcium 8.7 - 10.5 mg/dL 10.0   ALP 55 - 135 U/L 60   PROTEIN TOTAL 6.0 - 8.4 g/dL 7.8   Albumin 3.5 - 5.2 g/dL 4.4   BILIRUBIN TOTAL 0.1 - 1.0 mg/dL 0.4   AST 10 - 40 U/L 19   ALT 10 - 44 U/L  13   Cholesterol Total 120 - 199 mg/dL 216 (H)   HDL 40 - 75 mg/dL 50   HDL/Cholesterol Ratio 20.0 - 50.0 % 23.1   Non-HDL Cholesterol mg/dL 166   Total Cholesterol/HDL Ratio 2.0 - 5.0  4.3   Triglycerides 30 - 150 mg/dL 223 (H)   LDL Cholesterol 63.0 - 159.0 mg/dL 121.4   Vitamin D 30 - 96 ng/mL 37   Hemoglobin A1C External 4.0 - 5.6 % 4.8   Estimated Avg Glucose 68 - 131 mg/dL 91   TSH 0.400 - 4.000 uIU/mL 1.154   (L): Data is abnormally low  (H): Data is abnormally high   Latest Reference Range & Units 04/29/24 20:56   Specimen UA  Urine, Clean Catch   Color, UA Yellow, Straw, Dalia  Yellow   Appearance, UA Clear  Clear   Spec Grav UA 1.005 - 1.030  1.020   pH, UA 5.0 - 8.0  7.0   Protein, UA Negative  Negative   Glucose, UA Negative  Negative   Ketones, UA Negative  Negative   Blood, UA Negative  Negative   NITRITE UA Negative  Negative   UROBILINOGEN UA <2.0 EU/dL Negative   Bilirubin (UA) Negative  Negative   Leukocyte Esterase, UA Negative  Trace !   RBC, UA 0 - 4 /hpf 2   WBC, UA 0 - 5 /hpf 6 (H)   Bacteria, UA None-Occ /hpf Rare   Squam Epithel, UA /hpf 3   Microscopic Comment  SEE COMMENT   !: Data is abnormal  (H): Data is abnormally high    H/o rectal UC on mesalamine 2.4 g with breakfast in remission  Mixed inflammatory and nonspecific LBP evaluate for asSpA  H/o possible old uveitis based on evaluation by   Retinal Specialist  Right rotator cuff tendinitis  OA knees   Class 2 obesity Body mass index is 36.17 kg/m².  Migraine headaches  Depression/anxiety  Hypothyroidism  PCOS    CBC, CMP, ESR, CRP TSH  free T4, B27  X-ray right shoulder, ortho knees and ap pelvis after urine pregnancy test  May need MRI SIJ in future depending on labs and x-rays  Ref to PT  Increase aerobic exercise  Kumar Chi, yoga  [id number] Mediterranean diet as much as tolerated with UC  RTC  3months

## 2025-02-19 ENCOUNTER — OFFICE VISIT (OUTPATIENT)
Facility: CLINIC | Age: 35
End: 2025-02-19
Payer: COMMERCIAL

## 2025-02-19 DIAGNOSIS — F34.89 OTHER SPECIFIED PERSISTENT MOOD DISORDERS: ICD-10-CM

## 2025-02-19 DIAGNOSIS — G47.33 OSA ON CPAP: ICD-10-CM

## 2025-02-19 DIAGNOSIS — G43.009 MIGRAINE WITHOUT AURA AND WITHOUT STATUS MIGRAINOSUS, NOT INTRACTABLE: Primary | ICD-10-CM

## 2025-02-19 DIAGNOSIS — F41.8 DEPRESSION WITH ANXIETY: ICD-10-CM

## 2025-02-19 RX ORDER — AMITRIPTYLINE HYDROCHLORIDE 10 MG/1
10 TABLET, FILM COATED ORAL NIGHTLY
Qty: 90 TABLET | Refills: 1 | Status: SHIPPED | OUTPATIENT
Start: 2025-02-19

## 2025-02-19 NOTE — PROGRESS NOTES
Established Patient   SUBJECTIVE:  Patient ID: Sherlyn Monahan   Chief Complaint: Follow-up    History of Present Illness:  Sherlyn Monahan is a 34 y.o. female who presents for follow-up of headaches via virtual visit.     The patient location is: in [place]   The chief complaint leading to consultation is: Follow-up  Visit type: Virtual visit with synchronous audio and video  Total time spent with patient: 16 min  Each patient to whom he or she provides medical services by telemedicine is:  (1) informed of the relationship between the physician and patient and the respective role of any other health care provider with respect to management of the patient; and (2) notified that he or she may decline to receive medical services by telemedicine and may withdraw from such care at any time.    Recommendations made at last Office Visit on 11/12/2024:  - Continue magox 400 mg and vitamin b2 400 mg daily for migraine prevention   - For acute migraine - maxalt 10 mg mlt   - refills provided   - send message via portal should she become pregnant prior to next scheduled appointment   - Continue tracking headaches   - RTC in 3-6 months or sooner if needed    02/19/2025 - Interval History:  Sent multiple messages via patient portal throughout the month of January citing intractable, high frequency migraines.  Was given prednisone taper without benefit.  Reports at least 21-25 headache days per month.  She was eventually started on gabapentin 100 mg and instructed to take nightly with the option to uptitrate dose to 2 or 3 times per day.  Initially took gabapentin twice daily, however this caused drowsiness, is now taking only as needed.  Reports 7 headache days so far in February.  She has continued treating acute migraines with rizatriptan 10 mg mlt, during January maxalt was only giving her a few hours of relief.  Since starting gabapentin, has taken maxalt twice, once it gave her more relief, the second time, it did  "not.  Denies any acute change in January which could explain the drastic and abrupt uptick in migraine frequency.  Denies head/neck injury, change in medication/diet.      Sleep has been not so great the last few weeks, though has been under increased stress the last two weeks.  Diagnosed with LV, using CPAP nightly, feels this has been beneficial.    She has continued sertraline 50 mg daily for anxiety and depression.      Still actively trying to get pregnant.      11/12/2024 - Interval History:  Migraines have been better over the last two months, feels as her hormones continue to level out, migraine frequency has been going down.  Migraines last no longer than 30-60 minutes after treating with maxalt.  Has tracked only 1 migraine so far in November, 5 in October, and 14 in September.  Pleased with the natural gradual decrease in migraine frequency, does not wish to make adjustments in her treatment plan at this time.      Otherwise, information below is still accurate and current.      09/03/2024 - Interval History:  Found of she was pregnant 3 days after her last visit, unfortunately pregnancy was non-viable, had d & c on 8/21.  Has had an uptick in headache/migraine frequency over the last month, however she feels the large majority of these headaches were triggered by stress/hormone changes relating to pregnancy and subsequent miscarriage.  Feels headaches have begun to decrease in frequency over the last week or so.       Otherwise, information below is still accurate and current.      History of Present Illness:   34 y.o. female with hypothyroidism, migraines, anxiety/depression, who presents for evaluation of headaches via virtual visit.  Migraines began around age 14-15 yo and have been ongoing.  Migraines initially began shortly after she suffered a concussion.  Over the last 6 months, migraines have started to become more frequent.  Began taking "more magnesium" in January which has helped, now taking " 1700 mg twice daily.  Tracked 5 migraines in June, but also had 7 headache days.  Her typical month is 2-3 migraines per month.  Migraines typically last about 10 hours in duration.  She has been treating acute migraines with tylenol and ice packs which are only somewhat effective.  She has taken sumatriptan 50 mg in the past, which she does recall please.  Associated symptoms include photophobia, phonophobia, nausea, scalp sensitivity, shooting pains in the occipitalis, dizziness, fatigue, neck pain, visual disturbance.    She is not currently pregnant, but she and her  are actively trying to conceive.       Treatments Tried and Response  Sumatriptan   Fioricet   Sertraline   Magnesium   Vit b2 400 mg -    Coq10 -   Maxalt 10 mg mlt - helping  Gabapentin 100 mg prn - helping some - instructed to take nightly today   Amitriptyline - given today   Ubrelvy 100 mg - given today     Current Medications:  Current Medications[1]    Review of Systems - A review of 10+ systems was conducted with pertinent positive and negative findings documented in HPI with all other systems reviewed and negative.    PFSH: Past medical, family, and social history reviewed as documented in chart with pertinent positive medical, family, and social history detailed in HPI.    OBJECTIVE:  Vitals: There were no vitals taken for this visit.     Physical Exam:  Constitutional: she appears well-developed and well-nourished. she is well groomed. NAD.     Review of Data:   Notes from sleep medicine, rheumatology reviewed   Labs:  Lab Visit on 02/18/2025   Component Date Value Ref Range Status    Sed Rate 02/18/2025 9  0 - 36 mm/Hr Final    CRP 02/18/2025 5.1  0.0 - 8.2 mg/L Final    WBC 02/18/2025 8.88  3.90 - 12.70 K/uL Final    RBC 02/18/2025 4.54  4.00 - 5.40 M/uL Final    Hemoglobin 02/18/2025 14.2  12.0 - 16.0 g/dL Final    Hematocrit 02/18/2025 43.8  37.0 - 48.5 % Final    MCV 02/18/2025 97  82 - 98 fL Final    MCH 02/18/2025 31.3 (H)   27.0 - 31.0 pg Final    MCHC 02/18/2025 32.4  32.0 - 36.0 g/dL Final    RDW 02/18/2025 12.2  11.5 - 14.5 % Final    Platelets 02/18/2025 218  150 - 450 K/uL Final    MPV 02/18/2025 10.5  9.2 - 12.9 fL Final    Immature Granulocytes 02/18/2025 0.3  0.0 - 0.5 % Final    Gran # (ANC) 02/18/2025 5.0  1.8 - 7.7 K/uL Final    Immature Grans (Abs) 02/18/2025 0.03  0.00 - 0.04 K/uL Final    Lymph # 02/18/2025 3.0  1.0 - 4.8 K/uL Final    Mono # 02/18/2025 0.7  0.3 - 1.0 K/uL Final    Eos # 02/18/2025 0.1  0.0 - 0.5 K/uL Final    Baso # 02/18/2025 0.01  0.00 - 0.20 K/uL Final    nRBC 02/18/2025 0  0 /100 WBC Final    Gran % 02/18/2025 56.8  38.0 - 73.0 % Final    Lymph % 02/18/2025 34.1  18.0 - 48.0 % Final    Mono % 02/18/2025 7.8  4.0 - 15.0 % Final    Eosinophil % 02/18/2025 0.9  0.0 - 8.0 % Final    Basophil % 02/18/2025 0.1  0.0 - 1.9 % Final    Differential Method 02/18/2025 Automated   Final    Sodium 02/18/2025 140  136 - 145 mmol/L Final    Potassium 02/18/2025 4.0  3.5 - 5.1 mmol/L Final    Chloride 02/18/2025 106  95 - 110 mmol/L Final    CO2 02/18/2025 20 (L)  23 - 29 mmol/L Final    Glucose 02/18/2025 83  70 - 110 mg/dL Final    BUN 02/18/2025 9  6 - 20 mg/dL Final    Creatinine 02/18/2025 0.8  0.5 - 1.4 mg/dL Final    Calcium 02/18/2025 9.7  8.7 - 10.5 mg/dL Final    Total Protein 02/18/2025 7.4  6.0 - 8.4 g/dL Final    Albumin 02/18/2025 4.0  3.5 - 5.2 g/dL Final    Total Bilirubin 02/18/2025 0.3  0.1 - 1.0 mg/dL Final    Alkaline Phosphatase 02/18/2025 62  40 - 150 U/L Final    AST 02/18/2025 24  10 - 40 U/L Final    ALT 02/18/2025 11  10 - 44 U/L Final    eGFR 02/18/2025 >60.0  >60 mL/min/1.73 m^2 Final    Anion Gap 02/18/2025 14  8 - 16 mmol/L Final    TSH 02/18/2025 0.857  0.400 - 4.000 uIU/mL Final    Free T4 02/18/2025 0.84  0.71 - 1.51 ng/dL Final   Lab Visit on 11/18/2024   Component Date Value Ref Range Status    Calprotectin 11/18/2024 10.1  <50 mcg/g Final   Lab Visit on 11/14/2024   Component Date  Value Ref Range Status    CRP 11/14/2024 6.7  0.0 - 8.2 mg/L Final   Office Visit on 11/07/2024   Component Date Value Ref Range Status    Final Pathologic Diagnosis 11/07/2024    Final                    Value:1. Skin, midline upper back, shave biopsy:   - MELANOCYTIC NEVUS, COMPOUND TYPE.    This lesion is benign.      Gross 11/07/2024    Final                    Value:Patient ID/MRN:  [id number]   Pathology label MRN:  [id number]    The specimen is received in formalin labeled &quot;midline upper back&quot;. The specimen consists of a 0.7 x 0.7 x 0.2 cm shave of tan-white skin. The skin is remarkable for a tan-brown area of discoloration measuring 0.4 x 0.4 cm abutting the biopsy   margin. The specimen is inked blue at the biopsy margin, trisected and submitted entirely in cassette [id number].    Cassy Hayes  Grossing Technologist      Microscopic Exam 11/07/2024    Final                    Value:Sections show junctional nests of melanocytes associated with dermal nests and cords of similar cells showing architectural symmetry, maturation, and no cytological atypia.  Mart 1 immunohistochemical stain was examined with appropriately reactive   controls and used in evaluation of this lesion.      Disclaimer 11/07/2024 Unless the case is a 'gross only' or additional testing only, the final diagnosis for each specimen is based on a microscopic examination of appropriate tissue sections.   Final   Lab Visit on 10/11/2024   Component Date Value Ref Range Status    WBC 10/11/2024 9.68  3.90 - 12.70 K/uL Final    RBC 10/11/2024 4.39  4.00 - 5.40 M/uL Final    Hemoglobin 10/11/2024 14.0  12.0 - 16.0 g/dL Final    Hematocrit 10/11/2024 41.7  37.0 - 48.5 % Final    MCV 10/11/2024 95  82 - 98 fL Final    MCH 10/11/2024 31.9 (H)  27.0 - 31.0 pg Final    MCHC 10/11/2024 33.6  32.0 - 36.0 g/dL Final    RDW 10/11/2024 11.9  11.5 - 14.5 % Final    Platelets 10/11/2024 224  150 - 450 K/uL Final    MPV 10/11/2024 11.2  9.2 -  12.9 fL Final    Iron 10/11/2024 74  30 - 160 ug/dL Final    Transferrin 10/11/2024 223  200 - 375 mg/dL Final    TIBC 10/11/2024 330  250 - 450 ug/dL Final    Saturated Iron 10/11/2024 22  20 - 50 % Final    Ferritin 10/11/2024 238  20.0 - 300.0 ng/mL Final    TSH 10/11/2024 1.010  0.400 - 4.000 uIU/mL Final    Thyroperoxidase Antibodies 10/11/2024 <6.0  <6.0 IU/mL Final    Free T4 10/11/2024 0.95  0.71 - 1.51 ng/dL Final   Admission on 08/21/2024, Discharged on 08/21/2024   Component Date Value Ref Range Status    Group & Rh 08/21/2024 O POS   Final    Indirect Carla 08/21/2024 NEG   Final    Specimen Outdate 08/21/2024 08/24/2024 23:59   Final    Final Pathologic Diagnosis 08/21/2024 Products of conception, chorionic villi are identified.   Final    Gross 08/21/2024    Final                    Value:One Part Case:    Part 1  Pathology ID# KES-  Pathology MRN# [id number]  Hospital/Clinic label MRN# [id number]    Received in formalin labeled with the patient's name, MRN, and &quot;products of conception&quot; is a 5.5 x 5.0 x 1.5 cm aggregate of pink-red soft tissue fragments admixed with blood clot. Chorionic villi are not identified. There are no fetal or   embryonic parts identified. Representative sections are submitted in cassettes:  FGD--1-A  QQO--1-B  UIL--1-C  BHX--1-D    GARRICK Humphrey, PA (ASCP)CM      Disclaimer 08/21/2024 Unless the case is a 'gross only' or additional testing only, the final diagnosis for each specimen is based on a microscopic examination of appropriate tissue sections.   Final     Imaging:  No results found for this or any previous visit.  Note: I have independently reviewed any/all imaging/labs/tests and agree with the report (s) as documented.  Any discrepancies will be as noted/demarcated by free text.  MANAN, FNP-C 2/19/2025    ASSESSMENT:  1. Migraine without aura and without status migrainosus, not intractable    2. Other specified persistent mood  disorders    3. Depression with anxiety    4. LV on CPAP      PLAN:  - For migraine prevention - start amitriptyline 10 mg nightly   - Discussed treatment options are somewhat limited given she is still actively trying to get pregnant   - Encouraged her to begin taking gabapentin 100 mg nightly - may uptitrate dose to TID based on headache intensity   - For acute migraine - maxalt 10 mg mlt   - Secondary treatment option - trial ubrelvy 100 mg   - She was instructed to d/c use of ubrelvy should she become pregnant   - She was instructed to treat acute migraines aggressively from the onset of pain and treat until pain and symptom resolution    - Continue tracking headaches   - Send update via patient portal in 2-3 weeks   - Depression/Anxiety - chronic and stable, continue sertraline 50 mg daily, management per PCP   - VL - continue nightly use of CPAP device - management per sleep medicine   - RTC in 2 months or sooner if needed    Orders Placed This Encounter    ubrogepant (UBRELVY) 100 mg tablet    amitriptyline (ELAVIL) 10 MG tablet     Questions and concerns were sought and answered to the patient's stated verbal satisfaction.  The patient verbalizes understanding and agreement with the above stated treatment plan.     Visit today included increased complexity associated with the care of the episodic problem migraine without aura addressed and managing the longitudinal care of the patient due to the serious and/or complex managed problem(s).  Plan for patient to return to clinic in 2 months for follow-up visit.     CC: Tatiana Pedroza MD Elizabeth C. Vulevich, FNP-C  [facility]   [phone]    Dr. Pate was available during today's encounter.            [1]   Current Outpatient Medications:     amitriptyline (ELAVIL) 10 MG tablet, Take 1 tablet (10 mg total) by mouth every evening., Disp: 90 tablet, Rfl: 1    calcium-vitamin D3 (OS-ELISABETH 500 + D3) 500 mg(1,250mg) -200 unit per  tablet, Take 1 tablet by mouth., Disp: , Rfl:     cetirizine (ZYRTEC) 10 MG tablet, Take 1 tablet (10 mg total) by mouth 2 (two) times a day., Disp: 60 tablet, Rfl: 12    cholecalciferol, vitamin D3, (VITAMIN D3) 50 mcg (2,000 unit) Tab, Take 1 tablet (2,000 Units total) by mouth daily with breakfast., Disp: 100 tablet, Rfl: 3    gabapentin (NEURONTIN) 100 MG capsule, Take 1 capsule (100 mg total) by mouth 3 (three) times daily., Disp: 90 capsule, Rfl: 0    hydrocortisone (ANUSOL-HC) 2.5 % rectal cream, Place rectally 2 (two) times daily., Disp: 28 g, Rfl: 3    magnesium oxide (MAG-OX) 400 mg (241.3 mg magnesium) tablet, Take 400 mg by mouth once daily., Disp: , Rfl:     mesalamine (LIALDA) 1.2 gram TbEC, Take 2 tablets (2.4 g total) by mouth daily with breakfast., Disp: 60 tablet, Rfl: 3    mupirocin (BACTROBAN) 2 % ointment, Apply topically 2 (two) times daily., Disp: , Rfl:     mupirocin calcium 2% nasl oint (BACTROBAN) 2 % Oint, by Nasal route 2 (two) times daily., Disp: 1 Tube, Rfl: 3    omeprazole (PRILOSEC) 10 MG capsule, , Disp: , Rfl:     prenatal vit37-iron-folic acid 29 mg iron- 1 mg Chew, Take by mouth., Disp: , Rfl:     rizatriptan (MAXALT-MLT) 10 MG disintegrating tablet, Take 1 tablet (10 mg total) by mouth every 2 (two) hours as needed for Migraine. Max 30 mg/day., Disp: 12 tablet, Rfl: 5    sertraline (ZOLOFT) 50 MG tablet, Take 1 tablet (50 mg total) by mouth every evening., Disp: 90 tablet, Rfl: 3    SYNTHROID 50 mcg tablet, Take 1 tablet (50 mcg total) by mouth once daily., Disp: 90 tablet, Rfl: 3    ubrogepant (UBRELVY) 100 mg tablet, Take 1 tablet (100 mg total) by mouth every 2 (two) hours as needed for Migraine. Max 200 mg/day., Disp: 16 tablet, Rfl: 2  No current facility-administered medications for this visit.    Facility-Administered Medications Ordered in Other Visits:     fentaNYL 50 mcg/mL injection  mcg,  mcg, Intravenous, PRN, Yury Ambrosio MD, 50 mcg at 06/24/22  1026    LIDOcaine (PF) 10 mg/ml (1%) injection 10 mg, 1 mL, Intradermal, Once, Yury Ambrosio MD    midazolam (VERSED) 1 mg/mL injection 0.5-4 mg, 0.5-4 mg, Intravenous, PRN, Yury Ambrosio MD, 2 mg at 06/24/22 1026

## 2025-02-20 NOTE — PROGRESS NOTES
Your knee x-rays show minimal joint space narrowing inner aspect of right>left knee and some lateral tilting of the kneecaps.  YUDELKA

## 2025-02-27 ENCOUNTER — OFFICE VISIT (OUTPATIENT)
Dept: OBSTETRICS AND GYNECOLOGY | Facility: CLINIC | Age: 35
End: 2025-02-27
Payer: COMMERCIAL

## 2025-02-27 VITALS
DIASTOLIC BLOOD PRESSURE: 78 MMHG | WEIGHT: 215.25 LBS | SYSTOLIC BLOOD PRESSURE: 119 MMHG | BODY MASS INDEX: 35.82 KG/M2

## 2025-02-27 DIAGNOSIS — Z12.4 ROUTINE CERVICAL SMEAR: ICD-10-CM

## 2025-02-27 DIAGNOSIS — Z01.419 WELL WOMAN EXAM WITH ROUTINE GYNECOLOGICAL EXAM: Primary | ICD-10-CM

## 2025-02-27 PROCEDURE — 87624 HPV HI-RISK TYP POOLED RSLT: CPT | Performed by: OBSTETRICS & GYNECOLOGY

## 2025-02-27 PROCEDURE — 99999 PR PBB SHADOW E&M-EST. PATIENT-LVL III: CPT | Mod: PBBFAC,,, | Performed by: OBSTETRICS & GYNECOLOGY

## 2025-02-27 PROCEDURE — 88175 CYTOPATH C/V AUTO FLUID REDO: CPT | Performed by: OBSTETRICS & GYNECOLOGY

## 2025-02-27 NOTE — PROGRESS NOTES
OBSTETRIC HISTORY:   OB History          3    Para   2    Term   2       0    AB   0    Living   2         SAB   0    IAB   0    Ectopic   0    Multiple   0    Live Births   2                COMPREHENSIVE GYN HISTORY:  PAP History: Denies abnormal Paps.  Infection History: Reports STDs: Chlamydia. Denies PID.  Benign History: Denies uterine fibroids. Denies ovarian cysts. Denies endometriosis.   Cancer History: Denies cervical cancer. Denies uterine cancer or hyperplasia. Denies ovarian cancer. Denies vulvar cancer or pre-cancer. Denies vaginal cancer or pre-cancer. Denies breast cancer. Denies colon cancer.  Sexual Activity History:   reports that she currently engages in sexual activity and has had male partners. She reports using the following methods of birth control/protection: Condom.   Menstrual History:  Every 30 days, flows for 4 days. Moderate flow.  Dysmenorrhea History: Reports occ. dysmenorrhea.  Contraception: Condom           HPI:   34 y.o.  Patient's last menstrual period was 2025.   Patient is  here for her annual gynecologic exam.  She has no GYN complaints. She denies bladder, breast and bowel complaints.    ROS:  GENERAL: No weight gain or weight loss.   CHEST: No shortness of breath.   ABDOMEN: No abdominal pain, constipation, diarrhea, nausea, vomiting or rectal bleeding.   URINARY: No frequency, dysuria, hematuria, or burning on urination.  REPRODUCTIVE: See HPI.   BREASTS: No breast pain, lumps, or nipple discharge.   PSYCHIATRIC: No depression or anxiety.    PE:   /78   Wt 97.6 kg (215 lb 4 oz)   LMP 2025   BMI 35.82 kg/m²   APPEARANCE: Well nourished, well developed, in no acute distress.  NECK: Neck symmetric without  thyromegaly.  NODES: No inguinal, cervical lymph node enlargement.  CHEST: Lungs clear to auscultation.  HEART: Regular rate and rhythm, no murmurs, rubs or gallops.  ABDOMEN: Soft. No tenderness or masses. No hernias.  BREASTS: Symmetrical,  no skin changes or visible lesions. No palpable masses, nipple discharge or adenopathy bilaterally.  PELVIC:   VULVA: No lesions. Normal female genitalia.  URETHRAL MEATUS: Normal size and location, no lesions, no prolapse.  URETHRA: No masses, tenderness, prolapse or scarring.  VAGINA: Moist and well rugated, no discharge, no significant cystocele or rectocele.  CERVIX: No lesions and discharge.  UTERUS: Normal size, regular shape, mobile, non-tender, bladder base nontender.  ADNEXA: No masses or tenderness.    PROCEDURES:  Pap smear  HRHPV    Assessment:  Normal Gynecologic Exam    Recommendations routine screening and no risk factors:  Mammogram at age 40  Colonoscopy age 45  Bone density age 65  Return to clinic as needed for any problems.  Return to clinic in one year. It is recommended to have a physician breast exam and pelvic exam annually. This is different than doing a Pap smear.         As of April 1, 2021, the Cures Act has been passed nationally. This new law requires that all doctors progress notes, lab results, pathology reports and radiology reports be released IMMEDIATELY to the patient in the patient portal. That means that the results are released to you at the EXACT same time they are released to me. Therefore, with all of the patients that I have I am not able to reply to each patient exactly when the results come in. So there will be a delay from when you see the results to when I see them and have time to come up with a response to send you. Also I only see these results when I am on the computer at work. So if the results come in over the weekend or after 5 pm of a work day, I will not see them until the next business day. As you can tell, this is a challenge as a physician to give every patient the quick response they hope for and deserve. So please be patient!     Thanks for understanding,

## 2025-02-28 LAB
CLINICAL INFO: NORMAL
DATE OF PREVIOUS PAP: NORMAL
DATE PREVIOUS BX: NO
LMP START DATE: NORMAL
SPECIMEN SOURCE CVX/VAG CYTO: NORMAL

## 2025-03-06 ENCOUNTER — RESULTS FOLLOW-UP (OUTPATIENT)
Dept: OBSTETRICS AND GYNECOLOGY | Facility: HOSPITAL | Age: 35
End: 2025-03-06

## 2025-03-19 ENCOUNTER — PATIENT MESSAGE (OUTPATIENT)
Facility: CLINIC | Age: 35
End: 2025-03-19
Payer: COMMERCIAL

## 2025-03-21 ENCOUNTER — PATIENT MESSAGE (OUTPATIENT)
Dept: GASTROENTEROLOGY | Facility: CLINIC | Age: 35
End: 2025-03-21
Payer: COMMERCIAL

## 2025-03-22 DIAGNOSIS — K51.919 ULCERATIVE COLITIS WITH COMPLICATION, UNSPECIFIED LOCATION: Primary | ICD-10-CM

## 2025-03-22 RX ORDER — MESALAMINE 1.2 G/1
2.4 TABLET, DELAYED RELEASE ORAL
Qty: 60 TABLET | Refills: 11 | Status: SHIPPED | OUTPATIENT
Start: 2025-03-22

## 2025-04-08 ENCOUNTER — OFFICE VISIT (OUTPATIENT)
Facility: CLINIC | Age: 35
End: 2025-04-08
Payer: COMMERCIAL

## 2025-04-08 DIAGNOSIS — G43.709 CHRONIC MIGRAINE WITHOUT AURA WITHOUT STATUS MIGRAINOSUS, NOT INTRACTABLE: Primary | ICD-10-CM

## 2025-04-08 DIAGNOSIS — G43.009 MIGRAINE WITHOUT AURA AND WITHOUT STATUS MIGRAINOSUS, NOT INTRACTABLE: ICD-10-CM

## 2025-04-08 PROCEDURE — 98006 SYNCH AUDIO-VIDEO EST MOD 30: CPT | Mod: 95,,, | Performed by: NURSE PRACTITIONER

## 2025-04-08 PROCEDURE — G2211 COMPLEX E/M VISIT ADD ON: HCPCS | Mod: 95,,, | Performed by: NURSE PRACTITIONER

## 2025-04-08 PROCEDURE — 1159F MED LIST DOCD IN RCRD: CPT | Mod: CPTII,95,, | Performed by: NURSE PRACTITIONER

## 2025-04-08 PROCEDURE — 1160F RVW MEDS BY RX/DR IN RCRD: CPT | Mod: CPTII,95,, | Performed by: NURSE PRACTITIONER

## 2025-04-08 RX ORDER — AMITRIPTYLINE HYDROCHLORIDE 25 MG/1
25 TABLET, FILM COATED ORAL NIGHTLY
Qty: 30 TABLET | Refills: 5 | Status: SHIPPED | OUTPATIENT
Start: 2025-04-08

## 2025-04-08 NOTE — PROGRESS NOTES
Established Patient   SUBJECTIVE:  Patient ID: Sherlyn Monahan   Chief Complaint: Follow-up    History of Present Illness:  Sherlyn Monahan is a 34 y.o. female who presents for follow-up of headaches via virtual visit.     The patient location is:  in [place]   The chief complaint leading to consultation is: Follow-up  Visit type: Virtual visit with synchronous audio and video  Total time spent with patient: 10 min  Each patient to whom he or she provides medical services by telemedicine is:  (1) informed of the relationship between the physician and patient and the respective role of any other health care provider with respect to management of the patient; and (2) notified that he or she may decline to receive medical services by telemedicine and may withdraw from such care at any time.    Recommendations made at last Office Visit on 2/19/2025:  - For migraine prevention - start amitriptyline 10 mg nightly   - Discussed treatment options are somewhat limited given she is still actively trying to get pregnant   - Encouraged her to begin taking gabapentin 100 mg nightly - may uptitrate dose to TID based on headache intensity   - For acute migraine - maxalt 10 mg mlt   - Secondary treatment option - trial ubrelvy 100 mg   - She was instructed to d/c use of ubrelvy should she become pregnant   - She was instructed to treat acute migraines aggressively from the onset of pain and treat until pain and symptom resolution    - Continue tracking headaches   - Send update via patient portal in 2-3 weeks   - Depression/Anxiety - chronic and stable, continue sertraline 50 mg daily, management per PCP   - LV - continue nightly use of CPAP device - management per sleep medicine   - RTC in 2 months or sooner if needed    04/08/2025 - Interval History:  Migraines have continued to occur quite frequently, tracked 15 headache days in the month of February and 17 in March.  Migraines are lasting at least 4 hours in duration  despite treatment with ubrelvy and/or maxalt 10 mg mlt.  Migraines are moderate to severe in intensity and associated with nausea, photo/phonophobia.  She has continued amitriptyline 10 mg nightly for migraine prevention, no improvement in migraines, also denies presence of side effects.     February - 15 headache days   March - 17 headache days  April - 3 so far     CURRENT REGIMEN:  PPX - amitriptyline  Abortive - maxalt, ubrelvy    Otherwise, information below is still accurate and current.     02/19/2025 - Interval History:  Sent multiple messages via patient portal throughout the month of January citing intractable, high frequency migraines.  Was given prednisone taper without benefit.  Reports at least 21-25 headache days per month.  She was eventually started on gabapentin 100 mg and instructed to take nightly with the option to uptitrate dose to 2 or 3 times per day.  Initially took gabapentin twice daily, however this caused drowsiness, is now taking only as needed.  Reports 7 headache days so far in February.  She has continued treating acute migraines with rizatriptan 10 mg mlt, during January maxalt was only giving her a few hours of relief.  Since starting gabapentin, has taken maxalt twice, once it gave her more relief, the second time, it did not.  Denies any acute change in January which could explain the drastic and abrupt uptick in migraine frequency.  Denies head/neck injury, change in medication/diet.       Sleep has been not so great the last few weeks, though has been under increased stress the last two weeks.  Diagnosed with LV, using CPAP nightly, feels this has been beneficial.    She has continued sertraline 50 mg daily for anxiety and depression.       Still actively trying to get pregnant.       11/12/2024 - Interval History:  Migraines have been better over the last two months, feels as her hormones continue to level out, migraine frequency has been going down.  Migraines last no longer  "than 30-60 minutes after treating with maxalt.  Has tracked only 1 migraine so far in November, 5 in October, and 14 in September.  Pleased with the natural gradual decrease in migraine frequency, does not wish to make adjustments in her treatment plan at this time.      Otherwise, information below is still accurate and current.      09/03/2024 - Interval History:  Found of she was pregnant 3 days after her last visit, unfortunately pregnancy was non-viable, had d & c on 8/21.  Has had an uptick in headache/migraine frequency over the last month, however she feels the large majority of these headaches were triggered by stress/hormone changes relating to pregnancy and subsequent miscarriage.  Feels headaches have begun to decrease in frequency over the last week or so.       Otherwise, information below is still accurate and current.      History of Present Illness:   34 y.o. female with hypothyroidism, migraines, anxiety/depression, who presents for evaluation of headaches via virtual visit.  Migraines began around age 14-15 yo and have been ongoing.  Migraines initially began shortly after she suffered a concussion.  Over the last 6 months, migraines have started to become more frequent.  Began taking "more magnesium" in January which has helped, now taking 1700 mg twice daily.  Tracked 5 migraines in June, but also had 7 headache days.  Her typical month is 2-3 migraines per month.  Migraines typically last about 10 hours in duration.  She has been treating acute migraines with tylenol and ice packs which are only somewhat effective.  She has taken sumatriptan 50 mg in the past, which she does recall please.  Associated symptoms include photophobia, phonophobia, nausea, scalp sensitivity, shooting pains in the occipitalis, dizziness, fatigue, neck pain, visual disturbance.    She is not currently pregnant, but she and her  are actively trying to conceive.       Treatments Tried and Response  Sumatriptan "   Topiramate -   Fioricet   Sertraline   Magnesium   Vit b2 400 mg -    Coq10 -   Maxalt 10 mg mlt - helping  Gabapentin 100 mg prn - helping some - unsure   Amitriptyline -   Ubrelvy 100 mg -     Current Medications:  Current Medications[1]    Review of Systems - A review of 10+ systems was conducted with pertinent positive and negative findings documented in HPI with all other systems reviewed and negative.    PFSH: Past medical, family, and social history reviewed as documented in chart with pertinent positive medical, family, and social history detailed in HPI.    OBJECTIVE:  Vitals: There were no vitals taken for this visit.     Physical Exam:  Constitutional: she appears well-developed and well-nourished. she is well groomed. NAD.     Review of Data:   Notes from primary care, rheumatology, sleep medicine reviewed   Labs:  Office Visit on 02/27/2025   Component Date Value Ref Range Status    Cytology ThinPrep Pap Source 02/27/2025 Cervix   Final    Cytology ThinPrep Pap Report Status 02/27/2025 DNR   Final    Cytology Thinprep PAP Clinical His* 02/27/2025 HIGH RISK FOR CX CA   Corrected    Cytology ThinPrep Pap LMP 02/27/2025 02/18/2025   Final    Cytology ThinPrep Previous PAP 02/27/2025 Unknown   Final    Cytology ThinPrep Previous Biopsy 02/27/2025 No   Final    Cytology ThinPrep PAP Adequacy 02/27/2025 SEE BELOW   Final    Cytology ThinPrep PAP General Lissett* 02/27/2025 DNR   Final    Cytology ThinPrep PAP Interpretati* 02/27/2025 SEE BELOW   Final    Cytology ThinPrep PAP Comment 02/27/2025 SEE BELOW   Final    Cytotechnologist 02/27/2025 SEE BELOW   Final    Review Cytotechnologist 02/27/2025 SEE BELOW   Final    Pathologist 02/27/2025 DNR   Final    Cytology ThinPrep PAP Infection 02/27/2025 DNR   Final    Cytology Thin Prep Pap Explanation 02/27/2025 SEE BELOW   Final    HPV DNA High Risk 02/27/2025 Not Detected  NOT DETECTED Final   Lab Visit on 02/18/2025   Component Date Value Ref Range Status    Sed  Rate 02/18/2025 9  0 - 36 mm/Hr Final    CRP 02/18/2025 5.1  0.0 - 8.2 mg/L Final    WBC 02/18/2025 8.88  3.90 - 12.70 K/uL Final    RBC 02/18/2025 4.54  4.00 - 5.40 M/uL Final    Hemoglobin 02/18/2025 14.2  12.0 - 16.0 g/dL Final    Hematocrit 02/18/2025 43.8  37.0 - 48.5 % Final    MCV 02/18/2025 97  82 - 98 fL Final    MCH 02/18/2025 31.3 (H)  27.0 - 31.0 pg Final    MCHC 02/18/2025 32.4  32.0 - 36.0 g/dL Final    RDW 02/18/2025 12.2  11.5 - 14.5 % Final    Platelets 02/18/2025 218  150 - 450 K/uL Final    MPV 02/18/2025 10.5  9.2 - 12.9 fL Final    Immature Granulocytes 02/18/2025 0.3  0.0 - 0.5 % Final    Gran # (ANC) 02/18/2025 5.0  1.8 - 7.7 K/uL Final    Immature Grans (Abs) 02/18/2025 0.03  0.00 - 0.04 K/uL Final    Lymph # 02/18/2025 3.0  1.0 - 4.8 K/uL Final    Mono # 02/18/2025 0.7  0.3 - 1.0 K/uL Final    Eos # 02/18/2025 0.1  0.0 - 0.5 K/uL Final    Baso # 02/18/2025 0.01  0.00 - 0.20 K/uL Final    nRBC 02/18/2025 0  0 /100 WBC Final    Gran % 02/18/2025 56.8  38.0 - 73.0 % Final    Lymph % 02/18/2025 34.1  18.0 - 48.0 % Final    Mono % 02/18/2025 7.8  4.0 - 15.0 % Final    Eosinophil % 02/18/2025 0.9  0.0 - 8.0 % Final    Basophil % 02/18/2025 0.1  0.0 - 1.9 % Final    Differential Method 02/18/2025 Automated   Final    Sodium 02/18/2025 140  136 - 145 mmol/L Final    Potassium 02/18/2025 4.0  3.5 - 5.1 mmol/L Final    Chloride 02/18/2025 106  95 - 110 mmol/L Final    CO2 02/18/2025 20 (L)  23 - 29 mmol/L Final    Glucose 02/18/2025 83  70 - 110 mg/dL Final    BUN 02/18/2025 9  6 - 20 mg/dL Final    Creatinine 02/18/2025 0.8  0.5 - 1.4 mg/dL Final    Calcium 02/18/2025 9.7  8.7 - 10.5 mg/dL Final    Total Protein 02/18/2025 7.4  6.0 - 8.4 g/dL Final    Albumin 02/18/2025 4.0  3.5 - 5.2 g/dL Final    Total Bilirubin 02/18/2025 0.3  0.1 - 1.0 mg/dL Final    Alkaline Phosphatase 02/18/2025 62  40 - 150 U/L Final    AST 02/18/2025 24  10 - 40 U/L Final    ALT 02/18/2025 11  10 - 44 U/L Final    eGFR  02/18/2025 >60.0  >60 mL/min/1.73 m^2 Final    Anion Gap 02/18/2025 14  8 - 16 mmol/L Final    TSH 02/18/2025 0.857  0.400 - 4.000 uIU/mL Final    Free T4 02/18/2025 0.84  0.71 - 1.51 ng/dL Final    HLA B27 Interpretation 02/18/2025    Final                    Value:TAQ: [id number]  SAPE: 228      B27 Testing Date 02/18/2025 02/20/2025 08:52 AM   Final    HLA B27 Result 02/18/2025 Negative   Final   Lab Visit on 11/18/2024   Component Date Value Ref Range Status    Calprotectin 11/18/2024 10.1  <50 mcg/g Final   Lab Visit on 11/14/2024   Component Date Value Ref Range Status    CRP 11/14/2024 6.7  0.0 - 8.2 mg/L Final   Office Visit on 11/07/2024   Component Date Value Ref Range Status    Final Pathologic Diagnosis 11/07/2024    Final                    Value:1. Skin, midline upper back, shave biopsy:   - MELANOCYTIC NEVUS, COMPOUND TYPE.    This lesion is benign.      Gross 11/07/2024    Final                    Value:Patient ID/MRN:  [id number]   Pathology label MRN:  [id number]    The specimen is received in formalin labeled &quot;midline upper back&quot;. The specimen consists of a 0.7 x 0.7 x 0.2 cm shave of tan-white skin. The skin is remarkable for a tan-brown area of discoloration measuring 0.4 x 0.4 cm abutting the biopsy   margin. The specimen is inked blue at the biopsy margin, trisected and submitted entirely in cassette [id number]-SHAHANA Hayes  Grossing Technologist      Microscopic Exam 11/07/2024    Final                    Value:Sections show junctional nests of melanocytes associated with dermal nests and cords of similar cells showing architectural symmetry, maturation, and no cytological atypia.  Mart 1 immunohistochemical stain was examined with appropriately reactive   controls and used in evaluation of this lesion.      Disclaimer 11/07/2024 Unless the case is a 'gross only' or additional testing only, the final diagnosis for each specimen is based on a microscopic examination of appropriate  tissue sections.   Final   Lab Visit on 10/11/2024   Component Date Value Ref Range Status    WBC 10/11/2024 9.68  3.90 - 12.70 K/uL Final    RBC 10/11/2024 4.39  4.00 - 5.40 M/uL Final    Hemoglobin 10/11/2024 14.0  12.0 - 16.0 g/dL Final    Hematocrit 10/11/2024 41.7  37.0 - 48.5 % Final    MCV 10/11/2024 95  82 - 98 fL Final    MCH 10/11/2024 31.9 (H)  27.0 - 31.0 pg Final    MCHC 10/11/2024 33.6  32.0 - 36.0 g/dL Final    RDW 10/11/2024 11.9  11.5 - 14.5 % Final    Platelets 10/11/2024 224  150 - 450 K/uL Final    MPV 10/11/2024 11.2  9.2 - 12.9 fL Final    Iron 10/11/2024 74  30 - 160 ug/dL Final    Transferrin 10/11/2024 223  200 - 375 mg/dL Final    TIBC 10/11/2024 330  250 - 450 ug/dL Final    Saturated Iron 10/11/2024 22  20 - 50 % Final    Ferritin 10/11/2024 238  20.0 - 300.0 ng/mL Final    TSH 10/11/2024 1.010  0.400 - 4.000 uIU/mL Final    Thyroperoxidase Antibodies 10/11/2024 <6.0  <6.0 IU/mL Final    Free T4 10/11/2024 0.95  0.71 - 1.51 ng/dL Final     Imaging:  No results found for this or any previous visit.  Note: I have independently reviewed any/all imaging/labs/tests and agree with the report (s) as documented.  Any discrepancies will be as noted/demarcated by free text.  ANNAMARIE HINKLE 4/8/2025    ASSESSMENT:  1. Chronic migraine without aura without status migrainosus, not intractable    2. Migraine without aura and without status migrainosus, not intractable      Medical Decision Making:  BOTOX  The patient has chronic migraines ( G43.719) and suffers from headaches more than 15 days per month, each lasting more than 4 hours with at least 8 attacks that meet criteria for migraine. She has tried multiple medications including but not limited to amitriptyline, topiramate, sumatriptan, rizatriptan (see full list above). The patient is an ideal candidate for Botox. After treatment, I expect 50%  improvement in the patient's symptoms. A reduction of at least 7 days per month and the number of  cumulative hours suffering with headaches as well as at least 100 total hours affected with migraine per month. After obtaining informed consent and under aseptic technique, a total of 155 units of botulinum toxin type A will be injected in the following muscles: Procerus 5 units,  5 units bilaterally, frontalis 20 units, temporalis 20 units bilaterally, occipitalis 15 units, upper cervical paraspinals 10 units bilaterally and trapezius 15 units bilaterally. The patient will receive a total of 155 units in 31 sites. Frequency of treatment is every 12 weeks unless no response to the treatments, at which time we will discontinue the injections.    PLAN:  - Seek approval for Botox for chronic migraine prevention   - Will titrate dose amitriptyline from 10 mg qhs --> 25 mg nightly   - Discussed limitations to treatment options given actively trying to achieve pregnancy   - For acute migraines - ubrelvy 100 mg and/or maxalt 10 mg mlt   - She was instructed to d/c use of maxalt/ubrelvy and send message via patient portal should she become pregnant prior to next appt   - Continue tracking headaches   - Discussed goals of therapy are to decrease the frequency, intensity, and duration of headaches  - Risks, benefits, and potential side effects of botox discussed  - Alternative treatment options offered   - RTC in 1 month to start Botox    Orders Placed This Encounter    Prior authorization Order    amitriptyline (ELAVIL) 25 MG tablet     Questions and concerns were sought and answered to the patient's stated verbal satisfaction.  The patient verbalizes understanding and agreement with the above stated treatment plan.     Visit today included increased complexity associated with the care of the episodic problem migraine without aura addressed and managing the longitudinal care of the patient due to the serious and/or complex managed problem(s).  Plan for patient to return to clinic in 1 months for follow-up visit.      CC: No primary care provider on file.      Britta Sinha, FNP-C  [facility]   [phone]    Dr. Pate was available during today's encounter.            [1]   Current Outpatient Medications:     amitriptyline (ELAVIL) 10 MG tablet, Take 1 tablet (10 mg total) by mouth every evening., Disp: 90 tablet, Rfl: 1    calcium-vitamin D3 (OS-ELISABETH 500 + D3) 500 mg(1,250mg) -200 unit per tablet, Take 1 tablet by mouth., Disp: , Rfl:     cetirizine (ZYRTEC) 10 MG tablet, Take 1 tablet (10 mg total) by mouth 2 (two) times a day., Disp: 60 tablet, Rfl: 12    cholecalciferol, vitamin D3, (VITAMIN D3) 50 mcg (2,000 unit) Tab, Take 1 tablet (2,000 Units total) by mouth daily with breakfast., Disp: 100 tablet, Rfl: 3    gabapentin (NEURONTIN) 100 MG capsule, Take 1 capsule (100 mg total) by mouth 3 (three) times daily., Disp: 90 capsule, Rfl: 0    hydrocortisone (ANUSOL-HC) 2.5 % rectal cream, Place rectally 2 (two) times daily., Disp: 28 g, Rfl: 3    magnesium oxide (MAG-OX) 400 mg (241.3 mg magnesium) tablet, Take 400 mg by mouth once daily., Disp: , Rfl:     mesalamine (LIALDA) 1.2 gram TbEC, Take 2 tablets (2.4 g total) by mouth daily with breakfast., Disp: 60 tablet, Rfl: 11    mupirocin (BACTROBAN) 2 % ointment, Apply topically 2 (two) times daily., Disp: , Rfl:     mupirocin calcium 2% nasl oint (BACTROBAN) 2 % Oint, by Nasal route 2 (two) times daily., Disp: 1 Tube, Rfl: 3    omeprazole (PRILOSEC) 10 MG capsule, , Disp: , Rfl:     prenatal vit37-iron-folic acid 29 mg iron- 1 mg Chew, Take by mouth., Disp: , Rfl:     rizatriptan (MAXALT-MLT) 10 MG disintegrating tablet, Take 1 tablet (10 mg total) by mouth every 2 (two) hours as needed for Migraine. Max 30 mg/day., Disp: 12 tablet, Rfl: 5    sertraline (ZOLOFT) 50 MG tablet, Take 1 tablet (50 mg total) by mouth every evening., Disp: 90 tablet, Rfl: 3    SYNTHROID 50 mcg tablet, Take 1 tablet (50 mcg total) by mouth once daily., Disp: 90 tablet,  Rfl: 3    ubrogepant (UBRELVY) 100 mg tablet, Take 1 tablet (100 mg total) by mouth every 2 (two) hours as needed for Migraine. Max 200 mg/day., Disp: 16 tablet, Rfl: 2  No current facility-administered medications for this visit.    Facility-Administered Medications Ordered in Other Visits:     fentaNYL 50 mcg/mL injection  mcg,  mcg, Intravenous, PRN, Yury Ambrosio MD, 50 mcg at 06/24/22 1026    LIDOcaine (PF) 10 mg/ml (1%) injection 10 mg, 1 mL, Intradermal, Once, Yury Ambrosio MD    midazolam (VERSED) 1 mg/mL injection 0.5-4 mg, 0.5-4 mg, Intravenous, PRN, Yury Ambrosio MD, 2 mg at 06/24/22 1026

## 2025-04-11 ENCOUNTER — OFFICE VISIT (OUTPATIENT)
Dept: PRIMARY CARE CLINIC | Facility: CLINIC | Age: 35
End: 2025-04-11
Payer: COMMERCIAL

## 2025-04-11 VITALS
WEIGHT: 212.06 LBS | BODY MASS INDEX: 35.29 KG/M2 | OXYGEN SATURATION: 96 % | HEART RATE: 79 BPM | DIASTOLIC BLOOD PRESSURE: 78 MMHG | SYSTOLIC BLOOD PRESSURE: 126 MMHG

## 2025-04-11 DIAGNOSIS — K51.219 ULCERATIVE PROCTITIS WITH COMPLICATION: ICD-10-CM

## 2025-04-11 DIAGNOSIS — E03.9 ACQUIRED HYPOTHYROIDISM: Primary | ICD-10-CM

## 2025-04-11 DIAGNOSIS — G43.709 CHRONIC MIGRAINE WITHOUT AURA WITHOUT STATUS MIGRAINOSUS, NOT INTRACTABLE: ICD-10-CM

## 2025-04-11 PROCEDURE — 99999 PR PBB SHADOW E&M-EST. PATIENT-LVL IV: CPT | Mod: PBBFAC,,, | Performed by: STUDENT IN AN ORGANIZED HEALTH CARE EDUCATION/TRAINING PROGRAM

## 2025-04-11 NOTE — PROGRESS NOTES
SUBJECTIVE     Chief Complaint   Patient presents with    Establish Care       HPI  Sherlyn Monahan is a 34 y.o. female with medical diagnoses as listed in the medical history and problem list that presents to establish care with a new provider.    Pt is UTD on age appropriate CA screening.    Family, social, surgical Hx reviewed     LV-has a cpap Carlo Fowler sleep     Hypothyroidism- Synthroid 50 mcg    UC she sees on mesalamine, seeing Estela, she had a flex sig     Migraines: she is on maxalt prn and Ubrelvy prn- Sees Vulevich with Neuro         Health Maintenance         Date Due Completion Date    Pneumococcal Vaccines (Age 0-49) (1 of 2 - PCV) Never done ---    COVID-19 Vaccine (2024- season) 2024    Cervical Cancer Screening 2030    TETANUS VACCINE 2030    RSV Vaccine (Age 60+ and Pregnant patients) (1 - 1-dose 75+ series) 05/15/2065 ---              PAST MEDICAL HISTORY:  Past Medical History:   Diagnosis Date    Acquired hypothyroidism 2016    Allergy 2009    Seasonal    Anxiety 2019    Depression 2019    Irritable bowel syndrome (IBS)     Keloid cicatrix     Obesity (BMI 30.0-34.9) 2016    loosing weight consistently on 21 Day Fix program     Ulcerative colitis     Urticaria        PAST SURGICAL HISTORY:  Past Surgical History:   Procedure Laterality Date    breast reduction  2012    BREAST SURGERY  2014    breast reduction     SECTION  18    Second  2020     SECTION N/A 2020    Procedure:  SECTION;  Surgeon: Chiara Smith MD;  Location: [facility] L&D;  Service: OB/GYN;  Laterality: N/A;    CHOLECYSTECTOMY  19    COLONOSCOPY N/A 2023    Procedure: COLONOSCOPY;  Surgeon: Tom Palmer MD;  Location: [facility] ENDO;  Service: Endoscopy;  Laterality: N/A;    DILATION AND CURETTAGE OF UTERUS USING SUCTION N/A 2024    Procedure: DILATION AND CURETTAGE,  UTERUS, USING SUCTION;  Surgeon: Chiara Smith MD;  Location: [facility] OR;  Service: OB/GYN;  Laterality: N/A;    ENDOSCOPIC CARPAL TUNNEL RELEASE Right 06/24/2022    Procedure: RELEASE, CARPAL TUNNEL, ENDOSCOPIC;  Surgeon: Phoenix Rojas MD;  Location: [facility] OR;  Service: Orthopedics;  Laterality: Right;    FLEXIBLE SIGMOIDOSCOPY N/A 04/05/2024    Procedure: SIGMOIDOSCOPY, FLEXIBLE;  Surgeon: Tom Palmer MD;  Location: [facility] ENDO;  Service: Endoscopy;  Laterality: N/A;    LAPAROSCOPIC CHOLECYSTECTOMY N/A 06/11/2019    Procedure: CHOLECYSTECTOMY, LAPAROSCOPIC;  Surgeon: Missael Tolentino MD;  Location: [facility] OR;  Service: General;  Laterality: N/A;  video    SKIN BIOPSY  2022    ULNAR NERVE TRANSPOSITION Right 06/24/2022    Procedure: TRANSPOSITION, NERVE, ULNAR;  Surgeon: Phoenix Rojas MD;  Location: [facility] OR;  Service: Orthopedics;  Laterality: Right;       SOCIAL HISTORY:  Social History[1]    FAMILY HISTORY:  Family History   Problem Relation Name Age of Onset    Breast cancer Mother Refugio 45        remission for 10 years    Cancer Mother Refugio 45        Breast    Hypertension Mother Refugio     Allergic rhinitis Mother Refugio     Depression Mother Refugio     Mental illness Mother Refugio         Depression, Anxiety    Miscarriages / Stillbirths Mother Refugio     Diabetes type II Father Gustavo     Depression Father Gustavo     Heart disease Father Gustavo     Psoriasis Father Gustavo     Depression Brother Jose L     Depression Brother Jose L     Psoriasis Brother Jose L     Cancer Maternal Grandmother Rosario         Skin    Diabetes Maternal Grandfather Lalo     Heart disease Maternal Grandfather Lalo     Cancer Maternal Grandfather Lalo         Skin    Hypertension Maternal Grandfather Lalo     Diabetes Paternal Grandmother Tatiana     Diabetes Paternal Grandfather Ford     Colon cancer Neg Hx      Ovarian cancer Neg Hx      Allergies Neg Hx      Angioedema Neg Hx      Asthma Neg Hx       Atopy Neg Hx      Eczema Neg Hx      Immunodeficiency Neg Hx      Rhinitis Neg Hx      Urticaria Neg Hx         ALLERGIES AND MEDICATIONS: updated and reviewed.  Review of patient's allergies indicates:   Allergen Reactions    Minocycline Swelling    Nsaids (non-steroidal anti-inflammatory drug) Other (See Comments)     Current Medications[2]    ROS  Review of Systems   Constitutional:  Negative for fever and weight loss.   Respiratory:  Negative for cough and shortness of breath.    Cardiovascular:  Negative for chest pain and palpitations.   Gastrointestinal:  Negative for abdominal pain, constipation, diarrhea, nausea and vomiting.   Genitourinary:  Negative for dysuria.   Musculoskeletal:  Negative for back pain and joint pain.   Skin:  Negative for rash.   Neurological:  Negative for dizziness, weakness and headaches.   Psychiatric/Behavioral:  Negative for depression. The patient is not nervous/anxious.            OBJECTIVE     Physical Exam  Vitals:    04/11/25 1042   BP: 126/78   Pulse: 79    Body mass index is 35.29 kg/m².  Weight: 96.2 kg (212 lb 1.3 oz)         Physical Exam  HENT:      Head: Normocephalic and atraumatic.      Nose: Nose normal.      Mouth/Throat:      Mouth: Mucous membranes are moist.      Pharynx: Oropharynx is clear.   Eyes:      Extraocular Movements: Extraocular movements intact.      Conjunctiva/sclera: Conjunctivae normal.      Pupils: Pupils are equal, round, and reactive to light.   Pulmonary:      Effort: Pulmonary effort is normal.   Musculoskeletal:         General: No swelling. Normal range of motion.      Cervical back: Normal range of motion.      Right lower leg: No edema.      Left lower leg: No edema.   Skin:     General: Skin is warm.      Findings: No lesion or rash.   Neurological:      General: No focal deficit present.      Mental Status: She is alert and oriented to person, place, and time.      Motor: No weakness.   Psychiatric:         Mood and Affect: Mood  normal.         Thought Content: Thought content normal.               ASSESSMENT     34 y.o. female with     1. Acquired hypothyroidism    2. Ulcerative proctitis with complication    3. Chronic migraine without aura without status migrainosus, not intractable        PLAN:     1. Acquired hypothyroidism  Stable on medications, continue regimen    2. Ulcerative proctitis with complication  Overview:  Followed up on w/ Flex Sig 4/5/204 per Dr Palmer      3. Chronic migraine without aura without status migrainosus, not intractable  Stable on medications, continue regimen        Discussed age and gender appropriate screenings at this visit and encouraged a healthy diet low in simple carbohydrates, and increased physical activity.  Counseled on medically appropriate vaccines based on age and current health condition.  Screening test reviewed and discussed with patient.      RTC in 6 months for annual exam     Tatiana Pedroza MD  [facility]  04/11/2025 10:58 AM                             [1]   Social History  Socioeconomic History    Marital status:    Tobacco Use    Smoking status: Former     Current packs/day: 0.25     Average packs/day: 0.3 packs/day for 3.0 years (0.8 ttl pk-yrs)     Types: Cigarettes    Smokeless tobacco: Never   Substance and Sexual Activity    Alcohol use: Yes     Alcohol/week: 1.0 standard drink of alcohol     Types: 1 Glasses of wine per week     Comment: very rare    Drug use: No    Sexual activity: Yes     Partners: Male     Birth control/protection: Condom, None   Other Topics Concern    Are you pregnant or think you may be? No    Breast-feeding No     Social Drivers of Health     Financial Resource Strain: Low Risk  (4/15/2024)    Overall Financial Resource Strain (CARDIA)     Difficulty of Paying Living Expenses: Not very hard   Food Insecurity: No Food Insecurity (4/15/2024)    Hunger Vital Sign     Worried About Running Out of Food in the Last Year: Never  true     Ran Out of Food in the Last Year: Never true   Transportation Needs: No Transportation Needs (4/15/2024)    PRAPARE - Transportation     Lack of Transportation (Medical): No     Lack of Transportation (Non-Medical): No   Physical Activity: Insufficiently Active (4/15/2024)    Exercise Vital Sign     Days of Exercise per Week: 3 days     Minutes of Exercise per Session: 20 min   Stress: No Stress Concern Present (4/15/2024)    [employer] [place] [employer] - Occupational Stress Questionnaire     Feeling of Stress : Only a little   Housing Stability: Low Risk  (10/12/2023)    Housing Stability Vital Sign     Unable to Pay for Housing in the Last Year: No     Number of Places Lived in the Last Year: 1     Unstable Housing in the Last Year: No   [2]   Current Outpatient Medications   Medication Sig Dispense Refill    amitriptyline (ELAVIL) 25 MG tablet Take 1 tablet (25 mg total) by mouth every evening. 30 tablet 5    calcium-vitamin D3 (OS-ELISABETH 500 + D3) 500 mg(1,250mg) -200 unit per tablet Take 1 tablet by mouth.      cetirizine (ZYRTEC) 10 MG tablet Take 1 tablet (10 mg total) by mouth 2 (two) times a day. 60 tablet 12    cholecalciferol, vitamin D3, (VITAMIN D3) 50 mcg (2,000 unit) Tab Take 1 tablet (2,000 Units total) by mouth daily with breakfast. 100 tablet 3    gabapentin (NEURONTIN) 100 MG capsule Take 1 capsule (100 mg total) by mouth 3 (three) times daily. 90 capsule 0    hydrocortisone (ANUSOL-HC) 2.5 % rectal cream Place rectally 2 (two) times daily. 28 g 3    magnesium oxide (MAG-OX) 400 mg (241.3 mg magnesium) tablet Take 400 mg by mouth once daily.      mesalamine (LIALDA) 1.2 gram TbEC Take 2 tablets (2.4 g total) by mouth daily with breakfast. 60 tablet 11    mupirocin (BACTROBAN) 2 % ointment Apply topically 2 (two) times daily.      mupirocin calcium 2% nasl oint (BACTROBAN) 2 % Oint by Nasal route 2 (two) times daily. 1 Tube 3    omeprazole (PRILOSEC) 10 MG capsule       prenatal  vit37-iron-folic acid 29 mg iron- 1 mg Chew Take by mouth.      rizatriptan (MAXALT-MLT) 10 MG disintegrating tablet Take 1 tablet (10 mg total) by mouth every 2 (two) hours as needed for Migraine. Max 30 mg/day. 12 tablet 5    sertraline (ZOLOFT) 50 MG tablet Take 1 tablet (50 mg total) by mouth every evening. 90 tablet 3    SYNTHROID 50 mcg tablet Take 1 tablet (50 mcg total) by mouth once daily. 90 tablet 3    ubrogepant (UBRELVY) 100 mg tablet Take 1 tablet (100 mg total) by mouth every 2 (two) hours as needed for Migraine. Max 200 mg/day. 16 tablet 2     No current facility-administered medications for this visit.     Facility-Administered Medications Ordered in Other Visits   Medication Dose Route Frequency Provider Last Rate Last Admin    fentaNYL 50 mcg/mL injection  mcg   mcg Intravenous PRN Yury Ambrosio MD   50 mcg at 06/24/22 1026    LIDOcaine (PF) 10 mg/ml (1%) injection 10 mg  1 mL Intradermal Once Yury Ambrosio MD        midazolam (VERSED) 1 mg/mL injection 0.5-4 mg  0.5-4 mg Intravenous PRN Yury Ambrosio MD   2 mg at 06/24/22 1026

## 2025-04-13 DIAGNOSIS — G43.009 MIGRAINE WITHOUT AURA AND WITHOUT STATUS MIGRAINOSUS, NOT INTRACTABLE: ICD-10-CM

## 2025-04-14 RX ORDER — GABAPENTIN 100 MG/1
100 CAPSULE ORAL 3 TIMES DAILY
Qty: 90 CAPSULE | Refills: 0 | Status: SHIPPED | OUTPATIENT
Start: 2025-04-14

## 2025-04-16 ENCOUNTER — PATIENT MESSAGE (OUTPATIENT)
Dept: OBSTETRICS AND GYNECOLOGY | Facility: CLINIC | Age: 35
End: 2025-04-16
Payer: COMMERCIAL

## 2025-04-17 RX ORDER — MEDROXYPROGESTERONE ACETATE 10 MG/1
TABLET ORAL
Qty: 10 TABLET | Refills: 0 | Status: SHIPPED | OUTPATIENT
Start: 2025-04-17

## 2025-04-21 ENCOUNTER — PATIENT MESSAGE (OUTPATIENT)
Facility: CLINIC | Age: 35
End: 2025-04-21
Payer: COMMERCIAL

## 2025-05-16 ENCOUNTER — PATIENT MESSAGE (OUTPATIENT)
Facility: CLINIC | Age: 35
End: 2025-05-16
Payer: COMMERCIAL

## 2025-05-16 DIAGNOSIS — G43.009 MIGRAINE WITHOUT AURA AND WITHOUT STATUS MIGRAINOSUS, NOT INTRACTABLE: ICD-10-CM

## 2025-05-23 ENCOUNTER — PATIENT MESSAGE (OUTPATIENT)
Dept: OBSTETRICS AND GYNECOLOGY | Facility: CLINIC | Age: 35
End: 2025-05-23
Payer: COMMERCIAL

## 2025-05-23 ENCOUNTER — PATIENT MESSAGE (OUTPATIENT)
Facility: CLINIC | Age: 35
End: 2025-05-23
Payer: COMMERCIAL

## 2025-05-27 RX ORDER — PROGESTERONE 200 MG/1
400 CAPSULE ORAL NIGHTLY
Qty: 60 CAPSULE | Refills: 1 | Status: SHIPPED | OUTPATIENT
Start: 2025-05-27 | End: 2025-07-26

## 2025-05-27 NOTE — ADDENDUM NOTE
Addended by: CONNIE QUAN on: 5/27/2025 07:59 AM     Modules accepted: Orders     Discussed condition and exacerbating conditions/situations (e.g., dry/arid environments, overhead fans, air conditioners, side effect of medications).

## 2025-06-03 ENCOUNTER — TELEPHONE (OUTPATIENT)
Dept: OBSTETRICS AND GYNECOLOGY | Facility: CLINIC | Age: 35
End: 2025-06-03

## 2025-06-03 ENCOUNTER — CLINICAL SUPPORT (OUTPATIENT)
Dept: OBSTETRICS AND GYNECOLOGY | Facility: CLINIC | Age: 35
End: 2025-06-03
Payer: COMMERCIAL

## 2025-06-03 ENCOUNTER — PATIENT MESSAGE (OUTPATIENT)
Dept: OBSTETRICS AND GYNECOLOGY | Facility: CLINIC | Age: 35
End: 2025-06-03

## 2025-06-03 DIAGNOSIS — N91.2 AMENORRHEA: Primary | ICD-10-CM

## 2025-06-03 PROCEDURE — 99999 PR PBB SHADOW E&M-EST. PATIENT-LVL III: CPT | Mod: PBBFAC,,,

## 2025-06-04 ENCOUNTER — TELEPHONE (OUTPATIENT)
Dept: OBSTETRICS AND GYNECOLOGY | Facility: CLINIC | Age: 35
End: 2025-06-04
Payer: COMMERCIAL

## 2025-06-06 ENCOUNTER — PATIENT MESSAGE (OUTPATIENT)
Dept: OBSTETRICS AND GYNECOLOGY | Facility: CLINIC | Age: 35
End: 2025-06-06
Payer: COMMERCIAL

## 2025-06-06 ENCOUNTER — OFFICE VISIT (OUTPATIENT)
Dept: OBSTETRICS AND GYNECOLOGY | Facility: CLINIC | Age: 35
End: 2025-06-06
Payer: COMMERCIAL

## 2025-06-06 DIAGNOSIS — Z34.90 EARLY STAGE OF PREGNANCY: Primary | ICD-10-CM

## 2025-06-06 DIAGNOSIS — E03.9 HYPOTHYROIDISM, UNSPECIFIED TYPE: ICD-10-CM

## 2025-06-06 DIAGNOSIS — Z72.89 OTHER PROBLEMS RELATED TO LIFESTYLE: ICD-10-CM

## 2025-06-06 DIAGNOSIS — Z11.3 SCREENING EXAMINATION FOR STD (SEXUALLY TRANSMITTED DISEASE): ICD-10-CM

## 2025-06-06 PROCEDURE — 98005 SYNCH AUDIO-VIDEO EST LOW 20: CPT | Mod: 95,,, | Performed by: OBSTETRICS & GYNECOLOGY

## 2025-06-07 ENCOUNTER — LAB VISIT (OUTPATIENT)
Dept: LAB | Facility: HOSPITAL | Age: 35
End: 2025-06-07
Attending: OBSTETRICS & GYNECOLOGY
Payer: COMMERCIAL

## 2025-06-07 DIAGNOSIS — Z11.3 SCREENING EXAMINATION FOR STD (SEXUALLY TRANSMITTED DISEASE): ICD-10-CM

## 2025-06-07 DIAGNOSIS — Z72.89 OTHER PROBLEMS RELATED TO LIFESTYLE: ICD-10-CM

## 2025-06-07 DIAGNOSIS — Z34.90 EARLY STAGE OF PREGNANCY: ICD-10-CM

## 2025-06-07 DIAGNOSIS — E03.9 HYPOTHYROIDISM, UNSPECIFIED TYPE: ICD-10-CM

## 2025-06-07 LAB
ABSOLUTE EOSINOPHIL (OHS): 0.06 K/UL
ABSOLUTE MONOCYTE (OHS): 0.6 K/UL (ref 0.3–1)
ABSOLUTE NEUTROPHIL COUNT (OHS): 6.36 K/UL (ref 1.8–7.7)
BASOPHILS # BLD AUTO: 0.02 K/UL
BASOPHILS NFR BLD AUTO: 0.2 %
ERYTHROCYTE [DISTWIDTH] IN BLOOD BY AUTOMATED COUNT: 12.8 % (ref 11.5–14.5)
GROUP & RH: NORMAL
HBV SURFACE AG SERPL QL IA: NORMAL
HCG INTACT+B SERPL-ACNC: NORMAL MIU/ML
HCT VFR BLD AUTO: 44.7 % (ref 37–48.5)
HCV AB SERPL QL IA: NORMAL
HGB BLD-MCNC: 14.1 GM/DL (ref 12–16)
HIV 1+2 AB+HIV1 P24 AG SERPL QL IA: NORMAL
IMM GRANULOCYTES # BLD AUTO: 0.02 K/UL (ref 0–0.04)
IMM GRANULOCYTES NFR BLD AUTO: 0.2 % (ref 0–0.5)
INDIRECT COOMBS: NORMAL
LYMPHOCYTES # BLD AUTO: 2.41 K/UL (ref 1–4.8)
MCH RBC QN AUTO: 31.5 PG (ref 27–31)
MCHC RBC AUTO-ENTMCNC: 31.5 G/DL (ref 32–36)
MCV RBC AUTO: 100 FL (ref 82–98)
NUCLEATED RBC (/100WBC) (OHS): 0 /100 WBC
PLATELET # BLD AUTO: 207 K/UL (ref 150–450)
PMV BLD AUTO: 11.1 FL (ref 9.2–12.9)
RBC # BLD AUTO: 4.47 M/UL (ref 4–5.4)
RELATIVE EOSINOPHIL (OHS): 0.6 %
RELATIVE LYMPHOCYTE (OHS): 25.4 % (ref 18–48)
RELATIVE MONOCYTE (OHS): 6.3 % (ref 4–15)
RELATIVE NEUTROPHIL (OHS): 67.3 % (ref 38–73)
T PALLIDUM IGG+IGM SER QL: NORMAL
TSH SERPL-ACNC: 1.14 UIU/ML (ref 0.4–4)
WBC # BLD AUTO: 9.47 K/UL (ref 3.9–12.7)

## 2025-06-07 PROCEDURE — 85025 COMPLETE CBC W/AUTO DIFF WBC: CPT

## 2025-06-07 PROCEDURE — 86762 RUBELLA ANTIBODY: CPT

## 2025-06-07 PROCEDURE — 87389 HIV-1 AG W/HIV-1&-2 AB AG IA: CPT

## 2025-06-07 PROCEDURE — 84702 CHORIONIC GONADOTROPIN TEST: CPT

## 2025-06-07 PROCEDURE — 84443 ASSAY THYROID STIM HORMONE: CPT

## 2025-06-07 PROCEDURE — 36415 COLL VENOUS BLD VENIPUNCTURE: CPT | Mod: PO

## 2025-06-07 PROCEDURE — 86901 BLOOD TYPING SEROLOGIC RH(D): CPT | Performed by: OBSTETRICS & GYNECOLOGY

## 2025-06-07 PROCEDURE — 86803 HEPATITIS C AB TEST: CPT

## 2025-06-07 PROCEDURE — 87340 HEPATITIS B SURFACE AG IA: CPT

## 2025-06-07 PROCEDURE — 86593 SYPHILIS TEST NON-TREP QUANT: CPT

## 2025-06-09 DIAGNOSIS — R10.2 ACUTE PELVIC PAIN: ICD-10-CM

## 2025-06-09 DIAGNOSIS — Z34.90 EARLY STAGE OF PREGNANCY: Primary | ICD-10-CM

## 2025-06-09 LAB
RUBV IGG SER-ACNC: 13.7 IU/ML
RUBV IGG SER-IMP: REACTIVE

## 2025-06-10 ENCOUNTER — TELEPHONE (OUTPATIENT)
Dept: OBSTETRICS AND GYNECOLOGY | Facility: CLINIC | Age: 35
End: 2025-06-10
Payer: COMMERCIAL

## 2025-06-10 ENCOUNTER — RESULTS FOLLOW-UP (OUTPATIENT)
Dept: OBSTETRICS AND GYNECOLOGY | Facility: CLINIC | Age: 35
End: 2025-06-10

## 2025-06-11 ENCOUNTER — RESULTS FOLLOW-UP (OUTPATIENT)
Dept: OBSTETRICS AND GYNECOLOGY | Facility: HOSPITAL | Age: 35
End: 2025-06-11

## 2025-06-11 ENCOUNTER — PATIENT MESSAGE (OUTPATIENT)
Dept: OBSTETRICS AND GYNECOLOGY | Facility: HOSPITAL | Age: 35
End: 2025-06-11
Payer: COMMERCIAL

## 2025-06-11 ENCOUNTER — HOSPITAL ENCOUNTER (OUTPATIENT)
Dept: RADIOLOGY | Facility: HOSPITAL | Age: 35
Discharge: HOME | End: 2025-06-11
Attending: OBSTETRICS & GYNECOLOGY
Payer: COMMERCIAL

## 2025-06-11 DIAGNOSIS — Z34.90 EARLY STAGE OF PREGNANCY: ICD-10-CM

## 2025-06-11 DIAGNOSIS — R10.2 ACUTE PELVIC PAIN: ICD-10-CM

## 2025-06-11 PROCEDURE — 76801 OB US < 14 WKS SINGLE FETUS: CPT | Mod: TC

## 2025-06-12 NOTE — PROGRESS NOTES
The patient location is: [place]  The chief complaint leading to consultation is: new pregnancy    Visit type: audiovisual    Face to Face time with patient: 20 minutes of total time spent on the encounter, which includes face to face time and non-face to face time preparing to see the patient (eg, review of tests), Obtaining and/or reviewing separately obtained history, Documenting clinical information in the electronic or other health record, Independently interpreting results (not separately reported) and communicating results to the patient/family/caregiver, or Care coordination (not separately reported).         Each patient to whom he or she provides medical services by telemedicine is:  (1) informed of the relationship between the physician and patient and the respective role of any other health care provider with respect to management of the patient; and (2) notified that he or she may decline to receive medical services by telemedicine and may withdraw from such care at any time.    Notes:     CC: positive pregnancy test     HPI:   Sherlyn Monahan 35 y.o. G P is here for + UPT. She hasn't seen anyone yet for this pregnancy. She complains of cramping, no bleeding     OB history:  x2 and SAB      No LMP recorded.     Past Medical History:   Diagnosis Date    Acquired hypothyroidism 2016    Allergy 2009    Seasonal    Anxiety 2019    Depression 2019    Irritable bowel syndrome (IBS)     Keloid cicatrix     Obesity (BMI 30.0-34.9) 2016    loosing weight consistently on 21 Day Fix program     Ulcerative colitis     Urticaria        Past Surgical History:   Procedure Laterality Date    breast reduction  2012    BREAST SURGERY  2014    breast reduction     SECTION  18    Second  2020     SECTION N/A 2020    Procedure:  SECTION;  Surgeon: Chiara Smith MD;  Location: [facility] L&D;  Service: OB/GYN;  Laterality: N/A;    CHOLECYSTECTOMY   6/11/19    COLONOSCOPY N/A 12/19/2023    Procedure: COLONOSCOPY;  Surgeon: Tom Palmer MD;  Location: [facility] ENDO;  Service: Endoscopy;  Laterality: N/A;    DILATION AND CURETTAGE OF UTERUS USING SUCTION N/A 08/21/2024    Procedure: DILATION AND CURETTAGE, UTERUS, USING SUCTION;  Surgeon: Chiara Smith MD;  Location: [facility] OR;  Service: OB/GYN;  Laterality: N/A;    ENDOSCOPIC CARPAL TUNNEL RELEASE Right 06/24/2022    Procedure: RELEASE, CARPAL TUNNEL, ENDOSCOPIC;  Surgeon: Phoenix Rojas MD;  Location: [facility] OR;  Service: Orthopedics;  Laterality: Right;    FLEXIBLE SIGMOIDOSCOPY N/A 04/05/2024    Procedure: SIGMOIDOSCOPY, FLEXIBLE;  Surgeon: Tom Palmer MD;  Location: [facility] ENDO;  Service: Endoscopy;  Laterality: N/A;    LAPAROSCOPIC CHOLECYSTECTOMY N/A 06/11/2019    Procedure: CHOLECYSTECTOMY, LAPAROSCOPIC;  Surgeon: Missael Tolentino MD;  Location: [facility] OR;  Service: General;  Laterality: N/A;  video    SKIN BIOPSY  2022    ULNAR NERVE TRANSPOSITION Right 06/24/2022    Procedure: TRANSPOSITION, NERVE, ULNAR;  Surgeon: Phoenix Rojas MD;  Location: [facility] OR;  Service: Orthopedics;  Laterality: Right;       Family History   Problem Relation Name Age of Onset    Breast cancer Mother Refugio 45        remission for 10 years    Cancer Mother Refugio 45        Breast    Hypertension Mother Refugio     Allergic rhinitis Mother Refugio     Depression Mother Refugio     Mental illness Mother Refugio         Depression, Anxiety    Miscarriages / Stillbirths Mother Refugio     Diabetes type II Father Gustavo     Depression Father Gustavo     Heart disease Father Gustavo     Psoriasis Father Gustavo     Depression Brother Jose L     Psoriasis Brother Jose L     Epilepsy Brother Jose L     Cancer Maternal Grandmother Rosario         Skin    Diabetes Maternal Grandfather Lalo     Heart disease Maternal Grandfather Lalo     Cancer Maternal Grandfather Lalo         Skin    Hypertension Maternal  Grandfather Lalo     Diabetes Paternal Grandmother Tatiana     Diabetes Paternal Grandfather Ford     Colon cancer Neg Hx      Ovarian cancer Neg Hx      Allergies Neg Hx      Angioedema Neg Hx      Asthma Neg Hx      Atopy Neg Hx      Eczema Neg Hx      Immunodeficiency Neg Hx      Rhinitis Neg Hx      Urticaria Neg Hx         Social History[1]    OB History          3    Para   2    Term   2       0    AB   1    Living   2         SAB   1    IAB   0    Ectopic   0    Multiple   0    Live Births   2                  COMPREHENSIVE GYN HISTORY:  PAP History: Denies abnormal Paps.  Infection History: CT treated at 17 Denies PID.  Benign History: Denies uterine fibroids. H/o ovarian cysts, never needed surgery. Denies endometriosis.   Cancer History: Denies cervical cancer. Denies uterine cancer or hyperplasia. Denies ovarian cancer. Denies vulvar cancer or pre-cancer. Denies vaginal cancer or pre-cancer. Denies breast cancer. Denies colon cancer.  Sexual Activity History:   reports being sexually active and has had partner(s) who are male. She reports using the following methods of birth control/protection: Condom and None.   Menstrual History: 11/monthly        ROS:  Negative     PE:   There were no vitals taken for this visit.    APPEARANCE: Well nourished, well developed, in no acute distress.          1. Early stage of pregnancy    2. Hypothyroidism, unspecified type    3. Screening examination for STD (sexually transmitted disease)    4. Other problems related to lifestyle        Plan:    1. IOB labs and dating US ordered, PNV ordered.   2 see back in 4 weeks.   3. Hypthyroidism: synthyroid, treated with PCP and endocrinologist, recommend TSH <2.5, will repeat level   4. LV: continue cpap  5. Ulcerative colitis: continue f/u with GI   6. H/o migraines: on amitriptyline, wants to wean off, will come off slowly, stopped gabapentin   7. Taking progesterone, discussed limited data and not been shown  to prevent sab however not data showing harm, she would prefer to continue it.   8. H/o  x2                                 [1]   Social History  Socioeconomic History    Marital status:    Tobacco Use    Smoking status: Former     Current packs/day: 0.25     Average packs/day: 0.3 packs/day for 3.0 years (0.8 ttl pk-yrs)     Types: Cigarettes    Smokeless tobacco: Never   Vaping Use    Vaping status: Never Used   Substance and Sexual Activity    Alcohol use: Not Currently     Alcohol/week: 1.0 standard drink of alcohol     Types: 1 Glasses of wine per week     Comment: very rare    Drug use: No    Sexual activity: Yes     Partners: Male     Birth control/protection: Condom, None   Other Topics Concern    Are you pregnant or think you may be? No    Breast-feeding No     Social Drivers of Health     Financial Resource Strain: Low Risk  (4/15/2024)    Overall Financial Resource Strain (CARDIA)     Difficulty of Paying Living Expenses: Not very hard   Food Insecurity: No Food Insecurity (4/15/2024)    Hunger Vital Sign     Worried About Running Out of Food in the Last Year: Never true     Ran Out of Food in the Last Year: Never true   Transportation Needs: No Transportation Needs (4/15/2024)    PRAPARE - Transportation     Lack of Transportation (Medical): No     Lack of Transportation (Non-Medical): No   Physical Activity: Insufficiently Active (4/15/2024)    Exercise Vital Sign     Days of Exercise per Week: 3 days     Minutes of Exercise per Session: 20 min   Stress: No Stress Concern Present (4/15/2024)    [employer] [place] [employer] - Occupational Stress Questionnaire     Feeling of Stress : Only a little   Housing Stability: Low Risk  (10/12/2023)    Housing Stability Vital Sign     Unable to Pay for Housing in the Last Year: No     Number of Places Lived in the Last Year: 1     Unstable Housing in the Last Year: No

## 2025-06-14 ENCOUNTER — PATIENT MESSAGE (OUTPATIENT)
Dept: OBSTETRICS AND GYNECOLOGY | Facility: CLINIC | Age: 35
End: 2025-06-14
Payer: COMMERCIAL

## 2025-06-24 ENCOUNTER — PATIENT MESSAGE (OUTPATIENT)
Dept: GASTROENTEROLOGY | Facility: CLINIC | Age: 35
End: 2025-06-24
Payer: COMMERCIAL

## 2025-06-24 ENCOUNTER — PROCEDURE VISIT (OUTPATIENT)
Dept: MATERNAL FETAL MEDICINE | Facility: CLINIC | Age: 35
End: 2025-06-24
Payer: COMMERCIAL

## 2025-06-24 ENCOUNTER — OFFICE VISIT (OUTPATIENT)
Dept: OBSTETRICS AND GYNECOLOGY | Facility: CLINIC | Age: 35
End: 2025-06-24
Payer: COMMERCIAL

## 2025-06-24 VITALS
HEART RATE: 78 BPM | WEIGHT: 211.75 LBS | DIASTOLIC BLOOD PRESSURE: 82 MMHG | BODY MASS INDEX: 35.24 KG/M2 | SYSTOLIC BLOOD PRESSURE: 123 MMHG

## 2025-06-24 DIAGNOSIS — N91.2 AMENORRHEA: ICD-10-CM

## 2025-06-24 DIAGNOSIS — Z36.0 ENCOUNTER FOR ANTENATAL SCREENING FOR CHROMOSOMAL ANOMALIES: ICD-10-CM

## 2025-06-24 DIAGNOSIS — Z11.3 SCREENING EXAMINATION FOR STD (SEXUALLY TRANSMITTED DISEASE): ICD-10-CM

## 2025-06-24 DIAGNOSIS — O09.523 MULTIGRAVIDA OF ADVANCED MATERNAL AGE IN THIRD TRIMESTER: ICD-10-CM

## 2025-06-24 DIAGNOSIS — Z3A.01 7 WEEKS GESTATION OF PREGNANCY: ICD-10-CM

## 2025-06-24 DIAGNOSIS — Z32.00 POSSIBLE PREGNANCY: Primary | ICD-10-CM

## 2025-06-24 DIAGNOSIS — Z72.89 OTHER PROBLEMS RELATED TO LIFESTYLE: ICD-10-CM

## 2025-06-24 LAB
B-HCG UR QL: POSITIVE
CTP QC/QA: YES

## 2025-06-24 PROCEDURE — 87088 URINE BACTERIA CULTURE: CPT | Performed by: OBSTETRICS & GYNECOLOGY

## 2025-06-24 PROCEDURE — 76801 OB US < 14 WKS SINGLE FETUS: CPT | Mod: S$GLB,,, | Performed by: STUDENT IN AN ORGANIZED HEALTH CARE EDUCATION/TRAINING PROGRAM

## 2025-06-24 PROCEDURE — 0500F INITIAL PRENATAL CARE VISIT: CPT | Mod: S$GLB,,, | Performed by: OBSTETRICS & GYNECOLOGY

## 2025-06-24 PROCEDURE — 3008F BODY MASS INDEX DOCD: CPT | Mod: CPTII,S$GLB,, | Performed by: OBSTETRICS & GYNECOLOGY

## 2025-06-24 PROCEDURE — 87591 N.GONORRHOEAE DNA AMP PROB: CPT | Performed by: OBSTETRICS & GYNECOLOGY

## 2025-06-24 PROCEDURE — 81025 URINE PREGNANCY TEST: CPT | Mod: S$GLB,,, | Performed by: OBSTETRICS & GYNECOLOGY

## 2025-06-24 PROCEDURE — 99999 PR PBB SHADOW E&M-EST. PATIENT-LVL III: CPT | Mod: PBBFAC,,, | Performed by: OBSTETRICS & GYNECOLOGY

## 2025-06-24 PROCEDURE — 1159F MED LIST DOCD IN RCRD: CPT | Mod: CPTII,S$GLB,, | Performed by: OBSTETRICS & GYNECOLOGY

## 2025-06-24 PROCEDURE — 3079F DIAST BP 80-89 MM HG: CPT | Mod: CPTII,S$GLB,, | Performed by: OBSTETRICS & GYNECOLOGY

## 2025-06-24 PROCEDURE — 3074F SYST BP LT 130 MM HG: CPT | Mod: CPTII,S$GLB,, | Performed by: OBSTETRICS & GYNECOLOGY

## 2025-06-24 NOTE — PROGRESS NOTES
CC: f/u ob visit     HPI:   Sherlyn Monahan 35 y.o.    [facility].     OB history:  x2 and SAB      There were no vitals taken for this visit.    APPEARANCE: Well nourished, well developed, in no acute distress.    FHT normal           1. Possible pregnancy        Plan:    1. IOB labs and dating US done  2.  Hypothyroidism: synthyroid 50mcg, treated with PCP and endocrinologist, recommend TSH <2.5   1st trim: 1.138  3. Subchorionic hemorrhage: stable   4.. LV: continue cpap  5. Ulcerative colitis: continue f/u with GI   6. H/o migraines: on amitriptyline, wants to wean off, will come off slowly, stopped gabapentin   7. H/o SAB: Taking progesterone, discussed limited data and not been shown to prevent sab however not data showing harm, she would prefer to continue it.   8. H/o  x2   9. AMA: will discuss with GI if can do ASA   -MT21 ordered    -32 week growth US   10. Obesity: starting BMI 35

## 2025-06-26 ENCOUNTER — PATIENT MESSAGE (OUTPATIENT)
Dept: OBSTETRICS AND GYNECOLOGY | Facility: HOSPITAL | Age: 35
End: 2025-06-26
Payer: COMMERCIAL

## 2025-06-26 LAB
C TRACH DNA SPEC QL NAA+PROBE: NOT DETECTED
CTGC SOURCE (OHS) ORD-325: NORMAL
N GONORRHOEA DNA UR QL NAA+PROBE: NOT DETECTED

## 2025-06-26 RX ORDER — NITROFURANTOIN 25; 75 MG/1; MG/1
100 CAPSULE ORAL 2 TIMES DAILY
Qty: 14 CAPSULE | Refills: 0 | Status: SHIPPED | OUTPATIENT
Start: 2025-06-26 | End: 2025-07-03

## 2025-06-28 LAB — BACTERIA UR CULT: ABNORMAL

## 2025-07-02 ENCOUNTER — PATIENT MESSAGE (OUTPATIENT)
Dept: OBSTETRICS AND GYNECOLOGY | Facility: CLINIC | Age: 35
End: 2025-07-02
Payer: COMMERCIAL

## 2025-07-06 ENCOUNTER — PATIENT MESSAGE (OUTPATIENT)
Dept: OBSTETRICS AND GYNECOLOGY | Facility: CLINIC | Age: 35
End: 2025-07-06
Payer: COMMERCIAL

## 2025-07-08 ENCOUNTER — PATIENT MESSAGE (OUTPATIENT)
Facility: CLINIC | Age: 35
End: 2025-07-08
Payer: COMMERCIAL

## 2025-07-08 DIAGNOSIS — G43.009 MIGRAINE WITHOUT AURA AND WITHOUT STATUS MIGRAINOSUS, NOT INTRACTABLE: Primary | ICD-10-CM

## 2025-07-09 RX ORDER — METOCLOPRAMIDE 10 MG/1
10 TABLET ORAL 3 TIMES DAILY PRN
Qty: 30 TABLET | Refills: 2 | Status: SHIPPED | OUTPATIENT
Start: 2025-07-09

## 2025-07-18 ENCOUNTER — PATIENT MESSAGE (OUTPATIENT)
Facility: CLINIC | Age: 35
End: 2025-07-18

## 2025-07-18 ENCOUNTER — OFFICE VISIT (OUTPATIENT)
Facility: CLINIC | Age: 35
End: 2025-07-18
Payer: COMMERCIAL

## 2025-07-18 DIAGNOSIS — Z3A.12 12 WEEKS GESTATION OF PREGNANCY: ICD-10-CM

## 2025-07-18 DIAGNOSIS — G43.009 MIGRAINE WITHOUT AURA AND WITHOUT STATUS MIGRAINOSUS, NOT INTRACTABLE: Primary | ICD-10-CM

## 2025-07-18 RX ORDER — AMITRIPTYLINE HYDROCHLORIDE 25 MG/1
25 TABLET, FILM COATED ORAL NIGHTLY
Qty: 30 TABLET | Refills: 5 | Status: SHIPPED | OUTPATIENT
Start: 2025-07-18

## 2025-07-18 NOTE — PROGRESS NOTES
Established Patient   SUBJECTIVE:  Patient ID: Sherlyn Monahan   Chief Complaint: Follow-up    History of Present Illness:  Sherlyn Monahan is a 35 y.o. female who presents for follow-up of headaches via virtual visit.     The patient location is: in [place]   The chief complaint leading to consultation is: Follow-up  Visit type: Virtual visit with synchronous audio and video  Total time spent with patient: 6 min  Each patient to whom he or she provides medical services by telemedicine is:  (1) informed of the relationship between the physician and patient and the respective role of any other health care provider with respect to management of the patient; and (2) notified that he or she may decline to receive medical services by telemedicine and may withdraw from such care at any time.    07/18/2025 - Interval History:      CHIEF COMPLAINT:  Patient presents today for follow up of migraines during pregnancy.    PREGNANCY STATUS:  She is currently pregnant at approximately 12 weeks gestation, transitioning out of the first trimester.    HEADACHES AND MIGRAINE MANAGEMENT:  She reports ongoing but improved headaches since restarting amitriptyline. She started amitriptyline 25mg nightly in early July. She notes increased occipital nerve pain since discontinuing gabapentin, describing it as more frequent but not severe. She tried Reglan once but experienced significant diarrhea and discontinued use. She currently manages headaches with Tylenol which effectively controls pain. Her headaches are not severely impacting daily functioning.       Otherwise, information below is still accurate and current.     04/08/2025 - Interval History:  Migraines have continued to occur quite frequently, tracked 15 headache days in the month of February and 17 in March.  Migraines are lasting at least 4 hours in duration despite treatment with ubrelvy and/or maxalt 10 mg mlt.  Migraines are moderate to severe in intensity and  associated with nausea, photo/phonophobia.  She has continued amitriptyline 10 mg nightly for migraine prevention, no improvement in migraines, also denies presence of side effects.      February - 15 headache days   March - 17 headache days  April - 3 so far      CURRENT REGIMEN:  PPX - amitriptyline  Abortive - maxalt, ubrelvy     Otherwise, information below is still accurate and current.      02/19/2025 - Interval History:  Sent multiple messages via patient portal throughout the month of January citing intractable, high frequency migraines.  Was given prednisone taper without benefit.  Reports at least 21-25 headache days per month.  She was eventually started on gabapentin 100 mg and instructed to take nightly with the option to uptitrate dose to 2 or 3 times per day.  Initially took gabapentin twice daily, however this caused drowsiness, is now taking only as needed.  Reports 7 headache days so far in February.  She has continued treating acute migraines with rizatriptan 10 mg mlt, during January maxalt was only giving her a few hours of relief.  Since starting gabapentin, has taken maxalt twice, once it gave her more relief, the second time, it did not.  Denies any acute change in January which could explain the drastic and abrupt uptick in migraine frequency.  Denies head/neck injury, change in medication/diet.       Sleep has been not so great the last few weeks, though has been under increased stress the last two weeks.  Diagnosed with LV, using CPAP nightly, feels this has been beneficial.    She has continued sertraline 50 mg daily for anxiety and depression.       Still actively trying to get pregnant.       11/12/2024 - Interval History:  Migraines have been better over the last two months, feels as her hormones continue to level out, migraine frequency has been going down.  Migraines last no longer than 30-60 minutes after treating with maxalt.  Has tracked only 1 migraine so far in November, 5 in  "October, and 14 in September.  Pleased with the natural gradual decrease in migraine frequency, does not wish to make adjustments in her treatment plan at this time.      Otherwise, information below is still accurate and current.      09/03/2024 - Interval History:  Found of she was pregnant 3 days after her last visit, unfortunately pregnancy was non-viable, had d & c on 8/21.  Has had an uptick in headache/migraine frequency over the last month, however she feels the large majority of these headaches were triggered by stress/hormone changes relating to pregnancy and subsequent miscarriage.  Feels headaches have begun to decrease in frequency over the last week or so.       Otherwise, information below is still accurate and current.      History of Present Illness:   34 y.o. female with hypothyroidism, migraines, anxiety/depression, who presents for evaluation of headaches via virtual visit.  Migraines began around age 14-15 yo and have been ongoing.  Migraines initially began shortly after she suffered a concussion.  Over the last 6 months, migraines have started to become more frequent.  Began taking "more magnesium" in January which has helped, now taking 1700 mg twice daily.  Tracked 5 migraines in June, but also had 7 headache days.  Her typical month is 2-3 migraines per month.  Migraines typically last about 10 hours in duration.  She has been treating acute migraines with tylenol and ice packs which are only somewhat effective.  She has taken sumatriptan 50 mg in the past, which she does recall please.  Associated symptoms include photophobia, phonophobia, nausea, scalp sensitivity, shooting pains in the occipitalis, dizziness, fatigue, neck pain, visual disturbance.    She is not currently pregnant, but she and her  are actively trying to conceive.       Treatments Tried and Response  Sumatriptan   Topiramate -   Fioricet   Sertraline   Magnesium   Vit b2 400 mg -    Coq10 -   Maxalt 10 mg mlt - " helping  Gabapentin 100 mg prn - helping some - unsure   Amitriptyline -   Ubrelvy 100 mg -     Current Medications:  Current Medications[1]    Review of Systems - A review of 10+ systems was conducted with pertinent positive and negative findings documented in HPI with all other systems reviewed and negative.    PFSH: Past medical, family, and social history reviewed as documented in chart with pertinent positive medical, family, and social history detailed in HPI.    OBJECTIVE:  Physical Exam:  Constitutional: she appears well-developed and well-nourished. she is well groomed. NAD.     Review of Data:   Notes from OBGYN reviewed   Labs:  Office Visit on 06/24/2025   Component Date Value Ref Range Status    POC Preg Test, Ur 06/24/2025 Positive (A)  Negative Final     Acceptable 06/24/2025 Yes   Final    CTGC Source 06/24/2025 Cervix   Final    Chlamydia, Amplified DNA 06/24/2025 Not Detected  Not Detected Final    N gonorrhoeae, amplified DNA 06/24/2025 Not Detected  Not Detected Final    Urine Culture 06/24/2025 >100,000 cfu/ml Enterococcus faecalis (A)   Final   Lab Visit on 06/09/2025   Component Date Value Ref Range Status    Beta HCG Quant 06/09/2025 41,507.30  See Text mIU/mL Final   Lab Visit on 06/07/2025   Component Date Value Ref Range Status    TSH 06/07/2025 1.138  0.400 - 4.000 uIU/mL Final    Beta HCG Quant 06/07/2025 35,692.80  See Text mIU/mL Final    HIV 1/2 Ag/Ab 06/07/2025 Non-Reactive  Non-Reactive Final    Treponema Pallidum Antibodies IgG,* 06/07/2025 Non-Reactive  Non-Reactive Final    Hep BsAg Interp 06/07/2025 Non-Reactive  Non-Reactive Final    Hep C Ab Interp 06/07/2025 Non-Reactive  Non-Reactive Final    Rubella IgG Antibodies 06/07/2025 13.7  >=10 IU/mL Final    Rubella Immune Status 06/07/2025 Reactive    Final    Group & Rh 06/07/2025 O POS   Final    Indirect Carla 06/07/2025 NEG   Final    WBC 06/07/2025 9.47  3.90 - 12.70 K/uL Final    RBC 06/07/2025 4.47  4.00 -  5.40 M/uL Final    HGB 06/07/2025 14.1  12.0 - 16.0 gm/dL Final    HCT 06/07/2025 44.7  37.0 - 48.5 % Final    MCV 06/07/2025 100 (H)  82 - 98 fL Final    MCH 06/07/2025 31.5 (H)  27.0 - 31.0 pg Final    MCHC 06/07/2025 31.5 (L)  32.0 - 36.0 g/dL Final    RDW 06/07/2025 12.8  11.5 - 14.5 % Final    Platelet Count 06/07/2025 207  150 - 450 K/uL Final    MPV 06/07/2025 11.1  9.2 - 12.9 fL Final    Nucleated RBC 06/07/2025 0  <=0 /100 WBC Final    Neut % 06/07/2025 67.3  38 - 73 % Final    Lymph % 06/07/2025 25.4  18 - 48 % Final    Mono % 06/07/2025 6.3  4 - 15 % Final    Eos % 06/07/2025 0.6  <=8 % Final    Basophil % 06/07/2025 0.2  <=1.9 % Final    Imm Grans % 06/07/2025 0.2  0.0 - 0.5 % Final    Neut # 06/07/2025 6.36  1.8 - 7.7 K/uL Final    Lymph # 06/07/2025 2.41  1 - 4.8 K/uL Final    Mono # 06/07/2025 0.60  0.3 - 1 K/uL Final    Eos # 06/07/2025 0.06  <=0.5 K/uL Final    Baso # 06/07/2025 0.02  <=0.2 K/uL Final    Imm Grans # 06/07/2025 0.02  0.00 - 0.04 K/uL Final   Office Visit on 02/27/2025   Component Date Value Ref Range Status    Cytology ThinPrep Pap Source 02/27/2025 Cervix   Final    Cytology ThinPrep Pap Report Status 02/27/2025 DNR   Final    Cytology Thinprep PAP Clinical His* 02/27/2025 HIGH RISK FOR CX CA   Corrected    Cytology ThinPrep Pap LMP 02/27/2025 02/18/2025   Final    Cytology ThinPrep Previous PAP 02/27/2025 Unknown   Final    Cytology ThinPrep Previous Biopsy 02/27/2025 No   Final    Cytology ThinPrep PAP Adequacy 02/27/2025 SEE BELOW   Final    Cytology ThinPrep PAP General Lissett* 02/27/2025 DNR   Final    Cytology ThinPrep PAP Interpretati* 02/27/2025 SEE BELOW   Final    Cytology ThinPrep PAP Comment 02/27/2025 SEE BELOW   Final    Cytotechnologist 02/27/2025 SEE BELOW   Final    Review Cytotechnologist 02/27/2025 SEE BELOW   Final    Pathologist 02/27/2025 DNR   Final    Cytology ThinPrep PAP Infection 02/27/2025 DNR   Final    Cytology Thin Prep Pap Explanation 02/27/2025 SEE  BELOW   Final    HPV DNA High Risk 02/27/2025 Not Detected  NOT DETECTED Final   Lab Visit on 02/18/2025   Component Date Value Ref Range Status    Sed Rate 02/18/2025 9  0 - 36 mm/Hr Final    CRP 02/18/2025 5.1  0.0 - 8.2 mg/L Final    WBC 02/18/2025 8.88  3.90 - 12.70 K/uL Final    RBC 02/18/2025 4.54  4.00 - 5.40 M/uL Final    Hemoglobin 02/18/2025 14.2  12.0 - 16.0 g/dL Final    Hematocrit 02/18/2025 43.8  37.0 - 48.5 % Final    MCV 02/18/2025 97  82 - 98 fL Final    MCH 02/18/2025 31.3 (H)  27.0 - 31.0 pg Final    MCHC 02/18/2025 32.4  32.0 - 36.0 g/dL Final    RDW 02/18/2025 12.2  11.5 - 14.5 % Final    Platelets 02/18/2025 218  150 - 450 K/uL Final    MPV 02/18/2025 10.5  9.2 - 12.9 fL Final    Immature Granulocytes 02/18/2025 0.3  0.0 - 0.5 % Final    Gran # (ANC) 02/18/2025 5.0  1.8 - 7.7 K/uL Final    Immature Grans (Abs) 02/18/2025 0.03  0.00 - 0.04 K/uL Final    Lymph # 02/18/2025 3.0  1.0 - 4.8 K/uL Final    Mono # 02/18/2025 0.7  0.3 - 1.0 K/uL Final    Eos # 02/18/2025 0.1  0.0 - 0.5 K/uL Final    Baso # 02/18/2025 0.01  0.00 - 0.20 K/uL Final    nRBC 02/18/2025 0  0 /100 WBC Final    Gran % 02/18/2025 56.8  38.0 - 73.0 % Final    Lymph % 02/18/2025 34.1  18.0 - 48.0 % Final    Mono % 02/18/2025 7.8  4.0 - 15.0 % Final    Eosinophil % 02/18/2025 0.9  0.0 - 8.0 % Final    Basophil % 02/18/2025 0.1  0.0 - 1.9 % Final    Differential Method 02/18/2025 Automated   Final    Sodium 02/18/2025 140  136 - 145 mmol/L Final    Potassium 02/18/2025 4.0  3.5 - 5.1 mmol/L Final    Chloride 02/18/2025 106  95 - 110 mmol/L Final    CO2 02/18/2025 20 (L)  23 - 29 mmol/L Final    Glucose 02/18/2025 83  70 - 110 mg/dL Final    BUN 02/18/2025 9  6 - 20 mg/dL Final    Creatinine 02/18/2025 0.8  0.5 - 1.4 mg/dL Final    Calcium 02/18/2025 9.7  8.7 - 10.5 mg/dL Final    Total Protein 02/18/2025 7.4  6.0 - 8.4 g/dL Final    Albumin 02/18/2025 4.0  3.5 - 5.2 g/dL Final    Total Bilirubin 02/18/2025 0.3  0.1 - 1.0 mg/dL Final     Alkaline Phosphatase 02/18/2025 62  40 - 150 U/L Final    AST 02/18/2025 24  10 - 40 U/L Final    ALT 02/18/2025 11  10 - 44 U/L Final    eGFR 02/18/2025 >60.0  >60 mL/min/1.73 m^2 Final    Anion Gap 02/18/2025 14  8 - 16 mmol/L Final    TSH 02/18/2025 0.857  0.400 - 4.000 uIU/mL Final    Free T4 02/18/2025 0.84  0.71 - 1.51 ng/dL Final    HLA B27 Interpretation 02/18/2025    Final                    Value:TAQ: [id number]  SAPE: 228      B27 Testing Date 02/18/2025 02/20/2025 08:52 AM   Final    HLA B27 Result 02/18/2025 Negative   Final     Imaging:  No results found for this or any previous visit.  Note: I have independently reviewed any/all imaging/labs/tests and agree with the report (s) as documented.  Any discrepancies will be as noted/demarcated by free text.  ANNAMARIE HINKLE 7/18/2025    ASSESSMENT/PLAN:  1. Migraine without aura and without status migrainosus, not intractable      Assessment & Plan    IMPRESSION:  - 12 weeks pregnant, entering second trimester.  - Continued amitriptyline 25 mg nightly, noting improvement since restarting.  - Anticipate further improvement in migraines due to medication effect and progression into second trimester.  - Considered nerve block as potential intervention if needed, using lidocaine for trigeminal nerve or lidocaine with steroid for occipital nerve.  - Noted increased occipital nerve pain since discontinuing gabapentin.  - Reglan trial resulted in GI side effects; to retry once GI symptoms resolve.    MIGRAINE WITHOUT AURA AND WITHOUT STATUS MIGRAINOSUS, NOT INTRACTABLE:  - Explained that migraines often improve during pregnancy, typically starting in the second trimester.  - Described nerve block procedure, its mechanism of action, and safety in pregnancy.  - Tylenol recommended as safest pain relief option during pregnancy.  - Continue amitriptyline 25 mg nightly.  - Continue Reglan as needed for migraine management.  - Continue rizatriptan as needed for migraine  management.  - Follow up in 2 months.  - Contact office if nerve block is needed before next appointment.         Orders Placed This Encounter    amitriptyline (ELAVIL) 25 MG tablet     Questions and concerns were sought and answered to the patient's stated verbal satisfaction.  The patient verbalizes understanding and agreement with the above stated treatment plan.     Visit today included increased complexity associated with the care of the episodic problem migraine without aura addressed and managing the longitudinal care of the patient due to the serious and/or complex managed problem(s).  Plan for patient to return to clinic in 2 months for follow-up visit.     CC: Tatiana Pedroza MD [facility]ulev[facility], [facility]  [facility]   [phone]    Dr. Pate was available during today's encounter.     This note was generated with the assistance of ambient listening technology. Verbal consent was obtained by the patient and accompanying visitor(s) for the recording of patient appointment to facilitate this note. I attest to having reviewed and edited the generated note for accuracy, though some syntax or spelling errors may persist. Please contact the author of this note for any clarification.         [1]   Current Outpatient Medications:     amitriptyline (ELAVIL) 25 MG tablet, Take 1 tablet (25 mg total) by mouth every evening., Disp: 30 tablet, Rfl: 5    calcium-vitamin D3 (OS-ELISABETH 500 + D3) 500 mg(1,250mg) -200 unit per tablet, Take 1 tablet by mouth., Disp: , Rfl:     cetirizine (ZYRTEC) 10 MG tablet, Take 1 tablet (10 mg total) by mouth 2 (two) times a day., Disp: 60 tablet, Rfl: 12    cholecalciferol, vitamin D3, (VITAMIN D3) 50 mcg (2,000 unit) Tab, Take 1 tablet (2,000 Units total) by mouth daily with breakfast., Disp: 100 tablet, Rfl: 3    magnesium oxide (MAG-OX) 400 mg (241.3 mg magnesium) tablet, Take 400 mg by mouth once daily., Disp: , Rfl:     mesalamine (LIALDA) 1.2 gram  TbEC, Take 2 tablets (2.4 g total) by mouth daily with breakfast., Disp: 60 tablet, Rfl: 11    metoclopramide HCl (REGLAN) 10 MG tablet, Take 1 tablet (10 mg total) by mouth 3 (three) times daily as needed (migraine)., Disp: 30 tablet, Rfl: 2    mupirocin (BACTROBAN) 2 % ointment, Apply topically 2 (two) times daily. (Patient not taking: Reported on 6/24/2025), Disp: , Rfl:     mupirocin calcium 2% nasl oint (BACTROBAN) 2 % Oint, by Nasal route 2 (two) times daily. (Patient not taking: Reported on 6/24/2025), Disp: 1 Tube, Rfl: 3    omeprazole (PRILOSEC) 10 MG capsule, , Disp: , Rfl:     prenatal vit37-iron-folic acid 29 mg iron- 1 mg Chew, Take by mouth., Disp: , Rfl:     progesterone (PROMETRIUM) 200 MG capsule, Take 2 capsules (400 mg total) by mouth every evening. Take until 12 weeks of pregnancy, Disp: 60 capsule, Rfl: 1    rizatriptan (MAXALT-MLT) 10 MG disintegrating tablet, Take 1 tablet (10 mg total) by mouth every 2 (two) hours as needed for Migraine. Max 30 mg/day., Disp: 12 tablet, Rfl: 5    sertraline (ZOLOFT) 50 MG tablet, Take 1 tablet (50 mg total) by mouth every evening., Disp: 90 tablet, Rfl: 3    SYNTHROID 50 mcg tablet, Take 1 tablet (50 mcg total) by mouth once daily., Disp: 90 tablet, Rfl: 3  No current facility-administered medications for this visit.    Facility-Administered Medications Ordered in Other Visits:     fentaNYL 50 mcg/mL injection  mcg,  mcg, Intravenous, PRN, Yury Ambrosio MD, 50 mcg at 06/24/22 1026    LIDOcaine (PF) 10 mg/ml (1%) injection 10 mg, 1 mL, Intradermal, Once, Yury Ambrosio MD    midazolam (VERSED) 1 mg/mL injection 0.5-4 mg, 0.5-4 mg, Intravenous, PRN, Yury Ambrosio MD, 2 mg at 06/24/22 2053

## 2025-07-28 ENCOUNTER — ROUTINE PRENATAL (OUTPATIENT)
Dept: OBSTETRICS AND GYNECOLOGY | Facility: CLINIC | Age: 35
End: 2025-07-28
Payer: COMMERCIAL

## 2025-07-28 VITALS
DIASTOLIC BLOOD PRESSURE: 77 MMHG | WEIGHT: 210.56 LBS | BODY MASS INDEX: 35.04 KG/M2 | SYSTOLIC BLOOD PRESSURE: 118 MMHG

## 2025-07-28 DIAGNOSIS — E03.9 HYPOTHYROIDISM, UNSPECIFIED TYPE: ICD-10-CM

## 2025-07-28 DIAGNOSIS — Z3A.13 13 WEEKS GESTATION OF PREGNANCY: Primary | ICD-10-CM

## 2025-07-28 DIAGNOSIS — O09.523 MULTIGRAVIDA OF ADVANCED MATERNAL AGE IN THIRD TRIMESTER: ICD-10-CM

## 2025-07-28 DIAGNOSIS — Z36.2 ENCOUNTER FOR FOLLOW-UP ULTRASOUND OF FETAL ANATOMY: ICD-10-CM

## 2025-07-28 DIAGNOSIS — Z36.89 ENCOUNTER FOR FETAL ANATOMIC SURVEY: ICD-10-CM

## 2025-07-28 PROCEDURE — 99999 PR PBB SHADOW E&M-EST. PATIENT-LVL III: CPT | Mod: PBBFAC,,, | Performed by: OBSTETRICS & GYNECOLOGY

## 2025-07-28 PROCEDURE — 0502F SUBSEQUENT PRENATAL CARE: CPT | Mod: CPTII,S$GLB,, | Performed by: OBSTETRICS & GYNECOLOGY

## 2025-07-28 NOTE — PROGRESS NOTES
CC: f/u ob visit     HPI:   Sherlyn Monahan 35 y.o.    at [facility] is here for f/u OB visit.     OB history:  x2 and SAB      /77   Wt 95.5 kg (210 lb 8.6 oz)   LMP 2025 (Exact Date)   BMI 35.04 kg/m²     APPEARANCE: Well nourished, well developed, in no acute distress.    FHT normal           1. 13 weeks gestation of pregnancy        Plan:    1. IOB labs and dating US done   - consider MT21, pending price   - anatomy US scheduled  2.  Hypothyroidism: synthyroid 50mcg, treated with PCP and endocrinologist, recommend TSH <2.5   1st trim: 1.138   2nd trim ordered   3. Subchorionic hemorrhage: stable   4.. LV: continue cpap  5. Ulcerative colitis: continue f/u with GI   6. H/o migraines: on amitriptyline, not able to wean off,  stopped gabapentin; reglan and triptian PRN, follow with neurology   7. H/o SAB: Taking progesterone, discussed limited data and not been shown to prevent sab however not data showing harm, she would prefer to continue it.   8. H/o  x2   9. AMA: will discuss with GI if can do ASA   -MT21 ordered, waiting to find price     -32 week growth US   10. Obesity: starting BMI 35

## 2025-07-30 ENCOUNTER — PATIENT MESSAGE (OUTPATIENT)
Dept: ADMINISTRATIVE | Facility: OTHER | Age: 35
End: 2025-07-30
Payer: COMMERCIAL

## 2025-07-30 ENCOUNTER — PATIENT MESSAGE (OUTPATIENT)
Dept: OBSTETRICS AND GYNECOLOGY | Facility: CLINIC | Age: 35
End: 2025-07-30
Payer: COMMERCIAL

## 2025-08-04 ENCOUNTER — PATIENT MESSAGE (OUTPATIENT)
Facility: CLINIC | Age: 35
End: 2025-08-04
Payer: COMMERCIAL

## 2025-08-05 ENCOUNTER — LAB VISIT (OUTPATIENT)
Dept: LAB | Facility: HOSPITAL | Age: 35
End: 2025-08-05
Attending: OBSTETRICS & GYNECOLOGY
Payer: COMMERCIAL

## 2025-08-05 DIAGNOSIS — O09.523 MULTIGRAVIDA OF ADVANCED MATERNAL AGE IN THIRD TRIMESTER: ICD-10-CM

## 2025-08-05 DIAGNOSIS — Z36.0 ENCOUNTER FOR ANTENATAL SCREENING FOR CHROMOSOMAL ANOMALIES: ICD-10-CM

## 2025-08-05 DIAGNOSIS — E03.9 HYPOTHYROIDISM, UNSPECIFIED TYPE: ICD-10-CM

## 2025-08-05 LAB — TSH SERPL-ACNC: 1.22 UIU/ML (ref 0.4–4)

## 2025-08-05 PROCEDURE — 84443 ASSAY THYROID STIM HORMONE: CPT

## 2025-08-05 PROCEDURE — 36415 COLL VENOUS BLD VENIPUNCTURE: CPT

## 2025-08-06 ENCOUNTER — PATIENT MESSAGE (OUTPATIENT)
Dept: OBSTETRICS AND GYNECOLOGY | Facility: CLINIC | Age: 35
End: 2025-08-06
Payer: COMMERCIAL

## 2025-08-06 DIAGNOSIS — R39.9 UTI SYMPTOMS: Primary | ICD-10-CM

## 2025-08-07 ENCOUNTER — LAB VISIT (OUTPATIENT)
Dept: LAB | Facility: HOSPITAL | Age: 35
End: 2025-08-07
Attending: OBSTETRICS & GYNECOLOGY
Payer: COMMERCIAL

## 2025-08-07 DIAGNOSIS — R39.9 UTI SYMPTOMS: ICD-10-CM

## 2025-08-07 LAB
BILIRUB UR QL STRIP.AUTO: NEGATIVE
CLARITY UR: CLEAR
COLOR UR AUTO: COLORLESS
GLUCOSE UR QL STRIP: NEGATIVE
HGB UR QL STRIP: NEGATIVE
KETONES UR QL STRIP: NEGATIVE
LEUKOCYTE ESTERASE UR QL STRIP: NEGATIVE
NITRITE UR QL STRIP: NEGATIVE
PH UR STRIP: 8 [PH]
PROT UR QL STRIP: NEGATIVE
SP GR UR STRIP: <1.005
UROBILINOGEN UR STRIP-ACNC: NEGATIVE EU/DL

## 2025-08-07 PROCEDURE — 87086 URINE CULTURE/COLONY COUNT: CPT

## 2025-08-07 PROCEDURE — 81003 URINALYSIS AUTO W/O SCOPE: CPT

## 2025-08-07 RX ORDER — NITROFURANTOIN 25; 75 MG/1; MG/1
100 CAPSULE ORAL 2 TIMES DAILY
Qty: 14 CAPSULE | Refills: 0 | Status: SHIPPED | OUTPATIENT
Start: 2025-08-07 | End: 2025-08-14

## 2025-08-08 LAB — BACTERIA UR CULT: NORMAL

## 2025-08-10 ENCOUNTER — PATIENT MESSAGE (OUTPATIENT)
Dept: OBSTETRICS AND GYNECOLOGY | Facility: CLINIC | Age: 35
End: 2025-08-10
Payer: COMMERCIAL

## 2025-08-10 LAB
CFDNA.FET/CFDNA.TOTAL SFR FETUS: NORMAL %
CITATION REF LAB TEST: NORMAL
FET 13+18+21+X+Y ANEUP PLAS.CFDNA: NEGATIVE
FET CHR 21 TS PLAS.CFDNA QL: NEGATIVE
FET MS X RISK WBC.DNA+CFDNA QL: NOT DETECTED
FET SEX PLAS.CFDNA DOSAGE CFDNA: NORMAL
FET TS 13 RISK PLAS.CFDNA QL: NEGATIVE
FET TS 18 RISK WBC.DNA+CFDNA QL: NEGATIVE
FET X + Y ANEUP RISK PLAS.CFDNA SEQ-IMP: NOT DETECTED
GA EST FROM CONCEPTION DATE: NORMAL D
GESTATIONAL AGE > 9: YES
LAB DIRECTOR NAME PROVIDER: NORMAL
LAB DIRECTOR NAME PROVIDER: NORMAL
LABORATORY COMMENT REPORT: NORMAL
LIMITATIONS:: NORMAL
NEGATIVE PREDICTIVE VALUE: NORMAL
PERFORMANCE CHARACTERISTICS: NORMAL
POSITIVE PREDICTIVE VALUE: NORMAL
REF LAB TEST METHOD: NORMAL
SERVICE CMNT-IMP: NORMAL
TEST PERFORMANCE INFO SPEC: NORMAL

## 2025-08-13 ENCOUNTER — OFFICE VISIT (OUTPATIENT)
Facility: CLINIC | Age: 35
End: 2025-08-13
Payer: COMMERCIAL

## 2025-08-13 VITALS
DIASTOLIC BLOOD PRESSURE: 77 MMHG | BODY MASS INDEX: 35.53 KG/M2 | SYSTOLIC BLOOD PRESSURE: 118 MMHG | WEIGHT: 213.5 LBS | HEART RATE: 88 BPM

## 2025-08-13 DIAGNOSIS — G43.009 MIGRAINE WITHOUT AURA AND WITHOUT STATUS MIGRAINOSUS, NOT INTRACTABLE: Primary | ICD-10-CM

## 2025-08-13 DIAGNOSIS — M54.81 BILATERAL OCCIPITAL NEURALGIA: ICD-10-CM

## 2025-08-13 DIAGNOSIS — R51.9 INTRACTABLE EPISODIC HEADACHE, UNSPECIFIED HEADACHE TYPE: ICD-10-CM

## 2025-08-13 DIAGNOSIS — Z3A.16 16 WEEKS GESTATION OF PREGNANCY: ICD-10-CM

## 2025-08-13 PROCEDURE — 3074F SYST BP LT 130 MM HG: CPT | Mod: CPTII,S$GLB,, | Performed by: NURSE PRACTITIONER

## 2025-08-13 PROCEDURE — 64405 NJX AA&/STRD GR OCPL NRV: CPT | Mod: 50,S$GLB,, | Performed by: NURSE PRACTITIONER

## 2025-08-13 PROCEDURE — 64400 NJX AA&/STRD TRIGEMINAL NRV: CPT | Mod: 50,S$GLB,, | Performed by: NURSE PRACTITIONER

## 2025-08-13 PROCEDURE — 99214 OFFICE O/P EST MOD 30 MIN: CPT | Mod: 25,S$GLB,, | Performed by: NURSE PRACTITIONER

## 2025-08-13 PROCEDURE — 3078F DIAST BP <80 MM HG: CPT | Mod: CPTII,S$GLB,, | Performed by: NURSE PRACTITIONER

## 2025-08-13 PROCEDURE — 3008F BODY MASS INDEX DOCD: CPT | Mod: CPTII,S$GLB,, | Performed by: NURSE PRACTITIONER

## 2025-08-13 PROCEDURE — 99999 PR PBB SHADOW E&M-EST. PATIENT-LVL IV: CPT | Mod: PBBFAC,,, | Performed by: NURSE PRACTITIONER

## 2025-08-13 PROCEDURE — 1159F MED LIST DOCD IN RCRD: CPT | Mod: CPTII,S$GLB,, | Performed by: NURSE PRACTITIONER

## 2025-08-13 PROCEDURE — 1160F RVW MEDS BY RX/DR IN RCRD: CPT | Mod: CPTII,S$GLB,, | Performed by: NURSE PRACTITIONER

## 2025-08-13 RX ORDER — LIDOCAINE HYDROCHLORIDE 20 MG/ML
6 INJECTION, SOLUTION INFILTRATION; PERINEURAL
Status: COMPLETED | OUTPATIENT
Start: 2025-08-13 | End: 2025-08-13

## 2025-08-13 RX ORDER — AMITRIPTYLINE HYDROCHLORIDE 25 MG/1
25 TABLET, FILM COATED ORAL NIGHTLY
Qty: 30 TABLET | Refills: 2 | Status: SHIPPED | OUTPATIENT
Start: 2025-08-13

## 2025-08-13 RX ORDER — METHYLPREDNISOLONE ACETATE 40 MG/ML
40 INJECTION, SUSPENSION INTRA-ARTICULAR; INTRALESIONAL; INTRAMUSCULAR; SOFT TISSUE
Status: COMPLETED | OUTPATIENT
Start: 2025-08-13 | End: 2025-08-13

## 2025-08-13 RX ADMIN — LIDOCAINE HYDROCHLORIDE 6 ML: 20 INJECTION, SOLUTION INFILTRATION; PERINEURAL at 11:08

## 2025-08-13 RX ADMIN — METHYLPREDNISOLONE ACETATE 40 MG: 40 INJECTION, SUSPENSION INTRA-ARTICULAR; INTRALESIONAL; INTRAMUSCULAR; SOFT TISSUE at 11:08

## 2025-08-14 ENCOUNTER — PATIENT MESSAGE (OUTPATIENT)
Dept: OTHER | Facility: OTHER | Age: 35
End: 2025-08-14
Payer: COMMERCIAL

## 2025-08-14 ENCOUNTER — PATIENT MESSAGE (OUTPATIENT)
Dept: OBSTETRICS AND GYNECOLOGY | Facility: CLINIC | Age: 35
End: 2025-08-14
Payer: COMMERCIAL

## 2025-08-21 ENCOUNTER — PATIENT MESSAGE (OUTPATIENT)
Dept: OTHER | Facility: OTHER | Age: 35
End: 2025-08-21
Payer: COMMERCIAL

## 2025-08-25 ENCOUNTER — ROUTINE PRENATAL (OUTPATIENT)
Dept: OBSTETRICS AND GYNECOLOGY | Facility: CLINIC | Age: 35
End: 2025-08-25
Payer: COMMERCIAL

## 2025-08-25 VITALS
SYSTOLIC BLOOD PRESSURE: 127 MMHG | BODY MASS INDEX: 34.47 KG/M2 | WEIGHT: 207.13 LBS | DIASTOLIC BLOOD PRESSURE: 76 MMHG | HEART RATE: 91 BPM

## 2025-08-25 DIAGNOSIS — Z3A.17 17 WEEKS GESTATION OF PREGNANCY: Primary | ICD-10-CM

## 2025-08-25 DIAGNOSIS — K51.219 ULCERATIVE PROCTITIS WITH COMPLICATION: ICD-10-CM

## 2025-08-25 DIAGNOSIS — F41.8 DEPRESSION WITH ANXIETY: ICD-10-CM

## 2025-08-25 DIAGNOSIS — E03.9 ACQUIRED HYPOTHYROIDISM: ICD-10-CM

## 2025-08-25 LAB
BILIRUB SERPL-MCNC: NORMAL MG/DL
BLOOD URINE, POC: NORMAL
CLARITY, POC UA: CLEAR
COLOR, POC UA: YELLOW
GLUCOSE UR QL STRIP: NORMAL
KETONES UR QL STRIP: NORMAL
LEUKOCYTE ESTERASE URINE, POC: NORMAL
NITRITE, POC UA: NORMAL
PH, POC UA: 6.5
PROTEIN, POC: NORMAL
SPECIFIC GRAVITY, POC UA: 1.01
UROBILINOGEN, POC UA: 0.2

## 2025-08-25 PROCEDURE — 0502F SUBSEQUENT PRENATAL CARE: CPT | Mod: CPTII,S$GLB,, | Performed by: OBSTETRICS & GYNECOLOGY

## 2025-08-25 PROCEDURE — 99999 PR PBB SHADOW E&M-EST. PATIENT-LVL II: CPT | Mod: PBBFAC,,, | Performed by: OBSTETRICS & GYNECOLOGY

## 2025-08-25 RX ORDER — SERTRALINE HYDROCHLORIDE 50 MG/1
50 TABLET, FILM COATED ORAL NIGHTLY
Qty: 90 TABLET | Refills: 3 | Status: SHIPPED | OUTPATIENT
Start: 2025-08-25

## 2025-08-25 RX ORDER — LEVOTHYROXINE SODIUM 50 UG/1
50 TABLET ORAL DAILY
Qty: 90 TABLET | Refills: 3 | Status: SHIPPED | OUTPATIENT
Start: 2025-08-25

## 2025-08-26 ENCOUNTER — TELEPHONE (OUTPATIENT)
Dept: MATERNAL FETAL MEDICINE | Facility: CLINIC | Age: 35
End: 2025-08-26
Payer: COMMERCIAL

## (undated) DEVICE — DRESSING COVER AQUACEL AG SURG

## (undated) DEVICE — STOCKINETTE DBL PLY ST 4X

## (undated) DEVICE — DRAPE SURGICAL STERI IRRG PCH

## (undated) DEVICE — SUT MCRYL PLUS 4-0 PS2 27IN

## (undated) DEVICE — PAD PREP CUFFED NS 24X48IN

## (undated) DEVICE — GLOVE PI ULTRA TOUCH G SURGEON

## (undated) DEVICE — DRESSING TEGADERM 2 3/8 X 2.75

## (undated) DEVICE — SOL IRR NACL .9% 3000ML

## (undated) DEVICE — CONTAINER MULTIPURPOSE/SPECIME

## (undated) DEVICE — Device

## (undated) DEVICE — SUT 4-0 ETHILON 18 PS-2

## (undated) DEVICE — SUT VICRYL 4-0 ANTIBACT

## (undated) DEVICE — MANIFOLD 4 PORT

## (undated) DEVICE — GLOVE BIOGEL SKINSENSE PI 6.0

## (undated) DEVICE — SCISSOR 5MMX35CM DIRECT DRIVE

## (undated) DEVICE — PANTIES FEMININE NAPKIN LG/XLG

## (undated) DEVICE — DRESSING XEROFORM FOIL PK 1X8

## (undated) DEVICE — SLING ARM LARGE FOAM STRAP

## (undated) DEVICE — GOWN POLY REINF BRTH SLV XL

## (undated) DEVICE — TROCAR ENDOPATH XCEL 5X75MM

## (undated) DEVICE — VACURETTE 7MM CURVED

## (undated) DEVICE — CATH URETHRAL RED 16FR

## (undated) DEVICE — SUT VICRYL 4-0 RB1 27IN UD

## (undated) DEVICE — FILTER DISPOSABLE

## (undated) DEVICE — PAD CAST 2 IN X 4YDS STERILE

## (undated) DEVICE — APPLICATOR STRL COT 2INNR 6IN

## (undated) DEVICE — UNDERPAD ULTRASORB 300LB 30X36

## (undated) DEVICE — TUBING SUC UNIV W/CONN 12FT

## (undated) DEVICE — DRESSING AQUACEL AG ADV 3.5X12

## (undated) DEVICE — PAD CNTOUR SUP-ABSRB POSTPRTM

## (undated) DEVICE — DRAPE STERI-DRAPE 1000 17X11IN

## (undated) DEVICE — TROCAR ENDOPATH XCEL 11MM 10CM

## (undated) DEVICE — TROCAR ENDOPATH XCEL 5MM 7.5CM

## (undated) DEVICE — BANDAGE MATRIX HK LOOP 2IN 5YD

## (undated) DEVICE — OS LOCATOR

## (undated) DEVICE — VACURETTE 8MM CURVED

## (undated) DEVICE — SAFE TOUCH COLLECTION SYS

## (undated) DEVICE — SUT 0 VICRYL / UR6 (J603)

## (undated) DEVICE — NDL HYPO REG 25G X 1 1/2

## (undated) DEVICE — DRAPE PLASTIC U 60X72

## (undated) DEVICE — COVER OVERHEAD SURG LT BLUE

## (undated) DEVICE — SEE MEDLINE ITEM 154981

## (undated) DEVICE — STRIP STERI REIN CLSR 1/2X2IN

## (undated) DEVICE — ELECTRODE REM PLYHSV RETURN 9

## (undated) DEVICE — BAG TISS RETRV MONARCH 10MM

## (undated) DEVICE — SUT MONOCRYL 4-0 PS-2

## (undated) DEVICE — IRRIGATOR ENDOSCOPY DISP.

## (undated) DEVICE — TOURNIQUET SB QC DP 18X4IN

## (undated) DEVICE — SPLINT PLASTER FAST SET 5X30IN

## (undated) DEVICE — KIT ENDO CARPEL TUNNAL SINGLE

## (undated) DEVICE — SEE L#120831

## (undated) DEVICE — KIT ANTIFOG W/SPONG & FLUID

## (undated) DEVICE — HANDLE CURETTE W/TUBING

## (undated) DEVICE — GOWN POLY REINF BRTH SLV LG

## (undated) DEVICE — GLOVE PROTEXIS HYDROGEL SZ7

## (undated) DEVICE — UNDERGLOVES BIOGEL PI SIZE 8

## (undated) DEVICE — SPONGE DERMA 8PLY 2X2

## (undated) DEVICE — GLOVE BIOGEL PI MICRO SZ 7.5

## (undated) DEVICE — SUPPORT ULNA NERVE PROTECTOR

## (undated) DEVICE — APPLIER CLIP EPIX UNIV 5X34

## (undated) DEVICE — GAUZE SPONGE 4X4 12PLY

## (undated) DEVICE — SEE MEDLINE ITEM 156952

## (undated) DEVICE — TOWEL OR XRAY BLUE 17X26IN

## (undated) DEVICE — DRESSING TELFA N ADH 3X8

## (undated) DEVICE — PACK SURGERY START